# Patient Record
Sex: FEMALE | Race: BLACK OR AFRICAN AMERICAN | NOT HISPANIC OR LATINO | URBAN - METROPOLITAN AREA
[De-identification: names, ages, dates, MRNs, and addresses within clinical notes are randomized per-mention and may not be internally consistent; named-entity substitution may affect disease eponyms.]

---

## 2018-06-08 ENCOUNTER — EMERGENCY (EMERGENCY)
Facility: HOSPITAL | Age: 83
LOS: 0 days | Discharge: HOME | End: 2018-06-09
Attending: EMERGENCY MEDICINE
Payer: COMMERCIAL

## 2018-06-08 VITALS
WEIGHT: 139.99 LBS | HEIGHT: 65 IN | RESPIRATION RATE: 17 BRPM | HEART RATE: 61 BPM | SYSTOLIC BLOOD PRESSURE: 123 MMHG | OXYGEN SATURATION: 100 % | DIASTOLIC BLOOD PRESSURE: 72 MMHG | TEMPERATURE: 97 F

## 2018-06-08 DIAGNOSIS — I25.2 OLD MYOCARDIAL INFARCTION: ICD-10-CM

## 2018-06-08 DIAGNOSIS — I71.4 ABDOMINAL AORTIC ANEURYSM, WITHOUT RUPTURE: ICD-10-CM

## 2018-06-08 DIAGNOSIS — K59.00 CONSTIPATION, UNSPECIFIED: ICD-10-CM

## 2018-06-08 DIAGNOSIS — E78.5 HYPERLIPIDEMIA, UNSPECIFIED: ICD-10-CM

## 2018-06-08 DIAGNOSIS — I25.10 ATHEROSCLEROTIC HEART DISEASE OF NATIVE CORONARY ARTERY WITHOUT ANGINA PECTORIS: ICD-10-CM

## 2018-06-08 DIAGNOSIS — I10 ESSENTIAL (PRIMARY) HYPERTENSION: ICD-10-CM

## 2018-06-08 PROCEDURE — 74018 RADEX ABDOMEN 1 VIEW: CPT | Mod: 26

## 2018-06-08 PROCEDURE — 99283 EMERGENCY DEPT VISIT LOW MDM: CPT

## 2018-06-08 RX ORDER — POLYETHYLENE GLYCOL 3350 17 G/17G
17 POWDER, FOR SOLUTION ORAL ONCE
Qty: 0 | Refills: 0 | Status: COMPLETED | OUTPATIENT
Start: 2018-06-08 | End: 2018-06-08

## 2018-06-08 RX ORDER — SENNA PLUS 8.6 MG/1
2 TABLET ORAL ONCE
Qty: 0 | Refills: 0 | Status: COMPLETED | OUTPATIENT
Start: 2018-06-08 | End: 2018-06-08

## 2018-06-08 RX ORDER — DOCUSATE SODIUM 100 MG
100 CAPSULE ORAL ONCE
Qty: 0 | Refills: 0 | Status: COMPLETED | OUTPATIENT
Start: 2018-06-08 | End: 2018-06-08

## 2018-06-08 RX ADMIN — Medication 100 MILLIGRAM(S): at 22:27

## 2018-06-08 RX ADMIN — Medication 1 ENEMA: at 22:27

## 2018-06-08 RX ADMIN — POLYETHYLENE GLYCOL 3350 17 GRAM(S): 17 POWDER, FOR SOLUTION ORAL at 22:27

## 2018-06-08 RX ADMIN — SENNA PLUS 2 TABLET(S): 8.6 TABLET ORAL at 22:27

## 2018-06-08 NOTE — ED ADULT NURSE NOTE - PMH
AAA (abdominal aortic aneurysm)    Benign essential HTN    CAD (coronary artery disease)    HLD (hyperlipidemia)    Myocardial infarction

## 2018-06-08 NOTE — ED ADULT TRIAGE NOTE - NS ED TRIAGE AVPU SCALE
Alert-The patient is alert, awake and responds to voice. The patient is oriented to time, place, and person. The triage nurse is able to obtain subjective information. Hatchet Flap Text: The defect edges were debeveled with a #15 scalpel blade.  Given the location of the defect, shape of the defect and the proximity to free margins a hatchet flap was deemed most appropriate.  Using a sterile surgical marker, an appropriate hatchet flap was drawn incorporating the defect and placing the expected incisions within the relaxed skin tension lines where possible.    The area thus outlined was incised deep to adipose tissue with a #15 scalpel blade.  The skin margins were undermined to an appropriate distance in all directions utilizing iris scissors.

## 2018-06-08 NOTE — ED ADULT TRIAGE NOTE - PRO INTERPRETER NEED 2
09/12/17 1600   Psycho Education   Type of Intervention structured groups   Response unavailable   Hours 1   Treatment Detail dual group    Pt did not participate in dual group.    English

## 2018-06-09 VITALS
SYSTOLIC BLOOD PRESSURE: 152 MMHG | DIASTOLIC BLOOD PRESSURE: 86 MMHG | HEART RATE: 60 BPM | TEMPERATURE: 98 F | RESPIRATION RATE: 17 BRPM | OXYGEN SATURATION: 100 %

## 2018-06-09 NOTE — ED PROVIDER NOTE - MEDICAL DECISION MAKING DETAILS
Ddx: Constipation/ no vomiting, or abd pain to suggest SBO  Plan: KUB, senna, colace, fleet enema, possible manual disimpaction, reassess

## 2018-06-09 NOTE — ED PROVIDER NOTE - PROGRESS NOTE DETAILS
Pt had slight bowel movement with fleet enema. Manual disimpaction revealed dry and inspissated stool. Discomfort relieved, pt eager for discharge. KUB suggestive of high stool burden.

## 2018-06-09 NOTE — ED PROVIDER NOTE - OBJECTIVE STATEMENT
Pt is a 84 yo lady with a pmhx of HTN, HL, CAD, AAA sp repair who presents to the ED with 2 wks constipation. Has had prior constipation issues in the past, usually resolved with fleet enema, but this time did not resolve. No n/v/d, no chest pain, no sob. No abdominal pain. Normally goes once every day to 2 days. Not on bowel regimen. No hx of SBO.

## 2018-08-01 ENCOUNTER — INPATIENT (INPATIENT)
Facility: HOSPITAL | Age: 83
LOS: 7 days | Discharge: NURSING, REHAB OR LONG-TERM CARE | End: 2018-08-09
Attending: INTERNAL MEDICINE | Admitting: INTERNAL MEDICINE
Payer: COMMERCIAL

## 2018-08-01 VITALS
SYSTOLIC BLOOD PRESSURE: 117 MMHG | OXYGEN SATURATION: 99 % | HEIGHT: 69 IN | TEMPERATURE: 98 F | RESPIRATION RATE: 18 BRPM | WEIGHT: 139.99 LBS | HEART RATE: 86 BPM | DIASTOLIC BLOOD PRESSURE: 77 MMHG

## 2018-08-01 LAB
ALBUMIN SERPL ELPH-MCNC: 2.5 G/DL — LOW (ref 3.3–5)
ALP SERPL-CCNC: 75 U/L — SIGNIFICANT CHANGE UP (ref 40–120)
ALT FLD-CCNC: 8 U/L — LOW (ref 12–78)
ANION GAP SERPL CALC-SCNC: 17 MMOL/L — SIGNIFICANT CHANGE UP (ref 5–17)
APTT BLD: 27.6 SEC — SIGNIFICANT CHANGE UP (ref 27.5–37.4)
AST SERPL-CCNC: 22 U/L — SIGNIFICANT CHANGE UP (ref 15–37)
BASOPHILS # BLD AUTO: 0.03 K/UL — SIGNIFICANT CHANGE UP (ref 0–0.2)
BASOPHILS NFR BLD AUTO: 0.2 % — SIGNIFICANT CHANGE UP (ref 0–2)
BILIRUB SERPL-MCNC: 0.7 MG/DL — SIGNIFICANT CHANGE UP (ref 0.2–1.2)
BLD GP AB SCN SERPL QL: SIGNIFICANT CHANGE UP
BUN SERPL-MCNC: 55 MG/DL — HIGH (ref 7–23)
CALCIUM SERPL-MCNC: 9.1 MG/DL — SIGNIFICANT CHANGE UP (ref 8.5–10.1)
CHLORIDE SERPL-SCNC: 98 MMOL/L — SIGNIFICANT CHANGE UP (ref 96–108)
CK MB BLD-MCNC: <3.1 % — SIGNIFICANT CHANGE UP (ref 0–3.5)
CK MB CFR SERPL CALC: <1 NG/ML — SIGNIFICANT CHANGE UP (ref 0.5–3.6)
CK SERPL-CCNC: 32 U/L — SIGNIFICANT CHANGE UP (ref 26–192)
CO2 SERPL-SCNC: 24 MMOL/L — SIGNIFICANT CHANGE UP (ref 22–31)
CREAT SERPL-MCNC: 1.41 MG/DL — HIGH (ref 0.5–1.3)
EOSINOPHIL # BLD AUTO: 0.02 K/UL — SIGNIFICANT CHANGE UP (ref 0–0.5)
EOSINOPHIL NFR BLD AUTO: 0.1 % — SIGNIFICANT CHANGE UP (ref 0–6)
GLUCOSE SERPL-MCNC: 80 MG/DL — SIGNIFICANT CHANGE UP (ref 70–99)
HCT VFR BLD CALC: 35.9 % — SIGNIFICANT CHANGE UP (ref 34.5–45)
HGB BLD-MCNC: 11.5 G/DL — SIGNIFICANT CHANGE UP (ref 11.5–15.5)
IMM GRANULOCYTES NFR BLD AUTO: 0.8 % — SIGNIFICANT CHANGE UP (ref 0–1.5)
INR BLD: 1.12 RATIO — SIGNIFICANT CHANGE UP (ref 0.88–1.16)
LACTATE SERPL-SCNC: 1.4 MMOL/L — SIGNIFICANT CHANGE UP (ref 0.7–2)
LIDOCAIN IGE QN: 70 U/L — LOW (ref 73–393)
LYMPHOCYTES # BLD AUTO: 1.23 K/UL — SIGNIFICANT CHANGE UP (ref 1–3.3)
LYMPHOCYTES # BLD AUTO: 8.8 % — LOW (ref 13–44)
MCHC RBC-ENTMCNC: 28 PG — SIGNIFICANT CHANGE UP (ref 27–34)
MCHC RBC-ENTMCNC: 32 GM/DL — SIGNIFICANT CHANGE UP (ref 32–36)
MCV RBC AUTO: 87.6 FL — SIGNIFICANT CHANGE UP (ref 80–100)
MONOCYTES # BLD AUTO: 1.68 K/UL — HIGH (ref 0–0.9)
MONOCYTES NFR BLD AUTO: 12 % — SIGNIFICANT CHANGE UP (ref 2–14)
NEUTROPHILS # BLD AUTO: 10.96 K/UL — HIGH (ref 1.8–7.4)
NEUTROPHILS NFR BLD AUTO: 78.1 % — HIGH (ref 43–77)
NRBC # BLD: 0 /100 WBCS — SIGNIFICANT CHANGE UP (ref 0–0)
OB PNL STL: POSITIVE
OB PNL STL: POSITIVE
PLATELET # BLD AUTO: 310 K/UL — SIGNIFICANT CHANGE UP (ref 150–400)
POTASSIUM SERPL-MCNC: 3.5 MMOL/L — SIGNIFICANT CHANGE UP (ref 3.5–5.3)
POTASSIUM SERPL-SCNC: 3.5 MMOL/L — SIGNIFICANT CHANGE UP (ref 3.5–5.3)
PROT SERPL-MCNC: 7.9 GM/DL — SIGNIFICANT CHANGE UP (ref 6–8.3)
PROTHROM AB SERPL-ACNC: 12.2 SEC — SIGNIFICANT CHANGE UP (ref 9.8–12.7)
RBC # BLD: 4.1 M/UL — SIGNIFICANT CHANGE UP (ref 3.8–5.2)
RBC # FLD: 14 % — SIGNIFICANT CHANGE UP (ref 10.3–14.5)
SODIUM SERPL-SCNC: 139 MMOL/L — SIGNIFICANT CHANGE UP (ref 135–145)
TROPONIN I SERPL-MCNC: <.015 NG/ML — SIGNIFICANT CHANGE UP (ref 0.01–0.04)
WBC # BLD: 14.03 K/UL — HIGH (ref 3.8–10.5)
WBC # FLD AUTO: 14.03 K/UL — HIGH (ref 3.8–10.5)

## 2018-08-01 PROCEDURE — 71045 X-RAY EXAM CHEST 1 VIEW: CPT | Mod: 26

## 2018-08-01 PROCEDURE — 99285 EMERGENCY DEPT VISIT HI MDM: CPT

## 2018-08-01 PROCEDURE — 74174 CTA ABD&PLVS W/CONTRAST: CPT | Mod: 26

## 2018-08-01 RX ORDER — SODIUM CHLORIDE 9 MG/ML
3 INJECTION INTRAMUSCULAR; INTRAVENOUS; SUBCUTANEOUS ONCE
Qty: 0 | Refills: 0 | Status: COMPLETED | OUTPATIENT
Start: 2018-08-01 | End: 2018-08-01

## 2018-08-01 RX ORDER — SODIUM CHLORIDE 9 MG/ML
1000 INJECTION INTRAMUSCULAR; INTRAVENOUS; SUBCUTANEOUS ONCE
Qty: 0 | Refills: 0 | Status: COMPLETED | OUTPATIENT
Start: 2018-08-01 | End: 2018-08-01

## 2018-08-01 RX ADMIN — SODIUM CHLORIDE 1000 MILLILITER(S): 9 INJECTION INTRAMUSCULAR; INTRAVENOUS; SUBCUTANEOUS at 23:12

## 2018-08-01 RX ADMIN — SODIUM CHLORIDE 1000 MILLILITER(S): 9 INJECTION INTRAMUSCULAR; INTRAVENOUS; SUBCUTANEOUS at 21:30

## 2018-08-01 RX ADMIN — SODIUM CHLORIDE 3 MILLILITER(S): 9 INJECTION INTRAMUSCULAR; INTRAVENOUS; SUBCUTANEOUS at 21:30

## 2018-08-01 NOTE — ED ADULT NURSE NOTE - NSIMPLEMENTINTERV_GEN_ALL_ED
Implemented All Fall Risk Interventions:  [place] to call system. Call bell, personal items and telephone within reach. Instruct patient to call for assistance. Room bathroom lighting operational. Non-slip footwear when patient is off stretcher. Physically safe environment: no spills, clutter or unnecessary equipment. Stretcher in lowest position, wheels locked, appropriate side rails in place. Provide visual cue, wrist band, yellow gown, etc. Monitor gait and stability. Monitor for mental status changes and reorient to person, place, and time. Review medications for side effects contributing to fall risk. Reinforce activity limits and safety measures with patient and family.

## 2018-08-01 NOTE — ED PROVIDER NOTE - MEDICAL DECISION MAKING DETAILS
pt here with bloody stool and abdominal discomfort. will check labs, guaic, CT abd, likely from constipation, will give fleet enema

## 2018-08-01 NOTE — ED PROVIDER NOTE - PROGRESS NOTE DETAILS
endorsed by dr peña pending admission. CT with ++ diffuse constipation. Pt with drop in Hb but was given fluids. no acvtive BRB, but positive occult. disimpacted by dr peña but only soft small amount of stool with constant filling of vault. admit for serial disimpaction, hydration and monitoring of gi bleed. dr. wynn aware for admission.

## 2018-08-01 NOTE — ED PROVIDER NOTE - OBJECTIVE STATEMENT
84 y/o female with PMH HTN, HLD, CAD, AAA s/p repair here c/o bloody stool since last night. pt is poor historian. states she noticed there was bright red blood mixed in with her stool as well as abdominal discomfort, so her son called the ambulance. pt states she never had colonoscopy before. no fevers. no sick contacts. no recent travel. no vomiting.    ROS: No fever/chills. No eye pain/changes in vision, No ear pain/sore throat/dysphagia, No chest pain/palpitations. No SOB/cough/. ++ abdominal pain, no N/V/D, ++bloody bm. No dysuria/frequency/discharge, No headache. No Dizziness.    No rashes or breaks in skin. No numbness/tingling/weakness.

## 2018-08-01 NOTE — ED ADULT NURSE NOTE - ED STAT RN HANDOFF DETAILS
report given   no bleeding   vitals stable report given   no bleeding   vitals stable  patient had large loose brown bowel movement   made clean and dry

## 2018-08-01 NOTE — ED PROVIDER NOTE - ATTENDING CONTRIBUTION TO CARE
labs and imaging reviewed. Patient received ivfs and 2 fleet enemas. Patient also disimpacted with stool still in the vault. Patient pending admission. Patient care transitioned to incoming team.  All decisions regarding the progression of care will be made at their discretion.    Attending Physician: I have personally seen and examined the patient. I agree with the above history, physical and plan which I have reviewed and edited as appropriate.

## 2018-08-01 NOTE — ED ADULT TRIAGE NOTE - CHIEF COMPLAINT QUOTE
BIBA for 1 episode of bloody last night, stool mixed with stool, denies sob, goodwin, n/v/d, pain, cp.

## 2018-08-01 NOTE — ED PROVIDER NOTE - PHYSICAL EXAMINATION
Gen: Alert, NAD  Head: NC, AT, PERRL, EOMI, normal lids/conjunctiva  ENT: patent oropharynx without erythema/exudate, uvula midline  Neck: +supple, no tenderness/meningismus/JVD, +Trachea midline  Pulm: Bilateral BS, normal resp effort, no wheeze/stridor/retractions  CV: RRR, no M/R/G, +dist pulses  Abd: soft, ND, +BS, generalzied tenderness  rectal: no hemorrhoids, +impacted stool   Mskel: no edema/erythema/cyanosis  Skin: no rash  Neuro: AAOx3,

## 2018-08-02 DIAGNOSIS — I10 ESSENTIAL (PRIMARY) HYPERTENSION: ICD-10-CM

## 2018-08-02 DIAGNOSIS — K52.9 NONINFECTIVE GASTROENTERITIS AND COLITIS, UNSPECIFIED: ICD-10-CM

## 2018-08-02 DIAGNOSIS — I25.10 ATHEROSCLEROTIC HEART DISEASE OF NATIVE CORONARY ARTERY WITHOUT ANGINA PECTORIS: ICD-10-CM

## 2018-08-02 DIAGNOSIS — E78.5 HYPERLIPIDEMIA, UNSPECIFIED: ICD-10-CM

## 2018-08-02 DIAGNOSIS — K92.2 GASTROINTESTINAL HEMORRHAGE, UNSPECIFIED: ICD-10-CM

## 2018-08-02 LAB
BASOPHILS # BLD AUTO: 0.04 K/UL — SIGNIFICANT CHANGE UP (ref 0–0.2)
BASOPHILS NFR BLD AUTO: 0.3 % — SIGNIFICANT CHANGE UP (ref 0–2)
EOSINOPHIL # BLD AUTO: 0.03 K/UL — SIGNIFICANT CHANGE UP (ref 0–0.5)
EOSINOPHIL NFR BLD AUTO: 0.2 % — SIGNIFICANT CHANGE UP (ref 0–6)
HCT VFR BLD CALC: 32.2 % — LOW (ref 34.5–45)
HCT VFR BLD CALC: 32.9 % — LOW (ref 34.5–45)
HCT VFR BLD CALC: 33.1 % — LOW (ref 34.5–45)
HGB BLD-MCNC: 10.5 G/DL — LOW (ref 11.5–15.5)
HGB BLD-MCNC: 10.6 G/DL — LOW (ref 11.5–15.5)
HGB BLD-MCNC: 10.7 G/DL — LOW (ref 11.5–15.5)
IMM GRANULOCYTES NFR BLD AUTO: 1 % — SIGNIFICANT CHANGE UP (ref 0–1.5)
LYMPHOCYTES # BLD AUTO: 1.09 K/UL — SIGNIFICANT CHANGE UP (ref 1–3.3)
LYMPHOCYTES # BLD AUTO: 8.1 % — LOW (ref 13–44)
MCHC RBC-ENTMCNC: 28.5 PG — SIGNIFICANT CHANGE UP (ref 27–34)
MCHC RBC-ENTMCNC: 28.5 PG — SIGNIFICANT CHANGE UP (ref 27–34)
MCHC RBC-ENTMCNC: 28.7 PG — SIGNIFICANT CHANGE UP (ref 27–34)
MCHC RBC-ENTMCNC: 32 GM/DL — SIGNIFICANT CHANGE UP (ref 32–36)
MCHC RBC-ENTMCNC: 32.5 GM/DL — SIGNIFICANT CHANGE UP (ref 32–36)
MCHC RBC-ENTMCNC: 32.6 GM/DL — SIGNIFICANT CHANGE UP (ref 32–36)
MCV RBC AUTO: 87.5 FL — SIGNIFICANT CHANGE UP (ref 80–100)
MCV RBC AUTO: 88 FL — SIGNIFICANT CHANGE UP (ref 80–100)
MCV RBC AUTO: 89 FL — SIGNIFICANT CHANGE UP (ref 80–100)
MONOCYTES # BLD AUTO: 1.85 K/UL — HIGH (ref 0–0.9)
MONOCYTES NFR BLD AUTO: 13.8 % — SIGNIFICANT CHANGE UP (ref 2–14)
NEUTROPHILS # BLD AUTO: 10.28 K/UL — HIGH (ref 1.8–7.4)
NEUTROPHILS NFR BLD AUTO: 76.6 % — SIGNIFICANT CHANGE UP (ref 43–77)
NRBC # BLD: 0 /100 WBCS — SIGNIFICANT CHANGE UP (ref 0–0)
PLATELET # BLD AUTO: 275 K/UL — SIGNIFICANT CHANGE UP (ref 150–400)
PLATELET # BLD AUTO: 293 K/UL — SIGNIFICANT CHANGE UP (ref 150–400)
PLATELET # BLD AUTO: 306 K/UL — SIGNIFICANT CHANGE UP (ref 150–400)
RBC # BLD: 3.66 M/UL — LOW (ref 3.8–5.2)
RBC # BLD: 3.72 M/UL — LOW (ref 3.8–5.2)
RBC # BLD: 3.76 M/UL — LOW (ref 3.8–5.2)
RBC # FLD: 14.1 % — SIGNIFICANT CHANGE UP (ref 10.3–14.5)
RBC # FLD: 14.2 % — SIGNIFICANT CHANGE UP (ref 10.3–14.5)
RBC # FLD: 14.2 % — SIGNIFICANT CHANGE UP (ref 10.3–14.5)
WBC # BLD: 11.49 K/UL — HIGH (ref 3.8–10.5)
WBC # BLD: 13.42 K/UL — HIGH (ref 3.8–10.5)
WBC # BLD: 13.76 K/UL — HIGH (ref 3.8–10.5)
WBC # FLD AUTO: 11.49 K/UL — HIGH (ref 3.8–10.5)
WBC # FLD AUTO: 13.42 K/UL — HIGH (ref 3.8–10.5)
WBC # FLD AUTO: 13.76 K/UL — HIGH (ref 3.8–10.5)

## 2018-08-02 PROCEDURE — 93010 ELECTROCARDIOGRAM REPORT: CPT

## 2018-08-02 PROCEDURE — 99223 1ST HOSP IP/OBS HIGH 75: CPT

## 2018-08-02 RX ORDER — SODIUM CHLORIDE 9 MG/ML
1000 INJECTION INTRAMUSCULAR; INTRAVENOUS; SUBCUTANEOUS
Qty: 0 | Refills: 0 | Status: DISCONTINUED | OUTPATIENT
Start: 2018-08-02 | End: 2018-08-03

## 2018-08-02 RX ORDER — SIMVASTATIN 20 MG/1
20 TABLET, FILM COATED ORAL AT BEDTIME
Qty: 0 | Refills: 0 | Status: DISCONTINUED | OUTPATIENT
Start: 2018-08-02 | End: 2018-08-09

## 2018-08-02 RX ORDER — MULTIVIT WITH MIN/MFOLATE/K2 340-15/3 G
290 POWDER (GRAM) ORAL ONCE
Qty: 0 | Refills: 0 | Status: COMPLETED | OUTPATIENT
Start: 2018-08-02 | End: 2018-08-02

## 2018-08-02 RX ORDER — PANTOPRAZOLE SODIUM 20 MG/1
40 TABLET, DELAYED RELEASE ORAL EVERY 12 HOURS
Qty: 0 | Refills: 0 | Status: DISCONTINUED | OUTPATIENT
Start: 2018-08-02 | End: 2018-08-09

## 2018-08-02 RX ORDER — METRONIDAZOLE 500 MG
500 TABLET ORAL EVERY 8 HOURS
Qty: 0 | Refills: 0 | Status: DISCONTINUED | OUTPATIENT
Start: 2018-08-02 | End: 2018-08-09

## 2018-08-02 RX ORDER — CIPROFLOXACIN LACTATE 400MG/40ML
200 VIAL (ML) INTRAVENOUS EVERY 12 HOURS
Qty: 0 | Refills: 0 | Status: DISCONTINUED | OUTPATIENT
Start: 2018-08-02 | End: 2018-08-09

## 2018-08-02 RX ORDER — CIPROFLOXACIN LACTATE 400MG/40ML
VIAL (ML) INTRAVENOUS
Qty: 0 | Refills: 0 | Status: DISCONTINUED | OUTPATIENT
Start: 2018-08-02 | End: 2018-08-09

## 2018-08-02 RX ORDER — CIPROFLOXACIN LACTATE 400MG/40ML
400 VIAL (ML) INTRAVENOUS ONCE
Qty: 0 | Refills: 0 | Status: COMPLETED | OUTPATIENT
Start: 2018-08-02 | End: 2018-08-02

## 2018-08-02 RX ADMIN — Medication 200 MILLIGRAM(S): at 13:01

## 2018-08-02 RX ADMIN — Medication 1 ENEMA: at 00:10

## 2018-08-02 RX ADMIN — Medication 290 MILLILITER(S): at 17:19

## 2018-08-02 RX ADMIN — Medication 100 MILLIGRAM(S): at 23:38

## 2018-08-02 RX ADMIN — SODIUM CHLORIDE 1000 MILLILITER(S): 9 INJECTION INTRAMUSCULAR; INTRAVENOUS; SUBCUTANEOUS at 00:11

## 2018-08-02 RX ADMIN — SIMVASTATIN 20 MILLIGRAM(S): 20 TABLET, FILM COATED ORAL at 22:35

## 2018-08-02 RX ADMIN — SODIUM CHLORIDE 80 MILLILITER(S): 9 INJECTION INTRAMUSCULAR; INTRAVENOUS; SUBCUTANEOUS at 11:50

## 2018-08-02 RX ADMIN — Medication 100 MILLIGRAM(S): at 13:02

## 2018-08-02 RX ADMIN — Medication 100 MILLIGRAM(S): at 22:34

## 2018-08-02 RX ADMIN — SODIUM CHLORIDE 1000 MILLILITER(S): 9 INJECTION INTRAMUSCULAR; INTRAVENOUS; SUBCUTANEOUS at 02:12

## 2018-08-02 RX ADMIN — Medication 1 ENEMA: at 05:12

## 2018-08-02 RX ADMIN — PANTOPRAZOLE SODIUM 40 MILLIGRAM(S): 20 TABLET, DELAYED RELEASE ORAL at 17:20

## 2018-08-02 NOTE — ED ADULT NURSE REASSESSMENT NOTE - NS ED NURSE REASSESS COMMENT FT1
Spoke with son "Jose Alberto Chester" cell phone = [phone] home phone [phone]. reports patient had blood from rectum rather than bloody stool. Reports low dose aspiring but no blood thinners.
received patient resting no distress  vitals stable  no active bleeding noted

## 2018-08-02 NOTE — H&P ADULT - NSHPPHYSICALEXAM_GEN_ALL_CORE
GENERAL: NAD well-developed  HEAD:  Atraumatic, Normocephalic  EYES: EOMI, PERRLA, conjunctiva and sclera clear  ENMT: No tonsillar erythema, exudates, or enlargement; Moist mucous membranes, Good dentition, No lesions  NECK: Supple, No JVD, Normal thyroid  NERVOUS SYSTEM:  Alert & Oriented X3, Good concentration; Motor Strength 5/5 B/L upper and lower extremities; DTRs 2+ intact and symmetric  CHEST/LUNG: Clear to percussion bilaterally; No rales, rhonchi, wheezing, or rubs  HEART: Regular rate and rhythm; No murmurs, rubs, or gallops  ABDOMEN: Soft, Nontender, Nondistended; Bowel sounds present  EXTREMITIES:  2+ Peripheral Pulses, No clubbing, cyanosis, or edema  LYMPH: No lymphadenopathy   SKIN: No rashes or lesions GENERAL: NAD well-developed  HEAD:  Atraumatic, Normocephalic  EYES: EOMI, PERRLA, conjunctiva and sclera clear  ENMT: No tonsillar erythema, exudates, or enlargement; Moist mucous membranes, Good dentition, No lesions  NECK: Supple, No JVD, Normal thyroid  NERVOUS SYSTEM:  Alert & Oriented X3, Good concentration; Motor Strength 5/5 B/L upper and lower extremities; DTRs 2+ intact and symmetric  CHEST/LUNG: Clear to percussion bilaterally; No rales, rhonchi, wheezing, or rubs  HEART: Regular rate and rhythm; No murmurs, rubs, or gallops  ABDOMEN: Soft, lower abdominal tender, Nondistended; Bowel sounds present  EXTREMITIES:  2+ Peripheral Pulses, No clubbing, cyanosis, or edema  LYMPH: No lymphadenopathy   SKIN: No rashes or lesions

## 2018-08-02 NOTE — H&P ADULT - ASSESSMENT
86f with multiple medicql problems with bright red blood per rectum       IMPROVE VTE Individual Risk Assessment        RISK                                                          Points  [  ] Previous VTE                                                3  [  ] Thrombophilia                                             2  [  ] Lower limb paralysis                                   2        (unable to hold up >15 seconds)    [  ] Current Cancer                                            2         (within 6 months)  [  x] Immobilization > 24 hrs                              1  [  ] ICU/CCU stay > 24 hours                            1  [ x ] Age > 60                                                    1  IMPROVE VTE Score ___2_____

## 2018-08-02 NOTE — H&P ADULT - HISTORY OF PRESENT ILLNESS
86 y/o female with PMH HTN, HLD, CAD, AAA s/p repair here c/o bloody stool since last night. pt is poor historian. states she noticed there was bright red blood mixed in with her stool as well as abdominal discomfort, so her son called the ambulance. pt states she never had colonoscopy before. no fevers. no sick contacts. no recent travel. no vomiting. 84 y/o female with PMH HTN, HLD, CAD, AAA s/p rightepair here c/o bloody stool since last night. pt is poor historian. states she noticed there was bright red blood mixed in with her stool as well as abdominal discomfort, so her son called the ambulance. pt states she never had colonoscopy before. no fevers. no sick contacts. no recent travel. no vomiting.

## 2018-08-02 NOTE — H&P ADULT - NSHPREVIEWOFSYSTEMS_GEN_ALL_CORE
CONSTITUTIONAL: No fever, weight loss, or fatigue  EYES: No eye pain, visual disturbances, or discharge  ENMT:  No difficulty hearing, tinnitus, vertigo; No sinus or throat pain  NECK: No pain or stiffness  RESPIRATORY: No cough, wheezing, chills or hemoptysis; No shortness of breath  CARDIOVASCULAR: No chest pain, palpitations, dizziness, or leg swelling  GASTROINTESTINAL: No abdominal or epigastric pain. No nausea, vomiting, or hematemesis; No diarrhea or constipation. No melena or hematochezia.  GENITOURINARY: No dysuria, frequency, hematuria, or incontinence  NEUROLOGICAL: No headaches, memory loss, loss of strength, numbness, or tremors  SKIN: No itching, burning, rashes, or lesions   LYMPH NODES: No enlarged glands  ENDOCRINE: No heat or cold intolerance; No hair loss  MUSCULOSKELETAL: No joint pain or swelling; No muscle, back, or extremity pain  PSYCHIATRIC: No depression, anxiety, mood swings, or difficulty sleeping  HEME/LYMPH: No easy bruising, or bleeding gums  ALLERGY AND IMMUNOLOGIC: No hives or eczema CONSTITUTIONAL: fatigue  EYES: No eye pain, visual disturbances, or discharge  ENMT:  No difficulty hearing, tinnitus, vertigo; No sinus or throat pain  NECK: No pain or stiffness  RESPIRATORY: No cough, wheezing, chills or hemoptysis; No shortness of breath  CARDIOVASCULAR: No chest pain, palpitations, dizziness, or leg swelling  GASTROINTESTINAL :pos lower abdominal .pos  vomiting,  r hematochezia.  GENITOURINARY: No dysuria, frequency, hematuria, or incontinence  NEUROLOGICAL: No headaches, memory loss, loss of strength, numbness, or tremors  SKIN: No itching, burning, rashes, or lesions   LYMPH NODES: No enlarged glands  ENDOCRINE: No heat or cold intolerance; No hair loss  MUSCULOSKELETAL: No joint pain or swelling; No muscle, back, or extremity pain  PSYCHIATRIC: No depression, anxiety, mood swings, or difficulty sleeping  HEME/LYMPH: No easy bruising, or bleeding gums  ALLERGY AND IMMUNOLOGIC: No hives or eczema

## 2018-08-02 NOTE — CONSULT NOTE ADULT - SUBJECTIVE AND OBJECTIVE BOX
full consult dictated    Plan: Pt with probable colitis - cannot r/o neoplasm.  Will discuss possible colonoscopy with family. Continue antibx; will order laxatives; labs in AM

## 2018-08-02 NOTE — H&P ADULT - NSHPLABSRESULTS_GEN_ALL_CORE
10.6   13.42 )-----------( 275      ( 02 Aug 2018 02:05 )             33.1   08-01    139  |  98  |  55<H>  ----------------------------<  80  3.5   |  24  |  1.41<H>    Ca    9.1      01 Aug 2018 19:30    TPro  7.9  /  Alb  2.5<L>  /  TBili  0.7  /  DBili  x   /  AST  22  /  ALT  8<L>  /  AlkPhos  75  08-01  < from: Xray Chest 1 View- PORTABLE-Urgent (08.01.18 @ 23:55) >    Clear lungs. Tortuous aorta.    < from: CT Angio Abdomen and Pelvis w/ IV Cont (08.01.18 @ 22:21) >    IMPRESSION: Interval aortic endograft placement. No evidence of   extravasation. Small mural thrombus at the level ofrenal arteries.    Markedly distended rectosigmoid (10.1 x 9.3) with wall thickening at the   descending colon-sigmoid colon junction.

## 2018-08-03 LAB
ANION GAP SERPL CALC-SCNC: 15 MMOL/L — SIGNIFICANT CHANGE UP (ref 5–17)
BUN SERPL-MCNC: 32 MG/DL — HIGH (ref 7–23)
CALCIUM SERPL-MCNC: 8.1 MG/DL — LOW (ref 8.5–10.1)
CHLORIDE SERPL-SCNC: 105 MMOL/L — SIGNIFICANT CHANGE UP (ref 96–108)
CO2 SERPL-SCNC: 23 MMOL/L — SIGNIFICANT CHANGE UP (ref 22–31)
CREAT SERPL-MCNC: 1.14 MG/DL — SIGNIFICANT CHANGE UP (ref 0.5–1.3)
GLUCOSE SERPL-MCNC: 69 MG/DL — LOW (ref 70–99)
HCT VFR BLD CALC: 28.2 % — LOW (ref 34.5–45)
HCT VFR BLD CALC: 30.3 % — LOW (ref 34.5–45)
HGB BLD-MCNC: 9.3 G/DL — LOW (ref 11.5–15.5)
HGB BLD-MCNC: 9.7 G/DL — LOW (ref 11.5–15.5)
MCHC RBC-ENTMCNC: 28.4 PG — SIGNIFICANT CHANGE UP (ref 27–34)
MCHC RBC-ENTMCNC: 28.9 PG — SIGNIFICANT CHANGE UP (ref 27–34)
MCHC RBC-ENTMCNC: 32 GM/DL — SIGNIFICANT CHANGE UP (ref 32–36)
MCHC RBC-ENTMCNC: 33 GM/DL — SIGNIFICANT CHANGE UP (ref 32–36)
MCV RBC AUTO: 87.6 FL — SIGNIFICANT CHANGE UP (ref 80–100)
MCV RBC AUTO: 88.9 FL — SIGNIFICANT CHANGE UP (ref 80–100)
NRBC # BLD: 0 /100 WBCS — SIGNIFICANT CHANGE UP (ref 0–0)
NRBC # BLD: 0 /100 WBCS — SIGNIFICANT CHANGE UP (ref 0–0)
PLATELET # BLD AUTO: 260 K/UL — SIGNIFICANT CHANGE UP (ref 150–400)
PLATELET # BLD AUTO: 261 K/UL — SIGNIFICANT CHANGE UP (ref 150–400)
POTASSIUM SERPL-MCNC: 3.1 MMOL/L — LOW (ref 3.5–5.3)
POTASSIUM SERPL-SCNC: 3.1 MMOL/L — LOW (ref 3.5–5.3)
RBC # BLD: 3.22 M/UL — LOW (ref 3.8–5.2)
RBC # BLD: 3.41 M/UL — LOW (ref 3.8–5.2)
RBC # FLD: 14 % — SIGNIFICANT CHANGE UP (ref 10.3–14.5)
RBC # FLD: 14.3 % — SIGNIFICANT CHANGE UP (ref 10.3–14.5)
SODIUM SERPL-SCNC: 143 MMOL/L — SIGNIFICANT CHANGE UP (ref 135–145)
WBC # BLD: 11.38 K/UL — HIGH (ref 3.8–10.5)
WBC # BLD: 11.96 K/UL — HIGH (ref 3.8–10.5)
WBC # FLD AUTO: 11.38 K/UL — HIGH (ref 3.8–10.5)
WBC # FLD AUTO: 11.96 K/UL — HIGH (ref 3.8–10.5)

## 2018-08-03 PROCEDURE — 99233 SBSQ HOSP IP/OBS HIGH 50: CPT

## 2018-08-03 RX ORDER — DOCUSATE SODIUM 100 MG
100 CAPSULE ORAL THREE TIMES A DAY
Qty: 0 | Refills: 0 | Status: DISCONTINUED | OUTPATIENT
Start: 2018-08-03 | End: 2018-08-09

## 2018-08-03 RX ORDER — SODIUM CHLORIDE 9 MG/ML
1000 INJECTION, SOLUTION INTRAVENOUS
Qty: 0 | Refills: 0 | Status: DISCONTINUED | OUTPATIENT
Start: 2018-08-03 | End: 2018-08-09

## 2018-08-03 RX ORDER — DOCUSATE SODIUM 100 MG
100 CAPSULE ORAL THREE TIMES A DAY
Qty: 0 | Refills: 0 | Status: DISCONTINUED | OUTPATIENT
Start: 2018-08-03 | End: 2018-08-03

## 2018-08-03 RX ORDER — SENNA PLUS 8.6 MG/1
2 TABLET ORAL AT BEDTIME
Qty: 0 | Refills: 0 | Status: DISCONTINUED | OUTPATIENT
Start: 2018-08-03 | End: 2018-08-03

## 2018-08-03 RX ORDER — SENNA PLUS 8.6 MG/1
2 TABLET ORAL AT BEDTIME
Qty: 0 | Refills: 0 | Status: DISCONTINUED | OUTPATIENT
Start: 2018-08-03 | End: 2018-08-09

## 2018-08-03 RX ADMIN — Medication 100 MILLIGRAM(S): at 13:09

## 2018-08-03 RX ADMIN — Medication 100 MILLIGRAM(S): at 22:22

## 2018-08-03 RX ADMIN — SIMVASTATIN 20 MILLIGRAM(S): 20 TABLET, FILM COATED ORAL at 22:22

## 2018-08-03 RX ADMIN — Medication 100 MILLIGRAM(S): at 05:43

## 2018-08-03 RX ADMIN — SODIUM CHLORIDE 80 MILLILITER(S): 9 INJECTION, SOLUTION INTRAVENOUS at 13:11

## 2018-08-03 RX ADMIN — PANTOPRAZOLE SODIUM 40 MILLIGRAM(S): 20 TABLET, DELAYED RELEASE ORAL at 05:44

## 2018-08-03 RX ADMIN — Medication 100 MILLIGRAM(S): at 11:02

## 2018-08-03 RX ADMIN — PANTOPRAZOLE SODIUM 40 MILLIGRAM(S): 20 TABLET, DELAYED RELEASE ORAL at 17:20

## 2018-08-03 RX ADMIN — SENNA PLUS 2 MILLILITER(S): 8.6 TABLET ORAL at 22:21

## 2018-08-03 RX ADMIN — SODIUM CHLORIDE 80 MILLILITER(S): 9 INJECTION INTRAMUSCULAR; INTRAVENOUS; SUBCUTANEOUS at 05:44

## 2018-08-03 NOTE — SWALLOW BEDSIDE ASSESSMENT ADULT - SLP GENERAL OBSERVATIONS
pt seen bedside, alert and oriented x3.  She was able to answer simple autobiographical/want questions and follow one step directions.  Noted strained/strangled vocal quality.  Son present.

## 2018-08-03 NOTE — DIETITIAN INITIAL EVALUATION ADULT. - PERTINENT LABORATORY DATA
08-03 Na 143 mmol/L Glu 69 mg/dL<L> K+ 3.1 mmol/L<L> Cr  1.14 mg/dL BUN 32 mg/dL<H> Phos n/a   Alb n/a   PAB n/a   Hgb 9.7 g/dL<L> Hct 30.3 %<L>, WBC=11.96(8/3), Alb=2.5(8/1)

## 2018-08-03 NOTE — DIETITIAN INITIAL EVALUATION ADULT. - ORAL INTAKE PTA
Pt states poor po intake <50% nutritional needs x 2 months; "Pt states I've been eating nothing". Breakfast; coffee/poor

## 2018-08-03 NOTE — SWALLOW BEDSIDE ASSESSMENT ADULT - SWALLOW EVAL: DIAGNOSIS
pt presented with oropharyngeal dysphagia marked by decreased A-P transport of bolus causing oral residue with puree consistency, slow oral transit, suspect delay in swallow trigger, and reduced laryngeal elevation.  No overt signs of aspiration noted.

## 2018-08-03 NOTE — DIETITIAN INITIAL EVALUATION ADULT. - OTHER INFO
Pt seen for RN consult 8/3 for Low BMI.  Pt lives with son & grand son; pt receives MOW however reports poor po intake & abdominal discomfort x 2 months with sign wt loss.  Pt reports increased difficulty swallowing x 1 month, observed pt drinking water without difficulty & no coughing or choking. Pt c/o constipation; no further rectal bleeding, last BM x1(small) (8/3). Pt seen for RN consult 8/3 for Low BMI.  Pt lives with son & grand son; pt receives MOW however reports poor po intake & abdominal discomfort x 2 months with sign wt loss.  Pt reports increased difficulty swallowing x 1 month, observed pt drinking water without difficulty & no coughing or choking. Pt eating & drinking poorly noted low Glucose level; RN notified & recommend IVF D5 1/2. Pt c/o constipation; no further rectal bleeding, last BM x1(small) (8/3).

## 2018-08-03 NOTE — CHART NOTE - NSCHARTNOTEFT_GEN_A_CORE
Upon Nutritional Assessment by the Registered Dietitian your patient was determined to meet criteria / has evidence of the following diagnosis/diagnoses:          [ ]  Mild Protein Calorie Malnutrition        [ ]  Moderate Protein Calorie Malnutrition        [x ] Severe Protein Calorie Malnutrition        [ ] Unspecified Protein Calorie Malnutrition        [ ] Underweight / BMI <19        [ ] Morbid Obesity / BMI > 40      Findings as based on:  •  Comprehensive nutrition assessment and consultation  •  Calorie counts (nutrient intake analysis)  •  Food acceptance and intake status from observations by staff  •  Follow up  •  Patient education  •  Intervention secondary to interdisciplinary rounds  •   concerns      Treatment:    The following diet has been recommended:  Adv po diet when medically feasible pending swallow evaluation     PROVIDER Section:     By signing this assessment you are acknowledging and agree with the diagnosis/diagnoses assigned by the Registered Dietitian    Comments:

## 2018-08-03 NOTE — DIETITIAN INITIAL EVALUATION ADULT. - SIGNS/SYMPTOMS
sign wt loss 18% x 3months; po intake<50% nutr'l needs x 2 months; severe/moderate fat & muscle loss

## 2018-08-03 NOTE — SWALLOW BEDSIDE ASSESSMENT ADULT - SWALLOW EVAL: RECOMMENDED FEEDING/EATING TECHNIQUES
maintain upright posture during/after eating for 30 mins/check mouth frequently for oral residue/pocketing/Required reminder to clear oral residue

## 2018-08-03 NOTE — DIETITIAN INITIAL EVALUATION ADULT. - PHYSICAL APPEARANCE
underweight/debilitated/BMI=20.4; no edema; Nutrition focused physical exam conducted; Subcutaneous fat loss: [moderate ] Orbital fat pads region, [moderate ]Buccal fat region, [severe ]Triceps region,  [unable ]Ribs region.  Muscle wasting: [severe ]Temples region, [severe ]Clavicle region, [moderate ]Shoulder region, [unable ]Scapula region, [moderate ]Interosseous region,  [moderate ]thigh region, [severe ]Calf region

## 2018-08-03 NOTE — DIETITIAN INITIAL EVALUATION ADULT. - NS AS NUTRI INTERV MEALS SNACK
Recommend adv po diet pend swallow evaluation/Other (specify) Recommend adv po diet when medically feasible pend swallow evaluation/Other (specify)

## 2018-08-03 NOTE — SWALLOW BEDSIDE ASSESSMENT ADULT - SPECIFY REASON(S)
MD order stated history of dysphagia. Discussed with RN, okay to give po trials MD order stated history of dysphagia. Discussed with PA and RN, okay to give po trials

## 2018-08-03 NOTE — SWALLOW BEDSIDE ASSESSMENT ADULT - ORAL PHASE
Oral stasis/Decreased anterior-posterior movement of the bolus/Delayed oral transit time Delayed oral transit time

## 2018-08-03 NOTE — SWALLOW BEDSIDE ASSESSMENT ADULT - SWALLOW EVAL: PATIENT/FAMILY GOALS STATEMENT
Son stated that she has decreased appetite for 1 month. Son stated that she has decreased appetite for 1 month. Son report as per doctor pt not eating 2/2 reduced activity ie. walking. Son denied pt coughing while eating.

## 2018-08-03 NOTE — PROGRESS NOTE ADULT - SUBJECTIVE AND OBJECTIVE BOX
No more rectal bleeding - slight decrease in H/H      MEDICATIONS  (STANDING):  ciprofloxacin   IVPB 200 milliGRAM(s) IV Intermittent every 12 hours  ciprofloxacin   IVPB      metroNIDAZOLE  IVPB 500 milliGRAM(s) IV Intermittent every 8 hours  pantoprazole  Injectable 40 milliGRAM(s) IV Push every 12 hours  simvastatin 20 milliGRAM(s) Oral at bedtime  sodium chloride 0.9%. 1000 milliLiter(s) (80 mL/Hr) IV Continuous <Continuous>    MEDICATIONS  (PRN):      Allergies    No Known Allergies    Intolerances        Vital Signs Last 24 Hrs  T(C): 36.4 (03 Aug 2018 05:13), Max: 36.8 (02 Aug 2018 10:12)  T(F): 97.6 (03 Aug 2018 05:13), Max: 98.3 (02 Aug 2018 10:12)  HR: 70 (03 Aug 2018 05:13) (69 - 82)  BP: 124/67 (03 Aug 2018 05:13) (111/66 - 141/81)  BP(mean): --  RR: 16 (03 Aug 2018 05:13) (16 - 19)  SpO2: 98% (03 Aug 2018 05:13) (96% - 100%)    PHYSICAL EXAM:  General: NAD.  CVS: S1, S2  Chest: air entry bilaterally present  Abd: BS present, soft, non-tender      LABS:                        9.7    11.96 )-----------( 261      ( 03 Aug 2018 08:17 )             30.3     08-03    143  |  105  |  32<H>  ----------------------------<  69<L>  3.1<L>   |  23  |  1.14    Ca    8.1<L>      03 Aug 2018 08:17    TPro  7.9  /  Alb  2.5<L>  /  TBili  0.7  /  DBili  x   /  AST  22  /  ALT  8<L>  /  AlkPhos  75  08-01    PT/INR - ( 01 Aug 2018 19:30 )   PT: 12.2 sec;   INR: 1.12 ratio         PTT - ( 01 Aug 2018 19:30 )  PTT:27.6 sec      follow CBC  clear liquids  if bleeding persists consider colonoscopy

## 2018-08-03 NOTE — DIETITIAN INITIAL EVALUATION ADULT. - FACTORS AFF FOOD INTAKE
+abdominal discomfort x 2 months/persistent constipation/pain/difficulty swallowing +abdominal discomfort x 2 months; hypoactive bowels/difficulty swallowing/persistent constipation/pain

## 2018-08-03 NOTE — PROGRESS NOTE ADULT - SUBJECTIVE AND OBJECTIVE BOX
CHIEF COMPLAINT/INTERVAL HISTORY:    Patient is a 85y old  Female who presents with a chief complaint of gastroenterologist bleed (02 Aug 2018 10:54)      HPI:  86 y/o female with PMH HTN, HLD, CAD, AAA s/p rightepair here c/o bloody stool since last night. pt is poor historian. states she noticed there was bright red blood mixed in with her stool as well as abdominal discomfort, so her son called the ambulance. pt states she never had colonoscopy before. no fevers. no sick contacts. no recent travel. no vomiting. (02 Aug 2018 10:54)    Overnight issues  upper epigastric discomfort 	with vomiting x1             SUBJECTIVE & OBJECTIVE: Pt seen and examined at bedside.   ROS:  CONSTITUTIONAL: No fever, weight loss, or fatigue  NECK: No pain or stiffness  RESPIRATORY: No cough, wheezing, chills or hemoptysis; No shortness of breath  CARDIOVASCULAR: No chest pain, palpitations, dizziness, or leg swelling  GASTROINTESTINAL: as above  GENITOURINARY: No dysuria, frequency, hematuria, or incontinence  NEUROLOGICAL: No headaches, memory loss, loss of strength, numbness, or tremors  SKIN: No itching, burning, rashes, or lesions   ICU Vital Signs Last 24 Hrs  T(C): 37 (03 Aug 2018 11:11), Max: 37 (03 Aug 2018 11:11)  T(F): 98.6 (03 Aug 2018 11:11), Max: 98.6 (03 Aug 2018 11:11)  HR: 67 (03 Aug 2018 11:11) (67 - 74)  BP: 123/76 (03 Aug 2018 11:11) (111/66 - 128/69)  BP(mean): --  ABP: --  ABP(mean): --  RR: 16 (03 Aug 2018 11:11) (16 - 16)  SpO2: 99% (03 Aug 2018 11:11) (96% - 100%)        MEDICATIONS  (STANDING):  ciprofloxacin   IVPB 200 milliGRAM(s) IV Intermittent every 12 hours  ciprofloxacin   IVPB      dextrose 5% + sodium chloride 0.9%. 1000 milliLiter(s) (80 mL/Hr) IV Continuous <Continuous>  docusate sodium 100 milliGRAM(s) Oral three times a day  metroNIDAZOLE  IVPB 500 milliGRAM(s) IV Intermittent every 8 hours  pantoprazole  Injectable 40 milliGRAM(s) IV Push every 12 hours  senna 2 Tablet(s) Oral at bedtime  simvastatin 20 milliGRAM(s) Oral at bedtime    MEDICATIONS  (PRN):        PHYSICAL EXAM:    GENERAL: NAD, well-groomed, well-developed  HEAD:  Atraumatic, Normocephalic  EYES: EOMI, PERRLA, conjunctiva and sclera clear  ENMT: Moist mucous membranes  NECK: Supple, No JVD  NERVOUS SYSTEM:  Alert & Oriented X3, Motor Strength 5/5 B/L upper and lower extremities; DTRs 2+ intact and symmetric  CHEST/LUNG: Clear to auscultation bilaterally; No rales, rhonchi, wheezing, or rubs  HEART: Regular rate and rhythm; No murmurs, rubs, or gallops  ABDOMEN: Soft, Nontender, Nondistended; Bowel sounds present  EXTREMITIES:  2+ Peripheral Pulses, No clubbing, cyanosis, or edema    LABS:                        9.7    11.96 )-----------( 261      ( 03 Aug 2018 08:17 )             30.3     08-03    143  |  105  |  32<H>  ----------------------------<  69<L>  3.1<L>   |  23  |  1.14    Ca    8.1<L>      03 Aug 2018 08:17    TPro  7.9  /  Alb  2.5<L>  /  TBili  0.7  /  DBili  x   /  AST  22  /  ALT  8<L>  /  AlkPhos  75  08-01    PT/INR - ( 01 Aug 2018 19:30 )   PT: 12.2 sec;   INR: 1.12 ratio         PTT - ( 01 Aug 2018 19:30 )  PTT:27.6 sec      CAPILLARY BLOOD GLUCOSE          RECENT CULTURES:      RADIOLOGY & ADDITIONAL TESTS:  Imaging Personally Reviewed:  [ ] YES      Consultant(s) Notes Reviewed:  [ ] YES     Care Discussed with [ ] Consultants [X ] Patient [ ] Family  [x ]    [x ]  Other; RN  HEALTH ISSUES - PROBLEM Dx:  Colitis: Colitis  Hyperlipidemia, unspecified hyperlipidemia type: Hyperlipidemia, unspecified hyperlipidemia type  Benign essential HTN: Benign essential HTN  CAD (coronary artery disease): CAD (coronary artery disease)  Gastrointestinal hemorrhage, unspecified gastrointestinal hemorrhage type: Gastrointestinal hemorrhage, unspecified gastrointestinal hemorrhage type        DVT/GI ppx  Discussed with pt @ bedside

## 2018-08-03 NOTE — DIETITIAN INITIAL EVALUATION ADULT. - NS AS NUTRI INTERV COLLABORAT
Collaboration with other providers/Recommend palliative care consult & IVF D5 1/2 & recommend POCT due to hypoglycemia this am & swallow evaluation

## 2018-08-03 NOTE — SWALLOW BEDSIDE ASSESSMENT ADULT - H & P REVIEW
86 y/o female with PMH HTN, HLD, CAD, AAA s/p rightepair here c/o bloody stool since last night. pt is poor historian. states she noticed there was bright red blood mixed in with her stool as well as abdominal discomfort, so her son called the ambulance. pt states she never had colonoscopy before. no fevers. no sick contacts. no recent travel. no vomiting./yes

## 2018-08-04 DIAGNOSIS — E87.6 HYPOKALEMIA: ICD-10-CM

## 2018-08-04 LAB
ANION GAP SERPL CALC-SCNC: 8 MMOL/L — SIGNIFICANT CHANGE UP (ref 5–17)
BUN SERPL-MCNC: 19 MG/DL — SIGNIFICANT CHANGE UP (ref 7–23)
CALCIUM SERPL-MCNC: 7.6 MG/DL — LOW (ref 8.5–10.1)
CHLORIDE SERPL-SCNC: 108 MMOL/L — SIGNIFICANT CHANGE UP (ref 96–108)
CO2 SERPL-SCNC: 27 MMOL/L — SIGNIFICANT CHANGE UP (ref 22–31)
CREAT SERPL-MCNC: 1.09 MG/DL — SIGNIFICANT CHANGE UP (ref 0.5–1.3)
GLUCOSE SERPL-MCNC: 128 MG/DL — HIGH (ref 70–99)
HCT VFR BLD CALC: 29 % — LOW (ref 34.5–45)
HGB BLD-MCNC: 9.4 G/DL — LOW (ref 11.5–15.5)
MCHC RBC-ENTMCNC: 28.6 PG — SIGNIFICANT CHANGE UP (ref 27–34)
MCHC RBC-ENTMCNC: 32.4 GM/DL — SIGNIFICANT CHANGE UP (ref 32–36)
MCV RBC AUTO: 88.1 FL — SIGNIFICANT CHANGE UP (ref 80–100)
NRBC # BLD: 0 /100 WBCS — SIGNIFICANT CHANGE UP (ref 0–0)
PLATELET # BLD AUTO: 273 K/UL — SIGNIFICANT CHANGE UP (ref 150–400)
POTASSIUM SERPL-MCNC: 2.7 MMOL/L — CRITICAL LOW (ref 3.5–5.3)
POTASSIUM SERPL-SCNC: 2.7 MMOL/L — CRITICAL LOW (ref 3.5–5.3)
RBC # BLD: 3.29 M/UL — LOW (ref 3.8–5.2)
RBC # FLD: 14.2 % — SIGNIFICANT CHANGE UP (ref 10.3–14.5)
SODIUM SERPL-SCNC: 143 MMOL/L — SIGNIFICANT CHANGE UP (ref 135–145)
WBC # BLD: 10.98 K/UL — HIGH (ref 3.8–10.5)
WBC # FLD AUTO: 10.98 K/UL — HIGH (ref 3.8–10.5)

## 2018-08-04 PROCEDURE — 99233 SBSQ HOSP IP/OBS HIGH 50: CPT

## 2018-08-04 RX ORDER — POTASSIUM CHLORIDE 20 MEQ
10 PACKET (EA) ORAL
Qty: 0 | Refills: 0 | Status: COMPLETED | OUTPATIENT
Start: 2018-08-04 | End: 2018-08-04

## 2018-08-04 RX ORDER — POTASSIUM CHLORIDE 20 MEQ
40 PACKET (EA) ORAL ONCE
Qty: 0 | Refills: 0 | Status: COMPLETED | OUTPATIENT
Start: 2018-08-04 | End: 2018-08-04

## 2018-08-04 RX ORDER — PETROLATUM,WHITE
1 JELLY (GRAM) TOPICAL
Qty: 0 | Refills: 0 | Status: DISCONTINUED | OUTPATIENT
Start: 2018-08-04 | End: 2018-08-09

## 2018-08-04 RX ADMIN — Medication 100 MILLIGRAM(S): at 13:15

## 2018-08-04 RX ADMIN — Medication 100 MILLIEQUIVALENT(S): at 09:05

## 2018-08-04 RX ADMIN — PANTOPRAZOLE SODIUM 40 MILLIGRAM(S): 20 TABLET, DELAYED RELEASE ORAL at 05:21

## 2018-08-04 RX ADMIN — Medication 100 MILLIGRAM(S): at 05:19

## 2018-08-04 RX ADMIN — SENNA PLUS 2 MILLILITER(S): 8.6 TABLET ORAL at 22:09

## 2018-08-04 RX ADMIN — Medication 1 APPLICATION(S): at 22:08

## 2018-08-04 RX ADMIN — PANTOPRAZOLE SODIUM 40 MILLIGRAM(S): 20 TABLET, DELAYED RELEASE ORAL at 17:02

## 2018-08-04 RX ADMIN — Medication 100 MILLIEQUIVALENT(S): at 11:46

## 2018-08-04 RX ADMIN — Medication 100 MILLIGRAM(S): at 22:22

## 2018-08-04 RX ADMIN — Medication 100 MILLIGRAM(S): at 13:16

## 2018-08-04 RX ADMIN — Medication 100 MILLIEQUIVALENT(S): at 10:22

## 2018-08-04 RX ADMIN — Medication 40 MILLIEQUIVALENT(S): at 09:05

## 2018-08-04 RX ADMIN — Medication 100 MILLIGRAM(S): at 05:20

## 2018-08-04 RX ADMIN — Medication 100 MILLIGRAM(S): at 13:17

## 2018-08-04 RX ADMIN — Medication 100 MILLIGRAM(S): at 22:09

## 2018-08-04 RX ADMIN — SIMVASTATIN 20 MILLIGRAM(S): 20 TABLET, FILM COATED ORAL at 22:10

## 2018-08-04 RX ADMIN — Medication 100 MILLIGRAM(S): at 00:55

## 2018-08-04 RX ADMIN — SODIUM CHLORIDE 80 MILLILITER(S): 9 INJECTION, SOLUTION INTRAVENOUS at 05:19

## 2018-08-04 NOTE — PHYSICAL THERAPY INITIAL EVALUATION ADULT - GENERAL OBSERVATIONS, REHAB EVAL
Pt on room air. Pt received supine in bed. Pt left supine in bed with all needs in reach, NAD, in need of hygienic, RN and PCT made aware.

## 2018-08-04 NOTE — PHYSICAL THERAPY INITIAL EVALUATION ADULT - LIVES WITH, PROFILE
Pt lives in an apt with son. Ambulates using a rolling walker, but reports being too depressed to get out of bed recently.

## 2018-08-04 NOTE — PROGRESS NOTE ADULT - SUBJECTIVE AND OBJECTIVE BOX
Patient is a 85y old  Female who presents with a chief complaint of gastroenterologist bleed (02 Aug 2018 10:54)      INTERVAL HPI/OVERNIGHT EVENTS: Patient seen and examined at bed side in NAD.   K low-on supplement, poor po intake due to abdominal pain.     MEDICATIONS  (STANDING):  ciprofloxacin   IVPB 200 milliGRAM(s) IV Intermittent every 12 hours  ciprofloxacin   IVPB      dextrose 5% + sodium chloride 0.9%. 1000 milliLiter(s) (80 mL/Hr) IV Continuous <Continuous>  docusate sodium Liquid 100 milliGRAM(s) Oral three times a day  metroNIDAZOLE  IVPB 500 milliGRAM(s) IV Intermittent every 8 hours  pantoprazole  Injectable 40 milliGRAM(s) IV Push every 12 hours  potassium chloride  10 mEq/100 mL IVPB 10 milliEquivalent(s) IV Intermittent every 1 hour  senna Syrup 2 milliLiter(s) Oral at bedtime  simvastatin 20 milliGRAM(s) Oral at bedtime    MEDICATIONS  (PRN):      Allergies    No Known Allergies    Intolerances        Vital Signs Last 24 Hrs  T(C): 36.5 (04 Aug 2018 05:27), Max: 36.7 (04 Aug 2018 00:20)  T(F): 97.7 (04 Aug 2018 05:27), Max: 98.1 (04 Aug 2018 00:20)  HR: 67 (04 Aug 2018 05:27) (67 - 76)  BP: 124/68 (04 Aug 2018 05:27) (109/69 - 124/68)  BP(mean): --  RR: 16 (04 Aug 2018 05:27) (16 - 16)  SpO2: 94% (04 Aug 2018 05:27) (94% - 97%)    PHYSICAL EXAM:  GENERAL: elderly female in bed in [facility]  HEENT:  Atraumatic, Normocephalic, EOMI, PERRLA,  Moist mucous membranes  NECK: Supple, No JVD  NERVOUS SYSTEM:  Alert & Oriented X3, no focal deficit  CHEST/LUNG: Clear to percussion bilaterally; No rales, rhonchi  HEART: Regular rate and rhythm; No murmurs  ABDOMEN: Soft, mild diffuse tenderness on palpation, Nondistended; Bowel sounds present  EXTREMITIES:  no edema    LABS:                        9.4    10.98 )-----------( 273      ( 04 Aug 2018 07:28 )             29.0     08-04    143  |  108  |  19  ----------------------------<  128<H>  2.7<LL>   |  27  |  1.09    Ca    7.6<L>      04 Aug 2018 07:28

## 2018-08-05 DIAGNOSIS — E83.39 OTHER DISORDERS OF PHOSPHORUS METABOLISM: ICD-10-CM

## 2018-08-05 DIAGNOSIS — F32.9 MAJOR DEPRESSIVE DISORDER, SINGLE EPISODE, UNSPECIFIED: ICD-10-CM

## 2018-08-05 LAB
ALBUMIN SERPL ELPH-MCNC: 1.9 G/DL — LOW (ref 3.3–5)
ALP SERPL-CCNC: 55 U/L — SIGNIFICANT CHANGE UP (ref 40–120)
ALT FLD-CCNC: <6 U/L — LOW (ref 12–78)
ANION GAP SERPL CALC-SCNC: 11 MMOL/L — SIGNIFICANT CHANGE UP (ref 5–17)
AST SERPL-CCNC: 16 U/L — SIGNIFICANT CHANGE UP (ref 15–37)
BILIRUB SERPL-MCNC: 0.5 MG/DL — SIGNIFICANT CHANGE UP (ref 0.2–1.2)
BUN SERPL-MCNC: 12 MG/DL — SIGNIFICANT CHANGE UP (ref 7–23)
CALCIUM SERPL-MCNC: 7.7 MG/DL — LOW (ref 8.5–10.1)
CHLORIDE SERPL-SCNC: 109 MMOL/L — HIGH (ref 96–108)
CO2 SERPL-SCNC: 23 MMOL/L — SIGNIFICANT CHANGE UP (ref 22–31)
CREAT SERPL-MCNC: 0.91 MG/DL — SIGNIFICANT CHANGE UP (ref 0.5–1.3)
GLUCOSE SERPL-MCNC: 113 MG/DL — HIGH (ref 70–99)
HCT VFR BLD CALC: 28 % — LOW (ref 34.5–45)
HGB BLD-MCNC: 9.2 G/DL — LOW (ref 11.5–15.5)
MAGNESIUM SERPL-MCNC: 1.8 MG/DL — SIGNIFICANT CHANGE UP (ref 1.6–2.6)
MCHC RBC-ENTMCNC: 29 PG — SIGNIFICANT CHANGE UP (ref 27–34)
MCHC RBC-ENTMCNC: 32.9 GM/DL — SIGNIFICANT CHANGE UP (ref 32–36)
MCV RBC AUTO: 88.3 FL — SIGNIFICANT CHANGE UP (ref 80–100)
NRBC # BLD: 0 /100 WBCS — SIGNIFICANT CHANGE UP (ref 0–0)
PHOSPHATE SERPL-MCNC: 0.4 MG/DL — CRITICAL LOW (ref 2.5–4.5)
PLATELET # BLD AUTO: 254 K/UL — SIGNIFICANT CHANGE UP (ref 150–400)
POTASSIUM SERPL-MCNC: 3.1 MMOL/L — LOW (ref 3.5–5.3)
POTASSIUM SERPL-SCNC: 3.1 MMOL/L — LOW (ref 3.5–5.3)
PROT SERPL-MCNC: 5.9 GM/DL — LOW (ref 6–8.3)
RBC # BLD: 3.17 M/UL — LOW (ref 3.8–5.2)
RBC # FLD: 14.4 % — SIGNIFICANT CHANGE UP (ref 10.3–14.5)
SODIUM SERPL-SCNC: 143 MMOL/L — SIGNIFICANT CHANGE UP (ref 135–145)
WBC # BLD: 12.05 K/UL — HIGH (ref 3.8–10.5)
WBC # FLD AUTO: 12.05 K/UL — HIGH (ref 3.8–10.5)

## 2018-08-05 PROCEDURE — 99233 SBSQ HOSP IP/OBS HIGH 50: CPT

## 2018-08-05 RX ORDER — POTASSIUM PHOSPHATE, MONOBASIC POTASSIUM PHOSPHATE, DIBASIC 236; 224 MG/ML; MG/ML
30 INJECTION, SOLUTION INTRAVENOUS ONCE
Qty: 0 | Refills: 0 | Status: COMPLETED | OUTPATIENT
Start: 2018-08-05 | End: 2018-08-05

## 2018-08-05 RX ADMIN — Medication 100 MILLIGRAM(S): at 01:28

## 2018-08-05 RX ADMIN — SODIUM CHLORIDE 80 MILLILITER(S): 9 INJECTION, SOLUTION INTRAVENOUS at 12:15

## 2018-08-05 RX ADMIN — PANTOPRAZOLE SODIUM 40 MILLIGRAM(S): 20 TABLET, DELAYED RELEASE ORAL at 05:06

## 2018-08-05 RX ADMIN — POTASSIUM PHOSPHATE, MONOBASIC POTASSIUM PHOSPHATE, DIBASIC 85 MILLIMOLE(S): 236; 224 INJECTION, SOLUTION INTRAVENOUS at 09:18

## 2018-08-05 RX ADMIN — Medication 100 MILLIGRAM(S): at 13:15

## 2018-08-05 RX ADMIN — Medication 100 MILLIGRAM(S): at 05:02

## 2018-08-05 RX ADMIN — PANTOPRAZOLE SODIUM 40 MILLIGRAM(S): 20 TABLET, DELAYED RELEASE ORAL at 17:33

## 2018-08-05 RX ADMIN — Medication 1 APPLICATION(S): at 17:33

## 2018-08-05 RX ADMIN — Medication 100 MILLIGRAM(S): at 12:16

## 2018-08-05 RX ADMIN — Medication 1 APPLICATION(S): at 05:07

## 2018-08-05 RX ADMIN — Medication 100 MILLIGRAM(S): at 05:07

## 2018-08-05 RX ADMIN — Medication 100 MILLIGRAM(S): at 22:19

## 2018-08-05 NOTE — PROGRESS NOTE ADULT - SUBJECTIVE AND OBJECTIVE BOX
Pt stable - original CT showed dilated rectosigmoid  +anemia        MEDICATIONS  (STANDING):  ciprofloxacin   IVPB 200 milliGRAM(s) IV Intermittent every 12 hours  ciprofloxacin   IVPB      dextrose 5% + sodium chloride 0.9%. 1000 milliLiter(s) (80 mL/Hr) IV Continuous <Continuous>  docusate sodium Liquid 100 milliGRAM(s) Oral three times a day  metroNIDAZOLE  IVPB 500 milliGRAM(s) IV Intermittent every 8 hours  pantoprazole  Injectable 40 milliGRAM(s) IV Push every 12 hours  petrolatum white Ointment 1 Application(s) Topical two times a day  senna Syrup 2 milliLiter(s) Oral at bedtime  simvastatin 20 milliGRAM(s) Oral at bedtime    MEDICATIONS  (PRN):      Allergies    No Known Allergies    Intolerances        Vital Signs Last 24 Hrs  T(C): 37.6 (05 Aug 2018 17:36), Max: 37.6 (05 Aug 2018 00:56)  T(F): 99.6 (05 Aug 2018 17:36), Max: 99.7 (05 Aug 2018 00:56)  HR: 79 (05 Aug 2018 17:36) (73 - 79)  BP: 128/71 (05 Aug 2018 17:36) (113/66 - 129/72)  BP(mean): --  RR: 16 (05 Aug 2018 17:36) (16 - 17)  SpO2: 99% (05 Aug 2018 17:36) (99% - 100%)    PHYSICAL EXAM:  General: NAD.  CVS: S1, S2  Chest: air entry bilaterally present  Abd: BS present, soft, non-tender      LABS:                        9.2    12.05 )-----------( 254      ( 05 Aug 2018 06:25 )             28.0     08-05    143  |  109<H>  |  12  ----------------------------<  113<H>  3.1<L>   |  23  |  0.91    Ca    7.7<L>      05 Aug 2018 06:25  Phos  0.4     08-05  Mg     1.8     08-05    TPro  5.9<L>  /  Alb  1.9<L>  /  TBili  0.5  /  DBili  x   /  AST  16  /  ALT  <6<L>  /  AlkPhos  55  08-05      follow CBC  ?colonoscopy   diet as tolerated

## 2018-08-05 NOTE — PROGRESS NOTE ADULT - SUBJECTIVE AND OBJECTIVE BOX
CHIEF COMPLAINT/INTERVAL HISTORY:    Patient is a 85y old  Female who presents with a chief complaint of gastroenterologist bleed (02 Aug 2018 10:54)      HPI:  84 y/o female with PMH HTN, HLD, CAD, AAA s/p rightepair here c/o bloody stool since last night. pt is poor historian. states she noticed there was bright red blood mixed in with her stool as well as abdominal discomfort, so her son called the ambulance. pt states she never had colonoscopy before. no fevers. no sick contacts. no recent travel. no vomiting. (02 Aug 2018 10:54)    Overnight issues  NO FURTHER BLEEDING  FAMILY AND PATIENT SAYS SHE IS DEPRESSED  COLONOSCOPY PENDING   SUBJECTIVE & OBJECTIVE: Pt seen and examined at bedside.   ROS:  CONSTITUTIONAL: No fever, weight loss, or fatigue  NECK: No pain or stiffness  RESPIRATORY: No cough, wheezing, chills or hemoptysis; No shortness of breath  CARDIOVASCULAR: No chest pain, palpitations, dizziness, or leg swelling  GASTROINTESTINAL: No abdominal or epigastric pain. No nausea, vomiting, or hematemesis; No diarrhea or constipation. No melena or hematochezia.  GENITOURINARY: No dysuria, frequency, hematuria, or incontinence  NEUROLOGICAL: No headaches, memory loss, loss of strength, numbness, or tremors  SKIN: No itching, burning, rashes, or lesions   ICU Vital Signs Last 24 Hrs  T(C): 37.1 (05 Aug 2018 11:10), Max: 37.6 (05 Aug 2018 00:56)  T(F): 98.8 (05 Aug 2018 11:10), Max: 99.7 (05 Aug 2018 00:56)  HR: 76 (05 Aug 2018 11:10) (73 - 86)  BP: 129/72 (05 Aug 2018 11:10) (113/66 - 129/72)  BP(mean): --  ABP: --  ABP(mean): --  RR: 16 (05 Aug 2018 11:10) (16 - 17)  SpO2: 100% (05 Aug 2018 11:10) (97% - 100%)        MEDICATIONS  (STANDING):  ciprofloxacin   IVPB 200 milliGRAM(s) IV Intermittent every 12 hours  ciprofloxacin   IVPB      dextrose 5% + sodium chloride 0.9%. 1000 milliLiter(s) (80 mL/Hr) IV Continuous <Continuous>  docusate sodium Liquid 100 milliGRAM(s) Oral three times a day  metroNIDAZOLE  IVPB 500 milliGRAM(s) IV Intermittent every 8 hours  pantoprazole  Injectable 40 milliGRAM(s) IV Push every 12 hours  petrolatum white Ointment 1 Application(s) Topical two times a day  senna Syrup 2 milliLiter(s) Oral at bedtime  simvastatin 20 milliGRAM(s) Oral at bedtime    MEDICATIONS  (PRN):        PHYSICAL EXAM:    GENERAL: NAD, well-groomed, well-developed  HEAD:  Atraumatic, Normocephalic  EYES: EOMI, PERRLA, conjunctiva and sclera clear  ENMT: Moist mucous membranes  NECK: Supple, No JVD  NERVOUS SYSTEM:  Alert & Oriented X3, Motor Strength 5/5 B/L upper and lower extremities; DTRs 2+ intact and symmetric  CHEST/LUNG: Clear to auscultation bilaterally; No rales, rhonchi, wheezing, or rubs  HEART: Regular rate and rhythm; No murmurs, rubs, or gallops  ABDOMEN: Soft, Nontender, Nondistended; Bowel sounds present  EXTREMITIES:  2+ Peripheral Pulses, No clubbing, cyanosis, or edema    LABS:                        9.2    12.05 )-----------( 254      ( 05 Aug 2018 06:25 )             28.0     08-05    143  |  109<H>  |  12  ----------------------------<  113<H>  3.1<L>   |  23  |  0.91    Ca    7.7<L>      05 Aug 2018 06:25  Phos  0.4     08-05  Mg     1.8     08-05    TPro  5.9<L>  /  Alb  1.9<L>  /  TBili  0.5  /  DBili  x   /  AST  16  /  ALT  <6<L>  /  AlkPhos  55  08-05          CAPILLARY BLOOD GLUCOSE          RECENT CULTURES:      RADIOLOGY & ADDITIONAL TESTS:  Imaging Personally Reviewed:  [ ] YES      Consultant(s) Notes Reviewed:  [ ] YES     Care Discussed with [ ] Consultants [X ] Patient [ ] Family  [x ]    [x ]  Other; RN  HEALTH ISSUES - PROBLEM Dx:  Hypokalemia: Hypokalemia  Colitis: Colitis  Hyperlipidemia, unspecified hyperlipidemia type: Hyperlipidemia, unspecified hyperlipidemia type  Benign essential HTN: Benign essential HTN  CAD (coronary artery disease): CAD (coronary artery disease)  Gastrointestinal hemorrhage, unspecified gastrointestinal hemorrhage type: Gastrointestinal hemorrhage, unspecified gastrointestinal hemorrhage type        DVT/GI ppx  Discussed with pt @ bedside

## 2018-08-06 DIAGNOSIS — E83.42 HYPOMAGNESEMIA: ICD-10-CM

## 2018-08-06 LAB
ANION GAP SERPL CALC-SCNC: 7 MMOL/L — SIGNIFICANT CHANGE UP (ref 5–17)
BUN SERPL-MCNC: 8 MG/DL — SIGNIFICANT CHANGE UP (ref 7–23)
CALCIUM SERPL-MCNC: 6.8 MG/DL — LOW (ref 8.5–10.1)
CHLORIDE SERPL-SCNC: 112 MMOL/L — HIGH (ref 96–108)
CO2 SERPL-SCNC: 24 MMOL/L — SIGNIFICANT CHANGE UP (ref 22–31)
CREAT SERPL-MCNC: 0.86 MG/DL — SIGNIFICANT CHANGE UP (ref 0.5–1.3)
GLUCOSE SERPL-MCNC: 105 MG/DL — HIGH (ref 70–99)
HCT VFR BLD CALC: 26.8 % — LOW (ref 34.5–45)
HGB BLD-MCNC: 8.5 G/DL — LOW (ref 11.5–15.5)
MAGNESIUM SERPL-MCNC: 1.4 MG/DL — LOW (ref 1.6–2.6)
MCHC RBC-ENTMCNC: 28.1 PG — SIGNIFICANT CHANGE UP (ref 27–34)
MCHC RBC-ENTMCNC: 31.7 GM/DL — LOW (ref 32–36)
MCV RBC AUTO: 88.4 FL — SIGNIFICANT CHANGE UP (ref 80–100)
NRBC # BLD: 0 /100 WBCS — SIGNIFICANT CHANGE UP (ref 0–0)
PHOSPHATE SERPL-MCNC: 1.4 MG/DL — LOW (ref 2.5–4.5)
PLATELET # BLD AUTO: 253 K/UL — SIGNIFICANT CHANGE UP (ref 150–400)
POTASSIUM SERPL-MCNC: 3 MMOL/L — LOW (ref 3.5–5.3)
POTASSIUM SERPL-SCNC: 3 MMOL/L — LOW (ref 3.5–5.3)
RBC # BLD: 3.03 M/UL — LOW (ref 3.8–5.2)
RBC # FLD: 14.6 % — HIGH (ref 10.3–14.5)
SODIUM SERPL-SCNC: 143 MMOL/L — SIGNIFICANT CHANGE UP (ref 135–145)
WBC # BLD: 11.76 K/UL — HIGH (ref 3.8–10.5)
WBC # FLD AUTO: 11.76 K/UL — HIGH (ref 3.8–10.5)

## 2018-08-06 PROCEDURE — 74018 RADEX ABDOMEN 1 VIEW: CPT | Mod: 26

## 2018-08-06 PROCEDURE — 90792 PSYCH DIAG EVAL W/MED SRVCS: CPT

## 2018-08-06 PROCEDURE — 99497 ADVNCD CARE PLAN 30 MIN: CPT

## 2018-08-06 PROCEDURE — 99233 SBSQ HOSP IP/OBS HIGH 50: CPT

## 2018-08-06 RX ORDER — LACTULOSE 10 G/15ML
20 SOLUTION ORAL ONCE
Qty: 0 | Refills: 0 | Status: COMPLETED | OUTPATIENT
Start: 2018-08-06 | End: 2018-08-06

## 2018-08-06 RX ORDER — POTASSIUM PHOSPHATE, MONOBASIC POTASSIUM PHOSPHATE, DIBASIC 236; 224 MG/ML; MG/ML
15 INJECTION, SOLUTION INTRAVENOUS ONCE
Qty: 0 | Refills: 0 | Status: COMPLETED | OUTPATIENT
Start: 2018-08-06 | End: 2018-08-06

## 2018-08-06 RX ORDER — MAGNESIUM SULFATE 500 MG/ML
2 VIAL (ML) INJECTION ONCE
Qty: 0 | Refills: 0 | Status: COMPLETED | OUTPATIENT
Start: 2018-08-06 | End: 2018-08-06

## 2018-08-06 RX ORDER — POTASSIUM CHLORIDE 20 MEQ
10 PACKET (EA) ORAL
Qty: 0 | Refills: 0 | Status: COMPLETED | OUTPATIENT
Start: 2018-08-06 | End: 2018-08-06

## 2018-08-06 RX ADMIN — SIMVASTATIN 20 MILLIGRAM(S): 20 TABLET, FILM COATED ORAL at 22:42

## 2018-08-06 RX ADMIN — POTASSIUM PHOSPHATE, MONOBASIC POTASSIUM PHOSPHATE, DIBASIC 63.75 MILLIMOLE(S): 236; 224 INJECTION, SOLUTION INTRAVENOUS at 17:26

## 2018-08-06 RX ADMIN — Medication 100 MILLIEQUIVALENT(S): at 15:36

## 2018-08-06 RX ADMIN — Medication 100 MILLIGRAM(S): at 23:38

## 2018-08-06 RX ADMIN — Medication 100 MILLIEQUIVALENT(S): at 14:02

## 2018-08-06 RX ADMIN — Medication 100 MILLIGRAM(S): at 22:42

## 2018-08-06 RX ADMIN — Medication 50 GRAM(S): at 11:11

## 2018-08-06 RX ADMIN — Medication 100 MILLIGRAM(S): at 00:17

## 2018-08-06 RX ADMIN — Medication 100 MILLIGRAM(S): at 11:29

## 2018-08-06 RX ADMIN — Medication 1 APPLICATION(S): at 05:49

## 2018-08-06 RX ADMIN — PANTOPRAZOLE SODIUM 40 MILLIGRAM(S): 20 TABLET, DELAYED RELEASE ORAL at 17:26

## 2018-08-06 RX ADMIN — LACTULOSE 20 GRAM(S): 10 SOLUTION ORAL at 15:36

## 2018-08-06 RX ADMIN — Medication 100 MILLIEQUIVALENT(S): at 11:30

## 2018-08-06 RX ADMIN — Medication 100 MILLIGRAM(S): at 14:02

## 2018-08-06 RX ADMIN — PANTOPRAZOLE SODIUM 40 MILLIGRAM(S): 20 TABLET, DELAYED RELEASE ORAL at 05:50

## 2018-08-06 RX ADMIN — Medication 1 APPLICATION(S): at 17:26

## 2018-08-06 RX ADMIN — SENNA PLUS 2 MILLILITER(S): 8.6 TABLET ORAL at 22:42

## 2018-08-06 RX ADMIN — Medication 100 MILLIGRAM(S): at 22:43

## 2018-08-06 RX ADMIN — Medication 100 MILLIGRAM(S): at 05:50

## 2018-08-06 RX ADMIN — SODIUM CHLORIDE 80 MILLILITER(S): 9 INJECTION, SOLUTION INTRAVENOUS at 05:50

## 2018-08-06 NOTE — BEHAVIORAL HEALTH ASSESSMENT NOTE - NSBHCHARTREVIEWLAB_PSY_A_CORE FT
08-06    143  |  112<H>  |  8   ----------------------------<  105<H>  3.0<L>   |  24  |  0.86    Ca    6.8<L>      06 Aug 2018 06:26  Phos  1.4     08-06  Mg     1.4     08-06    TPro  5.9<L>  /  Alb  1.9<L>  /  TBili  0.5  /  DBili  x   /  AST  16  /  ALT  <6<L>  /  AlkPhos  55  08-05                        8.5    11.76 )-----------( 253      ( 06 Aug 2018 06:26 )             26.8

## 2018-08-06 NOTE — BEHAVIORAL HEALTH ASSESSMENT NOTE - NSBHCONSULTMEDAGITATION_PSY_A_CORE FT
if agitated consider haldol 0.25 q6h prn PO/IV/IM given patient's frailty if agitated consider haldol 0.5 q6h prn PO/IV/IM given patient's frailty

## 2018-08-06 NOTE — PROGRESS NOTE ADULT - PROBLEM SELECTOR PLAN 4
cipro /flagyl   gastroenterologist consult appreciated  wbc donwtrending and almost normalized cipro /flagyl   gastroenterologist consult appreciated  wbc down trending and almost normalized

## 2018-08-06 NOTE — CONSULT NOTE ADULT - ASSESSMENT
Family meeting on: DATE: 8/6/18 Called and left message to Chauncey Chester  to discuss goals of care and advance care planning. Awaiting call back.

## 2018-08-06 NOTE — BEHAVIORAL HEALTH ASSESSMENT NOTE - NSBHCHARTREVIEWVS_PSY_A_CORE FT
Vital Signs Last 24 Hrs  T(C): 37.1 (06 Aug 2018 11:21), Max: 37.6 (05 Aug 2018 17:36)  T(F): 98.7 (06 Aug 2018 11:21), Max: 99.6 (05 Aug 2018 17:36)  HR: 85 (06 Aug 2018 11:21) (77 - 85)  BP: 115/71 (06 Aug 2018 11:21) (115/71 - 132/77)  BP(mean): --  RR: 16 (06 Aug 2018 11:21) (16 - 18)  SpO2: 100% (06 Aug 2018 11:21) (94% - 100%)

## 2018-08-06 NOTE — CONSULT NOTE ADULT - SUBJECTIVE AND OBJECTIVE BOX
HPI:  85 year old female with PMH HTN, HLD, CAD, AAA s/p rightepair here c/o bloody stool since last night. pt is poor historian. states she noticed there was bright red blood mixed in with her stool as well as abdominal discomfort, so her son called the ambulance. pt states she never had colonoscopy before. no fevers. no sick contacts. no recent travel. no vomiting.     PERTINENT PMH REVIEWED:  [ x] YES [ ] NO           SOCIAL HISTORY:  Significant other/partner:  [ ] YES  [ ] NO            Children:  [x ] YES  [ ] NO                   Congregational/Spirituality:  Substance hx:  [ ] YES   [ ] NO           Tobacco hx:  [ ] YES  [ ] NO             Alcohol hx: [ ] YES  [ ] NO        Home Opioid hx:  [ ] YES  [ ] NO   Living Situation: [ ] Home  [ ] Long term care  [ ] Rehab    FAMILY HISTORY:    [x ] Family history non contributory     BASELINE ADLs (prior to admission):  Independent [ ] moderately [ ] fully   Dependent   [x ] moderately [ ] fully    Code Status:                      MOLST  [ ] YES [x ] NO    Living Will  [ ] YES [x ] NO    Health Care Proxy [ ] YES  [x ] NO      [ ] Surrogate  [ ] HCP  [ ] Guardian:  Chauncey Chester                                                                 Phone#:    Allergies  No Known Allergies    Intolerances    MEDICATIONS  (STANDING):  ciprofloxacin   IVPB 200 milliGRAM(s) IV Intermittent every 12 hours  ciprofloxacin   IVPB      dextrose 5% + sodium chloride 0.9%. 1000 milliLiter(s) (80 mL/Hr) IV Continuous <Continuous>  docusate sodium Liquid 100 milliGRAM(s) Oral three times a day  metroNIDAZOLE  IVPB 500 milliGRAM(s) IV Intermittent every 8 hours  pantoprazole  Injectable 40 milliGRAM(s) IV Push every 12 hours  petrolatum white Ointment 1 Application(s) Topical two times a day  potassium chloride  10 mEq/100 mL IVPB 10 milliEquivalent(s) IV Intermittent every 1 hour  potassium phosphate IVPB 15 milliMole(s) IV Intermittent once  senna Syrup 2 milliLiter(s) Oral at bedtime  simvastatin 20 milliGRAM(s) Oral at bedtime    MEDICATIONS  (PRN):    PRESENT SYMPTOMS:  Source: [ ] Patient   [ ] Family   [x ] Team     Pain: [ ] YES [ ] NO  Onset:                    Location:                          Duration:                 Character:            Aggravating factors:                        Relieving factors:    Radiation:              Timing:                             Severity:      Dyspnea: [x ] YES [ ] NO - Mild [x ]  Moderate [ ]  Severe [ ]    Anxiety: [x ] YES [ ] NO  Fatigue: [x ] YES [ ] NO   Nausea: [ ] YES [x ] NO  Loss of appetite: [x ] YES [ ] NO   Constipation: [x ] YES [ ] NO     Other Symptoms:  [x ] All other review of systems negative   [ ] Unable to obtain due to poor mentation     Does patient meet criteria for Severe Protein Calorie Malnutrition?  Yes [x ]  No [ ]  PPSV 30% or below [x ]  Anasarca [ ]  Albumin < 2 [ ] Catabolic State [ ] Poor nutritional intake [x ] Significant weight loss [ ]      Palliative Performance Status Version 2:  30 %  ECOG - 4     Vital Signs Last 24 Hrs  T(C): 37.1 (06 Aug 2018 11:21), Max: 37.6 (05 Aug 2018 17:36)  T(F): 98.7 (06 Aug 2018 11:21), Max: 99.6 (05 Aug 2018 17:36)  HR: 85 (06 Aug 2018 11:21) (77 - 85)  BP: 115/71 (06 Aug 2018 11:21) (115/71 - 132/77)  BP(mean): --  RR: 16 (06 Aug 2018 11:21) (16 - 18)  SpO2: 100% (06 Aug 2018 11:21) (94% - 100%)    Physical Exam:    General: [x ] Alert,  A&O x 1    [x ] lethargic   [ ] Agitated   [x ] Cachexia   HEENT: [ ] Normal   [x ] Dry mouth   [ ] ET Tube    [ ] Trach   Lungs: [ ] Clear [x ] Rhonchi  [ ] Crackles [ ] Wheezing [ ] Tachypnea  [ ] Audible excessive secretions    Cardiovascular:  [x ] Regular rate and rhythm  [ ] Irregular [ ] Tachycardia   [ ] Bradycardia   Abdomen: [x ] Soft  [ ] Distended  [x ] +BS  [ ] Non tender [x ] Tender  [ ]PEG   [ ] NGT   Last BM:     Genitourinary: [ ] Normal [x ] Incontinent   [ ] Oliguria/Anuria   [ ] Sin  Musculoskeletal:  [ ] Normal   [ ] Generalized weakness  [x ] Bedbound  [ ] Edema   Neurological: [ ] No focal deficits  [x ] Cognitive impairment     Skin: [x ] Normal   [ ] Pressure ulcers     LABS:                        8.5    11.76 )-----------( 253      ( 06 Aug 2018 06:26 )             26.8     08-06    143  |  112<H>  |  8   ----------------------------<  105<H>  3.0<L>   |  24  |  0.86    Ca    6.8<L>      06 Aug 2018 06:26  Phos  1.4     08-06  Mg     1.4     08-06    TPro  5.9<L>  /  Alb  1.9<L>  /  TBili  0.5  /  DBili  x   /  AST  16  /  ALT  <6<L>  /  AlkPhos  55  08-05    I&O's Summary    05 Aug 2018 07:01  -  06 Aug 2018 07:00  --------------------------------------------------------  IN: 2210 mL / OUT: 0 mL / NET: 2210 mL    RADIOLOGY & ADDITIONAL STUDIES:    Referrals:  Hospice [ ]   Chaplaincy [x ]    Child Life [ ]   Social Work [x ]   Case Management [ ]   Holistic Therapy [ ]

## 2018-08-06 NOTE — PROGRESS NOTE ADULT - SUBJECTIVE AND OBJECTIVE BOX
Pt stable - no c/o abd pain  Pt refuses colonoscopy      MEDICATIONS  (STANDING):  ciprofloxacin   IVPB 200 milliGRAM(s) IV Intermittent every 12 hours  ciprofloxacin   IVPB      dextrose 5% + sodium chloride 0.9%. 1000 milliLiter(s) (80 mL/Hr) IV Continuous <Continuous>  docusate sodium Liquid 100 milliGRAM(s) Oral three times a day  metroNIDAZOLE  IVPB 500 milliGRAM(s) IV Intermittent every 8 hours  pantoprazole  Injectable 40 milliGRAM(s) IV Push every 12 hours  petrolatum white Ointment 1 Application(s) Topical two times a day  potassium phosphate IVPB 15 milliMole(s) IV Intermittent once  senna Syrup 2 milliLiter(s) Oral at bedtime  simvastatin 20 milliGRAM(s) Oral at bedtime    MEDICATIONS  (PRN):      Allergies    No Known Allergies    Intolerances        Vital Signs Last 24 Hrs  T(C): 37.1 (06 Aug 2018 11:21), Max: 37.6 (05 Aug 2018 17:36)  T(F): 98.7 (06 Aug 2018 11:21), Max: 99.6 (05 Aug 2018 17:36)  HR: 85 (06 Aug 2018 11:21) (77 - 85)  BP: 115/71 (06 Aug 2018 11:21) (115/71 - 132/77)  BP(mean): --  RR: 16 (06 Aug 2018 11:21) (16 - 18)  SpO2: 100% (06 Aug 2018 11:21) (94% - 100%)    PHYSICAL EXAM:  General: NAD.  CVS: S1, S2  Chest: air entry bilaterally present  Abd: BS present, soft, non-tender      LABS:                        8.5    11.76 )-----------( 253      ( 06 Aug 2018 06:26 )             26.8     08-06    143  |  112<H>  |  8   ----------------------------<  105<H>  3.0<L>   |  24  |  0.86    Ca    6.8<L>      06 Aug 2018 06:26  Phos  1.4     08-06  Mg     1.4     08-06    TPro  5.9<L>  /  Alb  1.9<L>  /  TBili  0.5  /  DBili  x   /  AST  16  /  ALT  <6<L>  /  AlkPhos  55  08-05      Repeat KUB  on full fluids  pt refuses colonoscopy

## 2018-08-06 NOTE — BEHAVIORAL HEALTH ASSESSMENT NOTE - SUMMARY
84 y/o female, domiciled at home with son, grandson; with no PPH, PMH HTN, HLD, CAD, AAA s/p repair here c/o bloody stool since last night. Psych consulted to bj for depression.     Patient meets minor criteria for depression, however numerous features may overlap with medical illness. As appetite and sleep are of particular patient may benefit from mirtazapine low dose qHS to aide with both in addition to its antidepressant effects. Patient currently not interested in starting this med, but may be amenable with encouragement. AOx3, no waxing/waning seen though delirium should be high on differential due to age and debility.

## 2018-08-06 NOTE — CONSULT NOTE ADULT - PROVIDER SPECIALTY LIST ADULT
February 7, 2018      [facility] [place]  [address]  [place] [address]  Phone: [phone]  Fax: [phone]       Patient: Rahat Carbajal   YOB: 2012  Date of Visit: 02/07/2018    To Whom It May Concern:    Jay Jay Carbajal  was at [facility] on 02/07/2018. He may return to work/school on 2/8/2018, if no fever, with no restrictions. If you have any questions or concerns, or if I can be of further assistance, please do not hesitate to contact me.    Sincerely,    Reuben Lee PA-C      Palliative Care

## 2018-08-06 NOTE — BEHAVIORAL HEALTH ASSESSMENT NOTE - NSBHSUICPROTECTFACT_PSY_A_CORE
Future oriented/Supportive social network or family/Responsibility to family and others/Identifies reasons for living

## 2018-08-06 NOTE — BEHAVIORAL HEALTH ASSESSMENT NOTE - HPI (INCLUDE ILLNESS QUALITY, SEVERITY, DURATION, TIMING, CONTEXT, MODIFYING FACTORS, ASSOCIATED SIGNS AND SYMPTOMS)
86 y/o female, domiciled at home with son, grandson; with no PPH, PMH HTN, HLD, CAD, AAA s/p rightepair here c/o bloody stool since last night. Psych consulted to bj for depression.     Patient states that she has been depressed for several months due to weakness, poor energy, though has only mild anhedonia. Endorses worsening appetite and sleep, AOx3 currently. Denies AH/VH delusions or paranoia. Denies EtOH or drug abuse. 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 Denies SI/I/P. States her appetite would be better if she got split pea soup or "something that doesn't tastes like water".

## 2018-08-06 NOTE — PROGRESS NOTE ADULT - SUBJECTIVE AND OBJECTIVE BOX
INTERVAL HISTORY: Patient is a 85y old  Female who presents with a chief complaint of gastroenterologist bleed (02 Aug 2018 10:54)        MEDICATIONS  (STANDING):  ciprofloxacin   IVPB 200 milliGRAM(s) IV Intermittent every 12 hours  ciprofloxacin   IVPB      dextrose 5% + sodium chloride 0.9%. 1000 milliLiter(s) (80 mL/Hr) IV Continuous <Continuous>  docusate sodium Liquid 100 milliGRAM(s) Oral three times a day  magnesium sulfate  IVPB 2 Gram(s) IV Intermittent once  metroNIDAZOLE  IVPB 500 milliGRAM(s) IV Intermittent every 8 hours  pantoprazole  Injectable 40 milliGRAM(s) IV Push every 12 hours  petrolatum white Ointment 1 Application(s) Topical two times a day  potassium chloride  10 mEq/100 mL IVPB 10 milliEquivalent(s) IV Intermittent every 1 hour  potassium phosphate IVPB 15 milliMole(s) IV Intermittent once  senna Syrup 2 milliLiter(s) Oral at bedtime  simvastatin 20 milliGRAM(s) Oral at bedtime    MEDICATIONS  (PRN):    Vital Signs Last 24 Hrs  T(C): 36.8 (06 Aug 2018 05:49), Max: 37.6 (05 Aug 2018 17:36)  T(F): 98.3 (06 Aug 2018 05:49), Max: 99.6 (05 Aug 2018 17:36)  HR: 77 (06 Aug 2018 05:49) (76 - 79)  BP: 128/76 (06 Aug 2018 05:49) (128/71 - 132/77)  BP(mean): --  RR: 18 (06 Aug 2018 05:49) (16 - 18)  SpO2: 94% (06 Aug 2018 05:49) (94% - 100%)    PHYSICAL EXAM:    GENERAL: NAD, well-groomed, well-developed  HEAD:  Atraumatic, Normocephalic  EYES: EOMI, PERRLA, conjunctiva and sclera clear  ENMT: Moist mucous membranes  NECK: Supple, No JVD  NERVOUS SYSTEM:  Alert & Oriented X3, Motor Strength 5/5 B/L upper and lower extremities; DTRs 2+ intact and symmetric  CHEST/LUNG: Clear to auscultation bilaterally; No rales, rhonchi, wheezing, or rubs  HEART: Regular rate and rhythm; No murmurs, rubs, or gallops  ABDOMEN: Soft, Nontender, Nondistended; Bowel sounds present  EXTREMITIES:  2+ Peripheral Pulses, No clubbing, cyanosis, or edema    LABS:                                    8.5    11.76 )-----------( 253      ( 06 Aug 2018 06:26 )             26.8   08-06    143  |  112<H>  |  8   ----------------------------<  105<H>  3.0<L>   |  24  |  0.86    Ca    6.8<L>      06 Aug 2018 06:26  Phos  1.4     08-06  Mg     1.4     08-06    TPro  5.9<L>  /  Alb  1.9<L>  /  TBili  0.5  /  DBili  x   /  AST  16  /  ALT  <6<L>  /  AlkPhos  55  08-05          CAPILLARY BLOOD GLUCOSE          RECENT CULTURES:      RADIOLOGY & ADDITIONAL TESTS:  Imaging Personally Reviewed:  [ ] YES      Consultant(s) Notes Reviewed:  [ ] YES     Care Discussed with [ ] Consultants [X ] Patient [ ] Family  [x ]    [x ]  Other; RN  HEALTH ISSUES - PROBLEM Dx:  Hypokalemia: Hypokalemia  Colitis: Colitis  Hyperlipidemia, unspecified hyperlipidemia type: Hyperlipidemia, unspecified hyperlipidemia type  Benign essential HTN: Benign essential HTN  CAD (coronary artery disease): CAD (coronary artery disease)  Gastrointestinal hemorrhage, unspecified gastrointestinal hemorrhage type: Gastrointestinal hemorrhage, unspecified gastrointestinal hemorrhage type        DVT/GI ppx  Discussed with pt @ bedside INTERVAL HISTORY: Patient is a 85y old  Female who presents with a chief complaint of gastroenterologist bleed (02 Aug 2018 10:54)        MEDICATIONS  (STANDING):  ciprofloxacin   IVPB 200 milliGRAM(s) IV Intermittent every 12 hours  ciprofloxacin   IVPB      dextrose 5% + sodium chloride 0.9%. 1000 milliLiter(s) (80 mL/Hr) IV Continuous <Continuous>  docusate sodium Liquid 100 milliGRAM(s) Oral three times a day  magnesium sulfate  IVPB 2 Gram(s) IV Intermittent once  metroNIDAZOLE  IVPB 500 milliGRAM(s) IV Intermittent every 8 hours  pantoprazole  Injectable 40 milliGRAM(s) IV Push every 12 hours  petrolatum white Ointment 1 Application(s) Topical two times a day  potassium chloride  10 mEq/100 mL IVPB 10 milliEquivalent(s) IV Intermittent every 1 hour  potassium phosphate IVPB 15 milliMole(s) IV Intermittent once  senna Syrup 2 milliLiter(s) Oral at bedtime  simvastatin 20 milliGRAM(s) Oral at bedtime    MEDICATIONS  (PRN):    Vital Signs Last 24 Hrs  T(C): 36.8 (06 Aug 2018 05:49), Max: 37.6 (05 Aug 2018 17:36)  T(F): 98.3 (06 Aug 2018 05:49), Max: 99.6 (05 Aug 2018 17:36)  HR: 77 (06 Aug 2018 05:49) (76 - 79)  BP: 128/76 (06 Aug 2018 05:49) (128/71 - 132/77)  BP(mean): --  RR: 18 (06 Aug 2018 05:49) (16 - 18)  SpO2: 94% (06 Aug 2018 05:49) (94% - 100%)    PHYSICAL EXAM:    GENERAL: NAD, well-groomed, well-developed, elderly   HEAD:  Atraumatic, Normocephalic  EYES: EOMI, PERRLA, conjunctiva and sclera clear  ENMT: Moist mucous membranes  NECK: Supple, No JVD  NERVOUS SYSTEM:  Alert & Oriented X3, flat affect   CHEST/LUNG: Clear to auscultation bilaterally; No rales, rhonchi, wheezing, or rubs  HEART: Regular rate and rhythm; No murmurs, rubs, or gallops  ABDOMEN: Soft, Nontender, Nondistended; Bowel sounds present  EXTREMITIES:  2+ Peripheral Pulses, No clubbing, cyanosis, or edema    LABS:                                    8.5    11.76 )-----------( 253      ( 06 Aug 2018 06:26 )             26.8   08-06    143  |  112<H>  |  8   ----------------------------<  105<H>  3.0<L>   |  24  |  0.86    Ca    6.8<L>      06 Aug 2018 06:26  Phos  1.4     08-06  Mg     1.4     08-06    TPro  5.9<L>  /  Alb  1.9<L>  /  TBili  0.5  /  DBili  x   /  AST  16  /  ALT  <6<L>  /  AlkPhos  55  08-05          CAPILLARY BLOOD GLUCOSE          RECENT CULTURES:      RADIOLOGY & ADDITIONAL TESTS:  Imaging Personally Reviewed:  [ ] YES      Consultant(s) Notes Reviewed:  [ ] YES     Care Discussed with [ ] Consultants [X ] Patient [ ] Family  [x ]    [x ]  Other; RN  HEALTH ISSUES - PROBLEM Dx:  Hypokalemia: Hypokalemia  Colitis: Colitis  Hyperlipidemia, unspecified hyperlipidemia type: Hyperlipidemia, unspecified hyperlipidemia type  Benign essential HTN: Benign essential HTN  CAD (coronary artery disease): CAD (coronary artery disease)  Gastrointestinal hemorrhage, unspecified gastrointestinal hemorrhage type: Gastrointestinal hemorrhage, unspecified gastrointestinal hemorrhage type        DVT/GI ppx  Discussed with pt @ bedside

## 2018-08-06 NOTE — BEHAVIORAL HEALTH ASSESSMENT NOTE - NSBHCONSULTMEDS_PSY_A_CORE FT
- consider remeron 7.5mg qHS standing if patient amenable, patient currently not interested  - consider dietary/nutrition consult to assess patient's food intake  - f/u b12, folate levels - consider remeron 7.5mg qHS standing if patient amenable, patient currently not interested  - consider dietary/nutrition consult to assess patient's food intake  - consider chaplaincy f/u  - f/u b12, folate levels

## 2018-08-07 DIAGNOSIS — E43 UNSPECIFIED SEVERE PROTEIN-CALORIE MALNUTRITION: ICD-10-CM

## 2018-08-07 DIAGNOSIS — K59.00 CONSTIPATION, UNSPECIFIED: ICD-10-CM

## 2018-08-07 LAB
ANION GAP SERPL CALC-SCNC: 11 MMOL/L — SIGNIFICANT CHANGE UP (ref 5–17)
BUN SERPL-MCNC: 6 MG/DL — LOW (ref 7–23)
CALCIUM SERPL-MCNC: 7.3 MG/DL — LOW (ref 8.5–10.1)
CHLORIDE SERPL-SCNC: 110 MMOL/L — HIGH (ref 96–108)
CO2 SERPL-SCNC: 21 MMOL/L — LOW (ref 22–31)
CREAT SERPL-MCNC: 0.84 MG/DL — SIGNIFICANT CHANGE UP (ref 0.5–1.3)
FOLATE SERPL-MCNC: 11.6 NG/ML — SIGNIFICANT CHANGE UP
GLUCOSE SERPL-MCNC: 115 MG/DL — HIGH (ref 70–99)
HCT VFR BLD CALC: 29.3 % — LOW (ref 34.5–45)
HGB BLD-MCNC: 9.5 G/DL — LOW (ref 11.5–15.5)
MAGNESIUM SERPL-MCNC: 1.8 MG/DL — SIGNIFICANT CHANGE UP (ref 1.6–2.6)
MCHC RBC-ENTMCNC: 28.6 PG — SIGNIFICANT CHANGE UP (ref 27–34)
MCHC RBC-ENTMCNC: 32.4 GM/DL — SIGNIFICANT CHANGE UP (ref 32–36)
MCV RBC AUTO: 88.3 FL — SIGNIFICANT CHANGE UP (ref 80–100)
NRBC # BLD: 0 /100 WBCS — SIGNIFICANT CHANGE UP (ref 0–0)
PHOSPHATE SERPL-MCNC: 1.5 MG/DL — LOW (ref 2.5–4.5)
PLATELET # BLD AUTO: 249 K/UL — SIGNIFICANT CHANGE UP (ref 150–400)
POTASSIUM SERPL-MCNC: 3.5 MMOL/L — SIGNIFICANT CHANGE UP (ref 3.5–5.3)
POTASSIUM SERPL-SCNC: 3.5 MMOL/L — SIGNIFICANT CHANGE UP (ref 3.5–5.3)
RBC # BLD: 3.32 M/UL — LOW (ref 3.8–5.2)
RBC # FLD: 14.8 % — HIGH (ref 10.3–14.5)
SODIUM SERPL-SCNC: 142 MMOL/L — SIGNIFICANT CHANGE UP (ref 135–145)
VIT B12 SERPL-MCNC: 1882 PG/ML — HIGH (ref 232–1245)
WBC # BLD: 11.87 K/UL — HIGH (ref 3.8–10.5)
WBC # FLD AUTO: 11.87 K/UL — HIGH (ref 3.8–10.5)

## 2018-08-07 PROCEDURE — 99233 SBSQ HOSP IP/OBS HIGH 50: CPT

## 2018-08-07 RX ORDER — POTASSIUM PHOSPHATE, MONOBASIC POTASSIUM PHOSPHATE, DIBASIC 236; 224 MG/ML; MG/ML
15 INJECTION, SOLUTION INTRAVENOUS ONCE
Qty: 0 | Refills: 0 | Status: COMPLETED | OUTPATIENT
Start: 2018-08-07 | End: 2018-08-07

## 2018-08-07 RX ORDER — LACTULOSE 10 G/15ML
10 SOLUTION ORAL
Qty: 0 | Refills: 0 | Status: DISCONTINUED | OUTPATIENT
Start: 2018-08-07 | End: 2018-08-09

## 2018-08-07 RX ADMIN — Medication 100 MILLIGRAM(S): at 22:37

## 2018-08-07 RX ADMIN — Medication 100 MILLIGRAM(S): at 06:24

## 2018-08-07 RX ADMIN — Medication 100 MILLIGRAM(S): at 11:23

## 2018-08-07 RX ADMIN — Medication 100 MILLIGRAM(S): at 14:08

## 2018-08-07 RX ADMIN — Medication 1 APPLICATION(S): at 17:39

## 2018-08-07 RX ADMIN — LACTULOSE 10 GRAM(S): 10 SOLUTION ORAL at 17:39

## 2018-08-07 RX ADMIN — PANTOPRAZOLE SODIUM 40 MILLIGRAM(S): 20 TABLET, DELAYED RELEASE ORAL at 17:39

## 2018-08-07 RX ADMIN — SENNA PLUS 2 MILLILITER(S): 8.6 TABLET ORAL at 22:37

## 2018-08-07 RX ADMIN — Medication 1 APPLICATION(S): at 06:23

## 2018-08-07 RX ADMIN — SODIUM CHLORIDE 80 MILLILITER(S): 9 INJECTION, SOLUTION INTRAVENOUS at 17:38

## 2018-08-07 RX ADMIN — POTASSIUM PHOSPHATE, MONOBASIC POTASSIUM PHOSPHATE, DIBASIC 63.75 MILLIMOLE(S): 236; 224 INJECTION, SOLUTION INTRAVENOUS at 11:23

## 2018-08-07 RX ADMIN — SIMVASTATIN 20 MILLIGRAM(S): 20 TABLET, FILM COATED ORAL at 22:37

## 2018-08-07 RX ADMIN — PANTOPRAZOLE SODIUM 40 MILLIGRAM(S): 20 TABLET, DELAYED RELEASE ORAL at 06:23

## 2018-08-07 RX ADMIN — Medication 100 MILLIGRAM(S): at 06:23

## 2018-08-07 RX ADMIN — Medication 100 MILLIGRAM(S): at 14:09

## 2018-08-07 NOTE — PROGRESS NOTE ADULT - SUBJECTIVE AND OBJECTIVE BOX
Pt stable - no changes  H/H a little better      MEDICATIONS  (STANDING):  ciprofloxacin   IVPB 200 milliGRAM(s) IV Intermittent every 12 hours  ciprofloxacin   IVPB      dextrose 5% + sodium chloride 0.9%. 1000 milliLiter(s) (80 mL/Hr) IV Continuous <Continuous>  docusate sodium Liquid 100 milliGRAM(s) Oral three times a day  lactulose Syrup 10 Gram(s) Oral two times a day  metroNIDAZOLE  IVPB 500 milliGRAM(s) IV Intermittent every 8 hours  pantoprazole  Injectable 40 milliGRAM(s) IV Push every 12 hours  petrolatum white Ointment 1 Application(s) Topical two times a day  potassium phosphate IVPB 15 milliMole(s) IV Intermittent once  senna Syrup 2 milliLiter(s) Oral at bedtime  simvastatin 20 milliGRAM(s) Oral at bedtime    MEDICATIONS  (PRN):      Allergies    No Known Allergies    Intolerances        Vital Signs Last 24 Hrs  T(C): 37 (07 Aug 2018 05:27), Max: 37.1 (06 Aug 2018 11:21)  T(F): 98.6 (07 Aug 2018 05:27), Max: 98.8 (06 Aug 2018 23:56)  HR: 82 (07 Aug 2018 05:27) (80 - 85)  BP: 134/85 (07 Aug 2018 05:27) (115/71 - 146/85)  BP(mean): --  RR: 16 (07 Aug 2018 05:27) (16 - 16)  SpO2: 100% (07 Aug 2018 05:27) (96% - 100%)    PHYSICAL EXAM:  General: NAD.  CVS: S1, S2  Chest: air entry bilaterally present  Abd: BS present, soft, non-tender      LABS:                        9.5    11.87 )-----------( 249      ( 07 Aug 2018 07:16 )             29.3     08-07    142  |  110<H>  |  6<L>  ----------------------------<  115<H>  3.5   |  21<L>  |  0.84    Ca    7.3<L>      07 Aug 2018 07:16  Phos  1.5     08-07  Mg     1.8     08-07      follow H/H  diet as tolerated  on IV flagyl and Cipro

## 2018-08-07 NOTE — PROGRESS NOTE ADULT - SUBJECTIVE AND OBJECTIVE BOX
INTERVAL HISTORY: Patient is a 85y old  Female who presents with a chief complaint of gastroenterologist bleed (02 Aug 2018 10:54)    MEDICATIONS  (STANDING):  ciprofloxacin   IVPB 200 milliGRAM(s) IV Intermittent every 12 hours  ciprofloxacin   IVPB      dextrose 5% + sodium chloride 0.9%. 1000 milliLiter(s) (80 mL/Hr) IV Continuous <Continuous>  docusate sodium Liquid 100 milliGRAM(s) Oral three times a day  lactulose Syrup 10 Gram(s) Oral two times a day  metroNIDAZOLE  IVPB 500 milliGRAM(s) IV Intermittent every 8 hours  pantoprazole  Injectable 40 milliGRAM(s) IV Push every 12 hours  petrolatum white Ointment 1 Application(s) Topical two times a day  potassium phosphate IVPB 15 milliMole(s) IV Intermittent once  senna Syrup 2 milliLiter(s) Oral at bedtime  simvastatin 20 milliGRAM(s) Oral at bedtime    MEDICATIONS  (PRN):    Vital Signs Last 24 Hrs  T(C): 37 (07 Aug 2018 05:27), Max: 37.1 (06 Aug 2018 11:21)  T(F): 98.6 (07 Aug 2018 05:27), Max: 98.8 (06 Aug 2018 23:56)  HR: 82 (07 Aug 2018 05:27) (80 - 85)  BP: 134/85 (07 Aug 2018 05:27) (115/71 - 146/85)  BP(mean): --  RR: 16 (07 Aug 2018 05:27) (16 - 16)  SpO2: 100% (07 Aug 2018 05:27) (96% - 100%)    PHYSICAL EXAM:    GENERAL: NAD, well-groomed, well-developed, elderly   HEAD:  Atraumatic, Normocephalic  EYES: EOMI, PERRLA, conjunctiva and sclera clear  ENMT: Moist mucous membranes  NECK: Supple, No JVD  NERVOUS SYSTEM:  Alert & Oriented X3, flat affect   CHEST/LUNG: Clear to auscultation bilaterally; No rales, rhonchi, wheezing, or rubs  HEART: Regular rate and rhythm; No murmurs, rubs, or gallops  ABDOMEN: Soft, Nontender, Nondistended; Bowel sounds present  EXTREMITIES:  2+ Peripheral Pulses, No clubbing, cyanosis, or edema    LABS:                                        9.5    11.87 )-----------( 249      ( 07 Aug 2018 07:16 )             29.3   08-07    142  |  110<H>  |  6<L>  ----------------------------<  115<H>  3.5   |  21<L>  |  0.84    Ca    7.3<L>      07 Aug 2018 07:16  Phos  1.5     08-07  Mg     1.8     08-07          CAPILLARY BLOOD GLUCOSE          RECENT CULTURES:      RADIOLOGY & ADDITIONAL TESTS:  Imaging Personally Reviewed:  [ ] YES      Consultant(s) Notes Reviewed:  [ ] YES     Care Discussed with [ ] Consultants [X ] Patient [ ] Family  [x ]    [x ]  Other; RN  HEALTH ISSUES - PROBLEM Dx:  Hypokalemia: Hypokalemia  Colitis: Colitis  Hyperlipidemia, unspecified hyperlipidemia type: Hyperlipidemia, unspecified hyperlipidemia type  Benign essential HTN: Benign essential HTN  CAD (coronary artery disease): CAD (coronary artery disease)  Gastrointestinal hemorrhage, unspecified gastrointestinal hemorrhage type: Gastrointestinal hemorrhage, unspecified gastrointestinal hemorrhage type        DVT/GI ppx  Discussed with pt @ bedside INTERVAL HISTORY: Patient is a 85y old  Female who presents with a chief complaint of gastroenterologist bleed (02 Aug 2018 10:54)    MEDICATIONS  (STANDING):  ciprofloxacin   IVPB 200 milliGRAM(s) IV Intermittent every 12 hours  ciprofloxacin   IVPB      dextrose 5% + sodium chloride 0.9%. 1000 milliLiter(s) (80 mL/Hr) IV Continuous <Continuous>  docusate sodium Liquid 100 milliGRAM(s) Oral three times a day  lactulose Syrup 10 Gram(s) Oral two times a day  metroNIDAZOLE  IVPB 500 milliGRAM(s) IV Intermittent every 8 hours  pantoprazole  Injectable 40 milliGRAM(s) IV Push every 12 hours  petrolatum white Ointment 1 Application(s) Topical two times a day  potassium phosphate IVPB 15 milliMole(s) IV Intermittent once  senna Syrup 2 milliLiter(s) Oral at bedtime  simvastatin 20 milliGRAM(s) Oral at bedtime    MEDICATIONS  (PRN):    Vital Signs Last 24 Hrs  T(C): 37 (07 Aug 2018 05:27), Max: 37.1 (06 Aug 2018 11:21)  T(F): 98.6 (07 Aug 2018 05:27), Max: 98.8 (06 Aug 2018 23:56)  HR: 82 (07 Aug 2018 05:27) (80 - 85)  BP: 134/85 (07 Aug 2018 05:27) (115/71 - 146/85)  BP(mean): --  RR: 16 (07 Aug 2018 05:27) (16 - 16)  SpO2: 100% (07 Aug 2018 05:27) (96% - 100%)    PHYSICAL EXAM: s/p manual disimpaction    GENERAL: NAD, well-groomed, well-developed, elderly   HEAD:  Atraumatic, Normocephalic  EYES: EOMI, PERRLA, conjunctiva and sclera clear  ENMT: Moist mucous membranes  NECK: Supple, No JVD  NERVOUS SYSTEM:  Alert & Oriented X3, flat affect   CHEST/LUNG: Clear to auscultation bilaterally; No rales, rhonchi, wheezing, or rubs  HEART: Regular rate and rhythm; No murmurs, rubs, or gallops  ABDOMEN: Soft, Nontender, Nondistended; Bowel sounds present, s/p manual disimpacted   EXTREMITIES:  2+ Peripheral Pulses, No clubbing, cyanosis, or edema    LABS:                                        9.5    11.87 )-----------( 249      ( 07 Aug 2018 07:16 )             29.3   08-07    142  |  110<H>  |  6<L>  ----------------------------<  115<H>  3.5   |  21<L>  |  0.84    Ca    7.3<L>      07 Aug 2018 07:16  Phos  1.5     08-07  Mg     1.8     08-07          CAPILLARY BLOOD GLUCOSE          RECENT CULTURES:      RADIOLOGY & ADDITIONAL TESTS:  Imaging Personally Reviewed:  [ ] YES      Consultant(s) Notes Reviewed:  [ ] YES     Care Discussed with [ ] Consultants [X ] Patient [ ] Family  [x ]    [x ]  Other; RN  HEALTH ISSUES - PROBLEM Dx:  Hypokalemia: Hypokalemia  Colitis: Colitis  Hyperlipidemia, unspecified hyperlipidemia type: Hyperlipidemia, unspecified hyperlipidemia type  Benign essential HTN: Benign essential HTN  CAD (coronary artery disease): CAD (coronary artery disease)  Gastrointestinal hemorrhage, unspecified gastrointestinal hemorrhage type: Gastrointestinal hemorrhage, unspecified gastrointestinal hemorrhage type        DVT/GI ppx  Discussed with pt @ bedside

## 2018-08-07 NOTE — PROGRESS NOTE ADULT - PROBLEM SELECTOR PLAN 6
psychiatry consult reviewed offered Remeron and pt refused .    nutrition consult noted diet chanegd .

## 2018-08-07 NOTE — PROGRESS NOTE ADULT - PROBLEM SELECTOR PLAN 9
pt refusing enema continue with Lactulose change to bid . pt refusing enema continue with Lactulose change to bid .   s/p manual disimpaction

## 2018-08-07 NOTE — CHART NOTE - NSCHARTNOTEFT_GEN_A_CORE
Assessment: Pt with s/p gastrointestinal hemorrhage & colitis. Pt reports no further abdominal pain & refusing colonoscopy. Pt seen by palliative care; pending family decision for goals of care.     Factors impacting intake: [ ] none [ ] nausea  [ ] vomiting [ ] diarrhea [ ] constipation  [ ]chewing problems [ ] swallowing issues  [x ] other: Persistent lack of appetite  colitis    Diet Prescription: Diet, Full Liquid (08-06-18 @ 15:10)    Intake: 50% clear liquids & full liquids consumed. Pt states she likes Yogurt. Pt states she dislikes po supplements Ensure Enlive & Ensure pudding due to too sweet. Pt requesting puree food    Current Weight: Weight (kg): 55.8 (08-02 @ 12:04)  % Weight Change    Pertinent Medications: MEDICATIONS  (STANDING):  ciprofloxacin   IVPB 200 milliGRAM(s) IV Intermittent every 12 hours  ciprofloxacin   IVPB      dextrose 5% + sodium chloride 0.9%. 1000 milliLiter(s) (80 mL/Hr) IV Continuous <Continuous>  docusate sodium Liquid 100 milliGRAM(s) Oral three times a day  metroNIDAZOLE  IVPB 500 milliGRAM(s) IV Intermittent every 8 hours  pantoprazole  Injectable 40 milliGRAM(s) IV Push every 12 hours  petrolatum white Ointment 1 Application(s) Topical two times a day  senna Syrup 2 milliLiter(s) Oral at bedtime  simvastatin 20 milliGRAM(s) Oral at bedtime    MEDICATIONS  (PRN):    Pertinent Labs: 08-07 Na142 mmol/L Glu 115 mg/dL<H> K+ 3.5 mmol/L Cr  0.84 mg/dL BUN 6 mg/dL<L> 08-07 Phos 1.5 mg/dL<L> 08-05 Alb 1.9 g/dL<L>, WBC=11.87(8/7), Phos=1.5(8/7)     CAPILLARY BLOOD GLUCOSE        Skin:  intact    Estimated Needs:   [x ] no change since previous assessment  8/3/8  [ ] recalculated:     Previous Nutrition Diagnosis:   [ ] Inadequate Energy Intake [ ]Inadequate Oral Intake [ ] Excessive Energy Intake   [ ] Underweight [ ] Increased Nutrient Needs [ ] Overweight/Obesity   [ ] Altered GI Function [ ] Unintended Weight Loss [ ] Food & Nutrition Related Knowledge Deficit [x ] Acute severe Malnutrition [ ] moderate malnutrition    Nutrition Diagnosis is [x ] ongoing  [ ] resolved  [ ] improved  [ ] not applicable   Previous Goal:     New Nutrition Diagnosis: [x ] not applicable       Interventions:   Recommend  [x ] Continue: adv po diet when medically feasible Puree/thin liquids with Yogurt @ meals  [ ] Change Diet To:  [ ] Nutrition Supplement:  [ ] Nutrition Support:  [ ] Other:     Monitoring and Evaluation:   [x ] PO intake [ x ] Tolerance to diet prescription [ x ] weights [ x ] labs[ x ] follow up per protocol  [ ] other: Assessment: Pt with s/p gastrointestinal hemorrhage & colitis. Pt reports no further abdominal pain & refusing colonoscopy. Pt seen by palliative care; pending family decision for goals of care.     Factors impacting intake: [ ] none [ ] nausea  [ ] vomiting [ ] diarrhea [ ] constipation  [ ]chewing problems [ ] swallowing issues  [x ] other: Persistent lack of appetite  colitis    Diet Prescription: Diet, Full Liquid (08-06-18 @ 15:10)    Intake: 50% clear liquids & full liquids consumed. Pt states she likes Yogurt. Pt states she dislikes po supplements Ensure Enlive & Ensure pudding due to too sweet. Pt requesting puree food.    Current Weight: Weight (kg): Wt=63kg(8/7); no edema, Wt=55.8 (08-02 @ 12:04)  % Weight Change questionable sign wt gain 7.2kg(13%) x 5 days; recommend reweigh to verify wt gain & CNA notified.    Pertinent Medications: MEDICATIONS  (STANDING):  ciprofloxacin   IVPB 200 milliGRAM(s) IV Intermittent every 12 hours  ciprofloxacin   IVPB      dextrose 5% + sodium chloride 0.9%. 1000 milliLiter(s) (80 mL/Hr) IV Continuous <Continuous>  docusate sodium Liquid 100 milliGRAM(s) Oral three times a day  metroNIDAZOLE  IVPB 500 milliGRAM(s) IV Intermittent every 8 hours  pantoprazole  Injectable 40 milliGRAM(s) IV Push every 12 hours  petrolatum white Ointment 1 Application(s) Topical two times a day  senna Syrup 2 milliLiter(s) Oral at bedtime  simvastatin 20 milliGRAM(s) Oral at bedtime    MEDICATIONS  (PRN):    Pertinent Labs: 08-07 Na142 mmol/L Glu 115 mg/dL<H> K+ 3.5 mmol/L Cr  0.84 mg/dL BUN 6 mg/dL<L> 08-07 Phos 1.5 mg/dL<L> 08-05 Alb 1.9 g/dL<L>, WBC=11.87(8/7), Phos=1.5(8/7)     CAPILLARY BLOOD GLUCOSE        Skin:  intact    Estimated Needs:   [x ] no change since previous assessment  8/3/8  [ ] recalculated:     Previous Nutrition Diagnosis:   [ ] Inadequate Energy Intake [ ]Inadequate Oral Intake [ ] Excessive Energy Intake   [ ] Underweight [ ] Increased Nutrient Needs [ ] Overweight/Obesity   [ ] Altered GI Function [ ] Unintended Weight Loss [ ] Food & Nutrition Related Knowledge Deficit [x ] Acute severe Malnutrition [ ] moderate malnutrition    Nutrition Diagnosis is [x ] ongoing  [ ] resolved  [ ] improved  [ ] not applicable   Previous Goal:  Pt to maintain stable wt +/-2#; not met    Pt to tolerate po diet without GI distress; met    New Nutrition Diagnosis: [x ] not applicable       Interventions:   Recommend  [x ] Continue: adv po diet when medically feasible Puree/thin liquids with Yogurt @ meals  [ ] Change Diet To:  [ ] Nutrition Supplement:  [ ] Nutrition Support:  [x ] Other: Cater to food preferences    Monitoring and Evaluation:   [x ] PO intake [ x ] Tolerance to diet prescription [ x ] weights [ x ] labs[ x ] follow up per protocol  [ ] other:

## 2018-08-08 LAB
ANION GAP SERPL CALC-SCNC: 9 MMOL/L — SIGNIFICANT CHANGE UP (ref 5–17)
BUN SERPL-MCNC: 5 MG/DL — LOW (ref 7–23)
CALCIUM SERPL-MCNC: 7.2 MG/DL — LOW (ref 8.5–10.1)
CHLORIDE SERPL-SCNC: 110 MMOL/L — HIGH (ref 96–108)
CO2 SERPL-SCNC: 22 MMOL/L — SIGNIFICANT CHANGE UP (ref 22–31)
CREAT SERPL-MCNC: 0.93 MG/DL — SIGNIFICANT CHANGE UP (ref 0.5–1.3)
GLUCOSE SERPL-MCNC: 107 MG/DL — HIGH (ref 70–99)
HCT VFR BLD CALC: 29.3 % — LOW (ref 34.5–45)
HGB BLD-MCNC: 9.6 G/DL — LOW (ref 11.5–15.5)
MAGNESIUM SERPL-MCNC: 1.4 MG/DL — LOW (ref 1.6–2.6)
MCHC RBC-ENTMCNC: 28.5 PG — SIGNIFICANT CHANGE UP (ref 27–34)
MCHC RBC-ENTMCNC: 32.8 GM/DL — SIGNIFICANT CHANGE UP (ref 32–36)
MCV RBC AUTO: 86.9 FL — SIGNIFICANT CHANGE UP (ref 80–100)
NRBC # BLD: 0 /100 WBCS — SIGNIFICANT CHANGE UP (ref 0–0)
PHOSPHATE SERPL-MCNC: 1.6 MG/DL — LOW (ref 2.5–4.5)
PLATELET # BLD AUTO: 276 K/UL — SIGNIFICANT CHANGE UP (ref 150–400)
POTASSIUM SERPL-MCNC: 3.5 MMOL/L — SIGNIFICANT CHANGE UP (ref 3.5–5.3)
POTASSIUM SERPL-SCNC: 3.5 MMOL/L — SIGNIFICANT CHANGE UP (ref 3.5–5.3)
RBC # BLD: 3.37 M/UL — LOW (ref 3.8–5.2)
RBC # FLD: 15 % — HIGH (ref 10.3–14.5)
SODIUM SERPL-SCNC: 141 MMOL/L — SIGNIFICANT CHANGE UP (ref 135–145)
WBC # BLD: 11.65 K/UL — HIGH (ref 3.8–10.5)
WBC # FLD AUTO: 11.65 K/UL — HIGH (ref 3.8–10.5)

## 2018-08-08 PROCEDURE — 99232 SBSQ HOSP IP/OBS MODERATE 35: CPT

## 2018-08-08 PROCEDURE — 99233 SBSQ HOSP IP/OBS HIGH 50: CPT

## 2018-08-08 RX ORDER — MIRTAZAPINE 45 MG/1
7.5 TABLET, ORALLY DISINTEGRATING ORAL AT BEDTIME
Qty: 0 | Refills: 0 | Status: DISCONTINUED | OUTPATIENT
Start: 2018-08-08 | End: 2018-08-09

## 2018-08-08 RX ORDER — MAGNESIUM SULFATE 500 MG/ML
2 VIAL (ML) INJECTION ONCE
Qty: 0 | Refills: 0 | Status: COMPLETED | OUTPATIENT
Start: 2018-08-08 | End: 2018-08-08

## 2018-08-08 RX ORDER — POTASSIUM PHOSPHATE, MONOBASIC POTASSIUM PHOSPHATE, DIBASIC 236; 224 MG/ML; MG/ML
15 INJECTION, SOLUTION INTRAVENOUS ONCE
Qty: 0 | Refills: 0 | Status: COMPLETED | OUTPATIENT
Start: 2018-08-08 | End: 2018-08-08

## 2018-08-08 RX ADMIN — Medication 100 MILLIGRAM(S): at 22:49

## 2018-08-08 RX ADMIN — Medication 50 GRAM(S): at 15:09

## 2018-08-08 RX ADMIN — SENNA PLUS 2 MILLILITER(S): 8.6 TABLET ORAL at 23:40

## 2018-08-08 RX ADMIN — PANTOPRAZOLE SODIUM 40 MILLIGRAM(S): 20 TABLET, DELAYED RELEASE ORAL at 05:29

## 2018-08-08 RX ADMIN — Medication 100 MILLIGRAM(S): at 05:29

## 2018-08-08 RX ADMIN — LACTULOSE 10 GRAM(S): 10 SOLUTION ORAL at 05:29

## 2018-08-08 RX ADMIN — Medication 100 MILLIGRAM(S): at 23:40

## 2018-08-08 RX ADMIN — PANTOPRAZOLE SODIUM 40 MILLIGRAM(S): 20 TABLET, DELAYED RELEASE ORAL at 18:48

## 2018-08-08 RX ADMIN — SIMVASTATIN 20 MILLIGRAM(S): 20 TABLET, FILM COATED ORAL at 22:49

## 2018-08-08 RX ADMIN — LACTULOSE 10 GRAM(S): 10 SOLUTION ORAL at 18:48

## 2018-08-08 RX ADMIN — Medication 100 MILLIGRAM(S): at 13:57

## 2018-08-08 RX ADMIN — MIRTAZAPINE 7.5 MILLIGRAM(S): 45 TABLET, ORALLY DISINTEGRATING ORAL at 23:40

## 2018-08-08 RX ADMIN — Medication 1 APPLICATION(S): at 18:48

## 2018-08-08 RX ADMIN — POTASSIUM PHOSPHATE, MONOBASIC POTASSIUM PHOSPHATE, DIBASIC 63.75 MILLIMOLE(S): 236; 224 INJECTION, SOLUTION INTRAVENOUS at 15:38

## 2018-08-08 RX ADMIN — Medication 100 MILLIGRAM(S): at 13:53

## 2018-08-08 RX ADMIN — Medication 100 MILLIGRAM(S): at 00:32

## 2018-08-08 RX ADMIN — Medication 1 APPLICATION(S): at 05:30

## 2018-08-08 RX ADMIN — Medication 100 MILLIGRAM(S): at 11:41

## 2018-08-08 NOTE — PROGRESS NOTE BEHAVIORAL HEALTH - SUMMARY
86 y/o female, domiciled at home with son, grandson; with no PPH, PMH HTN, HLD, CAD, AAA s/p repair here c/o bloody stool since last night. Psych consulted to bj for depression.     Patient meets minor criteria for depression, however numerous features may overlap with medical illness. Patient states she is now interested in mirtazapine as previously discussed, may benefit from this for sleep and appetite in addition to its effects as an antidepressant. Appears cognitively same as last conversation.

## 2018-08-08 NOTE — PROGRESS NOTE ADULT - SUBJECTIVE AND OBJECTIVE BOX
INTERVAL HISTORY: Patient is a 85y old  Female who presents with a chief complaint of gastroenterologist bleed (02 Aug 2018 10:54)    MEDICATIONS  (STANDING):  ciprofloxacin   IVPB 200 milliGRAM(s) IV Intermittent every 12 hours  ciprofloxacin   IVPB      dextrose 5% + sodium chloride 0.9%. 1000 milliLiter(s) (80 mL/Hr) IV Continuous <Continuous>  docusate sodium Liquid 100 milliGRAM(s) Oral three times a day  lactulose Syrup 10 Gram(s) Oral two times a day  metroNIDAZOLE  IVPB 500 milliGRAM(s) IV Intermittent every 8 hours  pantoprazole  Injectable 40 milliGRAM(s) IV Push every 12 hours  petrolatum white Ointment 1 Application(s) Topical two times a day  senna Syrup 2 milliLiter(s) Oral at bedtime  simvastatin 20 milliGRAM(s) Oral at bedtime    MEDICATIONS  (PRN):    Vital Signs Last 24 Hrs  T(C): 36.7 (08 Aug 2018 05:19), Max: 37.3 (07 Aug 2018 12:54)  T(F): 98.1 (08 Aug 2018 05:19), Max: 99.2 (07 Aug 2018 12:54)  HR: 81 (08 Aug 2018 05:19) (79 - 88)  BP: 134/87 (08 Aug 2018 05:19) (130/83 - 143/91)  BP(mean): --  RR: 16 (08 Aug 2018 05:19) (16 - 16)  SpO2: 98% (08 Aug 2018 05:19) (96% - 100%)    PHYSICAL EXAM: s/p manual disimpaction    GENERAL: NAD, well-groomed, well-developed, elderly   HEAD:  Atraumatic, Normocephalic  EYES: EOMI, PERRLA, conjunctiva and sclera clear  ENMT: Moist mucous membranes  NECK: Supple, No JVD  NERVOUS SYSTEM:  Alert & Oriented X3, flat affect   CHEST/LUNG: Clear to auscultation bilaterally; No rales, rhonchi, wheezing, or rubs  HEART: Regular rate and rhythm; No murmurs, rubs, or gallops  ABDOMEN: Soft, Nontender, Nondistended; Bowel sounds present, s/p manual disimpacted   EXTREMITIES:  2+ Peripheral Pulses, No clubbing, cyanosis, or edema    LABS:                         9.6    11.65 )-----------( 276      ( 08 Aug 2018 06:35 )             29.3   08-08    141  |  110<H>  |  5<L>  ----------------------------<  107<H>  3.5   |  22  |  0.93    Ca    7.2<L>      08 Aug 2018 06:35  Phos  1.6     08-08  Mg     1.4     08-08                           CAPILLARY BLOOD GLUCOSE          RECENT CULTURES:      RADIOLOGY & ADDITIONAL TESTS:  Imaging Personally Reviewed:  [ ] YES      Consultant(s) Notes Reviewed:  [ ] YES     Care Discussed with [ ] Consultants [X ] Patient [ ] Family  [x ]    [x ]  Other; RN  HEALTH ISSUES - PROBLEM Dx:  Hypokalemia: Hypokalemia  Colitis: Colitis  Hyperlipidemia, unspecified hyperlipidemia type: Hyperlipidemia, unspecified hyperlipidemia type  Benign essential HTN: Benign essential HTN  CAD (coronary artery disease): CAD (coronary artery disease)  Gastrointestinal hemorrhage, unspecified gastrointestinal hemorrhage type: Gastrointestinal hemorrhage, unspecified gastrointestinal hemorrhage type        DVT/GI ppx  Discussed with pt @ bedside

## 2018-08-08 NOTE — PROGRESS NOTE BEHAVIORAL HEALTH - NSBHFUPINTERVALHXFT_PSY_A_CORE
Patient states that she still feels down and depressed, feels better when family visits, denies SI/I/P or HI/I/P, AOx2-3 (believes we are in [place] but gets hospital correct). Sleeping poorly and eating poorly, now feels she may benefit from qhs antidepressant, risks/benefits discussed.     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 Remembers content of conversation from yesterday.

## 2018-08-08 NOTE — PROGRESS NOTE BEHAVIORAL HEALTH - NSBHCHARTREVIEWVS_PSY_A_CORE FT
Vital Signs Last 24 Hrs  T(C): 37.2 (08 Aug 2018 11:33), Max: 37.3 (07 Aug 2018 12:54)  T(F): 98.9 (08 Aug 2018 11:33), Max: 99.2 (07 Aug 2018 12:54)  HR: 81 (08 Aug 2018 11:33) (79 - 88)  BP: 133/79 (08 Aug 2018 11:33) (130/83 - 143/91)  BP(mean): --  RR: 16 (08 Aug 2018 11:33) (16 - 16)  SpO2: 98% (08 Aug 2018 11:33) (96% - 100%)

## 2018-08-08 NOTE — PROGRESS NOTE ADULT - PROBLEM SELECTOR PLAN 6
psychiatry consult reviewed offered Remeron and pt refused .    nutrition consult noted diet chanegd . psychiatry consult reviewed    I d/w psych again on today  and pt now amenable to Remeron will start 7.5mg

## 2018-08-08 NOTE — PROGRESS NOTE BEHAVIORAL HEALTH - NSBHCONSULTMEDS_PSY_A_CORE FT
- consider remeron 7.5mg qHS standing, patient now wants it   - c/w dietary/nutrition consult to assess patient's food intake  - consider chaparden f/u  - consider TSH/T4 (last checked 2016); B12 folate OK

## 2018-08-08 NOTE — PROGRESS NOTE ADULT - SUBJECTIVE AND OBJECTIVE BOX
Pt weak - no bleeding; stable  Hgb 9.6      MEDICATIONS  (STANDING):  ciprofloxacin   IVPB 200 milliGRAM(s) IV Intermittent every 12 hours  ciprofloxacin   IVPB      dextrose 5% + sodium chloride 0.9%. 1000 milliLiter(s) (80 mL/Hr) IV Continuous <Continuous>  docusate sodium Liquid 100 milliGRAM(s) Oral three times a day  lactulose Syrup 10 Gram(s) Oral two times a day  magnesium sulfate  IVPB 2 Gram(s) IV Intermittent once  metroNIDAZOLE  IVPB 500 milliGRAM(s) IV Intermittent every 8 hours  pantoprazole  Injectable 40 milliGRAM(s) IV Push every 12 hours  petrolatum white Ointment 1 Application(s) Topical two times a day  potassium phosphate IVPB 15 milliMole(s) IV Intermittent once  senna Syrup 2 milliLiter(s) Oral at bedtime  simvastatin 20 milliGRAM(s) Oral at bedtime    MEDICATIONS  (PRN):      Allergies    No Known Allergies    Intolerances        Vital Signs Last 24 Hrs  T(C): 37.2 (08 Aug 2018 11:33), Max: 37.3 (07 Aug 2018 23:55)  T(F): 98.9 (08 Aug 2018 11:33), Max: 99.1 (07 Aug 2018 23:55)  HR: 81 (08 Aug 2018 11:33) (80 - 88)  BP: 133/79 (08 Aug 2018 11:33) (133/79 - 143/91)  BP(mean): --  RR: 16 (08 Aug 2018 11:33) (16 - 16)  SpO2: 98% (08 Aug 2018 11:33) (96% - 98%)    PHYSICAL EXAM:  General: NAD.  CVS: S1, S2  Chest: air entry bilaterally present  Abd: BS present, soft, non-tender      LABS:                        9.6    11.65 )-----------( 276      ( 08 Aug 2018 06:35 )             29.3     08-08    141  |  110<H>  |  5<L>  ----------------------------<  107<H>  3.5   |  22  |  0.93    Ca    7.2<L>      08 Aug 2018 06:35  Phos  1.6     08-08  Mg     1.4     08-08      Pt does not want colonoscopy - will discuss with family - pt is very frail  follow labs Pt weak - no bleeding; stable  Pt was constipated - disempacted yesterday by Dr Love  Hgb 9.6      MEDICATIONS  (STANDING):  ciprofloxacin   IVPB 200 milliGRAM(s) IV Intermittent every 12 hours  ciprofloxacin   IVPB      dextrose 5% + sodium chloride 0.9%. 1000 milliLiter(s) (80 mL/Hr) IV Continuous <Continuous>  docusate sodium Liquid 100 milliGRAM(s) Oral three times a day  lactulose Syrup 10 Gram(s) Oral two times a day  magnesium sulfate  IVPB 2 Gram(s) IV Intermittent once  metroNIDAZOLE  IVPB 500 milliGRAM(s) IV Intermittent every 8 hours  pantoprazole  Injectable 40 milliGRAM(s) IV Push every 12 hours  petrolatum white Ointment 1 Application(s) Topical two times a day  potassium phosphate IVPB 15 milliMole(s) IV Intermittent once  senna Syrup 2 milliLiter(s) Oral at bedtime  simvastatin 20 milliGRAM(s) Oral at bedtime    MEDICATIONS  (PRN):      Allergies    No Known Allergies    Intolerances        Vital Signs Last 24 Hrs  T(C): 37.2 (08 Aug 2018 11:33), Max: 37.3 (07 Aug 2018 23:55)  T(F): 98.9 (08 Aug 2018 11:33), Max: 99.1 (07 Aug 2018 23:55)  HR: 81 (08 Aug 2018 11:33) (80 - 88)  BP: 133/79 (08 Aug 2018 11:33) (133/79 - 143/91)  BP(mean): --  RR: 16 (08 Aug 2018 11:33) (16 - 16)  SpO2: 98% (08 Aug 2018 11:33) (96% - 98%)    PHYSICAL EXAM:  General: NAD.  CVS: S1, S2  Chest: air entry bilaterally present  Abd: BS present, soft, non-tender      LABS:                        9.6    11.65 )-----------( 276      ( 08 Aug 2018 06:35 )             29.3     08-08    141  |  110<H>  |  5<L>  ----------------------------<  107<H>  3.5   |  22  |  0.93    Ca    7.2<L>      08 Aug 2018 06:35  Phos  1.6     08-08  Mg     1.4     08-08      Pt does not want colonoscopy - will discuss with family - pt is very frail  follow labs  laxatives

## 2018-08-08 NOTE — PROGRESS NOTE ADULT - PROBLEM SELECTOR PLAN 9
pt refusing enema continue with Lactulose change to bid .   s/p manual disimpaction by me on8/7/18 and had large bm on last night 8/7/18

## 2018-08-08 NOTE — PROGRESS NOTE BEHAVIORAL HEALTH - NSBHCHARTREVIEWLAB_PSY_A_CORE FT
9.6    11.65 )-----------( 276      ( 08 Aug 2018 06:35 )             29.3   08-08    141  |  110<H>  |  5<L>  ----------------------------<  107<H>  3.5   |  22  |  0.93    Ca    7.2<L>      08 Aug 2018 06:35  Phos  1.6     08-08  Mg     1.4     08-08    Vitamin B12, Serum: 1882: Note: Reference Range Change on 12/18/2017. pg/mL (08.07.18 @ 10:10)  Folate, Serum: 11.6 ng/mL (08.07.18 @ 10:10)  Thyroid Stimulating Hormone, Serum: 4.51 uIU/mL (06.01.12 @ 05:30)

## 2018-08-08 NOTE — PROGRESS NOTE ADULT - ATTENDING COMMENTS
Turn and position the  patient  frequently per  protocol to prevent decubiti ulcer and skin breakdown Turn and position the  patient  frequently per  protocol to prevent decubiti ulcer and skin breakdown  Await PT eval to start d/c planning

## 2018-08-09 ENCOUNTER — TRANSCRIPTION ENCOUNTER (OUTPATIENT)
Age: 83
End: 2018-08-09

## 2018-08-09 VITALS — DIASTOLIC BLOOD PRESSURE: 98 MMHG | HEART RATE: 83 BPM | SYSTOLIC BLOOD PRESSURE: 143 MMHG | OXYGEN SATURATION: 96 %

## 2018-08-09 LAB
ANION GAP SERPL CALC-SCNC: 8 MMOL/L — SIGNIFICANT CHANGE UP (ref 5–17)
BUN SERPL-MCNC: 6 MG/DL — LOW (ref 7–23)
CALCIUM SERPL-MCNC: 7.5 MG/DL — LOW (ref 8.5–10.1)
CHLORIDE SERPL-SCNC: 112 MMOL/L — HIGH (ref 96–108)
CO2 SERPL-SCNC: 22 MMOL/L — SIGNIFICANT CHANGE UP (ref 22–31)
CREAT SERPL-MCNC: 1 MG/DL — SIGNIFICANT CHANGE UP (ref 0.5–1.3)
GLUCOSE SERPL-MCNC: 96 MG/DL — SIGNIFICANT CHANGE UP (ref 70–99)
HCT VFR BLD CALC: 32 % — LOW (ref 34.5–45)
HGB BLD-MCNC: 10.5 G/DL — LOW (ref 11.5–15.5)
MAGNESIUM SERPL-MCNC: 1.9 MG/DL — SIGNIFICANT CHANGE UP (ref 1.6–2.6)
MCHC RBC-ENTMCNC: 28.7 PG — SIGNIFICANT CHANGE UP (ref 27–34)
MCHC RBC-ENTMCNC: 32.8 GM/DL — SIGNIFICANT CHANGE UP (ref 32–36)
MCV RBC AUTO: 87.4 FL — SIGNIFICANT CHANGE UP (ref 80–100)
NRBC # BLD: 0 /100 WBCS — SIGNIFICANT CHANGE UP (ref 0–0)
PHOSPHATE SERPL-MCNC: 2.2 MG/DL — LOW (ref 2.5–4.5)
PLATELET # BLD AUTO: 286 K/UL — SIGNIFICANT CHANGE UP (ref 150–400)
POTASSIUM SERPL-MCNC: 4 MMOL/L — SIGNIFICANT CHANGE UP (ref 3.5–5.3)
POTASSIUM SERPL-SCNC: 4 MMOL/L — SIGNIFICANT CHANGE UP (ref 3.5–5.3)
RBC # BLD: 3.66 M/UL — LOW (ref 3.8–5.2)
RBC # FLD: 15.3 % — HIGH (ref 10.3–14.5)
SODIUM SERPL-SCNC: 142 MMOL/L — SIGNIFICANT CHANGE UP (ref 135–145)
WBC # BLD: 9.51 K/UL — SIGNIFICANT CHANGE UP (ref 3.8–10.5)
WBC # FLD AUTO: 9.51 K/UL — SIGNIFICANT CHANGE UP (ref 3.8–10.5)

## 2018-08-09 PROCEDURE — 99239 HOSP IP/OBS DSCHRG MGMT >30: CPT

## 2018-08-09 RX ORDER — PANTOPRAZOLE SODIUM 20 MG/1
1 TABLET, DELAYED RELEASE ORAL
Qty: 30 | Refills: 0
Start: 2018-08-09 | End: 2018-09-07

## 2018-08-09 RX ORDER — MOXIFLOXACIN HYDROCHLORIDE TABLETS, 400 MG 400 MG/1
1 TABLET, FILM COATED ORAL
Qty: 12 | Refills: 0
Start: 2018-08-09 | End: 2018-08-14

## 2018-08-09 RX ORDER — MIRTAZAPINE 45 MG/1
1 TABLET, ORALLY DISINTEGRATING ORAL
Qty: 0 | Refills: 0 | DISCHARGE
Start: 2018-08-09

## 2018-08-09 RX ORDER — METRONIDAZOLE 500 MG
1 TABLET ORAL
Qty: 18 | Refills: 0
Start: 2018-08-09 | End: 2018-08-14

## 2018-08-09 RX ORDER — LACTULOSE 10 G/15ML
15 SOLUTION ORAL
Qty: 0 | Refills: 0 | DISCHARGE
Start: 2018-08-09

## 2018-08-09 RX ORDER — LACTOBACILLUS ACIDOPHILUS 100MM CELL
1 CAPSULE ORAL
Qty: 20 | Refills: 0
Start: 2018-08-09 | End: 2018-08-18

## 2018-08-09 RX ORDER — SIMVASTATIN 20 MG/1
1 TABLET, FILM COATED ORAL
Qty: 0 | Refills: 0 | DISCHARGE
Start: 2018-08-09

## 2018-08-09 RX ORDER — PETROLATUM,WHITE
1 JELLY (GRAM) TOPICAL
Qty: 0 | Refills: 0 | DISCHARGE
Start: 2018-08-09

## 2018-08-09 RX ORDER — SENNA PLUS 8.6 MG/1
2 TABLET ORAL
Qty: 0 | Refills: 0 | DISCHARGE
Start: 2018-08-09

## 2018-08-09 RX ORDER — DOCUSATE SODIUM 100 MG
10 CAPSULE ORAL
Qty: 0 | Refills: 0 | DISCHARGE
Start: 2018-08-09

## 2018-08-09 RX ADMIN — LACTULOSE 10 GRAM(S): 10 SOLUTION ORAL at 06:10

## 2018-08-09 RX ADMIN — Medication 100 MILLIGRAM(S): at 13:16

## 2018-08-09 RX ADMIN — Medication 100 MILLIGRAM(S): at 06:11

## 2018-08-09 RX ADMIN — Medication 100 MILLIGRAM(S): at 11:43

## 2018-08-09 RX ADMIN — Medication 1 APPLICATION(S): at 06:12

## 2018-08-09 RX ADMIN — Medication 100 MILLIGRAM(S): at 06:09

## 2018-08-09 RX ADMIN — PANTOPRAZOLE SODIUM 40 MILLIGRAM(S): 20 TABLET, DELAYED RELEASE ORAL at 06:11

## 2018-08-09 RX ADMIN — SODIUM CHLORIDE 80 MILLILITER(S): 9 INJECTION, SOLUTION INTRAVENOUS at 06:07

## 2018-08-09 RX ADMIN — Medication 100 MILLIGRAM(S): at 13:15

## 2018-08-09 NOTE — PROGRESS NOTE ADULT - PROBLEM SELECTOR PROBLEM 2
Benign essential HTN
CAD (coronary artery disease)
Benign essential HTN

## 2018-08-09 NOTE — PROGRESS NOTE ADULT - ATTENDING COMMENTS
Turn and position the  patient  frequently per  protocol to prevent decubiti ulcer and skin breakdown  Await PT eval to start d/c planning

## 2018-08-09 NOTE — DISCHARGE NOTE ADULT - SECONDARY DIAGNOSIS.
Rectal bleeding Constipation, unspecified constipation type Hyperlipidemia, unspecified hyperlipidemia type

## 2018-08-09 NOTE — PROGRESS NOTE ADULT - PROBLEM SELECTOR PLAN 2
bp stable without meds  continue to monitor

## 2018-08-09 NOTE — DISCHARGE NOTE ADULT - PLAN OF CARE
complete antibx complete  antibx resolved and most likely due to colitis continue with stool continue with statin

## 2018-08-09 NOTE — PROGRESS NOTE ADULT - PROVIDER SPECIALTY LIST ADULT
Gastroenterology
Hospitalist

## 2018-08-09 NOTE — PROGRESS NOTE ADULT - PROBLEM SELECTOR PROBLEM 3
Hyperlipidemia, unspecified hyperlipidemia type
Benign essential HTN
Hyperlipidemia, unspecified hyperlipidemia type

## 2018-08-09 NOTE — PROGRESS NOTE ADULT - PROBLEM SELECTOR PLAN 9
pt refusing enema continue with Lactulose change to bid .   s/p manual disimpaction by me on8/7/18 and had large bm on last night 8/7/18 and again on this morning

## 2018-08-09 NOTE — DISCHARGE NOTE ADULT - PATIENT PORTAL LINK FT
You can access the [facility], offered by [facility], by registering with the following website: [email or web address]

## 2018-08-09 NOTE — PROGRESS NOTE ADULT - ASSESSMENT
85 yrs old female with PMH of HTN, HLD, CAD, AAA s/p repair here c/o bloody stool. Patient  is poor historian.  CT abdomen and pelvis showed Interval aortic endograft placement. No evidence of extravasation. Small mural thrombus at the level of renal arteries.Markedly distended rectosigmoid (10.1 x 9.3) with wall thickening at the descending colon-sigmoid colon junction. GI was consulted and recommended clear liquid diet and if bleeding persists will consider colonoscopy.   Hgb slow down trending but stable around 9-10.
86f with multiple medicql problems with bright red blood per rectum       IMPROVE VTE Individual Risk Assessment        RISK                                                          Points  [  ] Previous VTE                                                3  [  ] Thrombophilia                                             2  [  ] Lower limb paralysis                                   2        (unable to hold up >15 seconds)    [  ] Current Cancer                                            2         (within 6 months)  [  x] Immobilization > 24 hrs                              1  [  ] ICU/CCU stay > 24 hours                            1  [ x ] Age > 60                                                    1  IMPROVE VTE Score ___2_____
85 yrs old female with PMH of HTN, HLD, CAD, AAA s/p repair here c/o bloody stool. Patient  is poor historian.  CT abdomen and pelvis showed Interval aortic endograft placement. No evidence of extravasation. Small mural thrombus at the level of renal arteries.Markedly distended rectosigmoid (10.1 x 9.3) with wall thickening at the descending colon-sigmoid colon junction. GI was consulted and recommended clear liquid diet and if bleeding persists will consider colonoscopy.   Hgb slow down trending but stable around 9-10.

## 2018-08-09 NOTE — DISCHARGE NOTE ADULT - CARE PLAN
Principal Discharge DX:	Colitis  Goal:	complete antibx  Assessment and plan of treatment:	complete  antibx  Secondary Diagnosis:	Rectal bleeding  Goal:	resolved and most likely due to colitis  Secondary Diagnosis:	Constipation, unspecified constipation type  Goal:	continue with stool  Secondary Diagnosis:	Hyperlipidemia, unspecified hyperlipidemia type  Goal:	continue with statin

## 2018-08-09 NOTE — PROGRESS NOTE ADULT - SUBJECTIVE AND OBJECTIVE BOX
Stable - s/p GI bleed; + constipation  s/p disempaction      MEDICATIONS  (STANDING):  ciprofloxacin   IVPB 200 milliGRAM(s) IV Intermittent every 12 hours  ciprofloxacin   IVPB      dextrose 5% + sodium chloride 0.9%. 1000 milliLiter(s) (80 mL/Hr) IV Continuous <Continuous>  docusate sodium Liquid 100 milliGRAM(s) Oral three times a day  lactulose Syrup 10 Gram(s) Oral two times a day  metroNIDAZOLE  IVPB 500 milliGRAM(s) IV Intermittent every 8 hours  mirtazapine 7.5 milliGRAM(s) Oral at bedtime  pantoprazole  Injectable 40 milliGRAM(s) IV Push every 12 hours  petrolatum white Ointment 1 Application(s) Topical two times a day  senna Syrup 2 milliLiter(s) Oral at bedtime  simvastatin 20 milliGRAM(s) Oral at bedtime    MEDICATIONS  (PRN):      Allergies    No Known Allergies    Intolerances        Vital Signs Last 24 Hrs  T(C): 37.3 (09 Aug 2018 11:38), Max: 37.3 (09 Aug 2018 11:38)  T(F): 99.2 (09 Aug 2018 11:38), Max: 99.2 (09 Aug 2018 11:38)  HR: 83 (09 Aug 2018 12:00) (76 - 83)  BP: 143/98 (09 Aug 2018 12:00) (117/67 - 147/86)  BP(mean): --  RR: 16 (09 Aug 2018 11:38) (16 - 18)  SpO2: 96% (09 Aug 2018 12:00) (96% - 100%)    PHYSICAL EXAM:  General: NAD.  CVS: S1, S2  Chest: air entry bilaterally present  Abd: BS present, soft, non-tender      LABS:                        10.5   9.51  )-----------( 286      ( 09 Aug 2018 07:22 )             32.0     08-09    142  |  112<H>  |  6<L>  ----------------------------<  96  4.0   |  22  |  1.00    Ca    7.5<L>      09 Aug 2018 07:22  Phos  2.2     08-09  Mg     1.9     08-09      labs stable  diet as tolerated  pt refusing any further GI w/u

## 2018-08-09 NOTE — PROGRESS NOTE ADULT - PROBLEM SELECTOR PLAN 6
psychiatry consult reviewed    I d/w psych again on today  and pt now amenable to Remeron will start 7.5mg

## 2018-08-09 NOTE — DISCHARGE NOTE ADULT - HOSPITAL COURSE
· Assessment	  85 yrs old female with PMH of HTN, HLD, CAD, AAA s/p repair here c/o bloody stool. Patient  is poor historian.  CT abdomen and pelvis showed Interval aortic endograft placement. No evidence of extravasation. Small mural thrombus at the level of renal arteries.Markedly distended rectosigmoid (10.1 x 9.3) with wall thickening at the descending colon-sigmoid colon junction. GI was consulted and recommended clear liquid diet and if bleeding persists will consider colonoscopy.   Hgb slow down trending but stable around 9-10.        Problem/Plan - 1:  ·  Problem: Gastrointestinal hemorrhage, unspecified gastrointestinal hemorrhage type.  Plan: continue IV PPI change to po   gastroenterologist consult appreciated  diet advanced pt w/o appetite  last BM on 8/4/18 . s/p   KUB on 8/6/18 showing fecal impaction and rectal distention , patient having bowel  movements at 5am this morning   continue with stool softener  GI follow up recommended possible colonoscopy. can be possibly done as oupt      Problem/Plan - 2:  ·  Problem: Benign essential HTN.  Plan: bp stable without meds  continue to monitor.      Problem/Plan - 3:  ·  Problem: Hyperlipidemia, unspecified hyperlipidemia type.  Plan: statin.      Problem/Plan - 4:  ·  Problem: Colitis.  Plan: cipro /flagyl   gastroenterologist consult appreciated  wbc has  normalized.      Problem/Plan - 5:  ·  Problem: Hypokalemia.  Plan: resolved.      Problem/Plan - 6:  Problem: Depression, unspecified depression type. Plan: psychiatry consult reviewed    I d/w psych again on 8/7/18 and pt now amenable to Remeron start 7.5mg.     Problem/Plan - 7:  ·  Problem: Hypophosphatemia.  Plan: resolved.      Problem/Plan - 8:  ·  Problem: Hypomagnesemia.  Plan: resolved.      Problem/Plan - 9:  ·  Problem: Constipation, unspecified constipation type.  Plan: s/p manual disimpaction by me on8/7/18 and had large bm on last night 8/7/18 and again on this morning.      Problem/Plan - 10:  Problem: Severe protein-calorie malnutrition. Plan; nutrition consult noted diet changed.    TIME SPENT COMPLETING DISCHARGE AND COORDINATING CARE WITH NURSING,  AND CASE MANAGEMENT APPROX 40MINUTES

## 2018-08-09 NOTE — PROGRESS NOTE ADULT - PROBLEM SELECTOR PLAN 5
resolved
aggressive supplement  monitor bmp closely
cipro /flagyl   gastroenterologist consult appreciated
will be replaced
aggressive supplement  monitor bmp closely
resolved
will be replaced

## 2018-08-09 NOTE — PROGRESS NOTE ADULT - PROBLEM SELECTOR PLAN 10
nutrition consult noted diet changed

## 2018-08-09 NOTE — PROGRESS NOTE ADULT - PROBLEM SELECTOR PLAN 1
continue IV PPI  gastroenterologist consult appreciated  clear liquid diet advised, patient not tolerating well  per documentation 1 BM on 8/3, monitor BM, continue stool softener  GI follow up recommended possible colonoscopy
continue IV PPI  gastroenterologist consult appreciated  currently still on clear liquid diet advised,  last BM on 8/4/18 . will get KUB on today '  continue with stool softener  GI follow up recommended possible colonoscopy
continue IV PPI  gastroenterologist consult appreciated  clear liquid diet advised, patient not tolerating well  per documentation 1 BM on 8/3, monitor BM, continue stool softener  GI follow up recommended possible colonoscopy
continue IV PPI  gastroenterologist consult appreciated  diet advanced pt w/o appetite  last BM on 8/4/18 . s/p   KUB on 8/6/18 shwoing fecal imapaction and rectal distenetion  continue with stool softener  GI follow up recommended possible colonoscopy
continue IV PPI  gastroenterologist consult appreciated  diet advanced pt w/o appetite  last BM on 8/4/18 . s/p   KUB on 8/6/18 shwoing fecal imapaction and rectal distenetion  continue with stool softener  GI follow up recommended possible colonoscopy
protonix  gastroenterologist consult
continue IV PPI  gastroenterologist consult appreciated  diet advanced pt w/o appetite  last BM on 8/4/18 . s/p   KUB on 8/6/18 showing fecal impaction and rectal distention , patient having bowel  movements at 5am this morning   continue with stool softener  GI follow up recommended possible colonoscopy

## 2018-08-09 NOTE — DISCHARGE NOTE ADULT - MEDICATION SUMMARY - MEDICATIONS TO TAKE
I will START or STAY ON the medications listed below when I get home from the hospital:    Flagyl 500 mg oral tablet  -- 1 tab(s) by mouth 3 times a day  -- Indication: For Colitis    mirtazapine 7.5 mg oral tablet  -- 1 tab(s) by mouth once a day (at bedtime)  -- Indication: For Depression, unspecified depression type    simvastatin 20 mg oral tablet  -- 1 tab(s) by mouth once a day (at bedtime)  -- Indication: For Hld    petrolatum topical ointment  -- 1 application on skin 2 times a day  -- Indication: For General    docusate sodium 10 mg/mL oral liquid  -- 10 milliliter(s) by mouth 3 times a day  -- Indication: For Stool softener    lactulose 10 g/15 mL oral syrup  -- 15 milliliter(s) by mouth 2 times a day  -- Indication: For Constipation    senna 8.8 mg/5 mL oral syrup  -- 2 milliliter(s) by mouth once a day (at bedtime)  -- Indication: For Constipation    lactobacillus acidophilus oral capsule  -- 1 cap(s) by mouth 2 times a day   -- Indication: For General    pantoprazole 40 mg oral delayed release tablet  -- 1 tab(s) by mouth once a day  -- Indication: For General    Cipro 500 mg oral tablet  -- 1 tab(s) by mouth 2 times a day   -- Indication: For Colitis

## 2018-08-09 NOTE — PROGRESS NOTE ADULT - PROBLEM SELECTOR PROBLEM 1
Gastrointestinal hemorrhage, unspecified gastrointestinal hemorrhage type

## 2018-08-09 NOTE — PROGRESS NOTE ADULT - SUBJECTIVE AND OBJECTIVE BOX
INTERVAL HISTORY: Patient is a 85y old  Female who presents with a chief complaint of gastroenterologist bleed (02 Aug 2018 10:54)        MEDICATIONS  (STANDING):  ciprofloxacin   IVPB 200 milliGRAM(s) IV Intermittent every 12 hours  ciprofloxacin   IVPB      dextrose 5% + sodium chloride 0.9%. 1000 milliLiter(s) (80 mL/Hr) IV Continuous <Continuous>  docusate sodium Liquid 100 milliGRAM(s) Oral three times a day  lactulose Syrup 10 Gram(s) Oral two times a day  metroNIDAZOLE  IVPB 500 milliGRAM(s) IV Intermittent every 8 hours  mirtazapine 7.5 milliGRAM(s) Oral at bedtime  pantoprazole  Injectable 40 milliGRAM(s) IV Push every 12 hours  petrolatum white Ointment 1 Application(s) Topical two times a day  senna Syrup 2 milliLiter(s) Oral at bedtime  simvastatin 20 milliGRAM(s) Oral at bedtime    MEDICATIONS  (PRN):    Vital Signs Last 24 Hrs  T(C): 37.3 (09 Aug 2018 11:38), Max: 37.3 (09 Aug 2018 11:38)  T(F): 99.2 (09 Aug 2018 11:38), Max: 99.2 (09 Aug 2018 11:38)  HR: 83 (09 Aug 2018 11:38) (76 - 83)  BP: 147/86 (09 Aug 2018 11:38) (117/67 - 147/86)  BP(mean): 102 (08 Aug 2018 14:00) (102 - 102)  RR: 16 (09 Aug 2018 11:38) (16 - 18)  SpO2: 100% (09 Aug 2018 11:38) (96% - 100%)    PHYSICAL EXAM:   GENERAL: NAD, well-groomed, well-developed, elderly   HEAD:  Atraumatic, Normocephalic  EYES: EOMI, PERRLA, conjunctiva and sclera clear  ENMT: Moist mucous membranes  NECK: Supple, No JVD  NERVOUS SYSTEM:  Alert & Oriented X3, flat affect   CHEST/LUNG: Clear to auscultation bilaterally; No rales, rhonchi, wheezing, or rubs  HEART: Regular rate and rhythm; No murmurs, rubs, or gallops  ABDOMEN: Soft, Nontender, Nondistended; Bowel sounds present,   EXTREMITIES:  2+ Peripheral Pulses, No clubbing, cyanosis, or edema    LABS:                                           10.5   9.51  )-----------( 286      ( 09 Aug 2018 07:22 )               32.0     08-09    142  |  112<H>  |  6<L>  ----------------------------<  96  4.0   |  22  |  1.00    Ca    7.5<L>      09 Aug 2018 07:22  Phos  2.2     08-09  Mg     1.9     08-09                 CAPILLARY BLOOD GLUCOSE          RECENT CULTURES:      RADIOLOGY & ADDITIONAL TESTS:  Imaging Personally Reviewed:  [ ] YES      Consultant(s) Notes Reviewed:  [ ] YES     Care Discussed with [ ] Consultants [X ] Patient [ ] Family  [x ]    [x ]  Other; RN  HEALTH ISSUES - PROBLEM Dx:  Hypokalemia: Hypokalemia  Colitis: Colitis  Hyperlipidemia, unspecified hyperlipidemia type: Hyperlipidemia, unspecified hyperlipidemia type  Benign essential HTN: Benign essential HTN  CAD (coronary artery disease): CAD (coronary artery disease)  Gastrointestinal hemorrhage, unspecified gastrointestinal hemorrhage type: Gastrointestinal hemorrhage, unspecified gastrointestinal hemorrhage type        DVT/GI ppx  Discussed with pt @ bedside

## 2018-08-09 NOTE — PROGRESS NOTE ADULT - PROBLEM SELECTOR PROBLEM 6
Depression, unspecified depression type

## 2018-08-17 DIAGNOSIS — E43 UNSPECIFIED SEVERE PROTEIN-CALORIE MALNUTRITION: ICD-10-CM

## 2018-08-17 DIAGNOSIS — K59.00 CONSTIPATION, UNSPECIFIED: ICD-10-CM

## 2018-08-17 DIAGNOSIS — E78.5 HYPERLIPIDEMIA, UNSPECIFIED: ICD-10-CM

## 2018-08-17 DIAGNOSIS — E83.42 HYPOMAGNESEMIA: ICD-10-CM

## 2018-08-17 DIAGNOSIS — Z95.1 PRESENCE OF AORTOCORONARY BYPASS GRAFT: ICD-10-CM

## 2018-08-17 DIAGNOSIS — E83.39 OTHER DISORDERS OF PHOSPHORUS METABOLISM: ICD-10-CM

## 2018-08-17 DIAGNOSIS — Z53.20 PROCEDURE AND TREATMENT NOT CARRIED OUT BECAUSE OF PATIENT'S DECISION FOR UNSPECIFIED REASONS: ICD-10-CM

## 2018-08-17 DIAGNOSIS — I25.10 ATHEROSCLEROTIC HEART DISEASE OF NATIVE CORONARY ARTERY WITHOUT ANGINA PECTORIS: ICD-10-CM

## 2018-08-17 DIAGNOSIS — E87.6 HYPOKALEMIA: ICD-10-CM

## 2018-08-17 DIAGNOSIS — I25.2 OLD MYOCARDIAL INFARCTION: ICD-10-CM

## 2018-08-17 DIAGNOSIS — I10 ESSENTIAL (PRIMARY) HYPERTENSION: ICD-10-CM

## 2018-08-17 DIAGNOSIS — F32.9 MAJOR DEPRESSIVE DISORDER, SINGLE EPISODE, UNSPECIFIED: ICD-10-CM

## 2018-08-17 DIAGNOSIS — K92.2 GASTROINTESTINAL HEMORRHAGE, UNSPECIFIED: ICD-10-CM

## 2018-08-17 DIAGNOSIS — K52.9 NONINFECTIVE GASTROENTERITIS AND COLITIS, UNSPECIFIED: ICD-10-CM

## 2018-10-06 ENCOUNTER — INPATIENT (INPATIENT)
Facility: HOSPITAL | Age: 83
LOS: 2 days | Discharge: NURSING, REHAB OR LONG-TERM CARE | End: 2018-10-09
Attending: INTERNAL MEDICINE | Admitting: INTERNAL MEDICINE
Payer: MEDICARE

## 2018-10-06 VITALS
HEART RATE: 64 BPM | HEIGHT: 63 IN | SYSTOLIC BLOOD PRESSURE: 124 MMHG | RESPIRATION RATE: 20 BRPM | DIASTOLIC BLOOD PRESSURE: 78 MMHG | OXYGEN SATURATION: 96 % | WEIGHT: 119.93 LBS

## 2018-10-06 LAB
ALBUMIN SERPL ELPH-MCNC: 2.6 G/DL — LOW (ref 3.3–5)
ALP SERPL-CCNC: 63 U/L — SIGNIFICANT CHANGE UP (ref 40–120)
ALT FLD-CCNC: 15 U/L — SIGNIFICANT CHANGE UP (ref 12–78)
ANION GAP SERPL CALC-SCNC: 12 MMOL/L — SIGNIFICANT CHANGE UP (ref 5–17)
APPEARANCE UR: ABNORMAL
APTT BLD: 33.2 SEC — SIGNIFICANT CHANGE UP (ref 27.5–37.4)
AST SERPL-CCNC: 61 U/L — HIGH (ref 15–37)
BACTERIA # UR AUTO: ABNORMAL
BILIRUB SERPL-MCNC: 0.6 MG/DL — SIGNIFICANT CHANGE UP (ref 0.2–1.2)
BILIRUB UR-MCNC: NEGATIVE — SIGNIFICANT CHANGE UP
BUN SERPL-MCNC: 9 MG/DL — SIGNIFICANT CHANGE UP (ref 7–23)
CALCIUM SERPL-MCNC: 8.3 MG/DL — LOW (ref 8.5–10.1)
CHLORIDE SERPL-SCNC: 104 MMOL/L — SIGNIFICANT CHANGE UP (ref 96–108)
CK MB BLD-MCNC: <0.6 % — SIGNIFICANT CHANGE UP (ref 0–3.5)
CK MB CFR SERPL CALC: <1 NG/ML — SIGNIFICANT CHANGE UP (ref 0.5–3.6)
CK SERPL-CCNC: 154 U/L — SIGNIFICANT CHANGE UP (ref 26–192)
CO2 SERPL-SCNC: 22 MMOL/L — SIGNIFICANT CHANGE UP (ref 22–31)
COLOR SPEC: YELLOW — SIGNIFICANT CHANGE UP
CREAT SERPL-MCNC: 0.89 MG/DL — SIGNIFICANT CHANGE UP (ref 0.5–1.3)
DIFF PNL FLD: ABNORMAL
EPI CELLS # UR: SIGNIFICANT CHANGE UP
GLUCOSE BLDC GLUCOMTR-MCNC: 102 MG/DL — HIGH (ref 70–99)
GLUCOSE SERPL-MCNC: 97 MG/DL — SIGNIFICANT CHANGE UP (ref 70–99)
GLUCOSE UR QL: NEGATIVE MG/DL — SIGNIFICANT CHANGE UP
HCT VFR BLD CALC: 34.8 % — SIGNIFICANT CHANGE UP (ref 34.5–45)
HGB BLD-MCNC: 10.8 G/DL — LOW (ref 11.5–15.5)
INR BLD: 1.09 RATIO — SIGNIFICANT CHANGE UP (ref 0.88–1.16)
KETONES UR-MCNC: NEGATIVE — SIGNIFICANT CHANGE UP
LACTATE SERPL-SCNC: 3.2 MMOL/L — HIGH (ref 0.7–2)
LEUKOCYTE ESTERASE UR-ACNC: ABNORMAL
LIDOCAIN IGE QN: 169 U/L — SIGNIFICANT CHANGE UP (ref 73–393)
MCHC RBC-ENTMCNC: 29.1 PG — SIGNIFICANT CHANGE UP (ref 27–34)
MCHC RBC-ENTMCNC: 31 GM/DL — LOW (ref 32–36)
MCV RBC AUTO: 93.8 FL — SIGNIFICANT CHANGE UP (ref 80–100)
NITRITE UR-MCNC: POSITIVE
NRBC # BLD: 0 /100 WBCS — SIGNIFICANT CHANGE UP (ref 0–0)
PH UR: 7 — SIGNIFICANT CHANGE UP (ref 5–8)
PLATELET # BLD AUTO: 211 K/UL — SIGNIFICANT CHANGE UP (ref 150–400)
POTASSIUM SERPL-MCNC: 5.1 MMOL/L — SIGNIFICANT CHANGE UP (ref 3.5–5.3)
POTASSIUM SERPL-SCNC: 5.1 MMOL/L — SIGNIFICANT CHANGE UP (ref 3.5–5.3)
PROT SERPL-MCNC: 8 GM/DL — SIGNIFICANT CHANGE UP (ref 6–8.3)
PROT UR-MCNC: 15 MG/DL
PROTHROM AB SERPL-ACNC: 11.9 SEC — SIGNIFICANT CHANGE UP (ref 9.8–12.7)
RBC # BLD: 3.71 M/UL — LOW (ref 3.8–5.2)
RBC # FLD: 16.1 % — HIGH (ref 10.3–14.5)
RBC CASTS # UR COMP ASSIST: SIGNIFICANT CHANGE UP /HPF (ref 0–4)
SODIUM SERPL-SCNC: 138 MMOL/L — SIGNIFICANT CHANGE UP (ref 135–145)
SP GR SPEC: 1 — LOW (ref 1.01–1.02)
TROPONIN I SERPL-MCNC: <.015 NG/ML — SIGNIFICANT CHANGE UP (ref 0.01–0.04)
UROBILINOGEN FLD QL: NEGATIVE MG/DL — SIGNIFICANT CHANGE UP
WBC # BLD: 10.2 K/UL — SIGNIFICANT CHANGE UP (ref 3.8–10.5)
WBC # FLD AUTO: 10.2 K/UL — SIGNIFICANT CHANGE UP (ref 3.8–10.5)
WBC UR QL: ABNORMAL

## 2018-10-06 PROCEDURE — 74177 CT ABD & PELVIS W/CONTRAST: CPT | Mod: 26

## 2018-10-06 PROCEDURE — 99285 EMERGENCY DEPT VISIT HI MDM: CPT

## 2018-10-06 PROCEDURE — 93010 ELECTROCARDIOGRAM REPORT: CPT

## 2018-10-06 PROCEDURE — 71045 X-RAY EXAM CHEST 1 VIEW: CPT | Mod: 26

## 2018-10-06 RX ORDER — FAMOTIDINE 10 MG/ML
20 INJECTION INTRAVENOUS ONCE
Qty: 0 | Refills: 0 | Status: COMPLETED | OUTPATIENT
Start: 2018-10-06 | End: 2018-10-06

## 2018-10-06 RX ORDER — SODIUM CHLORIDE 9 MG/ML
1000 INJECTION INTRAMUSCULAR; INTRAVENOUS; SUBCUTANEOUS ONCE
Qty: 0 | Refills: 0 | Status: COMPLETED | OUTPATIENT
Start: 2018-10-06 | End: 2018-10-06

## 2018-10-06 RX ORDER — ENOXAPARIN SODIUM 100 MG/ML
40 INJECTION SUBCUTANEOUS DAILY
Qty: 0 | Refills: 0 | Status: DISCONTINUED | OUTPATIENT
Start: 2018-10-06 | End: 2018-10-09

## 2018-10-06 RX ORDER — CEFTRIAXONE 500 MG/1
1 INJECTION, POWDER, FOR SOLUTION INTRAMUSCULAR; INTRAVENOUS EVERY 24 HOURS
Qty: 0 | Refills: 0 | Status: DISCONTINUED | OUTPATIENT
Start: 2018-10-06 | End: 2018-10-07

## 2018-10-06 RX ORDER — ONDANSETRON 8 MG/1
4 TABLET, FILM COATED ORAL ONCE
Qty: 0 | Refills: 0 | Status: COMPLETED | OUTPATIENT
Start: 2018-10-06 | End: 2018-10-06

## 2018-10-06 RX ORDER — PANTOPRAZOLE SODIUM 20 MG/1
40 TABLET, DELAYED RELEASE ORAL
Qty: 0 | Refills: 0 | Status: DISCONTINUED | OUTPATIENT
Start: 2018-10-06 | End: 2018-10-09

## 2018-10-06 RX ORDER — CIPROFLOXACIN LACTATE 400MG/40ML
400 VIAL (ML) INTRAVENOUS EVERY 12 HOURS
Qty: 0 | Refills: 0 | Status: DISCONTINUED | OUTPATIENT
Start: 2018-10-07 | End: 2018-10-09

## 2018-10-06 RX ORDER — MIRTAZAPINE 45 MG/1
7.5 TABLET, ORALLY DISINTEGRATING ORAL AT BEDTIME
Qty: 0 | Refills: 0 | Status: DISCONTINUED | OUTPATIENT
Start: 2018-10-06 | End: 2018-10-09

## 2018-10-06 RX ORDER — METRONIDAZOLE 500 MG
TABLET ORAL
Qty: 0 | Refills: 0 | Status: DISCONTINUED | OUTPATIENT
Start: 2018-10-07 | End: 2018-10-09

## 2018-10-06 RX ORDER — CIPROFLOXACIN LACTATE 400MG/40ML
VIAL (ML) INTRAVENOUS
Qty: 0 | Refills: 0 | Status: DISCONTINUED | OUTPATIENT
Start: 2018-10-07 | End: 2018-10-09

## 2018-10-06 RX ORDER — SODIUM CHLORIDE 9 MG/ML
500 INJECTION INTRAMUSCULAR; INTRAVENOUS; SUBCUTANEOUS ONCE
Qty: 0 | Refills: 0 | Status: COMPLETED | OUTPATIENT
Start: 2018-10-06 | End: 2018-10-06

## 2018-10-06 RX ORDER — METRONIDAZOLE 500 MG
500 TABLET ORAL ONCE
Qty: 0 | Refills: 0 | Status: COMPLETED | OUTPATIENT
Start: 2018-10-06 | End: 2018-10-07

## 2018-10-06 RX ORDER — SENNA PLUS 8.6 MG/1
10 TABLET ORAL AT BEDTIME
Qty: 0 | Refills: 0 | Status: DISCONTINUED | OUTPATIENT
Start: 2018-10-06 | End: 2018-10-09

## 2018-10-06 RX ORDER — SODIUM CHLORIDE 9 MG/ML
1000 INJECTION, SOLUTION INTRAVENOUS
Qty: 0 | Refills: 0 | Status: DISCONTINUED | OUTPATIENT
Start: 2018-10-06 | End: 2018-10-09

## 2018-10-06 RX ORDER — METRONIDAZOLE 500 MG
500 TABLET ORAL EVERY 8 HOURS
Qty: 0 | Refills: 0 | Status: DISCONTINUED | OUTPATIENT
Start: 2018-10-07 | End: 2018-10-09

## 2018-10-06 RX ORDER — CIPROFLOXACIN LACTATE 400MG/40ML
400 VIAL (ML) INTRAVENOUS ONCE
Qty: 0 | Refills: 0 | Status: COMPLETED | OUTPATIENT
Start: 2018-10-06 | End: 2018-10-07

## 2018-10-06 RX ADMIN — CEFTRIAXONE 100 GRAM(S): 500 INJECTION, POWDER, FOR SOLUTION INTRAMUSCULAR; INTRAVENOUS at 21:00

## 2018-10-06 RX ADMIN — SODIUM CHLORIDE 1000 MILLILITER(S): 9 INJECTION INTRAMUSCULAR; INTRAVENOUS; SUBCUTANEOUS at 13:37

## 2018-10-06 RX ADMIN — SODIUM CHLORIDE 500 MILLILITER(S): 9 INJECTION INTRAMUSCULAR; INTRAVENOUS; SUBCUTANEOUS at 21:12

## 2018-10-06 RX ADMIN — SODIUM CHLORIDE 1000 MILLILITER(S): 9 INJECTION INTRAMUSCULAR; INTRAVENOUS; SUBCUTANEOUS at 12:16

## 2018-10-06 RX ADMIN — FAMOTIDINE 20 MILLIGRAM(S): 10 INJECTION INTRAVENOUS at 13:43

## 2018-10-06 RX ADMIN — ONDANSETRON 4 MILLIGRAM(S): 8 TABLET, FILM COATED ORAL at 13:43

## 2018-10-06 NOTE — ED ADULT NURSE NOTE - NS ED NOTE ABUSE RESPONSE YN
Pt called to confirm Eliquis hold for biopsy and appts for Friday.   FK level 9.4; no dose change.    Appreciated pt call.    Yes

## 2018-10-06 NOTE — ED ADULT NURSE NOTE - OBJECTIVE STATEMENT
biba, ao x3 but alk very low and slow , stated she went to  urgent care center because of being very weak , and had a near passing episode while with the doctor. the urgent care doctor called 911 . arrived with ems infusing 250ml n.s(0.9%) through a #20 sl lock in left ac position. labs drawn in ed and sent 1 l of 0.9N.S up as ordered . will continue to monitor.

## 2018-10-06 NOTE — H&P ADULT - NSHPLABSRESULTS_GEN_ALL_CORE
10.8   10.20 )-----------( 211      ( 06 Oct 2018 12:18 )             34.8     10-06    138  |  104  |  9   ----------------------------<  97  5.1   |  22  |  0.89    Ca    8.3<L>      06 Oct 2018 12:18    TPro  8.0  /  Alb  2.6<L>  /  TBili  0.6  /  DBili  x   /  AST  61<H>  /  ALT  15  /  AlkPhos  63  10    PT/INR - ( 06 Oct 2018 12:18 )   PT: 11.9 sec;   INR: 1.09 ratio         PTT - ( 06 Oct 2018 12:18 )  PTT:33.2 sec  Urinalysis Basic - ( 06 Oct 2018 16:29 )    Color: Yellow / Appearance: Slightly Turbid / S.005 / pH: x  Gluc: x / Ketone: Negative  / Bili: Negative / Urobili: Negative mg/dL   Blood: x / Protein: 15 mg/dL / Nitrite: Positive   Leuk Esterase: Trace / RBC: 0-2 /HPF / WBC 11-25   Sq Epi: x / Non Sq Epi: Few / Bacteria: Many        MICROBIOLOGY:  RECENT CULTURES:

## 2018-10-06 NOTE — H&P ADULT - NSHPPHYSICALEXAM_GEN_ALL_CORE
Constitutional: NAD, well-groomed, well-developed, dehydrated  HEENT: PERRLA, EOMI, Normal Hearing, MMM  Neck: No LAD, No JVD  Back: Normal spine flexure, No CVA tenderness  Respiratory: CTAB/L   Cardiovascular: S1 and S2, RRR, no M/G/R  Gastrointestinal: BS+, soft, NT/ND  Extremities: No peripheral edema  Vascular: 2+ peripheral pulses  Neurological: A/O x 3, no focal deficits  Skin: No rashes

## 2018-10-06 NOTE — PATIENT PROFILE ADULT - LONGEST PERIOD TOBACCO-FREE
Medical Necessity Clause: This procedure was medically necessary because the lesions that were treated were:
Anesthesia Volume In Cc: 0.5
Consent: The patient's consent was obtained including but not limited to risks of crusting, scabbing, blistering, scarring, darker or lighter pigmentary change, recurrence, incomplete removal and infection.
Add 52 Modifier (Optional): no
Medical Necessity Information: It is in your best interest to select a reason for this procedure from the list below. All of these items fulfill various CMS LCD requirements except the new and changing color options.
Post-Care Instructions: I reviewed with the patient in detail post-care instructions. Patient is to wear sunprotection, and avoid picking at any of the treated lesions. Pt may apply Vaseline to crusted or scabbing areas.
Treatment Number (Will Not Render If 0): 0
Detail Level: Detailed
25 years

## 2018-10-06 NOTE — H&P ADULT - HISTORY OF PRESENT ILLNESS
stated she went to  urgent care center because of being very weak , and had a near passing episode while with the doctor. the urgent care doctor called 911 .  position. pt states that she suddenly became lethargic and weak while sitting in her pmd office and vomited (-) chest pain (-) weakness (-) chills stated she went to  urgent care center because of being very weak , and had a near passing episode while with the doctor. the urgent care doctor called 911 .  position. pt states that she suddenly became lethargic and weak while sitting in her pmd office and vomited (-) chest pain (-) weakness (-) chills  Recently dc from hospital after fecal disimpaction, diarrhea, poor po intake

## 2018-10-06 NOTE — ED PROVIDER NOTE - OBJECTIVE STATEMENT
85 year old female presents today biba sent from her pmd office for evaluation, pt states that she suddenly became lethargic and weak while sitting in her pmd office and vomited (-) chest pain (-) weakness (-) chills

## 2018-10-06 NOTE — ED ADULT NURSE NOTE - INTERVENTIONS DEFINITIONS
[place] to call system/Call bell, personal items and telephone within reach/Instruct patient to call for assistance/Non-slip footwear when patient is off stretcher/Monitor for mental status changes and reorient to person, place, and time/Reinforce activity limits and safety measures with patient and family/Physically safe environment: no spills, clutter or unnecessary equipment/Stretcher in lowest position, wheels locked, appropriate side rails in place/Provide visual cue, wrist band, yellow gown, etc./Provide visual clues: red socks

## 2018-10-06 NOTE — ED ADULT TRIAGE NOTE - CHIEF COMPLAINT QUOTE
per ems " pt was at pmd office when she complained of feeling weak, and demonstrated signs of lethargy

## 2018-10-07 DIAGNOSIS — R53.1 WEAKNESS: ICD-10-CM

## 2018-10-07 DIAGNOSIS — E78.49 OTHER HYPERLIPIDEMIA: ICD-10-CM

## 2018-10-07 DIAGNOSIS — K52.9 NONINFECTIVE GASTROENTERITIS AND COLITIS, UNSPECIFIED: ICD-10-CM

## 2018-10-07 DIAGNOSIS — I10 ESSENTIAL (PRIMARY) HYPERTENSION: ICD-10-CM

## 2018-10-07 DIAGNOSIS — I25.810 ATHEROSCLEROSIS OF CORONARY ARTERY BYPASS GRAFT(S) WITHOUT ANGINA PECTORIS: ICD-10-CM

## 2018-10-07 RX ORDER — INFLUENZA VIRUS VACCINE 15; 15; 15; 15 UG/.5ML; UG/.5ML; UG/.5ML; UG/.5ML
0.5 SUSPENSION INTRAMUSCULAR ONCE
Qty: 0 | Refills: 0 | Status: COMPLETED | OUTPATIENT
Start: 2018-10-07 | End: 2018-10-09

## 2018-10-07 RX ADMIN — Medication 100 MILLIGRAM(S): at 19:30

## 2018-10-07 RX ADMIN — Medication 200 MILLIGRAM(S): at 13:11

## 2018-10-07 RX ADMIN — ENOXAPARIN SODIUM 40 MILLIGRAM(S): 100 INJECTION SUBCUTANEOUS at 13:12

## 2018-10-07 RX ADMIN — SODIUM CHLORIDE 80 MILLILITER(S): 9 INJECTION, SOLUTION INTRAVENOUS at 01:19

## 2018-10-07 RX ADMIN — Medication 100 MILLIGRAM(S): at 01:18

## 2018-10-07 RX ADMIN — SENNA PLUS 10 MILLILITER(S): 8.6 TABLET ORAL at 21:22

## 2018-10-07 RX ADMIN — Medication 100 MILLIGRAM(S): at 12:12

## 2018-10-07 RX ADMIN — Medication 200 MILLIGRAM(S): at 01:18

## 2018-10-07 RX ADMIN — MIRTAZAPINE 7.5 MILLIGRAM(S): 45 TABLET, ORALLY DISINTEGRATING ORAL at 21:22

## 2018-10-07 RX ADMIN — SODIUM CHLORIDE 80 MILLILITER(S): 9 INJECTION, SOLUTION INTRAVENOUS at 13:12

## 2018-10-07 NOTE — PHYSICAL THERAPY INITIAL EVALUATION ADULT - GAIT DEVIATIONS NOTED, PT EVAL
decreased velocity of limb motion/decreased step length/decreased stride length/decreased weight-shifting ability/increased time in double stance/decreased umu

## 2018-10-07 NOTE — PHYSICAL THERAPY INITIAL EVALUATION ADULT - CRITERIA FOR SKILLED THERAPEUTIC INTERVENTIONS
risk reduction/prevention/anticipated discharge recommendation/subacute rehab/rehab potential/impairments found/functional limitations in following categories/therapy frequency/predicted duration of therapy intervention

## 2018-10-07 NOTE — PHYSICAL THERAPY INITIAL EVALUATION ADULT - ADDITIONAL COMMENTS
As per son (Phillip), pt was able to ambulate independently c no AD in April 2018. Pt has recently become very weak and uses electric wheel chair and has a hospital bed. Pt has no steps to negotiate in home environment.

## 2018-10-07 NOTE — PROGRESS NOTE ADULT - SUBJECTIVE AND OBJECTIVE BOX
Patient is a 85y old  Female who presents with a chief complaint of vomiting/weakness (06 Oct 2018 21:25)      INTERVAL HPI/OVERNIGHT EVENTS:  BM x 2 today  tolerated breakfast, no Abd pain  feels improved  MEDICATIONS  (STANDING):  cefTRIAXone   IVPB 1 Gram(s) IV Intermittent every 24 hours  ciprofloxacin   IVPB      ciprofloxacin   IVPB 400 milliGRAM(s) IV Intermittent every 12 hours  dextrose 5% + sodium chloride 0.45%. 1000 milliLiter(s) (80 mL/Hr) IV Continuous <Continuous>  enoxaparin Injectable 40 milliGRAM(s) SubCutaneous daily  influenza   Vaccine 0.5 milliLiter(s) IntraMuscular once  metroNIDAZOLE  IVPB 500 milliGRAM(s) IV Intermittent every 8 hours  metroNIDAZOLE  IVPB      mirtazapine 7.5 milliGRAM(s) Oral at bedtime  pantoprazole    Tablet 40 milliGRAM(s) Oral before breakfast  senna Syrup 10 milliLiter(s) Oral at bedtime    MEDICATIONS  (PRN):        REVIEW OF SYSTEMS:  CONSTITUTIONAL: No fever, weight loss, or fatigue  EYES: No eye pain, visual disturbances, or discharge  ENMT:  No difficulty hearing, tinnitus, vertigo; No sinus or throat pain  NECK: No pain or stiffness  RESPIRATORY: No cough, wheezing, chills or hemoptysis; No shortness of breath  CARDIOVASCULAR: No chest pain, palpitations, dizziness, or leg swelling  GASTROINTESTINAL: No abdominal or epigastric pain. No nausea, vomiting, or hematemesis; No diarrhea or constipation. No melena or hematochezia.  GENITOURINARY: No dysuria, frequency, hematuria, or incontinence  NEUROLOGICAL: No headaches, memory loss, loss of strength, numbness, or tremors  SKIN: No itching, burning, rashes, or lesions      Vital Signs Last 24 Hrs  T(C): 36.8 (07 Oct 2018 11:08), Max: 36.8 (07 Oct 2018 11:08)  T(F): 98.2 (07 Oct 2018 11:08), Max: 98.2 (07 Oct 2018 11:08)  HR: 51 (07 Oct 2018 12:00) (51 - 70)  BP: 115/67 (07 Oct 2018 12:00) (115/67 - 164/97)  BP(mean): --  RR: 17 (07 Oct 2018 12:00) (15 - 17)  SpO2: 96% (07 Oct 2018 12:00) (96% - 100%)    PHYSICAL EXAM:  GENERAL: NAD, well-groomed, well-developed  HEAD:  Atraumatic, Normocephalic  EYES: EOMI, PERRLA, conjunctiva and sclera clear  ENMT: No tonsillar erythema, exudates, or enlargement; Moist mucous membranes   NECK: Supple, No JVD   NERVOUS SYSTEM:  Alert & Oriented X3, Good concentration; Motor Strength 5/5 B/L upper and lower extremities; DTRs 2+ intact and symmetric  CHEST/LUNG: Clear to percussion bilaterally; No rales, rhonchi, wheezing, or rubs  HEART: Regular rate and rhythm; No murmurs, rubs, or gallops  ABDOMEN: Soft, Nontender, Nondistended; Bowel sounds present  EXTREMITIES:  2+ Peripheral Pulses, No clubbing, cyanosis, or edema  LYMPH: No lymphadenopathy noted  SKIN: No rashes or lesions    LABS:                        10.8   10.20 )-----------( 211      ( 06 Oct 2018 12:18 )             34.8     10    138  |  104  |  9   ----------------------------<  97  5.1   |  22  |  0.89    Ca    8.3<L>      06 Oct 2018 12:18    TPro  8.0  /  Alb  2.6<L>  /  TBili  0.6  /  DBili  x   /  AST  61<H>  /  ALT  15  /  AlkPhos  63  10-06    PT/INR - ( 06 Oct 2018 12:18 )   PT: 11.9 sec;   INR: 1.09 ratio         PTT - ( 06 Oct 2018 12:18 )  PTT:33.2 sec  Urinalysis Basic - ( 06 Oct 2018 16:29 )    Color: Yellow / Appearance: Slightly Turbid / S.005 / pH: x  Gluc: x / Ketone: Negative  / Bili: Negative / Urobili: Negative mg/dL   Blood: x / Protein: 15 mg/dL / Nitrite: Positive   Leuk Esterase: Trace / RBC: 0-2 /HPF / WBC 11-25   Sq Epi: x / Non Sq Epi: Few / Bacteria: Many      CAPILLARY BLOOD GLUCOSE          RADIOLOGY & ADDITIONAL TESTS:    Imaging Personally Reviewed:  [ ] YES  [ ] NO    Consultant(s) Notes Reviewed:  [ ] YES  [ ] NO    Care Discussed with Consultants/Other Providers [ ] YES  [ ] NO

## 2018-10-08 DIAGNOSIS — N30.00 ACUTE CYSTITIS WITHOUT HEMATURIA: ICD-10-CM

## 2018-10-08 LAB
-  AMIKACIN: SIGNIFICANT CHANGE UP
-  AMOXICILLIN/CLAVULANIC ACID: SIGNIFICANT CHANGE UP
-  AMPICILLIN/SULBACTAM: SIGNIFICANT CHANGE UP
-  AMPICILLIN: SIGNIFICANT CHANGE UP
-  AZTREONAM: SIGNIFICANT CHANGE UP
-  CEFAZOLIN: SIGNIFICANT CHANGE UP
-  CEFEPIME: SIGNIFICANT CHANGE UP
-  CEFOXITIN: SIGNIFICANT CHANGE UP
-  CEFTRIAXONE: SIGNIFICANT CHANGE UP
-  CIPROFLOXACIN: SIGNIFICANT CHANGE UP
-  ERTAPENEM: SIGNIFICANT CHANGE UP
-  GENTAMICIN: SIGNIFICANT CHANGE UP
-  IMIPENEM: SIGNIFICANT CHANGE UP
-  LEVOFLOXACIN: SIGNIFICANT CHANGE UP
-  MEROPENEM: SIGNIFICANT CHANGE UP
-  NITROFURANTOIN: SIGNIFICANT CHANGE UP
-  PIPERACILLIN/TAZOBACTAM: SIGNIFICANT CHANGE UP
-  TIGECYCLINE: SIGNIFICANT CHANGE UP
-  TOBRAMYCIN: SIGNIFICANT CHANGE UP
-  TRIMETHOPRIM/SULFAMETHOXAZOLE: SIGNIFICANT CHANGE UP
ALBUMIN SERPL ELPH-MCNC: 2.1 G/DL — LOW (ref 3.3–5)
ALP SERPL-CCNC: 49 U/L — SIGNIFICANT CHANGE UP (ref 40–120)
ALT FLD-CCNC: 6 U/L — LOW (ref 12–78)
ANION GAP SERPL CALC-SCNC: 9 MMOL/L — SIGNIFICANT CHANGE UP (ref 5–17)
AST SERPL-CCNC: 21 U/L — SIGNIFICANT CHANGE UP (ref 15–37)
BILIRUB SERPL-MCNC: 0.4 MG/DL — SIGNIFICANT CHANGE UP (ref 0.2–1.2)
BUN SERPL-MCNC: 3 MG/DL — LOW (ref 7–23)
CALCIUM SERPL-MCNC: 8 MG/DL — LOW (ref 8.5–10.1)
CHLORIDE SERPL-SCNC: 109 MMOL/L — HIGH (ref 96–108)
CO2 SERPL-SCNC: 27 MMOL/L — SIGNIFICANT CHANGE UP (ref 22–31)
CREAT SERPL-MCNC: 0.69 MG/DL — SIGNIFICANT CHANGE UP (ref 0.5–1.3)
CULTURE RESULTS: SIGNIFICANT CHANGE UP
GLUCOSE SERPL-MCNC: 87 MG/DL — SIGNIFICANT CHANGE UP (ref 70–99)
HCT VFR BLD CALC: 29.4 % — LOW (ref 34.5–45)
HGB BLD-MCNC: 9.3 G/DL — LOW (ref 11.5–15.5)
LACTATE SERPL-SCNC: 1.9 MMOL/L — SIGNIFICANT CHANGE UP (ref 0.7–2)
MAGNESIUM SERPL-MCNC: 1.5 MG/DL — LOW (ref 1.6–2.6)
MCHC RBC-ENTMCNC: 29.2 PG — SIGNIFICANT CHANGE UP (ref 27–34)
MCHC RBC-ENTMCNC: 31.6 GM/DL — LOW (ref 32–36)
MCV RBC AUTO: 92.5 FL — SIGNIFICANT CHANGE UP (ref 80–100)
METHOD TYPE: SIGNIFICANT CHANGE UP
NRBC # BLD: 0 /100 WBCS — SIGNIFICANT CHANGE UP (ref 0–0)
ORGANISM # SPEC MICROSCOPIC CNT: SIGNIFICANT CHANGE UP
ORGANISM # SPEC MICROSCOPIC CNT: SIGNIFICANT CHANGE UP
PLATELET # BLD AUTO: 200 K/UL — SIGNIFICANT CHANGE UP (ref 150–400)
POTASSIUM SERPL-MCNC: 2.7 MMOL/L — CRITICAL LOW (ref 3.5–5.3)
POTASSIUM SERPL-SCNC: 2.7 MMOL/L — CRITICAL LOW (ref 3.5–5.3)
PROT SERPL-MCNC: 6.1 GM/DL — SIGNIFICANT CHANGE UP (ref 6–8.3)
RBC # BLD: 3.18 M/UL — LOW (ref 3.8–5.2)
RBC # FLD: 15.9 % — HIGH (ref 10.3–14.5)
SODIUM SERPL-SCNC: 145 MMOL/L — SIGNIFICANT CHANGE UP (ref 135–145)
SPECIMEN SOURCE: SIGNIFICANT CHANGE UP
WBC # BLD: 4.79 K/UL — SIGNIFICANT CHANGE UP (ref 3.8–10.5)
WBC # FLD AUTO: 4.79 K/UL — SIGNIFICANT CHANGE UP (ref 3.8–10.5)

## 2018-10-08 RX ORDER — POTASSIUM CHLORIDE 20 MEQ
40 PACKET (EA) ORAL ONCE
Qty: 0 | Refills: 0 | Status: COMPLETED | OUTPATIENT
Start: 2018-10-08 | End: 2018-10-08

## 2018-10-08 RX ORDER — POTASSIUM CHLORIDE 20 MEQ
10 PACKET (EA) ORAL
Qty: 0 | Refills: 0 | Status: COMPLETED | OUTPATIENT
Start: 2018-10-08 | End: 2018-10-08

## 2018-10-08 RX ADMIN — Medication 100 MILLIEQUIVALENT(S): at 11:49

## 2018-10-08 RX ADMIN — Medication 200 MILLIGRAM(S): at 13:33

## 2018-10-08 RX ADMIN — SODIUM CHLORIDE 80 MILLILITER(S): 9 INJECTION, SOLUTION INTRAVENOUS at 11:50

## 2018-10-08 RX ADMIN — ENOXAPARIN SODIUM 40 MILLIGRAM(S): 100 INJECTION SUBCUTANEOUS at 11:49

## 2018-10-08 RX ADMIN — Medication 40 MILLIEQUIVALENT(S): at 10:11

## 2018-10-08 RX ADMIN — Medication 200 MILLIGRAM(S): at 00:49

## 2018-10-08 RX ADMIN — PANTOPRAZOLE SODIUM 40 MILLIGRAM(S): 20 TABLET, DELAYED RELEASE ORAL at 08:03

## 2018-10-08 RX ADMIN — Medication 100 MILLIEQUIVALENT(S): at 14:53

## 2018-10-08 RX ADMIN — Medication 100 MILLIGRAM(S): at 03:57

## 2018-10-08 RX ADMIN — Medication 100 MILLIEQUIVALENT(S): at 13:33

## 2018-10-08 RX ADMIN — Medication 100 MILLIGRAM(S): at 11:49

## 2018-10-08 RX ADMIN — Medication 100 MILLIGRAM(S): at 20:07

## 2018-10-08 RX ADMIN — MIRTAZAPINE 7.5 MILLIGRAM(S): 45 TABLET, ORALLY DISINTEGRATING ORAL at 21:43

## 2018-10-08 NOTE — SWALLOW BEDSIDE ASSESSMENT ADULT - ASR SWALLOW DENTITION
pt reported "my dentures are at home but I do not use them"/upper dentures pt reported "I have upper dentures, but they are at home but I do not use them"/incomplete

## 2018-10-08 NOTE — SWALLOW BEDSIDE ASSESSMENT ADULT - SWALLOW EVAL: DIAGNOSIS
pt presented with oropharyngeal phases of swallow WNL, however reduced mastication ability as a result of incomplete dentition. no overt signs of aspiration. pt presented with oropharyngeal phases of swallow WNL, except reduced mastication which may be secondary to incomplete dentition. no overt signs of aspiration.

## 2018-10-08 NOTE — SWALLOW BEDSIDE ASSESSMENT ADULT - COMMENTS
CXR 10/6/2018 IMPRESSION: No consolidation, pleural effusion or pneumothorax. No pulmonary edema. The cardiomediastinal silhouette is normal in size and contour. Sternotomy wires and mediastinal clips are again noted. Abdominal aortic stent graft.

## 2018-10-08 NOTE — PROGRESS NOTE ADULT - SUBJECTIVE AND OBJECTIVE BOX
Patient is a 85y old  Female who presents with a chief complaint of vomiting/weakness (07 Oct 2018 15:43)      INTERVAL HPI/OVERNIGHT EVENTS:  continues to improve  labs noted- low K, E Coli on urine culture    MEDICATIONS  (STANDING):  ciprofloxacin   IVPB      ciprofloxacin   IVPB 400 milliGRAM(s) IV Intermittent every 12 hours  dextrose 5% + sodium chloride 0.45%. 1000 milliLiter(s) (80 mL/Hr) IV Continuous <Continuous>  enoxaparin Injectable 40 milliGRAM(s) SubCutaneous daily  influenza   Vaccine 0.5 milliLiter(s) IntraMuscular once  metroNIDAZOLE  IVPB      metroNIDAZOLE  IVPB 500 milliGRAM(s) IV Intermittent every 8 hours  mirtazapine 7.5 milliGRAM(s) Oral at bedtime  pantoprazole    Tablet 40 milliGRAM(s) Oral before breakfast  potassium chloride   Powder 40 milliEquivalent(s) Oral once  potassium chloride  10 mEq/100 mL IVPB 10 milliEquivalent(s) IV Intermittent every 1 hour  senna Syrup 10 milliLiter(s) Oral at bedtime    MEDICATIONS  (PRN):        REVIEW OF SYSTEMS:  CONSTITUTIONAL: No fever, weight loss, or fatigue  EYES: No eye pain, visual disturbances, or discharge  ENMT:  No difficulty hearing, tinnitus, vertigo; No sinus or throat pain  NECK: No pain or stiffness  RESPIRATORY: No cough, wheezing, chills or hemoptysis; No shortness of breath  CARDIOVASCULAR: No chest pain, palpitations, dizziness, or leg swelling  GASTROINTESTINAL: No abdominal or epigastric pain. No nausea, vomiting, or hematemesis; No diarrhea or constipation. No melena or hematochezia.  GENITOURINARY: No dysuria, frequency, hematuria, or incontinence  NEUROLOGICAL: No headaches, memory loss, loss of strength, numbness, or tremors  SKIN: No itching, burning, rashes, or lesions      Vital Signs Last 24 Hrs  T(C): 35.8 (08 Oct 2018 05:50), Max: 36.8 (07 Oct 2018 11:08)  T(F): 96.5 (08 Oct 2018 05:50), Max: 98.2 (07 Oct 2018 11:08)  HR: 57 (08 Oct 2018 05:50) (51 - 70)  BP: 126/74 (08 Oct 2018 05:50) (104/63 - 138/75)  BP(mean): --  RR: 17 (08 Oct 2018 05:50) (16 - 17)  SpO2: 97% (08 Oct 2018 05:50) (96% - 98%)    PHYSICAL EXAM:  GENERAL: NAD, well-groomed, well-developed  HEAD:  Atraumatic, Normocephalic  EYES: EOMI, PERRLA, conjunctiva and sclera clear  ENMT: No tonsillar erythema, exudates, or enlargement; Moist mucous membranes   NECK: Supple, No JVD   NERVOUS SYSTEM:  Alert & Oriented X3, Good concentration; Motor Strength 5/5 B/L upper and lower extremities; DTRs 2+ intact and symmetric  CHEST/LUNG: Clear to percussion bilaterally; No rales, rhonchi, wheezing, or rubs  HEART: Regular rate and rhythm; No murmurs, rubs, or gallops  ABDOMEN: Soft, Nontender, Nondistended; Bowel sounds present  EXTREMITIES:  2+ Peripheral Pulses, No clubbing, cyanosis, or edema  LYMPH: No lymphadenopathy noted  SKIN: No rashes or lesions    LABS:                        9.3    4.79  )-----------( 200      ( 08 Oct 2018 06:26 )             29.4     10-08    145  |  109<H>  |  3<L>  ----------------------------<  87  2.7<LL>   |  27  |  0.69    Ca    8.0<L>      08 Oct 2018 06:26    TPro  6.1  /  Alb  2.1<L>  /  TBili  0.4  /  DBili  x   /  AST  21  /  ALT  6<L>  /  AlkPhos  49  10-08    PT/INR - ( 06 Oct 2018 12:18 )   PT: 11.9 sec;   INR: 1.09 ratio         PTT - ( 06 Oct 2018 12:18 )  PTT:33.2 sec  Urinalysis Basic - ( 06 Oct 2018 16:29 )    Color: Yellow / Appearance: Slightly Turbid / S.005 / pH: x  Gluc: x / Ketone: Negative  / Bili: Negative / Urobili: Negative mg/dL   Blood: x / Protein: 15 mg/dL / Nitrite: Positive   Leuk Esterase: Trace / RBC: 0-2 /HPF / WBC 11-25   Sq Epi: x / Non Sq Epi: Few / Bacteria: Many      CAPILLARY BLOOD GLUCOSE          RADIOLOGY & ADDITIONAL TESTS:    Imaging Personally Reviewed:  [ ] YES  [ ] NO    Consultant(s) Notes Reviewed:  [ ] YES  [ ] NO    Care Discussed with Consultants/Other Providers [ ] YES  [ ] NO

## 2018-10-08 NOTE — SWALLOW BEDSIDE ASSESSMENT ADULT - H & P REVIEW
stated she went to  urgent care center because of being very weak , and had a near passing episode while with the doctor. the urgent care doctor called 911 .  position. pt states that she suddenly became lethargic and weak while sitting in her pmd office and vomited (-) chest pain (-) weakness (-) chills/yes

## 2018-10-08 NOTE — SWALLOW BEDSIDE ASSESSMENT ADULT - DIET PRIOR TO ADMI
pt reported "I typically eat cereal for breakfast and soup for lunch" and "I haven't eaten solid food in a while"

## 2018-10-08 NOTE — SWALLOW BEDSIDE ASSESSMENT ADULT - SLP GENERAL OBSERVATIONS
pt seen bedside alert and oriented x4. pt responded to autobiographical/want questions and verbalized needs. pt followed one step directions. noted good speech intelligibility.

## 2018-10-09 ENCOUNTER — TRANSCRIPTION ENCOUNTER (OUTPATIENT)
Age: 83
End: 2018-10-09

## 2018-10-09 VITALS
TEMPERATURE: 97 F | OXYGEN SATURATION: 98 % | SYSTOLIC BLOOD PRESSURE: 151 MMHG | RESPIRATION RATE: 16 BRPM | HEART RATE: 74 BPM | DIASTOLIC BLOOD PRESSURE: 75 MMHG

## 2018-10-09 LAB
ANION GAP SERPL CALC-SCNC: 10 MMOL/L — SIGNIFICANT CHANGE UP (ref 5–17)
ANION GAP SERPL CALC-SCNC: 8 MMOL/L — SIGNIFICANT CHANGE UP (ref 5–17)
BUN SERPL-MCNC: 2 MG/DL — LOW (ref 7–23)
BUN SERPL-MCNC: 2 MG/DL — LOW (ref 7–23)
CALCIUM SERPL-MCNC: 7.4 MG/DL — LOW (ref 8.5–10.1)
CALCIUM SERPL-MCNC: 7.7 MG/DL — LOW (ref 8.5–10.1)
CHLORIDE SERPL-SCNC: 109 MMOL/L — HIGH (ref 96–108)
CHLORIDE SERPL-SCNC: 109 MMOL/L — HIGH (ref 96–108)
CO2 SERPL-SCNC: 25 MMOL/L — SIGNIFICANT CHANGE UP (ref 22–31)
CO2 SERPL-SCNC: 26 MMOL/L — SIGNIFICANT CHANGE UP (ref 22–31)
CREAT SERPL-MCNC: 0.68 MG/DL — SIGNIFICANT CHANGE UP (ref 0.5–1.3)
CREAT SERPL-MCNC: 0.8 MG/DL — SIGNIFICANT CHANGE UP (ref 0.5–1.3)
GLUCOSE SERPL-MCNC: 117 MG/DL — HIGH (ref 70–99)
GLUCOSE SERPL-MCNC: 73 MG/DL — SIGNIFICANT CHANGE UP (ref 70–99)
POTASSIUM SERPL-MCNC: 2.9 MMOL/L — CRITICAL LOW (ref 3.5–5.3)
POTASSIUM SERPL-MCNC: 3.5 MMOL/L — SIGNIFICANT CHANGE UP (ref 3.5–5.3)
POTASSIUM SERPL-SCNC: 2.9 MMOL/L — CRITICAL LOW (ref 3.5–5.3)
POTASSIUM SERPL-SCNC: 3.5 MMOL/L — SIGNIFICANT CHANGE UP (ref 3.5–5.3)
SODIUM SERPL-SCNC: 143 MMOL/L — SIGNIFICANT CHANGE UP (ref 135–145)
SODIUM SERPL-SCNC: 144 MMOL/L — SIGNIFICANT CHANGE UP (ref 135–145)

## 2018-10-09 RX ORDER — POTASSIUM CHLORIDE 20 MEQ
10 PACKET (EA) ORAL
Qty: 0 | Refills: 0 | Status: COMPLETED | OUTPATIENT
Start: 2018-10-09 | End: 2018-10-09

## 2018-10-09 RX ORDER — POTASSIUM CHLORIDE 20 MEQ
40 PACKET (EA) ORAL EVERY 4 HOURS
Qty: 0 | Refills: 0 | Status: COMPLETED | OUTPATIENT
Start: 2018-10-09 | End: 2018-10-09

## 2018-10-09 RX ORDER — POTASSIUM CHLORIDE 20 MEQ
40 PACKET (EA) ORAL EVERY 4 HOURS
Qty: 0 | Refills: 0 | Status: DISCONTINUED | OUTPATIENT
Start: 2018-10-09 | End: 2018-10-09

## 2018-10-09 RX ADMIN — Medication 40 MILLIEQUIVALENT(S): at 13:25

## 2018-10-09 RX ADMIN — Medication 200 MILLIGRAM(S): at 13:25

## 2018-10-09 RX ADMIN — Medication 100 MILLIEQUIVALENT(S): at 12:32

## 2018-10-09 RX ADMIN — PANTOPRAZOLE SODIUM 40 MILLIGRAM(S): 20 TABLET, DELAYED RELEASE ORAL at 08:32

## 2018-10-09 RX ADMIN — Medication 100 MILLIEQUIVALENT(S): at 14:55

## 2018-10-09 RX ADMIN — ENOXAPARIN SODIUM 40 MILLIGRAM(S): 100 INJECTION SUBCUTANEOUS at 12:33

## 2018-10-09 RX ADMIN — Medication 200 MILLIGRAM(S): at 01:26

## 2018-10-09 RX ADMIN — Medication 100 MILLIEQUIVALENT(S): at 11:33

## 2018-10-09 RX ADMIN — Medication 100 MILLIGRAM(S): at 05:34

## 2018-10-09 RX ADMIN — Medication 40 MILLIEQUIVALENT(S): at 17:22

## 2018-10-09 RX ADMIN — SODIUM CHLORIDE 80 MILLILITER(S): 9 INJECTION, SOLUTION INTRAVENOUS at 05:34

## 2018-10-09 RX ADMIN — INFLUENZA VIRUS VACCINE 0.5 MILLILITER(S): 15; 15; 15; 15 SUSPENSION INTRAMUSCULAR at 13:24

## 2018-10-09 RX ADMIN — Medication 100 MILLIGRAM(S): at 12:32

## 2018-10-09 NOTE — DISCHARGE NOTE ADULT - MEDICATION SUMMARY - MEDICATIONS TO TAKE
I will START or STAY ON the medications listed below when I get home from the hospital:    Flagyl 500 mg oral tablet  -- 1 tab(s) by mouth 3 times a day  -- Indication: For Proctocolitis    mirtazapine 7.5 mg oral tablet  -- 1 tab(s) by mouth once a day (at bedtime)  -- Indication: For depression    simvastatin 20 mg oral tablet  -- 1 tab(s) by mouth once a day (at bedtime)  -- Indication: For hld    senna 8.8 mg/5 mL oral syrup  -- 2 milliliter(s) by mouth once a day (at bedtime)  -- Indication: For Constipation    pantoprazole 40 mg oral delayed release tablet  -- 1 tab(s) by mouth once a day  -- Indication: For gerd    Cipro 500 mg oral tablet  -- 1 tab(s) by mouth 2 times a day   -- Indication: For Proctocolitis

## 2018-10-09 NOTE — DISCHARGE NOTE ADULT - HOSPITAL COURSE
stated she went to  urgent care center because of being very weak , and had a near passing episode while with the doctor. the urgent care doctor called 911 .  position. pt states that she suddenly became lethargic and weak while sitting in her pmd office and vomited (-) chest pain (-) weakness (-) chills  Recently dc from hospital after fecal disimpaction, diarrhea, poor po intake  In hospital pt received IV cipro, flagyl for proctocolitis. for d/c to rehab with po cipro, flagyl.

## 2018-10-09 NOTE — DIETITIAN INITIAL EVALUATION ADULT. - OTHER INFO
Pt seen for consult (decreased PO intake); Pt admitted for procolitis, vomiting & weakness; PMHx AAA, benign essential HTN, CAD, HDL, MI, S/P CABG x4; As per previous RD assessment (08/03) and Pt interview, PO intake <50% needs noted x4 months; SLP evaluation (10/08) recommended dysphagia 2 with thins; Pt reported vomiting, now alleviated, and diarrhea, no constipation or nausea; Pt lives with her son who assists with breakfast, receives meals on wheels for lunch/dinner Pt seen for RN consult 10/7 for (decreased PO intake); Pt admitted for procolitis, vomiting & weakness; PMHx AAA, benign essential HTN, CAD, HDL, MI, S/P CABG x4; As per previous RD assessment (08/03) and Pt interview, PO intake <50% needs noted x4 months; Wt=55.6kg(8/3) with 2.1kg wt loss (4%) x 2 months. SLP evaluation (10/08) recommended dysphagia 2 with thin liquids; Pt reported vomiting, now alleviated, and diarrhea, no constipation or nausea; Pt lives with her son who assists with breakfast, receives meals on wheels for lunch/dinner Pt seen for RN consult 10/7 for (decreased PO intake); Pt admitted for procolitis, vomiting & weakness; PMHx AAA, benign essential HTN, CAD, HDL, MI, S/P CABG x4; As per previous RD assessment (08/03) and Pt interview, PO intake <50% needs noted x4 months; Wt=55.6kg(8/3) with 2.1kg wt loss (4%) x 2 months. SLP evaluation (10/08) recommended dysphagia 2 with thin liquids; Pt with missing teeth prefers mechanical soft consistency & prefers puree soup with meals. Pt reported vomiting on admission, now resolved, and diarrhea, no constipation or nausea; Pt lives with her son who assists with breakfast, receives meals on wheels for lunch/dinner

## 2018-10-09 NOTE — DIETITIAN INITIAL EVALUATION ADULT. - ADHERENCE
Pt consumes 2-3 ensure/day and soups due to chewing difficulty/fair fair/Pt states consuming 2-3 ensure/day and soups due to chewing difficulty

## 2018-10-09 NOTE — DIETITIAN INITIAL EVALUATION ADULT. - PERTINENT LABORATORY DATA
10-08 Na 145 mmol/L Glu 87 mg/dL K+ 2.7 mmol/L<LL> Cr 0.69 mg/dL BUN 3 mg/dL<L> Phos n/a   Alb 2.1 g/dL<L> PAB n/a   Hgb 9.3 g/dL<L> Hct 29.4 %<L> HgA1C n/a     24Hr FS: 10-08 Na 145 mmol/L Glu 87 mg/dL K+ 2.7 mmol/L<LL> Cr 0.69 mg/dL BUN 3 mg/dL<L> Phos n/a   Alb 2.1 g/dL<L> PAB n/a   Hgb 9.3 g/dL<L> Hct 29.4 %<L> HgA1C n/a     24Hr FS:, Phos=2.2(10/9)

## 2018-10-09 NOTE — DISCHARGE NOTE ADULT - MEDICATION SUMMARY - MEDICATIONS TO STOP TAKING
I will STOP taking the medications listed below when I get home from the hospital:    docusate sodium 10 mg/mL oral liquid  -- 10 milliliter(s) by mouth 3 times a day    lactulose 10 g/15 mL oral syrup  -- 15 milliliter(s) by mouth 2 times a day

## 2018-10-09 NOTE — CHART NOTE - NSCHARTNOTEFT_GEN_A_CORE
Upon Nutritional Assessment by the Registered Dietitian your patient was determined to meet criteria / has evidence of the following diagnosis/diagnoses:          [ ]  Mild Protein Calorie Malnutrition        [ ]  Moderate Protein Calorie Malnutrition        [x ] Severe Protein Calorie Malnutrition        [ ] Unspecified Protein Calorie Malnutrition        [ ] Underweight / BMI <19        [ ] Morbid Obesity / BMI > 40      Findings as based on:  •  Comprehensive nutrition assessment and consultation  •  Calorie counts (nutrient intake analysis)  •  Food acceptance and intake status from observations by staff  •  Follow up  •  Patient education  •  Intervention secondary to interdisciplinary rounds  •   concerns      Treatment:    The following diet has been recommended:  Mechanical soft/thin liquids/Ensure Enlive 2 x day(350kcal & 20gm protein)    PROVIDER Section:     By signing this assessment you are acknowledging and agree with the diagnosis/diagnoses assigned by the Registered Dietitian    Comments:

## 2018-10-09 NOTE — DISCHARGE NOTE ADULT - PATIENT PORTAL LINK FT
You can access the [facility] Portal, offered by [facility], by registering with the following website: [email or web address]

## 2018-10-09 NOTE — DISCHARGE NOTE ADULT - CARE PROVIDER_API CALL
Reno Haddad), Internal Medicine  [address] [place] [address]  Phone: [phone]  Fax: [phone]

## 2018-10-09 NOTE — DIETITIAN INITIAL EVALUATION ADULT. - SIGNS/SYMPTOMS
Severe fat depletion & muscle loss; PO intake <50% x4 months; weight loss 21% x5 months Severe fat depletion & muscle loss; PO intake <50% x4 months; weight loss of 21% x5 months

## 2018-10-09 NOTE — DIETITIAN INITIAL EVALUATION ADULT. - ETIOLOGY
Inadequate protein/energy intake related to lethargy, dysphagia & decreased appetite Inadequate protein/energy intake related to decreased appetite, lethargy, & dysphagia Inadequate protein/energy intake related to cardiac hx with s/p CABG x 4;  decreased appetite, lethargy, & dysphagia

## 2018-10-09 NOTE — DIETITIAN INITIAL EVALUATION ADULT. - ENERGY NEEDS
Height (cm): 165.1 (10-09)  Weight (kg): 50.5 (10-06)  BMI (kg/m2): 18.5 (10-09)  IBW (kg): 57 +/-10%   % IBW: 93.8  UBW (kg): 68   % UBW: 79 Height (cm): 165.1 (10-09)  Weight (kg): 53.5 (10-09)  BMI (kg/m2): 19.67 (10-09)  IBW (kg): 57 +/-10%   % IBW: 93.8  UBW (kg): 68   % UBW: 79

## 2018-10-09 NOTE — DISCHARGE NOTE ADULT - CARE PLAN
Principal Discharge DX:	Proctocolitis  Goal:	resolution of infection  Assessment and plan of treatment:	cont po cipro/ flagyl. f/u with pmd  Secondary Diagnosis:	Hyperlipidemia, unspecified hyperlipidemia type  Assessment and plan of treatment:	cont simvastatin  Secondary Diagnosis:	Benign essential HTN  Assessment and plan of treatment:	f/u with pmd

## 2018-10-09 NOTE — DIETITIAN INITIAL EVALUATION ADULT. - PERTINENT MEDS FT
ciprofloxacin, dextrose 5% + sodium chloride 0.45%., enoxaparin, metronidazole, mirtazapine, pantoprazole, senna

## 2018-10-12 DIAGNOSIS — Z95.1 PRESENCE OF AORTOCORONARY BYPASS GRAFT: ICD-10-CM

## 2018-10-12 DIAGNOSIS — I25.2 OLD MYOCARDIAL INFARCTION: ICD-10-CM

## 2018-10-12 DIAGNOSIS — N39.0 URINARY TRACT INFECTION, SITE NOT SPECIFIED: ICD-10-CM

## 2018-10-12 DIAGNOSIS — B96.20 UNSPECIFIED ESCHERICHIA COLI [E. COLI] AS THE CAUSE OF DISEASES CLASSIFIED ELSEWHERE: ICD-10-CM

## 2018-10-12 DIAGNOSIS — F32.9 MAJOR DEPRESSIVE DISORDER, SINGLE EPISODE, UNSPECIFIED: ICD-10-CM

## 2018-10-12 DIAGNOSIS — I10 ESSENTIAL (PRIMARY) HYPERTENSION: ICD-10-CM

## 2018-10-12 DIAGNOSIS — E43 UNSPECIFIED SEVERE PROTEIN-CALORIE MALNUTRITION: ICD-10-CM

## 2018-10-12 DIAGNOSIS — R53.1 WEAKNESS: ICD-10-CM

## 2018-10-12 DIAGNOSIS — R11.10 VOMITING, UNSPECIFIED: ICD-10-CM

## 2018-10-12 DIAGNOSIS — I25.10 ATHEROSCLEROTIC HEART DISEASE OF NATIVE CORONARY ARTERY WITHOUT ANGINA PECTORIS: ICD-10-CM

## 2018-10-12 DIAGNOSIS — K52.9 NONINFECTIVE GASTROENTERITIS AND COLITIS, UNSPECIFIED: ICD-10-CM

## 2018-10-12 DIAGNOSIS — R13.10 DYSPHAGIA, UNSPECIFIED: ICD-10-CM

## 2018-10-12 DIAGNOSIS — K59.00 CONSTIPATION, UNSPECIFIED: ICD-10-CM

## 2018-10-12 DIAGNOSIS — K21.9 GASTRO-ESOPHAGEAL REFLUX DISEASE WITHOUT ESOPHAGITIS: ICD-10-CM

## 2018-10-12 DIAGNOSIS — E78.49 OTHER HYPERLIPIDEMIA: ICD-10-CM

## 2020-01-01 ENCOUNTER — TRANSCRIPTION ENCOUNTER (OUTPATIENT)
Age: 85
End: 2020-01-01

## 2020-01-01 ENCOUNTER — APPOINTMENT (OUTPATIENT)
Dept: FAMILY MEDICINE | Facility: CLINIC | Age: 85
End: 2020-01-01

## 2020-01-01 ENCOUNTER — APPOINTMENT (OUTPATIENT)
Dept: UROLOGY | Facility: CLINIC | Age: 85
End: 2020-01-01
Payer: MEDICARE

## 2020-01-01 ENCOUNTER — NON-APPOINTMENT (OUTPATIENT)
Age: 85
End: 2020-01-01

## 2020-01-01 ENCOUNTER — LABORATORY RESULT (OUTPATIENT)
Age: 85
End: 2020-01-01

## 2020-01-01 ENCOUNTER — RESULT REVIEW (OUTPATIENT)
Age: 85
End: 2020-01-01

## 2020-01-01 ENCOUNTER — APPOINTMENT (OUTPATIENT)
Dept: NEUROLOGY | Facility: CLINIC | Age: 85
End: 2020-01-01
Payer: MEDICARE

## 2020-01-01 ENCOUNTER — APPOINTMENT (OUTPATIENT)
Dept: FAMILY MEDICINE | Facility: CLINIC | Age: 85
End: 2020-01-01
Payer: MEDICARE

## 2020-01-01 ENCOUNTER — INPATIENT (INPATIENT)
Facility: HOSPITAL | Age: 85
LOS: 4 days | Discharge: HOME HEALTH | End: 2020-08-30
Attending: INTERNAL MEDICINE | Admitting: INTERNAL MEDICINE
Payer: MEDICARE

## 2020-01-01 ENCOUNTER — APPOINTMENT (OUTPATIENT)
Dept: UROLOGY | Facility: HOSPITAL | Age: 85
End: 2020-01-01

## 2020-01-01 ENCOUNTER — APPOINTMENT (OUTPATIENT)
Dept: NEUROLOGY | Facility: CLINIC | Age: 85
End: 2020-01-01

## 2020-01-01 ENCOUNTER — INPATIENT (INPATIENT)
Facility: HOSPITAL | Age: 85
LOS: 7 days | Discharge: HOME HEALTH | End: 2020-10-07
Attending: HOSPITALIST | Admitting: HOSPITALIST
Payer: MEDICARE

## 2020-01-01 ENCOUNTER — APPOINTMENT (OUTPATIENT)
Dept: CARDIOLOGY | Facility: CLINIC | Age: 85
End: 2020-01-01

## 2020-01-01 ENCOUNTER — APPOINTMENT (OUTPATIENT)
Dept: UROLOGY | Facility: CLINIC | Age: 85
End: 2020-01-01

## 2020-01-01 VITALS
HEART RATE: 92 BPM | BODY MASS INDEX: 23.54 KG/M2 | DIASTOLIC BLOOD PRESSURE: 70 MMHG | SYSTOLIC BLOOD PRESSURE: 100 MMHG | WEIGHT: 150 LBS | TEMPERATURE: 97.7 F | OXYGEN SATURATION: 95 % | HEIGHT: 67 IN

## 2020-01-01 VITALS
WEIGHT: 169.98 LBS | HEIGHT: 65 IN | OXYGEN SATURATION: 98 % | SYSTOLIC BLOOD PRESSURE: 62 MMHG | DIASTOLIC BLOOD PRESSURE: 47 MMHG | TEMPERATURE: 98 F | HEART RATE: 66 BPM | RESPIRATION RATE: 15 BRPM

## 2020-01-01 VITALS
SYSTOLIC BLOOD PRESSURE: 119 MMHG | HEART RATE: 87 BPM | DIASTOLIC BLOOD PRESSURE: 75 MMHG | RESPIRATION RATE: 17 BRPM | TEMPERATURE: 98 F

## 2020-01-01 VITALS
HEART RATE: 66 BPM | RESPIRATION RATE: 19 BRPM | OXYGEN SATURATION: 92 % | DIASTOLIC BLOOD PRESSURE: 73 MMHG | SYSTOLIC BLOOD PRESSURE: 133 MMHG | TEMPERATURE: 98 F

## 2020-01-01 VITALS — TEMPERATURE: 98.2 F | SYSTOLIC BLOOD PRESSURE: 101 MMHG | DIASTOLIC BLOOD PRESSURE: 62 MMHG | HEART RATE: 86 BPM

## 2020-01-01 VITALS
RESPIRATION RATE: 18 BRPM | HEART RATE: 73 BPM | DIASTOLIC BLOOD PRESSURE: 66 MMHG | TEMPERATURE: 98 F | SYSTOLIC BLOOD PRESSURE: 132 MMHG | OXYGEN SATURATION: 95 %

## 2020-01-01 VITALS
SYSTOLIC BLOOD PRESSURE: 115 MMHG | DIASTOLIC BLOOD PRESSURE: 71 MMHG | RESPIRATION RATE: 17 BRPM | HEART RATE: 78 BPM | TEMPERATURE: 98.1 F

## 2020-01-01 VITALS
SYSTOLIC BLOOD PRESSURE: 144 MMHG | HEIGHT: 67 IN | WEIGHT: 154.98 LBS | TEMPERATURE: 98 F | OXYGEN SATURATION: 94 % | HEART RATE: 80 BPM | DIASTOLIC BLOOD PRESSURE: 94 MMHG

## 2020-01-01 VITALS — HEIGHT: 67 IN | BODY MASS INDEX: 21.97 KG/M2 | WEIGHT: 140 LBS

## 2020-01-01 DIAGNOSIS — N39.0 URINARY TRACT INFECTION, SITE NOT SPECIFIED: ICD-10-CM

## 2020-01-01 DIAGNOSIS — N20.0 CALCULUS OF KIDNEY: ICD-10-CM

## 2020-01-01 DIAGNOSIS — N13.6 PYONEPHROSIS: ICD-10-CM

## 2020-01-01 DIAGNOSIS — Z82.3 FAMILY HISTORY OF STROKE: ICD-10-CM

## 2020-01-01 DIAGNOSIS — R53.81 OTHER MALAISE: ICD-10-CM

## 2020-01-01 DIAGNOSIS — Z29.9 ENCOUNTER FOR PROPHYLACTIC MEASURES, UNSPECIFIED: ICD-10-CM

## 2020-01-01 DIAGNOSIS — E78.5 HYPERLIPIDEMIA, UNSPECIFIED: ICD-10-CM

## 2020-01-01 DIAGNOSIS — I25.2 OLD MYOCARDIAL INFARCTION: ICD-10-CM

## 2020-01-01 DIAGNOSIS — Z95.0 PRESENCE OF CARDIAC PACEMAKER: ICD-10-CM

## 2020-01-01 DIAGNOSIS — N20.1 CALCULUS OF URETER: ICD-10-CM

## 2020-01-01 DIAGNOSIS — M21.372 FOOT DROP, LEFT FOOT: ICD-10-CM

## 2020-01-01 DIAGNOSIS — B96.1 KLEBSIELLA PNEUMONIAE [K. PNEUMONIAE] AS THE CAUSE OF DISEASES CLASSIFIED ELSEWHERE: ICD-10-CM

## 2020-01-01 DIAGNOSIS — R53.2 FUNCTIONAL QUADRIPLEGIA: ICD-10-CM

## 2020-01-01 DIAGNOSIS — N12 TUBULO-INTERSTITIAL NEPHRITIS, NOT SPECIFIED AS ACUTE OR CHRONIC: ICD-10-CM

## 2020-01-01 DIAGNOSIS — K29.60 OTHER GASTRITIS W/OUT BLEEDING: ICD-10-CM

## 2020-01-01 DIAGNOSIS — I10 ESSENTIAL (PRIMARY) HYPERTENSION: ICD-10-CM

## 2020-01-01 DIAGNOSIS — Z95.1 PRESENCE OF AORTOCORONARY BYPASS GRAFT: ICD-10-CM

## 2020-01-01 DIAGNOSIS — N17.9 ACUTE KIDNEY FAILURE, UNSPECIFIED: ICD-10-CM

## 2020-01-01 DIAGNOSIS — R78.81 BACTEREMIA: ICD-10-CM

## 2020-01-01 DIAGNOSIS — I25.10 ATHEROSCLEROTIC HEART DISEASE OF NATIVE CORONARY ARTERY W/OUT ANGINA PECTORIS: ICD-10-CM

## 2020-01-01 DIAGNOSIS — K56.41 FECAL IMPACTION: ICD-10-CM

## 2020-01-01 DIAGNOSIS — I25.10 ATHEROSCLEROTIC HEART DISEASE OF NATIVE CORONARY ARTERY WITHOUT ANGINA PECTORIS: ICD-10-CM

## 2020-01-01 DIAGNOSIS — D64.9 ANEMIA, UNSPECIFIED: ICD-10-CM

## 2020-01-01 DIAGNOSIS — R32 UNSPECIFIED URINARY INCONTINENCE: ICD-10-CM

## 2020-01-01 DIAGNOSIS — E87.6 HYPOKALEMIA: ICD-10-CM

## 2020-01-01 DIAGNOSIS — Z80.1 FAMILY HISTORY OF MALIGNANT NEOPLASM OF TRACHEA, BRONCHUS AND LUNG: ICD-10-CM

## 2020-01-01 DIAGNOSIS — M21.371 FOOT DROP, RIGHT FOOT: ICD-10-CM

## 2020-01-01 DIAGNOSIS — K57.90 DIVERTICULOSIS OF INTESTINE, PART UNSPECIFIED, WITHOUT PERFORATION OR ABSCESS WITHOUT BLEEDING: ICD-10-CM

## 2020-01-01 DIAGNOSIS — K52.9 NONINFECTIVE GASTROENTERITIS AND COLITIS, UNSPECIFIED: ICD-10-CM

## 2020-01-01 DIAGNOSIS — R11.2 NAUSEA WITH VOMITING, UNSPECIFIED: ICD-10-CM

## 2020-01-01 DIAGNOSIS — E78.00 PURE HYPERCHOLESTEROLEMIA, UNSPECIFIED: ICD-10-CM

## 2020-01-01 DIAGNOSIS — R94.01 ABNORMAL ELECTROENCEPHALOGRAM [EEG]: ICD-10-CM

## 2020-01-01 DIAGNOSIS — K59.00 CONSTIPATION, UNSPECIFIED: ICD-10-CM

## 2020-01-01 DIAGNOSIS — E44.0 MODERATE PROTEIN-CALORIE MALNUTRITION: ICD-10-CM

## 2020-01-01 DIAGNOSIS — Z95.5 PRESENCE OF CORONARY ANGIOPLASTY IMPLANT AND GRAFT: ICD-10-CM

## 2020-01-01 DIAGNOSIS — Z00.00 ENCOUNTER FOR GENERAL ADULT MEDICAL EXAMINATION W/OUT ABNORMAL FINDINGS: ICD-10-CM

## 2020-01-01 DIAGNOSIS — B96.20 UNSPECIFIED ESCHERICHIA COLI [E. COLI] AS THE CAUSE OF DISEASES CLASSIFIED ELSEWHERE: ICD-10-CM

## 2020-01-01 LAB
-  AMIKACIN: SIGNIFICANT CHANGE UP
-  AMOXICILLIN/CLAVULANIC ACID: SIGNIFICANT CHANGE UP
-  AMPICILLIN/SULBACTAM: SIGNIFICANT CHANGE UP
-  AMPICILLIN: SIGNIFICANT CHANGE UP
-  AZTREONAM: SIGNIFICANT CHANGE UP
-  CEFAZOLIN: SIGNIFICANT CHANGE UP
-  CEFEPIME: SIGNIFICANT CHANGE UP
-  CEFOXITIN: SIGNIFICANT CHANGE UP
-  CEFTRIAXONE: SIGNIFICANT CHANGE UP
-  CIPROFLOXACIN: SIGNIFICANT CHANGE UP
-  COAGULASE NEGATIVE STAPHYLOCOCCUS: SIGNIFICANT CHANGE UP
-  ERTAPENEM: SIGNIFICANT CHANGE UP
-  GENTAMICIN: SIGNIFICANT CHANGE UP
-  IMIPENEM: SIGNIFICANT CHANGE UP
-  K. PNEUMONIAE GROUP: SIGNIFICANT CHANGE UP
-  LEVOFLOXACIN: SIGNIFICANT CHANGE UP
-  MEROPENEM: SIGNIFICANT CHANGE UP
-  NITROFURANTOIN: SIGNIFICANT CHANGE UP
-  PIPERACILLIN/TAZOBACTAM: SIGNIFICANT CHANGE UP
-  TIGECYCLINE: SIGNIFICANT CHANGE UP
-  TOBRAMYCIN: SIGNIFICANT CHANGE UP
-  TRIMETHOPRIM/SULFAMETHOXAZOLE: SIGNIFICANT CHANGE UP
24R-OH-CALCIDIOL SERPL-MCNC: 39.1 NG/ML — SIGNIFICANT CHANGE UP (ref 30–80)
25(OH)D3 SERPL-MCNC: 41.4 NG/ML
ALBUMIN SERPL ELPH-MCNC: 2.5 G/DL — LOW (ref 3.3–5)
ALBUMIN SERPL ELPH-MCNC: 2.6 G/DL — LOW (ref 3.3–5)
ALBUMIN SERPL ELPH-MCNC: 3.1 G/DL — LOW (ref 3.3–5)
ALBUMIN SERPL ELPH-MCNC: 3.2 G/DL — LOW (ref 3.3–5)
ALBUMIN SERPL ELPH-MCNC: 3.3 G/DL — SIGNIFICANT CHANGE UP (ref 3.3–5)
ALBUMIN SERPL ELPH-MCNC: 4.4 G/DL
ALP BLD-CCNC: 89 U/L
ALP SERPL-CCNC: 102 U/L — SIGNIFICANT CHANGE UP (ref 40–120)
ALP SERPL-CCNC: 54 U/L — SIGNIFICANT CHANGE UP (ref 40–120)
ALP SERPL-CCNC: 62 U/L — SIGNIFICANT CHANGE UP (ref 40–120)
ALP SERPL-CCNC: 69 U/L — SIGNIFICANT CHANGE UP (ref 40–120)
ALP SERPL-CCNC: 94 U/L — SIGNIFICANT CHANGE UP (ref 40–120)
ALT FLD-CCNC: 11 U/L — LOW (ref 12–78)
ALT FLD-CCNC: 15 U/L — SIGNIFICANT CHANGE UP (ref 12–78)
ALT FLD-CCNC: 17 U/L — SIGNIFICANT CHANGE UP (ref 12–78)
ALT FLD-CCNC: 19 U/L — SIGNIFICANT CHANGE UP (ref 12–78)
ALT FLD-CCNC: 9 U/L — LOW (ref 12–78)
ALT SERPL-CCNC: 6 U/L
ANION GAP SERPL CALC-SCNC: 10 MMOL/L — SIGNIFICANT CHANGE UP (ref 5–17)
ANION GAP SERPL CALC-SCNC: 12 MMOL/L — SIGNIFICANT CHANGE UP (ref 5–17)
ANION GAP SERPL CALC-SCNC: 14 MMOL/L — SIGNIFICANT CHANGE UP (ref 5–17)
ANION GAP SERPL CALC-SCNC: 17 MMOL/L
ANION GAP SERPL CALC-SCNC: 5 MMOL/L — SIGNIFICANT CHANGE UP (ref 5–17)
ANION GAP SERPL CALC-SCNC: 6 MMOL/L — SIGNIFICANT CHANGE UP (ref 5–17)
ANION GAP SERPL CALC-SCNC: 6 MMOL/L — SIGNIFICANT CHANGE UP (ref 5–17)
ANION GAP SERPL CALC-SCNC: 7 MMOL/L — SIGNIFICANT CHANGE UP (ref 5–17)
ANION GAP SERPL CALC-SCNC: 8 MMOL/L — SIGNIFICANT CHANGE UP (ref 5–17)
ANION GAP SERPL CALC-SCNC: 8 MMOL/L — SIGNIFICANT CHANGE UP (ref 5–17)
ANION GAP SERPL CALC-SCNC: 9 MMOL/L — SIGNIFICANT CHANGE UP (ref 5–17)
APPEARANCE UR: ABNORMAL
APPEARANCE UR: CLEAR — SIGNIFICANT CHANGE UP
APTT BLD: 28.3 SEC — SIGNIFICANT CHANGE UP (ref 27.5–35.5)
APTT BLD: 39.7 SEC — HIGH (ref 27.5–35.5)
AST SERPL-CCNC: 23 U/L — SIGNIFICANT CHANGE UP (ref 15–37)
AST SERPL-CCNC: 25 U/L
AST SERPL-CCNC: 26 U/L — SIGNIFICANT CHANGE UP (ref 15–37)
AST SERPL-CCNC: 27 U/L — SIGNIFICANT CHANGE UP (ref 15–37)
AST SERPL-CCNC: 31 U/L — SIGNIFICANT CHANGE UP (ref 15–37)
AST SERPL-CCNC: 38 U/L — HIGH (ref 15–37)
BACTERIA # UR AUTO: ABNORMAL
BACTERIA # UR AUTO: ABNORMAL
BACTERIA UR CULT: ABNORMAL
BACTERIA UR CULT: NORMAL
BASOPHILS # BLD AUTO: 0 K/UL — SIGNIFICANT CHANGE UP (ref 0–0.2)
BASOPHILS # BLD AUTO: 0.04 K/UL — SIGNIFICANT CHANGE UP (ref 0–0.2)
BASOPHILS # BLD AUTO: 0.05 K/UL
BASOPHILS NFR BLD AUTO: 0 % — SIGNIFICANT CHANGE UP (ref 0–2)
BASOPHILS NFR BLD AUTO: 0.4 % — SIGNIFICANT CHANGE UP (ref 0–2)
BASOPHILS NFR BLD AUTO: 0.6 %
BILIRUB SERPL-MCNC: 0.4 MG/DL
BILIRUB SERPL-MCNC: 0.5 MG/DL — SIGNIFICANT CHANGE UP (ref 0.2–1.2)
BILIRUB SERPL-MCNC: 0.6 MG/DL — SIGNIFICANT CHANGE UP (ref 0.2–1.2)
BILIRUB SERPL-MCNC: 0.7 MG/DL — SIGNIFICANT CHANGE UP (ref 0.2–1.2)
BILIRUB SERPL-MCNC: 0.8 MG/DL — SIGNIFICANT CHANGE UP (ref 0.2–1.2)
BILIRUB SERPL-MCNC: 1.8 MG/DL — HIGH (ref 0.2–1.2)
BILIRUB UR-MCNC: NEGATIVE — SIGNIFICANT CHANGE UP
BILIRUB UR-MCNC: NEGATIVE — SIGNIFICANT CHANGE UP
BLD GP AB SCN SERPL QL: SIGNIFICANT CHANGE UP
BLD GP AB SCN SERPL QL: SIGNIFICANT CHANGE UP
BUN SERPL-MCNC: 10 MG/DL
BUN SERPL-MCNC: 10 MG/DL — SIGNIFICANT CHANGE UP (ref 7–23)
BUN SERPL-MCNC: 12 MG/DL — SIGNIFICANT CHANGE UP (ref 7–23)
BUN SERPL-MCNC: 16 MG/DL — SIGNIFICANT CHANGE UP (ref 7–23)
BUN SERPL-MCNC: 22 MG/DL — SIGNIFICANT CHANGE UP (ref 7–23)
BUN SERPL-MCNC: 23 MG/DL — SIGNIFICANT CHANGE UP (ref 7–23)
BUN SERPL-MCNC: 26 MG/DL — HIGH (ref 7–23)
BUN SERPL-MCNC: 32 MG/DL — HIGH (ref 7–23)
BUN SERPL-MCNC: 34 MG/DL — HIGH (ref 7–23)
BUN SERPL-MCNC: 8 MG/DL — SIGNIFICANT CHANGE UP (ref 7–23)
BUN SERPL-MCNC: 9 MG/DL — SIGNIFICANT CHANGE UP (ref 7–23)
CALCIUM SERPL-MCNC: 8.1 MG/DL — LOW (ref 8.5–10.1)
CALCIUM SERPL-MCNC: 8.2 MG/DL — LOW (ref 8.5–10.1)
CALCIUM SERPL-MCNC: 8.2 MG/DL — LOW (ref 8.5–10.1)
CALCIUM SERPL-MCNC: 8.3 MG/DL — LOW (ref 8.5–10.1)
CALCIUM SERPL-MCNC: 8.6 MG/DL — SIGNIFICANT CHANGE UP (ref 8.5–10.1)
CALCIUM SERPL-MCNC: 9.1 MG/DL — SIGNIFICANT CHANGE UP (ref 8.5–10.1)
CALCIUM SERPL-MCNC: 9.5 MG/DL — SIGNIFICANT CHANGE UP (ref 8.5–10.1)
CALCIUM SERPL-MCNC: 9.7 MG/DL
CHLORIDE SERPL-SCNC: 101 MMOL/L — SIGNIFICANT CHANGE UP (ref 96–108)
CHLORIDE SERPL-SCNC: 103 MMOL/L — SIGNIFICANT CHANGE UP (ref 96–108)
CHLORIDE SERPL-SCNC: 105 MMOL/L — SIGNIFICANT CHANGE UP (ref 96–108)
CHLORIDE SERPL-SCNC: 107 MMOL/L — SIGNIFICANT CHANGE UP (ref 96–108)
CHLORIDE SERPL-SCNC: 107 MMOL/L — SIGNIFICANT CHANGE UP (ref 96–108)
CHLORIDE SERPL-SCNC: 109 MMOL/L — HIGH (ref 96–108)
CHLORIDE SERPL-SCNC: 109 MMOL/L — HIGH (ref 96–108)
CHLORIDE SERPL-SCNC: 110 MMOL/L — HIGH (ref 96–108)
CHLORIDE SERPL-SCNC: 99 MMOL/L
CHOLEST SERPL-MCNC: 149 MG/DL
CHOLEST/HDLC SERPL: 3.2 RATIO
CK SERPL-CCNC: 90 U/L — SIGNIFICANT CHANGE UP (ref 26–192)
CO2 SERPL-SCNC: 21 MMOL/L — LOW (ref 22–31)
CO2 SERPL-SCNC: 22 MMOL/L — SIGNIFICANT CHANGE UP (ref 22–31)
CO2 SERPL-SCNC: 24 MMOL/L — SIGNIFICANT CHANGE UP (ref 22–31)
CO2 SERPL-SCNC: 25 MMOL/L
CO2 SERPL-SCNC: 25 MMOL/L — SIGNIFICANT CHANGE UP (ref 22–31)
CO2 SERPL-SCNC: 26 MMOL/L — SIGNIFICANT CHANGE UP (ref 22–31)
CO2 SERPL-SCNC: 26 MMOL/L — SIGNIFICANT CHANGE UP (ref 22–31)
CO2 SERPL-SCNC: 28 MMOL/L — SIGNIFICANT CHANGE UP (ref 22–31)
CO2 SERPL-SCNC: 29 MMOL/L — SIGNIFICANT CHANGE UP (ref 22–31)
COLOR SPEC: YELLOW — SIGNIFICANT CHANGE UP
COLOR SPEC: YELLOW — SIGNIFICANT CHANGE UP
COMMENT - URINE: SIGNIFICANT CHANGE UP
CREAT SERPL-MCNC: 0.88 MG/DL — SIGNIFICANT CHANGE UP (ref 0.5–1.3)
CREAT SERPL-MCNC: 0.91 MG/DL — SIGNIFICANT CHANGE UP (ref 0.5–1.3)
CREAT SERPL-MCNC: 0.92 MG/DL — SIGNIFICANT CHANGE UP (ref 0.5–1.3)
CREAT SERPL-MCNC: 1.05 MG/DL — SIGNIFICANT CHANGE UP (ref 0.5–1.3)
CREAT SERPL-MCNC: 1.08 MG/DL — SIGNIFICANT CHANGE UP (ref 0.5–1.3)
CREAT SERPL-MCNC: 1.1 MG/DL
CREAT SERPL-MCNC: 1.15 MG/DL — SIGNIFICANT CHANGE UP (ref 0.5–1.3)
CREAT SERPL-MCNC: 1.22 MG/DL — SIGNIFICANT CHANGE UP (ref 0.5–1.3)
CREAT SERPL-MCNC: 1.3 MG/DL — SIGNIFICANT CHANGE UP (ref 0.5–1.3)
CREAT SERPL-MCNC: 1.47 MG/DL — HIGH (ref 0.5–1.3)
CREAT SERPL-MCNC: 1.54 MG/DL — HIGH (ref 0.5–1.3)
CULTURE RESULTS: SIGNIFICANT CHANGE UP
DIFF PNL FLD: ABNORMAL
DIFF PNL FLD: ABNORMAL
EOSINOPHIL # BLD AUTO: 0 K/UL — SIGNIFICANT CHANGE UP (ref 0–0.5)
EOSINOPHIL # BLD AUTO: 0.1 K/UL — SIGNIFICANT CHANGE UP (ref 0–0.5)
EOSINOPHIL # BLD AUTO: 0.16 K/UL
EOSINOPHIL NFR BLD AUTO: 0 % — SIGNIFICANT CHANGE UP (ref 0–6)
EOSINOPHIL NFR BLD AUTO: 1 % — SIGNIFICANT CHANGE UP (ref 0–6)
EOSINOPHIL NFR BLD AUTO: 2.1 %
EPI CELLS # UR: SIGNIFICANT CHANGE UP
EPI CELLS # UR: SIGNIFICANT CHANGE UP
ESTIMATED AVERAGE GLUCOSE: 105 MG/DL
FERRITIN SERPL-MCNC: 302 NG/ML
FOLATE SERPL-MCNC: 13.3 NG/ML
FOLATE SERPL-MCNC: 14.5 NG/ML — SIGNIFICANT CHANGE UP
GLUCOSE BLDC GLUCOMTR-MCNC: 156 MG/DL — HIGH (ref 70–99)
GLUCOSE SERPL-MCNC: 102 MG/DL — HIGH (ref 70–99)
GLUCOSE SERPL-MCNC: 109 MG/DL — HIGH (ref 70–99)
GLUCOSE SERPL-MCNC: 118 MG/DL — HIGH (ref 70–99)
GLUCOSE SERPL-MCNC: 135 MG/DL
GLUCOSE SERPL-MCNC: 162 MG/DL — HIGH (ref 70–99)
GLUCOSE SERPL-MCNC: 74 MG/DL — SIGNIFICANT CHANGE UP (ref 70–99)
GLUCOSE SERPL-MCNC: 75 MG/DL — SIGNIFICANT CHANGE UP (ref 70–99)
GLUCOSE SERPL-MCNC: 85 MG/DL — SIGNIFICANT CHANGE UP (ref 70–99)
GLUCOSE SERPL-MCNC: 93 MG/DL — SIGNIFICANT CHANGE UP (ref 70–99)
GLUCOSE SERPL-MCNC: 94 MG/DL — SIGNIFICANT CHANGE UP (ref 70–99)
GLUCOSE SERPL-MCNC: 98 MG/DL — SIGNIFICANT CHANGE UP (ref 70–99)
GLUCOSE UR QL: NEGATIVE MG/DL — SIGNIFICANT CHANGE UP
GLUCOSE UR QL: NEGATIVE MG/DL — SIGNIFICANT CHANGE UP
GRAM STN FLD: SIGNIFICANT CHANGE UP
HBA1C MFR BLD HPLC: 5.3 %
HCT VFR BLD CALC: 32 % — LOW (ref 34.5–45)
HCT VFR BLD CALC: 33.6 % — LOW (ref 34.5–45)
HCT VFR BLD CALC: 33.7 % — LOW (ref 34.5–45)
HCT VFR BLD CALC: 34 % — LOW (ref 34.5–45)
HCT VFR BLD CALC: 34.5 % — SIGNIFICANT CHANGE UP (ref 34.5–45)
HCT VFR BLD CALC: 36 % — SIGNIFICANT CHANGE UP (ref 34.5–45)
HCT VFR BLD CALC: 37.2 % — SIGNIFICANT CHANGE UP (ref 34.5–45)
HCT VFR BLD CALC: 37.3 % — SIGNIFICANT CHANGE UP (ref 34.5–45)
HCT VFR BLD CALC: 39.1 %
HCT VFR BLD CALC: 39.6 % — SIGNIFICANT CHANGE UP (ref 34.5–45)
HDLC SERPL-MCNC: 46 MG/DL
HGB BLD-MCNC: 10.3 G/DL — LOW (ref 11.5–15.5)
HGB BLD-MCNC: 10.4 G/DL — LOW (ref 11.5–15.5)
HGB BLD-MCNC: 11 G/DL — LOW (ref 11.5–15.5)
HGB BLD-MCNC: 11.1 G/DL — LOW (ref 11.5–15.5)
HGB BLD-MCNC: 11.2 G/DL — LOW (ref 11.5–15.5)
HGB BLD-MCNC: 11.5 G/DL — SIGNIFICANT CHANGE UP (ref 11.5–15.5)
HGB BLD-MCNC: 11.6 G/DL
HGB BLD-MCNC: 11.7 G/DL — SIGNIFICANT CHANGE UP (ref 11.5–15.5)
HGB BLD-MCNC: 12 G/DL — SIGNIFICANT CHANGE UP (ref 11.5–15.5)
HGB BLD-MCNC: 13.3 G/DL — SIGNIFICANT CHANGE UP (ref 11.5–15.5)
HYPOCHROMIA BLD QL: SLIGHT — SIGNIFICANT CHANGE UP
IMM GRANULOCYTES NFR BLD AUTO: 0.4 %
IMM GRANULOCYTES NFR BLD AUTO: 0.4 % — SIGNIFICANT CHANGE UP (ref 0–1.5)
INR BLD: 1.09 RATIO — SIGNIFICANT CHANGE UP (ref 0.88–1.16)
INR BLD: 1.21 RATIO — HIGH (ref 0.88–1.16)
IRON SATN MFR SERPL: 22 %
IRON SERPL-MCNC: 50 UG/DL
KETONES UR-MCNC: ABNORMAL
KETONES UR-MCNC: NEGATIVE — SIGNIFICANT CHANGE UP
LACTATE SERPL-SCNC: 1.7 MMOL/L — SIGNIFICANT CHANGE UP (ref 0.7–2)
LACTATE SERPL-SCNC: 2 MMOL/L — SIGNIFICANT CHANGE UP (ref 0.7–2)
LACTATE SERPL-SCNC: 2.7 MMOL/L — HIGH (ref 0.7–2)
LDLC SERPL CALC-MCNC: 85 MG/DL
LEUKOCYTE ESTERASE UR-ACNC: ABNORMAL
LEUKOCYTE ESTERASE UR-ACNC: ABNORMAL
LIDOCAIN IGE QN: 24 U/L — LOW (ref 73–393)
LIDOCAIN IGE QN: 50 U/L — LOW (ref 73–393)
LYMPHOCYTES # BLD AUTO: 2.14 K/UL
LYMPHOCYTES # BLD AUTO: 2.18 K/UL — SIGNIFICANT CHANGE UP (ref 1–3.3)
LYMPHOCYTES # BLD AUTO: 2.5 K/UL — SIGNIFICANT CHANGE UP (ref 1–3.3)
LYMPHOCYTES # BLD AUTO: 21.3 % — SIGNIFICANT CHANGE UP (ref 13–44)
LYMPHOCYTES # BLD AUTO: 26 % — SIGNIFICANT CHANGE UP (ref 13–44)
LYMPHOCYTES NFR BLD AUTO: 27.7 %
MAGNESIUM SERPL-MCNC: 1.6 MG/DL — SIGNIFICANT CHANGE UP (ref 1.6–2.6)
MAGNESIUM SERPL-MCNC: 1.9 MG/DL — SIGNIFICANT CHANGE UP (ref 1.6–2.6)
MAGNESIUM SERPL-MCNC: 2 MG/DL — SIGNIFICANT CHANGE UP (ref 1.6–2.6)
MAGNESIUM SERPL-MCNC: 2 MG/DL — SIGNIFICANT CHANGE UP (ref 1.6–2.6)
MAGNESIUM SERPL-MCNC: 2.1 MG/DL — SIGNIFICANT CHANGE UP (ref 1.6–2.6)
MAGNESIUM SERPL-MCNC: 2.1 MG/DL — SIGNIFICANT CHANGE UP (ref 1.6–2.6)
MAGNESIUM SERPL-MCNC: 2.2 MG/DL — SIGNIFICANT CHANGE UP (ref 1.6–2.6)
MAGNESIUM SERPL-MCNC: 2.2 MG/DL — SIGNIFICANT CHANGE UP (ref 1.6–2.6)
MAN DIFF?: NORMAL
MANUAL SMEAR VERIFICATION: SIGNIFICANT CHANGE UP
MCHC RBC-ENTMCNC: 27.4 PG — SIGNIFICANT CHANGE UP (ref 27–34)
MCHC RBC-ENTMCNC: 27.5 PG — SIGNIFICANT CHANGE UP (ref 27–34)
MCHC RBC-ENTMCNC: 27.6 PG
MCHC RBC-ENTMCNC: 27.7 PG — SIGNIFICANT CHANGE UP (ref 27–34)
MCHC RBC-ENTMCNC: 27.9 PG — SIGNIFICANT CHANGE UP (ref 27–34)
MCHC RBC-ENTMCNC: 27.9 PG — SIGNIFICANT CHANGE UP (ref 27–34)
MCHC RBC-ENTMCNC: 28 PG — SIGNIFICANT CHANGE UP (ref 27–34)
MCHC RBC-ENTMCNC: 28.2 PG — SIGNIFICANT CHANGE UP (ref 27–34)
MCHC RBC-ENTMCNC: 28.5 PG — SIGNIFICANT CHANGE UP (ref 27–34)
MCHC RBC-ENTMCNC: 28.7 PG — SIGNIFICANT CHANGE UP (ref 27–34)
MCHC RBC-ENTMCNC: 29.7 GM/DL
MCHC RBC-ENTMCNC: 31 GM/DL — LOW (ref 32–36)
MCHC RBC-ENTMCNC: 31.5 GM/DL — LOW (ref 32–36)
MCHC RBC-ENTMCNC: 31.9 GM/DL — LOW (ref 32–36)
MCHC RBC-ENTMCNC: 31.9 GM/DL — LOW (ref 32–36)
MCHC RBC-ENTMCNC: 32.2 GM/DL — SIGNIFICANT CHANGE UP (ref 32–36)
MCHC RBC-ENTMCNC: 32.2 GM/DL — SIGNIFICANT CHANGE UP (ref 32–36)
MCHC RBC-ENTMCNC: 32.6 GM/DL — SIGNIFICANT CHANGE UP (ref 32–36)
MCHC RBC-ENTMCNC: 33.2 GM/DL — SIGNIFICANT CHANGE UP (ref 32–36)
MCHC RBC-ENTMCNC: 33.6 GM/DL — SIGNIFICANT CHANGE UP (ref 32–36)
MCV RBC AUTO: 84.8 FL — SIGNIFICANT CHANGE UP (ref 80–100)
MCV RBC AUTO: 86 FL — SIGNIFICANT CHANGE UP (ref 80–100)
MCV RBC AUTO: 86.3 FL — SIGNIFICANT CHANGE UP (ref 80–100)
MCV RBC AUTO: 86.4 FL — SIGNIFICANT CHANGE UP (ref 80–100)
MCV RBC AUTO: 86.7 FL — SIGNIFICANT CHANGE UP (ref 80–100)
MCV RBC AUTO: 87.5 FL — SIGNIFICANT CHANGE UP (ref 80–100)
MCV RBC AUTO: 87.6 FL — SIGNIFICANT CHANGE UP (ref 80–100)
MCV RBC AUTO: 87.8 FL — SIGNIFICANT CHANGE UP (ref 80–100)
MCV RBC AUTO: 88.7 FL — SIGNIFICANT CHANGE UP (ref 80–100)
MCV RBC AUTO: 92.9 FL
METHOD TYPE: SIGNIFICANT CHANGE UP
MONOCYTES # BLD AUTO: 0.48 K/UL — SIGNIFICANT CHANGE UP (ref 0–0.9)
MONOCYTES # BLD AUTO: 0.62 K/UL — SIGNIFICANT CHANGE UP (ref 0–0.9)
MONOCYTES # BLD AUTO: 0.65 K/UL
MONOCYTES NFR BLD AUTO: 5 % — SIGNIFICANT CHANGE UP (ref 2–14)
MONOCYTES NFR BLD AUTO: 6.1 % — SIGNIFICANT CHANGE UP (ref 2–14)
MONOCYTES NFR BLD AUTO: 8.4 %
NEUTROPHILS # BLD AUTO: 4.69 K/UL
NEUTROPHILS # BLD AUTO: 6.62 K/UL — SIGNIFICANT CHANGE UP (ref 1.8–7.4)
NEUTROPHILS # BLD AUTO: 7.24 K/UL — SIGNIFICANT CHANGE UP (ref 1.8–7.4)
NEUTROPHILS NFR BLD AUTO: 60.8 %
NEUTROPHILS NFR BLD AUTO: 69 % — SIGNIFICANT CHANGE UP (ref 43–77)
NEUTROPHILS NFR BLD AUTO: 70.8 % — SIGNIFICANT CHANGE UP (ref 43–77)
NITRITE UR-MCNC: NEGATIVE — SIGNIFICANT CHANGE UP
NITRITE UR-MCNC: POSITIVE
NRBC # BLD: 0 /100 WBCS — SIGNIFICANT CHANGE UP (ref 0–0)
NRBC # BLD: 0 /100 — SIGNIFICANT CHANGE UP (ref 0–0)
NRBC # BLD: SIGNIFICANT CHANGE UP /100 WBCS (ref 0–0)
ORGANISM # SPEC MICROSCOPIC CNT: SIGNIFICANT CHANGE UP
PH UR: 5 — SIGNIFICANT CHANGE UP (ref 5–8)
PH UR: 8 — SIGNIFICANT CHANGE UP (ref 5–8)
PHOSPHATE SERPL-MCNC: 2.2 MG/DL — LOW (ref 2.5–4.5)
PHOSPHATE SERPL-MCNC: 2.7 MG/DL — SIGNIFICANT CHANGE UP (ref 2.5–4.5)
PHOSPHATE SERPL-MCNC: 2.8 MG/DL — SIGNIFICANT CHANGE UP (ref 2.5–4.5)
PHOSPHATE SERPL-MCNC: 2.9 MG/DL — SIGNIFICANT CHANGE UP (ref 2.5–4.5)
PHOSPHATE SERPL-MCNC: 3.4 MG/DL — SIGNIFICANT CHANGE UP (ref 2.5–4.5)
PLAT MORPH BLD: NORMAL — SIGNIFICANT CHANGE UP
PLATELET # BLD AUTO: 138 K/UL — LOW (ref 150–400)
PLATELET # BLD AUTO: 175 K/UL — SIGNIFICANT CHANGE UP (ref 150–400)
PLATELET # BLD AUTO: 189 K/UL — SIGNIFICANT CHANGE UP (ref 150–400)
PLATELET # BLD AUTO: 196 K/UL — SIGNIFICANT CHANGE UP (ref 150–400)
PLATELET # BLD AUTO: 198 K/UL — SIGNIFICANT CHANGE UP (ref 150–400)
PLATELET # BLD AUTO: 202 K/UL — SIGNIFICANT CHANGE UP (ref 150–400)
PLATELET # BLD AUTO: 223 K/UL — SIGNIFICANT CHANGE UP (ref 150–400)
PLATELET # BLD AUTO: 235 K/UL — SIGNIFICANT CHANGE UP (ref 150–400)
PLATELET # BLD AUTO: 243 K/UL — SIGNIFICANT CHANGE UP (ref 150–400)
PLATELET # BLD AUTO: 264 K/UL
POTASSIUM SERPL-MCNC: 3.2 MMOL/L — LOW (ref 3.5–5.3)
POTASSIUM SERPL-MCNC: 3.3 MMOL/L — LOW (ref 3.5–5.3)
POTASSIUM SERPL-MCNC: 3.3 MMOL/L — LOW (ref 3.5–5.3)
POTASSIUM SERPL-MCNC: 3.4 MMOL/L — LOW (ref 3.5–5.3)
POTASSIUM SERPL-MCNC: 3.4 MMOL/L — LOW (ref 3.5–5.3)
POTASSIUM SERPL-MCNC: 3.5 MMOL/L — SIGNIFICANT CHANGE UP (ref 3.5–5.3)
POTASSIUM SERPL-MCNC: 4 MMOL/L — SIGNIFICANT CHANGE UP (ref 3.5–5.3)
POTASSIUM SERPL-MCNC: 4.8 MMOL/L — SIGNIFICANT CHANGE UP (ref 3.5–5.3)
POTASSIUM SERPL-SCNC: 3.2 MMOL/L — LOW (ref 3.5–5.3)
POTASSIUM SERPL-SCNC: 3.3 MMOL/L
POTASSIUM SERPL-SCNC: 3.3 MMOL/L — LOW (ref 3.5–5.3)
POTASSIUM SERPL-SCNC: 3.3 MMOL/L — LOW (ref 3.5–5.3)
POTASSIUM SERPL-SCNC: 3.4 MMOL/L — LOW (ref 3.5–5.3)
POTASSIUM SERPL-SCNC: 3.4 MMOL/L — LOW (ref 3.5–5.3)
POTASSIUM SERPL-SCNC: 3.5 MMOL/L — SIGNIFICANT CHANGE UP (ref 3.5–5.3)
POTASSIUM SERPL-SCNC: 4 MMOL/L — SIGNIFICANT CHANGE UP (ref 3.5–5.3)
POTASSIUM SERPL-SCNC: 4.8 MMOL/L — SIGNIFICANT CHANGE UP (ref 3.5–5.3)
PROT SERPL-MCNC: 6.7 GM/DL — SIGNIFICANT CHANGE UP (ref 6–8.3)
PROT SERPL-MCNC: 7.2 GM/DL — SIGNIFICANT CHANGE UP (ref 6–8.3)
PROT SERPL-MCNC: 8.1 GM/DL — SIGNIFICANT CHANGE UP (ref 6–8.3)
PROT SERPL-MCNC: 8.2 GM/DL — SIGNIFICANT CHANGE UP (ref 6–8.3)
PROT SERPL-MCNC: 8.4 G/DL
PROT SERPL-MCNC: 8.9 GM/DL — HIGH (ref 6–8.3)
PROT UR-MCNC: 15 MG/DL
PROT UR-MCNC: 500 MG/DL
PROTHROM AB SERPL-ACNC: 12.6 SEC — SIGNIFICANT CHANGE UP (ref 10.6–13.6)
PROTHROM AB SERPL-ACNC: 13.9 SEC — HIGH (ref 10.6–13.6)
RBC # BLD: 3.72 M/UL — LOW (ref 3.8–5.2)
RBC # BLD: 3.79 M/UL — LOW (ref 3.8–5.2)
RBC # BLD: 3.9 M/UL — SIGNIFICANT CHANGE UP (ref 3.8–5.2)
RBC # BLD: 3.94 M/UL — SIGNIFICANT CHANGE UP (ref 3.8–5.2)
RBC # BLD: 3.94 M/UL — SIGNIFICANT CHANGE UP (ref 3.8–5.2)
RBC # BLD: 4.1 M/UL — SIGNIFICANT CHANGE UP (ref 3.8–5.2)
RBC # BLD: 4.21 M/UL
RBC # BLD: 4.25 M/UL — SIGNIFICANT CHANGE UP (ref 3.8–5.2)
RBC # BLD: 4.3 M/UL — SIGNIFICANT CHANGE UP (ref 3.8–5.2)
RBC # BLD: 4.67 M/UL — SIGNIFICANT CHANGE UP (ref 3.8–5.2)
RBC # FLD: 14.2 % — SIGNIFICANT CHANGE UP (ref 10.3–14.5)
RBC # FLD: 14.3 % — SIGNIFICANT CHANGE UP (ref 10.3–14.5)
RBC # FLD: 14.8 % — HIGH (ref 10.3–14.5)
RBC # FLD: 14.9 % — HIGH (ref 10.3–14.5)
RBC # FLD: 14.9 % — HIGH (ref 10.3–14.5)
RBC # FLD: 15 % — HIGH (ref 10.3–14.5)
RBC # FLD: 15 % — HIGH (ref 10.3–14.5)
RBC # FLD: 15.4 % — HIGH (ref 10.3–14.5)
RBC # FLD: 15.4 % — HIGH (ref 10.3–14.5)
RBC # FLD: 15.5 %
RBC BLD AUTO: SIGNIFICANT CHANGE UP
RBC CASTS # UR COMP ASSIST: ABNORMAL /HPF (ref 0–4)
RBC CASTS # UR COMP ASSIST: ABNORMAL /HPF (ref 0–4)
SARS-COV-2 IGG SERPL QL IA: NEGATIVE — SIGNIFICANT CHANGE UP
SARS-COV-2 IGG SERPL QL IA: NEGATIVE — SIGNIFICANT CHANGE UP
SARS-COV-2 IGM SERPL IA-ACNC: 0.08 INDEX — SIGNIFICANT CHANGE UP
SARS-COV-2 IGM SERPL IA-ACNC: 0.55 RATIO — SIGNIFICANT CHANGE UP
SARS-COV-2 RNA SPEC QL NAA+PROBE: SIGNIFICANT CHANGE UP
SARS-COV-2 RNA SPEC QL NAA+PROBE: SIGNIFICANT CHANGE UP
SODIUM SERPL-SCNC: 136 MMOL/L — SIGNIFICANT CHANGE UP (ref 135–145)
SODIUM SERPL-SCNC: 137 MMOL/L — SIGNIFICANT CHANGE UP (ref 135–145)
SODIUM SERPL-SCNC: 138 MMOL/L — SIGNIFICANT CHANGE UP (ref 135–145)
SODIUM SERPL-SCNC: 139 MMOL/L — SIGNIFICANT CHANGE UP (ref 135–145)
SODIUM SERPL-SCNC: 140 MMOL/L — SIGNIFICANT CHANGE UP (ref 135–145)
SODIUM SERPL-SCNC: 141 MMOL/L
SODIUM SERPL-SCNC: 141 MMOL/L — SIGNIFICANT CHANGE UP (ref 135–145)
SODIUM SERPL-SCNC: 141 MMOL/L — SIGNIFICANT CHANGE UP (ref 135–145)
SODIUM SERPL-SCNC: 143 MMOL/L — SIGNIFICANT CHANGE UP (ref 135–145)
SP GR SPEC: 1.01 — SIGNIFICANT CHANGE UP (ref 1.01–1.02)
SP GR SPEC: 1.02 — SIGNIFICANT CHANGE UP (ref 1.01–1.02)
SPECIMEN SOURCE: SIGNIFICANT CHANGE UP
T PALLIDUM AB TITR SER: NEGATIVE — SIGNIFICANT CHANGE UP
T3 SERPL-MCNC: 88 NG/DL — SIGNIFICANT CHANGE UP (ref 80–200)
T4 FREE SERPL-MCNC: 1.5 NG/DL — SIGNIFICANT CHANGE UP (ref 0.9–1.8)
TIBC SERPL-MCNC: 229 UG/DL
TRIGL SERPL-MCNC: 88 MG/DL
TROPONIN I SERPL-MCNC: 0.05 NG/ML — HIGH (ref 0.01–0.04)
TROPONIN I SERPL-MCNC: <.015 NG/ML — SIGNIFICANT CHANGE UP (ref 0.01–0.04)
TSH SERPL-ACNC: 6.51 UIU/ML
TSH SERPL-MCNC: 3.96 UU/ML — HIGH (ref 0.36–3.74)
TSH SERPL-MCNC: 3.99 UU/ML — HIGH (ref 0.36–3.74)
UIBC SERPL-MCNC: 179 UG/DL
UROBILINOGEN FLD QL: 1 MG/DL
UROBILINOGEN FLD QL: NEGATIVE MG/DL — SIGNIFICANT CHANGE UP
VIT B12 SERPL-MCNC: 1375 PG/ML — HIGH (ref 232–1245)
VIT B12 SERPL-MCNC: >2000 PG/ML
WBC # BLD: 10.22 K/UL — SIGNIFICANT CHANGE UP (ref 3.8–10.5)
WBC # BLD: 10.63 K/UL — HIGH (ref 3.8–10.5)
WBC # BLD: 10.92 K/UL — HIGH (ref 3.8–10.5)
WBC # BLD: 15.01 K/UL — HIGH (ref 3.8–10.5)
WBC # BLD: 6.06 K/UL — SIGNIFICANT CHANGE UP (ref 3.8–10.5)
WBC # BLD: 6.09 K/UL — SIGNIFICANT CHANGE UP (ref 3.8–10.5)
WBC # BLD: 6.89 K/UL — SIGNIFICANT CHANGE UP (ref 3.8–10.5)
WBC # BLD: 9.6 K/UL — SIGNIFICANT CHANGE UP (ref 3.8–10.5)
WBC # BLD: 9.75 K/UL — SIGNIFICANT CHANGE UP (ref 3.8–10.5)
WBC # FLD AUTO: 10.22 K/UL — SIGNIFICANT CHANGE UP (ref 3.8–10.5)
WBC # FLD AUTO: 10.63 K/UL — HIGH (ref 3.8–10.5)
WBC # FLD AUTO: 10.92 K/UL — HIGH (ref 3.8–10.5)
WBC # FLD AUTO: 15.01 K/UL — HIGH (ref 3.8–10.5)
WBC # FLD AUTO: 6.06 K/UL — SIGNIFICANT CHANGE UP (ref 3.8–10.5)
WBC # FLD AUTO: 6.09 K/UL — SIGNIFICANT CHANGE UP (ref 3.8–10.5)
WBC # FLD AUTO: 6.89 K/UL — SIGNIFICANT CHANGE UP (ref 3.8–10.5)
WBC # FLD AUTO: 7.72 K/UL
WBC # FLD AUTO: 9.6 K/UL — SIGNIFICANT CHANGE UP (ref 3.8–10.5)
WBC # FLD AUTO: 9.75 K/UL — SIGNIFICANT CHANGE UP (ref 3.8–10.5)
WBC UR QL: >50
WBC UR QL: ABNORMAL

## 2020-01-01 PROCEDURE — 36415 COLL VENOUS BLD VENIPUNCTURE: CPT

## 2020-01-01 PROCEDURE — 99233 SBSQ HOSP IP/OBS HIGH 50: CPT

## 2020-01-01 PROCEDURE — 99232 SBSQ HOSP IP/OBS MODERATE 35: CPT

## 2020-01-01 PROCEDURE — 52310 CYSTOSCOPY AND TREATMENT: CPT

## 2020-01-01 PROCEDURE — 99221 1ST HOSP IP/OBS SF/LOW 40: CPT

## 2020-01-01 PROCEDURE — 99214 OFFICE O/P EST MOD 30 MIN: CPT | Mod: 95

## 2020-01-01 PROCEDURE — 74176 CT ABD & PELVIS W/O CONTRAST: CPT | Mod: 26

## 2020-01-01 PROCEDURE — 70450 CT HEAD/BRAIN W/O DYE: CPT | Mod: 26

## 2020-01-01 PROCEDURE — 74420 UROGRAPHY RTRGR +-KUB: CPT | Mod: 26

## 2020-01-01 PROCEDURE — 72131 CT LUMBAR SPINE W/O DYE: CPT | Mod: 26

## 2020-01-01 PROCEDURE — 99285 EMERGENCY DEPT VISIT HI MDM: CPT

## 2020-01-01 PROCEDURE — 71045 X-RAY EXAM CHEST 1 VIEW: CPT | Mod: 26

## 2020-01-01 PROCEDURE — 93010 ELECTROCARDIOGRAM REPORT: CPT

## 2020-01-01 PROCEDURE — 52332 CYSTOSCOPY AND TREATMENT: CPT | Mod: 59,LT

## 2020-01-01 PROCEDURE — 74018 RADEX ABDOMEN 1 VIEW: CPT | Mod: 26

## 2020-01-01 PROCEDURE — 73600 X-RAY EXAM OF ANKLE: CPT | Mod: 26,LT

## 2020-01-01 PROCEDURE — 99239 HOSP IP/OBS DSCHRG MGMT >30: CPT

## 2020-01-01 PROCEDURE — 88300 SURGICAL PATH GROSS: CPT | Mod: 26,59

## 2020-01-01 PROCEDURE — 76775 US EXAM ABDO BACK WALL LIM: CPT | Mod: 26

## 2020-01-01 PROCEDURE — G0439: CPT

## 2020-01-01 PROCEDURE — 99231 SBSQ HOSP IP/OBS SF/LOW 25: CPT

## 2020-01-01 PROCEDURE — 52356 CYSTO/URETERO W/LITHOTRIPSY: CPT | Mod: LT

## 2020-01-01 PROCEDURE — 99223 1ST HOSP IP/OBS HIGH 75: CPT

## 2020-01-01 PROCEDURE — 52332 CYSTOSCOPY AND TREATMENT: CPT | Mod: LT

## 2020-01-01 PROCEDURE — 52005 CYSTO W/URTRL CATHJ: CPT | Mod: 59

## 2020-01-01 PROCEDURE — 99212 OFFICE O/P EST SF 10 MIN: CPT | Mod: 25

## 2020-01-01 PROCEDURE — 51701 INSERT BLADDER CATHETER: CPT

## 2020-01-01 PROCEDURE — 99222 1ST HOSP IP/OBS MODERATE 55: CPT

## 2020-01-01 PROCEDURE — 99497 ADVNCD CARE PLAN 30 MIN: CPT

## 2020-01-01 PROCEDURE — 99203 OFFICE O/P NEW LOW 30 MIN: CPT | Mod: 95

## 2020-01-01 PROCEDURE — 99231 SBSQ HOSP IP/OBS SF/LOW 25: CPT | Mod: 57,25

## 2020-01-01 PROCEDURE — 99214 OFFICE O/P EST MOD 30 MIN: CPT | Mod: 25

## 2020-01-01 RX ORDER — VANCOMYCIN HCL 1 G
1000 VIAL (EA) INTRAVENOUS ONCE
Refills: 0 | Status: COMPLETED | OUTPATIENT
Start: 2020-01-01 | End: 2020-01-01

## 2020-01-01 RX ORDER — ASPIRIN 325 MG/1
TABLET, FILM COATED ORAL
Refills: 0 | Status: DISCONTINUED | COMMUNITY
End: 2020-01-01

## 2020-01-01 RX ORDER — IOHEXOL 300 MG/ML
30 INJECTION, SOLUTION INTRAVENOUS ONCE
Refills: 0 | Status: COMPLETED | OUTPATIENT
Start: 2020-01-01 | End: 2020-01-01

## 2020-01-01 RX ORDER — POTASSIUM CHLORIDE 20 MEQ
20 PACKET (EA) ORAL ONCE
Refills: 0 | Status: COMPLETED | OUTPATIENT
Start: 2020-01-01 | End: 2020-01-01

## 2020-01-01 RX ORDER — CEFPODOXIME PROXETIL 200 MG/1
200 TABLET, FILM COATED ORAL
Refills: 0 | Status: DISCONTINUED | COMMUNITY
End: 2020-01-01

## 2020-01-01 RX ORDER — SENNA PLUS 8.6 MG/1
2 TABLET ORAL
Qty: 0 | Refills: 0 | DISCHARGE
Start: 2020-01-01

## 2020-01-01 RX ORDER — SODIUM CHLORIDE 9 MG/ML
1000 INJECTION INTRAMUSCULAR; INTRAVENOUS; SUBCUTANEOUS
Refills: 0 | Status: DISCONTINUED | OUTPATIENT
Start: 2020-01-01 | End: 2020-01-01

## 2020-01-01 RX ORDER — CEFTRIAXONE 500 MG/1
INJECTION, POWDER, FOR SOLUTION INTRAMUSCULAR; INTRAVENOUS
Refills: 0 | Status: DISCONTINUED | OUTPATIENT
Start: 2020-01-01 | End: 2020-01-01

## 2020-01-01 RX ORDER — CEFTRIAXONE 500 MG/1
1000 INJECTION, POWDER, FOR SOLUTION INTRAMUSCULAR; INTRAVENOUS EVERY 24 HOURS
Refills: 0 | Status: CANCELLED | OUTPATIENT
Start: 2020-01-01 | End: 2020-01-01

## 2020-01-01 RX ORDER — ENOXAPARIN SODIUM 100 MG/ML
30 INJECTION SUBCUTANEOUS DAILY
Refills: 0 | Status: DISCONTINUED | OUTPATIENT
Start: 2020-01-01 | End: 2020-01-01

## 2020-01-01 RX ORDER — SIMVASTATIN 20 MG/1
20 TABLET, FILM COATED ORAL AT BEDTIME
Refills: 0 | Status: DISCONTINUED | OUTPATIENT
Start: 2020-01-01 | End: 2020-01-01

## 2020-01-01 RX ORDER — ONDANSETRON 8 MG/1
4 TABLET, FILM COATED ORAL THREE TIMES A DAY
Refills: 0 | Status: DISCONTINUED | OUTPATIENT
Start: 2020-01-01 | End: 2020-01-01

## 2020-01-01 RX ORDER — AMLODIPINE BESYLATE 2.5 MG/1
10 TABLET ORAL DAILY
Refills: 0 | Status: DISCONTINUED | OUTPATIENT
Start: 2020-01-01 | End: 2020-01-01

## 2020-01-01 RX ORDER — CEFTRIAXONE 500 MG/1
1000 INJECTION, POWDER, FOR SOLUTION INTRAMUSCULAR; INTRAVENOUS ONCE
Refills: 0 | Status: COMPLETED | OUTPATIENT
Start: 2020-01-01 | End: 2020-01-01

## 2020-01-01 RX ORDER — SODIUM CHLORIDE 9 MG/ML
1000 INJECTION INTRAMUSCULAR; INTRAVENOUS; SUBCUTANEOUS ONCE
Refills: 0 | Status: COMPLETED | OUTPATIENT
Start: 2020-01-01 | End: 2020-01-01

## 2020-01-01 RX ORDER — PIPERACILLIN AND TAZOBACTAM 4; .5 G/20ML; G/20ML
3.38 INJECTION, POWDER, LYOPHILIZED, FOR SOLUTION INTRAVENOUS EVERY 8 HOURS
Refills: 0 | Status: DISCONTINUED | OUTPATIENT
Start: 2020-01-01 | End: 2020-01-01

## 2020-01-01 RX ORDER — LACTULOSE 10 G/15ML
15 SOLUTION ORAL ONCE
Refills: 0 | Status: COMPLETED | OUTPATIENT
Start: 2020-01-01 | End: 2020-01-01

## 2020-01-01 RX ORDER — ONDANSETRON 8 MG/1
4 TABLET, FILM COATED ORAL ONCE
Refills: 0 | Status: DISCONTINUED | OUTPATIENT
Start: 2020-01-01 | End: 2020-01-01

## 2020-01-01 RX ORDER — SIMETHICONE 80 MG/1
80 TABLET, CHEWABLE ORAL EVERY 6 HOURS
Refills: 0 | Status: DISCONTINUED | OUTPATIENT
Start: 2020-01-01 | End: 2020-01-01

## 2020-01-01 RX ORDER — ASPIRIN/CALCIUM CARB/MAGNESIUM 324 MG
81 TABLET ORAL DAILY
Refills: 0 | Status: DISCONTINUED | OUTPATIENT
Start: 2020-01-01 | End: 2020-01-01

## 2020-01-01 RX ORDER — PANTOPRAZOLE SODIUM 20 MG/1
40 TABLET, DELAYED RELEASE ORAL
Refills: 0 | Status: DISCONTINUED | OUTPATIENT
Start: 2020-01-01 | End: 2020-01-01

## 2020-01-01 RX ORDER — ACETAMINOPHEN 500 MG
1000 TABLET ORAL ONCE
Refills: 0 | Status: COMPLETED | OUTPATIENT
Start: 2020-01-01 | End: 2020-01-01

## 2020-01-01 RX ORDER — MULTIVIT WITH MIN/MFOLATE/K2 340-15/3 G
1 POWDER (GRAM) ORAL ONCE
Refills: 0 | Status: DISCONTINUED | OUTPATIENT
Start: 2020-01-01 | End: 2020-01-01

## 2020-01-01 RX ORDER — SODIUM CHLORIDE 9 MG/ML
1000 INJECTION, SOLUTION INTRAVENOUS
Refills: 0 | Status: DISCONTINUED | OUTPATIENT
Start: 2020-01-01 | End: 2020-01-01

## 2020-01-01 RX ORDER — MIRTAZAPINE 45 MG/1
7.5 TABLET, ORALLY DISINTEGRATING ORAL AT BEDTIME
Refills: 0 | Status: DISCONTINUED | OUTPATIENT
Start: 2020-01-01 | End: 2020-01-01

## 2020-01-01 RX ORDER — MORPHINE SULFATE 50 MG/1
4 CAPSULE, EXTENDED RELEASE ORAL
Refills: 0 | Status: DISCONTINUED | OUTPATIENT
Start: 2020-01-01 | End: 2020-01-01

## 2020-01-01 RX ORDER — AMLODIPINE BESYLATE 5 MG/1
TABLET ORAL
Refills: 0 | Status: DISCONTINUED | COMMUNITY
End: 2020-01-01

## 2020-01-01 RX ORDER — POTASSIUM CHLORIDE 20 MEQ
40 PACKET (EA) ORAL EVERY 4 HOURS
Refills: 0 | Status: COMPLETED | OUTPATIENT
Start: 2020-01-01 | End: 2020-01-01

## 2020-01-01 RX ORDER — POTASSIUM CHLORIDE 20 MEQ
40 PACKET (EA) ORAL ONCE
Refills: 0 | Status: COMPLETED | OUTPATIENT
Start: 2020-01-01 | End: 2020-01-01

## 2020-01-01 RX ORDER — CEFPODOXIME PROXETIL 100 MG
1 TABLET ORAL
Qty: 18 | Refills: 0
Start: 2020-01-01 | End: 2020-01-01

## 2020-01-01 RX ORDER — HEPARIN SODIUM 5000 [USP'U]/ML
5000 INJECTION INTRAVENOUS; SUBCUTANEOUS EVERY 8 HOURS
Refills: 0 | Status: DISCONTINUED | OUTPATIENT
Start: 2020-01-01 | End: 2020-01-01

## 2020-01-01 RX ORDER — AMOXICILLIN AND CLAVULANATE POTASSIUM 875; 125 MG/1; MG/1
875-125 TABLET, COATED ORAL
Qty: 28 | Refills: 2 | Status: DISCONTINUED | COMMUNITY
Start: 2020-01-01 | End: 2020-01-01

## 2020-01-01 RX ORDER — PANTOPRAZOLE 40 MG/1
40 TABLET, DELAYED RELEASE ORAL
Refills: 0 | Status: ACTIVE | COMMUNITY

## 2020-01-01 RX ORDER — ONDANSETRON 8 MG/1
4 TABLET, FILM COATED ORAL ONCE
Refills: 0 | Status: COMPLETED | OUTPATIENT
Start: 2020-01-01 | End: 2020-01-01

## 2020-01-01 RX ORDER — LACTULOSE 10 G/15ML
15 SOLUTION ORAL
Refills: 0 | Status: DISCONTINUED | OUTPATIENT
Start: 2020-01-01 | End: 2020-01-01

## 2020-01-01 RX ORDER — SENNA PLUS 8.6 MG/1
2 TABLET ORAL AT BEDTIME
Refills: 0 | Status: DISCONTINUED | OUTPATIENT
Start: 2020-01-01 | End: 2020-01-01

## 2020-01-01 RX ORDER — CEFPODOXIME PROXETIL 200 MG/1
200 TABLET, FILM COATED ORAL
Qty: 20 | Refills: 0 | Status: DISCONTINUED | COMMUNITY
Start: 2020-01-01 | End: 2020-01-01

## 2020-01-01 RX ORDER — POLYETHYLENE GLYCOL 3350 17 G/17G
17 POWDER, FOR SOLUTION ORAL DAILY
Refills: 0 | Status: DISCONTINUED | OUTPATIENT
Start: 2020-01-01 | End: 2020-01-01

## 2020-01-01 RX ORDER — PANTOPRAZOLE SODIUM 20 MG/1
1 TABLET, DELAYED RELEASE ORAL
Qty: 30 | Refills: 0
Start: 2020-01-01 | End: 2020-01-01

## 2020-01-01 RX ORDER — PIPERACILLIN AND TAZOBACTAM 4; .5 G/20ML; G/20ML
3.38 INJECTION, POWDER, LYOPHILIZED, FOR SOLUTION INTRAVENOUS ONCE
Refills: 0 | Status: COMPLETED | OUTPATIENT
Start: 2020-01-01 | End: 2020-01-01

## 2020-01-01 RX ORDER — LEVOTHYROXINE SODIUM 137 UG/1
TABLET ORAL
Refills: 0 | Status: DISCONTINUED | COMMUNITY
End: 2020-01-01

## 2020-01-01 RX ORDER — MAGNESIUM HYDROXIDE 400 MG/1
30 TABLET, CHEWABLE ORAL ONCE
Refills: 0 | Status: DISCONTINUED | OUTPATIENT
Start: 2020-01-01 | End: 2020-01-01

## 2020-01-01 RX ORDER — LACTULOSE 10 G/15ML
10 SOLUTION ORAL DAILY
Refills: 0 | Status: DISCONTINUED | OUTPATIENT
Start: 2020-01-01 | End: 2020-01-01

## 2020-01-01 RX ORDER — HYDROMORPHONE HYDROCHLORIDE 2 MG/ML
0.5 INJECTION INTRAMUSCULAR; INTRAVENOUS; SUBCUTANEOUS
Refills: 0 | Status: DISCONTINUED | OUTPATIENT
Start: 2020-01-01 | End: 2020-01-01

## 2020-01-01 RX ORDER — SIMVASTATIN 20 MG/1
1 TABLET, FILM COATED ORAL
Qty: 0 | Refills: 0 | DISCHARGE
Start: 2020-01-01

## 2020-01-01 RX ORDER — POLYETHYLENE GLYCOL 3350 17 G/17G
17 POWDER, FOR SOLUTION ORAL
Qty: 510 | Refills: 0
Start: 2020-01-01 | End: 2020-01-01

## 2020-01-01 RX ORDER — CIPROFLOXACIN LACTATE 400MG/40ML
250 VIAL (ML) INTRAVENOUS EVERY 12 HOURS
Refills: 0 | Status: COMPLETED | OUTPATIENT
Start: 2020-01-01 | End: 2020-01-01

## 2020-01-01 RX ORDER — POTASSIUM PHOSPHATE, MONOBASIC POTASSIUM PHOSPHATE, DIBASIC 236; 224 MG/ML; MG/ML
15 INJECTION, SOLUTION INTRAVENOUS ONCE
Refills: 0 | Status: COMPLETED | OUTPATIENT
Start: 2020-01-01 | End: 2020-01-01

## 2020-01-01 RX ORDER — AMLODIPINE BESYLATE 2.5 MG/1
1 TABLET ORAL
Qty: 30 | Refills: 0
Start: 2020-01-01 | End: 2020-01-01

## 2020-01-01 RX ORDER — MAGNESIUM OXIDE 400 MG ORAL TABLET 241.3 MG
800 TABLET ORAL ONCE
Refills: 0 | Status: COMPLETED | OUTPATIENT
Start: 2020-01-01 | End: 2020-01-01

## 2020-01-01 RX ORDER — GINGER ROOT/GINGER ROOT EXT 262.5 MG
600-800 CAPSULE ORAL
Refills: 0 | Status: DISCONTINUED | COMMUNITY
Start: 2020-01-01 | End: 2020-01-01

## 2020-01-01 RX ORDER — CEFTRIAXONE 500 MG/1
1000 INJECTION, POWDER, FOR SOLUTION INTRAMUSCULAR; INTRAVENOUS EVERY 24 HOURS
Refills: 0 | Status: DISCONTINUED | OUTPATIENT
Start: 2020-01-01 | End: 2020-01-01

## 2020-01-01 RX ORDER — POTASSIUM CHLORIDE 20 MEQ
40 PACKET (EA) ORAL EVERY 4 HOURS
Refills: 0 | Status: DISCONTINUED | OUTPATIENT
Start: 2020-01-01 | End: 2020-01-01

## 2020-01-01 RX ORDER — MORPHINE SULFATE 50 MG/1
2 CAPSULE, EXTENDED RELEASE ORAL
Refills: 0 | Status: DISCONTINUED | OUTPATIENT
Start: 2020-01-01 | End: 2020-01-01

## 2020-01-01 RX ORDER — POTASSIUM CHLORIDE 20 MEQ
10 PACKET (EA) ORAL
Refills: 0 | Status: DISCONTINUED | OUTPATIENT
Start: 2020-01-01 | End: 2020-01-01

## 2020-01-01 RX ORDER — SULFAMETHOXAZOLE AND TRIMETHOPRIM 800; 160 MG/1; MG/1
800-160 TABLET ORAL TWICE DAILY
Qty: 2 | Refills: 0 | Status: COMPLETED | COMMUNITY
Start: 2020-01-01 | End: 2020-01-01

## 2020-01-01 RX ADMIN — ENOXAPARIN SODIUM 30 MILLIGRAM(S): 100 INJECTION SUBCUTANEOUS at 11:59

## 2020-01-01 RX ADMIN — Medication 40 MILLIEQUIVALENT(S): at 13:26

## 2020-01-01 RX ADMIN — HEPARIN SODIUM 5000 UNIT(S): 5000 INJECTION INTRAVENOUS; SUBCUTANEOUS at 06:36

## 2020-01-01 RX ADMIN — PIPERACILLIN AND TAZOBACTAM 25 GRAM(S): 4; .5 INJECTION, POWDER, LYOPHILIZED, FOR SOLUTION INTRAVENOUS at 13:15

## 2020-01-01 RX ADMIN — AMLODIPINE BESYLATE 10 MILLIGRAM(S): 2.5 TABLET ORAL at 06:36

## 2020-01-01 RX ADMIN — SENNA PLUS 2 TABLET(S): 8.6 TABLET ORAL at 22:35

## 2020-01-01 RX ADMIN — Medication 81 MILLIGRAM(S): at 11:16

## 2020-01-01 RX ADMIN — HEPARIN SODIUM 5000 UNIT(S): 5000 INJECTION INTRAVENOUS; SUBCUTANEOUS at 05:45

## 2020-01-01 RX ADMIN — PIPERACILLIN AND TAZOBACTAM 25 GRAM(S): 4; .5 INJECTION, POWDER, LYOPHILIZED, FOR SOLUTION INTRAVENOUS at 12:08

## 2020-01-01 RX ADMIN — Medication 81 MILLIGRAM(S): at 12:45

## 2020-01-01 RX ADMIN — Medication 20 MILLIEQUIVALENT(S): at 18:35

## 2020-01-01 RX ADMIN — HEPARIN SODIUM 5000 UNIT(S): 5000 INJECTION INTRAVENOUS; SUBCUTANEOUS at 13:37

## 2020-01-01 RX ADMIN — SODIUM CHLORIDE 1000 MILLILITER(S): 9 INJECTION INTRAMUSCULAR; INTRAVENOUS; SUBCUTANEOUS at 19:45

## 2020-01-01 RX ADMIN — HEPARIN SODIUM 5000 UNIT(S): 5000 INJECTION INTRAVENOUS; SUBCUTANEOUS at 14:30

## 2020-01-01 RX ADMIN — HEPARIN SODIUM 5000 UNIT(S): 5000 INJECTION INTRAVENOUS; SUBCUTANEOUS at 22:35

## 2020-01-01 RX ADMIN — PANTOPRAZOLE SODIUM 40 MILLIGRAM(S): 20 TABLET, DELAYED RELEASE ORAL at 05:58

## 2020-01-01 RX ADMIN — SODIUM CHLORIDE 1000 MILLILITER(S): 9 INJECTION INTRAMUSCULAR; INTRAVENOUS; SUBCUTANEOUS at 17:01

## 2020-01-01 RX ADMIN — PIPERACILLIN AND TAZOBACTAM 25 GRAM(S): 4; .5 INJECTION, POWDER, LYOPHILIZED, FOR SOLUTION INTRAVENOUS at 21:19

## 2020-01-01 RX ADMIN — ENOXAPARIN SODIUM 30 MILLIGRAM(S): 100 INJECTION SUBCUTANEOUS at 11:24

## 2020-01-01 RX ADMIN — SODIUM CHLORIDE 60 MILLILITER(S): 9 INJECTION INTRAMUSCULAR; INTRAVENOUS; SUBCUTANEOUS at 21:19

## 2020-01-01 RX ADMIN — CEFTRIAXONE 100 MILLIGRAM(S): 500 INJECTION, POWDER, FOR SOLUTION INTRAMUSCULAR; INTRAVENOUS at 18:15

## 2020-01-01 RX ADMIN — Medication 250 MILLIGRAM(S): at 17:04

## 2020-01-01 RX ADMIN — SODIUM CHLORIDE 60 MILLILITER(S): 9 INJECTION INTRAMUSCULAR; INTRAVENOUS; SUBCUTANEOUS at 15:22

## 2020-01-01 RX ADMIN — SIMVASTATIN 20 MILLIGRAM(S): 20 TABLET, FILM COATED ORAL at 22:59

## 2020-01-01 RX ADMIN — SODIUM CHLORIDE 85 MILLILITER(S): 9 INJECTION INTRAMUSCULAR; INTRAVENOUS; SUBCUTANEOUS at 21:11

## 2020-01-01 RX ADMIN — SODIUM CHLORIDE 1000 MILLILITER(S): 9 INJECTION INTRAMUSCULAR; INTRAVENOUS; SUBCUTANEOUS at 13:22

## 2020-01-01 RX ADMIN — AMLODIPINE BESYLATE 10 MILLIGRAM(S): 2.5 TABLET ORAL at 05:47

## 2020-01-01 RX ADMIN — SIMVASTATIN 20 MILLIGRAM(S): 20 TABLET, FILM COATED ORAL at 21:20

## 2020-01-01 RX ADMIN — SODIUM CHLORIDE 1000 MILLILITER(S): 9 INJECTION INTRAMUSCULAR; INTRAVENOUS; SUBCUTANEOUS at 13:33

## 2020-01-01 RX ADMIN — Medication 1000 MILLIGRAM(S): at 21:20

## 2020-01-01 RX ADMIN — Medication 81 MILLIGRAM(S): at 11:24

## 2020-01-01 RX ADMIN — HEPARIN SODIUM 5000 UNIT(S): 5000 INJECTION INTRAVENOUS; SUBCUTANEOUS at 14:54

## 2020-01-01 RX ADMIN — SIMVASTATIN 20 MILLIGRAM(S): 20 TABLET, FILM COATED ORAL at 21:39

## 2020-01-01 RX ADMIN — PIPERACILLIN AND TAZOBACTAM 25 GRAM(S): 4; .5 INJECTION, POWDER, LYOPHILIZED, FOR SOLUTION INTRAVENOUS at 05:44

## 2020-01-01 RX ADMIN — SODIUM CHLORIDE 75 MILLILITER(S): 9 INJECTION, SOLUTION INTRAVENOUS at 17:53

## 2020-01-01 RX ADMIN — HEPARIN SODIUM 5000 UNIT(S): 5000 INJECTION INTRAVENOUS; SUBCUTANEOUS at 21:39

## 2020-01-01 RX ADMIN — SIMVASTATIN 20 MILLIGRAM(S): 20 TABLET, FILM COATED ORAL at 21:27

## 2020-01-01 RX ADMIN — SIMVASTATIN 20 MILLIGRAM(S): 20 TABLET, FILM COATED ORAL at 22:35

## 2020-01-01 RX ADMIN — PANTOPRAZOLE SODIUM 40 MILLIGRAM(S): 20 TABLET, DELAYED RELEASE ORAL at 06:07

## 2020-01-01 RX ADMIN — SENNA PLUS 2 TABLET(S): 8.6 TABLET ORAL at 21:18

## 2020-01-01 RX ADMIN — Medication 250 MILLIGRAM(S): at 10:49

## 2020-01-01 RX ADMIN — PIPERACILLIN AND TAZOBACTAM 25 GRAM(S): 4; .5 INJECTION, POWDER, LYOPHILIZED, FOR SOLUTION INTRAVENOUS at 18:09

## 2020-01-01 RX ADMIN — POTASSIUM PHOSPHATE, MONOBASIC POTASSIUM PHOSPHATE, DIBASIC 62.5 MILLIMOLE(S): 236; 224 INJECTION, SOLUTION INTRAVENOUS at 17:48

## 2020-01-01 RX ADMIN — SIMVASTATIN 20 MILLIGRAM(S): 20 TABLET, FILM COATED ORAL at 23:20

## 2020-01-01 RX ADMIN — Medication 5 MILLIGRAM(S): at 05:33

## 2020-01-01 RX ADMIN — ENOXAPARIN SODIUM 30 MILLIGRAM(S): 100 INJECTION SUBCUTANEOUS at 18:09

## 2020-01-01 RX ADMIN — MIRTAZAPINE 7.5 MILLIGRAM(S): 45 TABLET, ORALLY DISINTEGRATING ORAL at 21:49

## 2020-01-01 RX ADMIN — HEPARIN SODIUM 5000 UNIT(S): 5000 INJECTION INTRAVENOUS; SUBCUTANEOUS at 21:54

## 2020-01-01 RX ADMIN — Medication 250 MILLIGRAM(S): at 17:11

## 2020-01-01 RX ADMIN — LACTULOSE 10 GRAM(S): 10 SOLUTION ORAL at 11:16

## 2020-01-01 RX ADMIN — HEPARIN SODIUM 5000 UNIT(S): 5000 INJECTION INTRAVENOUS; SUBCUTANEOUS at 14:01

## 2020-01-01 RX ADMIN — Medication 81 MILLIGRAM(S): at 11:35

## 2020-01-01 RX ADMIN — AMLODIPINE BESYLATE 10 MILLIGRAM(S): 2.5 TABLET ORAL at 05:18

## 2020-01-01 RX ADMIN — PIPERACILLIN AND TAZOBACTAM 25 GRAM(S): 4; .5 INJECTION, POWDER, LYOPHILIZED, FOR SOLUTION INTRAVENOUS at 22:35

## 2020-01-01 RX ADMIN — SIMVASTATIN 20 MILLIGRAM(S): 20 TABLET, FILM COATED ORAL at 22:13

## 2020-01-01 RX ADMIN — HEPARIN SODIUM 5000 UNIT(S): 5000 INJECTION INTRAVENOUS; SUBCUTANEOUS at 06:04

## 2020-01-01 RX ADMIN — CEFTRIAXONE 100 MILLIGRAM(S): 500 INJECTION, POWDER, FOR SOLUTION INTRAMUSCULAR; INTRAVENOUS at 13:22

## 2020-01-01 RX ADMIN — MIRTAZAPINE 7.5 MILLIGRAM(S): 45 TABLET, ORALLY DISINTEGRATING ORAL at 22:13

## 2020-01-01 RX ADMIN — Medication 81 MILLIGRAM(S): at 19:46

## 2020-01-01 RX ADMIN — SENNA PLUS 2 TABLET(S): 8.6 TABLET ORAL at 21:27

## 2020-01-01 RX ADMIN — HEPARIN SODIUM 5000 UNIT(S): 5000 INJECTION INTRAVENOUS; SUBCUTANEOUS at 23:12

## 2020-01-01 RX ADMIN — HEPARIN SODIUM 5000 UNIT(S): 5000 INJECTION INTRAVENOUS; SUBCUTANEOUS at 06:12

## 2020-01-01 RX ADMIN — AMLODIPINE BESYLATE 10 MILLIGRAM(S): 2.5 TABLET ORAL at 12:44

## 2020-01-01 RX ADMIN — SIMVASTATIN 20 MILLIGRAM(S): 20 TABLET, FILM COATED ORAL at 21:49

## 2020-01-01 RX ADMIN — Medication 40 MILLIEQUIVALENT(S): at 06:07

## 2020-01-01 RX ADMIN — CEFTRIAXONE 100 MILLIGRAM(S): 500 INJECTION, POWDER, FOR SOLUTION INTRAMUSCULAR; INTRAVENOUS at 18:19

## 2020-01-01 RX ADMIN — Medication 81 MILLIGRAM(S): at 11:26

## 2020-01-01 RX ADMIN — AMLODIPINE BESYLATE 10 MILLIGRAM(S): 2.5 TABLET ORAL at 06:12

## 2020-01-01 RX ADMIN — PIPERACILLIN AND TAZOBACTAM 200 GRAM(S): 4; .5 INJECTION, POWDER, LYOPHILIZED, FOR SOLUTION INTRAVENOUS at 19:47

## 2020-01-01 RX ADMIN — Medication 100 MILLIEQUIVALENT(S): at 08:10

## 2020-01-01 RX ADMIN — SENNA PLUS 2 TABLET(S): 8.6 TABLET ORAL at 21:54

## 2020-01-01 RX ADMIN — Medication 40 MILLIEQUIVALENT(S): at 19:46

## 2020-01-01 RX ADMIN — HEPARIN SODIUM 5000 UNIT(S): 5000 INJECTION INTRAVENOUS; SUBCUTANEOUS at 05:18

## 2020-01-01 RX ADMIN — SULFAMETHOXAZOLE AND TRIMETHOPRIM 0 MG: 800; 160 TABLET ORAL at 00:00

## 2020-01-01 RX ADMIN — SENNA PLUS 2 TABLET(S): 8.6 TABLET ORAL at 21:11

## 2020-01-01 RX ADMIN — Medication 400 MILLIGRAM(S): at 20:55

## 2020-01-01 RX ADMIN — HEPARIN SODIUM 5000 UNIT(S): 5000 INJECTION INTRAVENOUS; SUBCUTANEOUS at 21:27

## 2020-01-01 RX ADMIN — AMLODIPINE BESYLATE 10 MILLIGRAM(S): 2.5 TABLET ORAL at 05:45

## 2020-01-01 RX ADMIN — MAGNESIUM OXIDE 400 MG ORAL TABLET 800 MILLIGRAM(S): 241.3 TABLET ORAL at 05:59

## 2020-01-01 RX ADMIN — HEPARIN SODIUM 5000 UNIT(S): 5000 INJECTION INTRAVENOUS; SUBCUTANEOUS at 05:33

## 2020-01-01 RX ADMIN — HEPARIN SODIUM 5000 UNIT(S): 5000 INJECTION INTRAVENOUS; SUBCUTANEOUS at 21:11

## 2020-01-01 RX ADMIN — PIPERACILLIN AND TAZOBACTAM 25 GRAM(S): 4; .5 INJECTION, POWDER, LYOPHILIZED, FOR SOLUTION INTRAVENOUS at 06:02

## 2020-01-01 RX ADMIN — Medication 40 MILLIEQUIVALENT(S): at 23:03

## 2020-01-01 RX ADMIN — PANTOPRAZOLE SODIUM 40 MILLIGRAM(S): 20 TABLET, DELAYED RELEASE ORAL at 05:44

## 2020-01-01 RX ADMIN — ENOXAPARIN SODIUM 30 MILLIGRAM(S): 100 INJECTION SUBCUTANEOUS at 11:30

## 2020-01-01 RX ADMIN — SODIUM CHLORIDE 1000 MILLILITER(S): 9 INJECTION INTRAMUSCULAR; INTRAVENOUS; SUBCUTANEOUS at 15:35

## 2020-01-01 RX ADMIN — PIPERACILLIN AND TAZOBACTAM 200 GRAM(S): 4; .5 INJECTION, POWDER, LYOPHILIZED, FOR SOLUTION INTRAVENOUS at 10:40

## 2020-01-01 RX ADMIN — Medication 81 MILLIGRAM(S): at 11:11

## 2020-01-01 RX ADMIN — CEFTRIAXONE 100 MILLIGRAM(S): 500 INJECTION, POWDER, FOR SOLUTION INTRAMUSCULAR; INTRAVENOUS at 09:56

## 2020-01-01 RX ADMIN — SENNA PLUS 2 TABLET(S): 8.6 TABLET ORAL at 21:39

## 2020-01-01 RX ADMIN — SIMVASTATIN 20 MILLIGRAM(S): 20 TABLET, FILM COATED ORAL at 21:54

## 2020-01-01 RX ADMIN — IOHEXOL 30 MILLILITER(S): 300 INJECTION, SOLUTION INTRAVENOUS at 12:39

## 2020-01-01 RX ADMIN — Medication 81 MILLIGRAM(S): at 11:59

## 2020-01-01 RX ADMIN — Medication 20 MILLIEQUIVALENT(S): at 12:45

## 2020-01-01 RX ADMIN — Medication 40 MILLIEQUIVALENT(S): at 10:49

## 2020-01-01 RX ADMIN — MIRTAZAPINE 7.5 MILLIGRAM(S): 45 TABLET, ORALLY DISINTEGRATING ORAL at 22:36

## 2020-01-01 RX ADMIN — AMLODIPINE BESYLATE 10 MILLIGRAM(S): 2.5 TABLET ORAL at 05:49

## 2020-01-01 RX ADMIN — MIRTAZAPINE 7.5 MILLIGRAM(S): 45 TABLET, ORALLY DISINTEGRATING ORAL at 21:17

## 2020-01-01 RX ADMIN — HEPARIN SODIUM 5000 UNIT(S): 5000 INJECTION INTRAVENOUS; SUBCUTANEOUS at 22:20

## 2020-01-01 RX ADMIN — PIPERACILLIN AND TAZOBACTAM 25 GRAM(S): 4; .5 INJECTION, POWDER, LYOPHILIZED, FOR SOLUTION INTRAVENOUS at 05:18

## 2020-01-01 RX ADMIN — Medication 81 MILLIGRAM(S): at 11:30

## 2020-01-01 RX ADMIN — Medication 40 MILLIEQUIVALENT(S): at 09:49

## 2020-01-01 RX ADMIN — AMLODIPINE BESYLATE 10 MILLIGRAM(S): 2.5 TABLET ORAL at 05:34

## 2020-01-01 RX ADMIN — PANTOPRAZOLE SODIUM 40 MILLIGRAM(S): 20 TABLET, DELAYED RELEASE ORAL at 08:00

## 2020-01-01 RX ADMIN — Medication 81 MILLIGRAM(S): at 18:09

## 2020-01-01 RX ADMIN — SENNA PLUS 2 TABLET(S): 8.6 TABLET ORAL at 22:20

## 2020-01-01 RX ADMIN — HEPARIN SODIUM 5000 UNIT(S): 5000 INJECTION INTRAVENOUS; SUBCUTANEOUS at 13:05

## 2020-01-01 RX ADMIN — Medication 100 MILLIEQUIVALENT(S): at 09:25

## 2020-01-01 RX ADMIN — Medication 250 MILLIGRAM(S): at 06:04

## 2020-01-01 RX ADMIN — HEPARIN SODIUM 5000 UNIT(S): 5000 INJECTION INTRAVENOUS; SUBCUTANEOUS at 05:47

## 2020-01-01 RX ADMIN — Medication 81 MILLIGRAM(S): at 12:02

## 2020-01-01 RX ADMIN — ENOXAPARIN SODIUM 30 MILLIGRAM(S): 100 INJECTION SUBCUTANEOUS at 12:45

## 2020-01-01 RX ADMIN — SIMVASTATIN 20 MILLIGRAM(S): 20 TABLET, FILM COATED ORAL at 21:11

## 2020-01-01 RX ADMIN — AMLODIPINE BESYLATE 10 MILLIGRAM(S): 2.5 TABLET ORAL at 05:38

## 2020-01-01 RX ADMIN — Medication 20 MILLIEQUIVALENT(S): at 11:30

## 2020-01-01 RX ADMIN — Medication 81 MILLIGRAM(S): at 11:34

## 2020-01-01 RX ADMIN — LACTULOSE 15 GRAM(S): 10 SOLUTION ORAL at 18:21

## 2020-01-01 RX ADMIN — SODIUM CHLORIDE 60 MILLILITER(S): 9 INJECTION INTRAMUSCULAR; INTRAVENOUS; SUBCUTANEOUS at 11:29

## 2020-01-01 RX ADMIN — SIMVASTATIN 20 MILLIGRAM(S): 20 TABLET, FILM COATED ORAL at 22:20

## 2020-01-01 RX ADMIN — SIMVASTATIN 20 MILLIGRAM(S): 20 TABLET, FILM COATED ORAL at 21:17

## 2020-01-01 RX ADMIN — SODIUM CHLORIDE 110 MILLILITER(S): 9 INJECTION, SOLUTION INTRAVENOUS at 20:45

## 2020-01-01 RX ADMIN — POLYETHYLENE GLYCOL 3350 17 GRAM(S): 17 POWDER, FOR SOLUTION ORAL at 11:16

## 2020-01-01 RX ADMIN — Medication 250 MILLIGRAM(S): at 06:36

## 2020-01-01 RX ADMIN — PANTOPRAZOLE SODIUM 40 MILLIGRAM(S): 20 TABLET, DELAYED RELEASE ORAL at 06:16

## 2020-01-01 RX ADMIN — ONDANSETRON 4 MILLIGRAM(S): 8 TABLET, FILM COATED ORAL at 13:34

## 2020-01-01 RX ADMIN — HEPARIN SODIUM 5000 UNIT(S): 5000 INJECTION INTRAVENOUS; SUBCUTANEOUS at 21:18

## 2020-01-01 RX ADMIN — AMLODIPINE BESYLATE 10 MILLIGRAM(S): 2.5 TABLET ORAL at 06:04

## 2020-01-01 RX ADMIN — LACTULOSE 10 GRAM(S): 10 SOLUTION ORAL at 11:25

## 2020-01-01 RX ADMIN — HEPARIN SODIUM 5000 UNIT(S): 5000 INJECTION INTRAVENOUS; SUBCUTANEOUS at 14:16

## 2020-01-01 RX ADMIN — Medication 20 MILLIEQUIVALENT(S): at 18:16

## 2020-01-01 RX ADMIN — POLYETHYLENE GLYCOL 3350 17 GRAM(S): 17 POWDER, FOR SOLUTION ORAL at 11:26

## 2020-01-01 RX ADMIN — Medication 250 MILLIGRAM(S): at 17:05

## 2020-01-01 RX ADMIN — SODIUM CHLORIDE 110 MILLILITER(S): 9 INJECTION, SOLUTION INTRAVENOUS at 06:12

## 2020-01-01 RX ADMIN — Medication 250 MILLIGRAM(S): at 06:12

## 2020-02-27 NOTE — PATIENT PROFILE ADULT. - NS PRO PASSIVE SMOKE EXP
Patient seen and evaluated with Dr. Andrew  Discussed with patient the need for admission for IV antibiotics, patient has failed outpatient oral antibiotics  Discussed need to obtain vascular studies and MRI  Order for right foot MRI and arterial duplex  Wound Culture obtained and submitted to lab  Dressed in DSD  Podiatry will continue to follow No

## 2020-08-25 NOTE — H&P ADULT - HISTORY OF PRESENT ILLNESS
87F PMHx CAD, MI w/ stents, AAA. 1 day of abdominal pain. 4 episdoes of nbnb vomiting this AM. weakness for 1 day.  no diarrhea or constipation. patient is chronically incontinent 87F PMHx CAD, MI w/ stents, AAA. 1 day of abdominal pain. 4 episdoes of nbnb vomiting this AM. weakness for 1 day.  no diarrhea or constipation. patient is chronically incontinent- she also POSITIVE upper epigastric pain

## 2020-08-25 NOTE — H&P ADULT - NSHPREVIEWOFSYSTEMS_GEN_ALL_CORE
CONSTITUTIONAL: No fever, weight loss, POSITIVE fatigue  EYES: No eye pain, visual disturbances, or discharge  ENMT:  No difficulty hearing, tinnitus, vertigo; No sinus or throat pain  NECK: No pain or stiffness  RESPIRATORY: No cough, wheezing, chills or hemoptysis; No shortness of breath  CARDIOVASCULAR: No chest pain, palpitations, dizziness, or leg swelling  GASTROINTESTINAL:see history of present illness   GENITOURINARY: No dysuria, frequency, hematuria, or incontinence  NEUROLOGICAL: No headaches, memory loss, loss of strength, numbness, or tremors  SKIN: No itching, burning, rashes, or lesions   LYMPH NODES: No enlarged glands  ENDOCRINE: No heat or cold intolerance; No hair loss  MUSCULOSKELETAL: No joint pain or swelling; No muscle, back, or extremity pain  PSYCHIATRIC: No depression, anxiety, mood swings, or difficulty sleeping  HEME/LYMPH: No easy bruising, or bleeding gums  ALLERGY AND IMMUNOLOGIC: No hives or eczema

## 2020-08-25 NOTE — ED ADULT TRIAGE NOTE - IDEAL BODY WEIGHT(KG)
Patient Education     Middle Ear Infection (Adult)  You have an infection of the middle ear (the space behind the eardrum). This is also called acute otitis media (AOM). Sometimes it is caused by the common cold. This is because congestion can block the internal passage (eustachian tube) that drains fluid from the middle ear. When the middle ear fills with fluid, bacteria can grow there and cause an infection. Oral antibiotics are used to treat this illness, not ear drops. Symptoms usually start to improve within 1 to 2 days of treatment.    Home care  The following are general care guidelines:  · Finish all of the antibiotic medicine given, even though you may feel better after the first few days.  · You may use acetaminophen or ibuprofen to control pain, unless something else was prescribed. [NOTE: If you have chronic liver or kidney disease or have ever had a stomach ulcer or GI bleeding, talk with your doctor before using these medicines.] Do not give aspirin to anyone under 18 years of age who has a fever. It may cause severe liver damage.  Follow-up care  Follow up with your doctor in 2 weeks if all symptoms have not gotten better, or if hearing doesn't go back to normal within 1 month.  When to seek medical care  Get prompt medical attention if any of the following occur:  · Ear pain gets worse or does not improve after 3 days of treatment  · Unusual drowsiness or confusion  · Neck pain, stiff neck, or headache  · Fluid or blood draining from the ear canal  · Fever of 100.4°F (38°C) or higher after 3 days of antibiotics, or as directed by your health care provider  · Convulsion (seizure)  © 3843-2296 [employer]. [address] [place] [address], [place] [address]. All rights reserved. This information is not intended as a substitute for professional medical care. Always follow your healthcare professional's instructions.         push fluids and rest.  flonase nasal spray, 1 spray to each nostril once  daily  Until all congestion and ear symptoms are gone.  Pseudoephedrine 30mg, 1 tab every 6 hours for nasal congestion and ear plugged. Ask the pharmacist for this.   augmentin antibiotic 1 tab twice daily for 7 days, take with food.  Follow up in 5 days if no improvement, sooner if symptoms worsen.    62

## 2020-08-25 NOTE — ED PROVIDER NOTE - PROGRESS NOTE DETAILS
w/u significant for nephrolithiasis and UTI with pyelonephritis and mild hydronephrosis. will admit for management of the above. urology paged for aid with definitive maangment. fecal impaction on CT read, patient states her BMs are normal

## 2020-08-25 NOTE — BRIEF OPERATIVE NOTE - NSICDXBRIEFPROCEDURE_GEN_ALL_CORE_FT
PROCEDURES:  Left retrograde pyelography 25-Aug-2020 21:41:49  Perez Leary  Cystoureteroscopy with insertion of stent 25-Aug-2020 21:41:28  Perez Leary

## 2020-08-25 NOTE — H&P ADULT - NSICDXPASTMEDICALHX_GEN_ALL_CORE_FT
PAST MEDICAL HISTORY:  AAA (abdominal aortic aneurysm)     Benign essential HTN     CAD (coronary artery disease)     HLD (hyperlipidemia)     Myocardial infarction

## 2020-08-25 NOTE — ED ADULT NURSE NOTE - CHPI ED NUR SYMPTOMS NEG
no abdominal distension/no blood in stool/no burning urination/no chills/no dysuria/no fever/no hematuria/no nausea

## 2020-08-25 NOTE — ED PROVIDER NOTE - CARE PLAN
Principal Discharge DX:	Pyelonephritis  Secondary Diagnosis:	UTI (urinary tract infection)  Secondary Diagnosis:	LISA (acute kidney injury)

## 2020-08-25 NOTE — CONSULT NOTE ADULT - ATTENDING COMMENTS
chart and Ct reviewed  has had fever,shaking chills vomiting, and elev wbc  obstructing left ureteral stone  sl inc troponin likely demand ischemia  d/w pt-to have left JJ stent  rationale risk alternative reviewed   all questions answered

## 2020-08-25 NOTE — CONSULT NOTE ADULT - SUBJECTIVE AND OBJECTIVE BOX
Chief Complaint: Patient is a 87y old Female who presents with a chief complaint of increased weakkness (25 Aug 2020 15:35)      HPI:  87F PMHx CAD, MI w/ stents, AAA. 1 day of abdominal pain. 4 episdoes of nbnb vomiting this AM. weakness for 1 day.  no diarrhea or constipation. patient is chronically incontinent- she also POSITIVE upper epigastric pain (25 Aug 2020 15:35)    Pt seen and examined in ED. Son present at bedside.   Reports nausea and vomiting since last evening.  Denies flank/abd pain, fevers, chills, chest pain, SOB.   Pt is incontinent of urine.  Denies any urological issues in the past, has never seen a urologist in the past.   Febrile in ED to 100.6.   Pt has history of MI s/p coronary bypass x 4.  Has not seen her cariologist    PMH/PSH:PAST MEDICAL & SURGICAL HISTORY:  CAD (coronary artery disease)  AAA (abdominal aortic aneurysm)  Myocardial infarction  HLD (hyperlipidemia)  Benign essential HTN  S/p CABG x 4    Allergies:  No Known Allergies      Medications:  mirtazapine 7.5 milliGRAM(s) Oral at bedtime  pantoprazole    Tablet 40 milliGRAM(s) Oral before breakfast  simvastatin 20 milliGRAM(s) Oral at bedtime      REVIEW OF SYSTEMS:  All other review of systems is negative unless indicated above.    Relevant Family History:   FAMILY HISTORY:      Relevant Social History:  Denies ETOH or tobacco history    Physical Exam:    Vital Signs:  Vital Signs Last 24 Hrs  T(C): 37.2 (25 Aug 2020 16:40), Max: 38.1 (25 Aug 2020 12:42)  T(F): 99 (25 Aug 2020 16:40), Max: 100.6 (25 Aug 2020 12:42)  HR: 82 (25 Aug 2020 16:40) (80 - 82)  BP: 153/87 (25 Aug 2020 16:40) (141/80 - 153/87)  BP(mean): --  RR: 82 (25 Aug 2020 16:40) (82 - 94)  SpO2: 94% (25 Aug 2020 10:56) (94% - 94%)  Daily Height in cm: 170.18 (25 Aug 2020 10:56)    Daily     Constitutional: WDWN resting comfortably in bed; NAD  HEENT: NC/AT, PERRL, EOMI, anicteric sclera, no nasal discharge; uvula midline, no oropharyngeal erythema or exudates  Neck: supple; no JVD or thyromegaly  Respiratory: CTA B/L; no W/R/R, no retractions  Cardiac: +S1/S2; RRR; no M/R/G; PMI non-displaced  Gastrointestinal: soft, NT/ND; no rebound or guarding; +BS   Extremities: , no clubbing or cyanosis; no peripheral edema  Musculoskeletal:  no joint swelling, tenderness or erythema  Vascular: 2+ radial, femoral, DP/PT pulses B/L  Skin: warm, dry and intact; no rashes, wounds, or scars  Neurologic: AAOx3; CNS grossly intact; no focal deficits no asterixis, no tremor, no encephalopathy    Laboratory:                          13.3   15.01 )-----------( 175      ( 25 Aug 2020 12:05 )             39.6     08-    136  |  101  |  23  ----------------------------<  109<H>  3.2<L>   |  21<L>  |  1.22    Ca    9.1      25 Aug 2020 12:05    TPro  8.2  /  Alb  3.1<L>  /  TBili  1.8<H>  /  DBili  x   /  AST  27  /  ALT  11<L>  /  AlkPhos  69  08-25    LIVER FUNCTIONS - ( 25 Aug 2020 12:05 )  Alb: 3.1 g/dL / Pro: 8.2 gm/dL / ALK PHOS: 69 U/L / ALT: 11 U/L / AST: 27 U/L / GGT: x           PT/INR - ( 25 Aug 2020 12:05 )   PT: 13.9 sec;   INR: 1.21 ratio         PTT - ( 25 Aug 2020 12:05 )  PTT:28.3 sec  Urinalysis Basic - ( 25 Aug 2020 12:40 )    Color: Yellow / Appearance: very cloudy / S.025 / pH: x  Gluc: x / Ketone: Moderate  / Bili: Negative / Urobili: 1 mg/dL   Blood: x / Protein: 500 mg/dL / Nitrite: Positive   Leuk Esterase: Moderate / RBC: 6-10 /HPF / WBC >50   Sq Epi: x / Non Sq Epi: Few / Bacteria: Many    Lipase serum24 U/L<L>         Intake and Output      Imaging: Chief Complaint: Patient is a 87y old Female who presents with a chief complaint of increased weakkness (25 Aug 2020 15:35)      HPI:  87F PMHx CAD, MI w/ stents, AAA. 1 day of abdominal pain. 4 episdoes of nbnb vomiting this AM. weakness for 1 day.  no diarrhea or constipation. patient is chronically incontinent- she also POSITIVE upper epigastric pain (25 Aug 2020 15:35)    Pt seen and examined in ED. Son present at bedside.   Reports nausea and vomiting since last evening.  Denies flank/abd pain, fevers, chills, chest pain, SOB.   Pt is incontinent of urine.  Denies any urological issues in the past, has never seen a urologist in the past.   Febrile in ED to 100.6.   Pt has history of MI s/p coronary bypass x 4.  Has not seen her cardiologist in over a year.     PMH/PSH:PAST MEDICAL & SURGICAL HISTORY:  CAD (coronary artery disease)  AAA (abdominal aortic aneurysm)  Myocardial infarction  HLD (hyperlipidemia)  Benign essential HTN  S/p CABG x 4    Allergies:  No Known Allergies      Medications:  mirtazapine 7.5 milliGRAM(s) Oral at bedtime  pantoprazole    Tablet 40 milliGRAM(s) Oral before breakfast  simvastatin 20 milliGRAM(s) Oral at bedtime      REVIEW OF SYSTEMS:  All other review of systems is negative unless indicated above.    Relevant Family History:   FAMILY HISTORY:      Relevant Social History:  Denies ETOH or tobacco history    Physical Exam:    Vital Signs:  Vital Signs Last 24 Hrs  T(C): 37.2 (25 Aug 2020 16:40), Max: 38.1 (25 Aug 2020 12:42)  T(F): 99 (25 Aug 2020 16:40), Max: 100.6 (25 Aug 2020 12:42)  HR: 82 (25 Aug 2020 16:40) (80 - 82)  BP: 153/87 (25 Aug 2020 16:40) (141/80 - 153/87)  RR: 82 (25 Aug 2020 16:40) (82 - 94)  SpO2: 94% (25 Aug 2020 10:56) (94% - 94%)  Daily Height in cm: 170.18 (25 Aug 2020 10:56)    Daily     Constitutional: Lying supine in bed, +chills   HEENT: NC/AT, PERRL, EOMI  Neck: supple; no JVD or thyromegaly  Respiratory: respirations nonlabored   Cardiac: RRR   Gastrointestinal: soft, NT/ND; no rebound or guarding, no suprapubic tenderness  Extremities: no clubbing or cyanosis; no peripheral edema  Musculoskeletal:  no joint swelling, tenderness or erythema  Vascular: 2+ radial, femoral, DP/PT pulses B/L  Skin: warm, dry and intact; no rashes, wounds, or scars  Neurologic: AAOx3    Laboratory:                          13.3   15.01 )-----------( 175      ( 25 Aug 2020 12:05 )             39.6         136  |  101  |  23  ----------------------------<  109<H>  3.2<L>   |  21<L>  |  1.22    Ca    9.1      25 Aug 2020 12:05    TPro  8.2  /  Alb  3.1<L>  /  TBili  1.8<H>  /  DBili  x   /  AST  27  /  ALT  11<L>  /  AlkPhos  69      LIVER FUNCTIONS - ( 25 Aug 2020 12:05 )  Alb: 3.1 g/dL / Pro: 8.2 gm/dL / ALK PHOS: 69 U/L / ALT: 11 U/L / AST: 27 U/L / GGT: x           PT/INR - ( 25 Aug 2020 12:05 )   PT: 13.9 sec;   INR: 1.21 ratio         PTT - ( 25 Aug 2020 12:05 )  PTT:28.3 sec  Urinalysis Basic - ( 25 Aug 2020 12:40 )    Color: Yellow / Appearance: very cloudy / S.025 / pH: x  Gluc: x / Ketone: Moderate  / Bili: Negative / Urobili: 1 mg/dL   Blood: x / Protein: 500 mg/dL / Nitrite: Positive   Leuk Esterase: Moderate / RBC: 6-10 /HPF / WBC >50   Sq Epi: x / Non Sq Epi: Few / Bacteria: Many    Lipase serum24 U/L<L>         Imaging:  < from: CT Abdomen and Pelvis No Cont (20 @ 13:49) >  EXAM:  CT ABDOMEN AND PELVIS                            PROCEDURE DATE:  2020          INTERPRETATION:  CLINICAL INDICATION: 87 years  Female with abdominal pain.    COMPARISON: Contrast-enhanced CT abdomen and pelvis 10/6/2018    PROCEDURE:  CT of the Abdomen and Pelvis was performed without intravenous contrast.  Intravenous contrast: None.  Oral contrast: None.  Sagittal and coronal reformats were performed.    LIMITATIONS: Evaluation of the solid organs, vascular structures and GI tract is limited without oral and IV contrast.. Motion artifact.    FINDINGS:  LOWER CHEST: Mild bibasilar dependent changes. Trace bilateral pleural effusions. Mild cardiomegaly. Pacemaker leads in the heart. Coronary atherosclerosis.    LIVER: Multiplehepatic cysts. Some of the largest cysts are smaller than on the prior study.  BILE DUCTS: Normal caliber.  GALLBLADDER: Within normal limits.  SPLEEN: Within normal limits.  PANCREAS: Within normal limits.  ADRENALS: Within normal limits.  KIDNEYS/URETERS: New mild left hydroureteronephrosis down to the level of a 8.8 mm mid ureteral calculus at the level of the L2-3 disc. Mild left perinephric edema. No right hydroureteronephrosis or calculus.    BLADDER: Decompressed  REPRODUCTIVE ORGANS: Atrophic uterus deviated to the left by the distended rectum.    BOWEL: No bowel obstruction. Appendix is not visualized. No evidence of inflammation in the pericecal region.. Rectal fecal impaction with the rectum measuring up to 7.6 cm in diameter. Redundant sigmoid colon. The proximal sigmoid colon is distended measuring up to 6.7 cm.  PERITONEUM: No ascites.  VESSELS: Embolization coils reidentified in the right internal iliac artery. Aortobifemoral stent graft reidentified. Native aortic aneurysmmeasures up to 4.5 cm in diameter.  RETROPERITONEUM/LYMPH NODES: No lymphadenopathy.  ABDOMINAL WALL: Bilateral inguinal surgical clips.  BONES: Moderate degenerative changes.    IMPRESSION:  Mild left hydroureteronephrosis down to level of an 8.8 mmmid ureteral calculus. Left perinephric stranding related to obstruction. Correlate clinically for pyelonephritis.    Rectal fecal impaction. Redundant sigmoid colon is mildly distended    Aortobifemoral stent graft reidentified. Right internal iliacartery embolization coils reidentified.    Cardiomegaly. Pacemaker. Trace bilateral pleural effusions.      MICHELLE LIU M.D., ATTENDING RADIOLOGIST  This document has been electronically signed. Aug 25 2020  2:38PM

## 2020-08-25 NOTE — ED ADULT NURSE NOTE - OBJECTIVE STATEMENT
pt complains of N/V since this morning. Vomiting 5-10 episodes. brown emesis. pt states her BMs are usually diarrhea per usual. Pt denies any cough, fever, abdominal pain or urinary symptoms. Pt incontinent. Wearing depends. Son a bedside, reports pt being weak. Pt is bedbound. unable to ambulate due to weakness.

## 2020-08-25 NOTE — ED PROVIDER NOTE - CLINICAL SUMMARY MEDICAL DECISION MAKING FREE TEXT BOX
lower extremity weaknesss is chronic. will eval for possible intrabdominal infection vs. uti.   per chart review patient BUN normal range is 2-5; so patient is eperiencing pre-renal LISA in ED. leukocysotis and fever in ED. will test for covid. rehydrate, obtain imaging to aid in diagnoses. troponin minimally elevated; EKG with paced rhythm, no priors fro comparison. patient without chest pain or sob, unlikely ACS

## 2020-08-25 NOTE — CONSULT NOTE ADULT - ASSESSMENT
Impression: 88 y/o female PMH MI s/p CABG x 4, HTN, HLD presenting with sepsis 2/2 L 8.8 midureteral calculus.      Plan  -Per Dr. Leary, will book for emergent OR for L ureteroscopy, possible stent insertion, with image.   -Consent in chart.  -F/u preop labs.   -Discussed with Dr. Leary.

## 2020-08-25 NOTE — ED PROVIDER NOTE - OBJECTIVE STATEMENT
87F PMHx CAD, MI w/ stents, AAA. 1 day of abdominal pain. 4 episdoes of nbnb vomiting this AM. weakness for 1 day.  no diarrhea or constipation. patient is chronically incontinent

## 2020-08-25 NOTE — H&P ADULT - ASSESSMENT
87 years old female with 87 years old female with upper epigastric pain with nausea vomiting or diarrhea probably from urinary tract infection     IMPROVE VTE Individual Risk Assessment          RISK                                                          Points  [  ] Previous VTE                                                3  [  ] Thrombophilia                                             2  [  ] Lower limb paralysis                                   2        (unable to hold up >15 seconds)    [  ] Current Cancer                                             2         (within 6 months)  [ x ] Immobilization > 24 hrs                              1  [  ] ICU/CCU stay > 24 hours                             1  [  x] Age > 60                                                         1    IMPROVE VTE Score: 2

## 2020-08-25 NOTE — ED PROVIDER NOTE - SKIN, MLM
fever and cough x 1 week, took 1g of tylenol denies recent travels. Skin normal color for race, warm, dry and intact. No evidence of rash.

## 2020-08-25 NOTE — H&P ADULT - NSHPPHYSICALEXAM_GEN_ALL_CORE
ICU Vital Signs Last 24 Hrs  T(C): 38.1 (25 Aug 2020 12:42), Max: 38.1 (25 Aug 2020 12:42)  T(F): 100.6 (25 Aug 2020 12:42), Max: 100.6 (25 Aug 2020 12:42)  HR: 80 (25 Aug 2020 12:42) (80 - 80)  BP: 141/80 (25 Aug 2020 12:42) (141/80 - 144/94)  BP(mean): --  ABP: --  ABP(mean): --  RR: 94 (25 Aug 2020 12:42) (94 - 94)  SpO2: 94% (25 Aug 2020 10:56) (94% - 94%)  GENERAL: NAD well-developed  HEAD:  Atraumatic, Normocephalic  EYES: EOMI, PERRLA, conjunctiva and sclera clear  ENMT: No tonsillar erythema, exudates, or enlargement; Moist mucous membranes, Good dentition, No lesions  NECK: Supple, No JVD, Normal thyroid  NERVOUS SYSTEM:  Alert & Oriented X3, Good concentration; Motor Strength 5/5 B/L upper and lower extremities; DTRs 2+ intact and symmetric  CHEST/LUNG: Clear to percussion bilaterally; No rales, rhonchi, wheezing, or rubs  HEART: Regular rate and rhythm; No murmurs, rubs, or gallops  ABDOMEN: Soft, Nontender, Nondistended; Bowel sounds present  EXTREMITIES:  2+ Peripheral Pulses, No clubbing, cyanosis, or edema  LYMPH: No lymphadenopathy   SKIN: No rashes or lesions

## 2020-08-26 NOTE — PROGRESS NOTE ADULT - SUBJECTIVE AND OBJECTIVE BOX
Patient seen and examined bedside resting comfortably.  No complaints offered.   Has indwelling Sin  Denies nausea and vomiting. Tolerating diet.  Denies chest pain, dyspnea, cough.    T(F): 98.2 (08-26-20 @ 11:29), Max: 100.6 (08-25-20 @ 12:42)  HR: 67 (08-26-20 @ 11:29) (67 - 93)  BP: 125/77 (08-26-20 @ 11:29) (125/70 - 164/98)  RR: 18 (08-26-20 @ 11:29) (16 - 94)  SpO2: 97% (08-26-20 @ 11:29) (92% - 99%)  Wt(kg): --  CAPILLARY BLOOD GLUCOSE          PHYSICAL EXAM:  General: NAD, alert and awake  HEENT: NCAT, EOMI, conjunctiva clear  Chest: nonlabored respirations, good inspiratory effort  Abdomen: soft, NTND.   Extremities: no pedal edema or calf tenderness noted   : No CVA or SP tenderness.     LABS:                        11.1   10.63 )-----------( 138      ( 26 Aug 2020 01:19 )             34.0   08-26    139  |  105  |  22  ----------------------------<  102<H>  3.3<L>   |  22  |  1.08    Ca    8.2<L>      26 Aug 2020 01:19  Mg     1.9     08-26    TPro  8.2  /  Alb  3.1<L>  /  TBili  1.8<H>  /  DBili  x   /  AST  27  /  ALT  11<L>  /  AlkPhos  69  08-25  PT/INR - ( 25 Aug 2020 12:05 )   PT: 13.9 sec;   INR: 1.21 ratio         PTT - ( 25 Aug 2020 12:05 )  PTT:28.3 sec  I&O's Detail    25 Aug 2020 07:01  -  26 Aug 2020 07:00  --------------------------------------------------------  IN:    lactated ringers.: 110 mL    Solution: 100 mL  Total IN: 210 mL    OUT:    Voided: 180 mL  Total OUT: 180 mL    Total NET: 30 mL      26 Aug 2020 07:01  -  26 Aug 2020 11:38  --------------------------------------------------------  IN:    Oral Fluid: 360 mL  Total IN: 360 mL    OUT:  Total OUT: 0 mL    Total NET: 360 mL        Impression: 86 y/o female PMH MI s/p CABG x 4, HTN, HLD presenting with sepsis 2/2 L 8.8 midureteral calculus.    s/p Cystoureteroscopy with insertion of stent POD 1    Continue Antibiotics  D/C yuliya in am  Will need follow up as outpatient for possible ESWL vs laser lithotripsy  - discussed with Dr Leary

## 2020-08-26 NOTE — PROGRESS NOTE ADULT - PROBLEM SELECTOR PLAN 1
CT abdomen/pelvis: Mild left hydroureteronephrosis down to level of an 8.8 mm mid ureteral calculus.   - s/p Cystourethroscopy w/ stent placement   - Urology following   - dwyer to be removed in am, f/u urology recommendations.

## 2020-08-26 NOTE — PROGRESS NOTE ADULT - SUBJECTIVE AND OBJECTIVE BOX
Patient is a 87y old  Female who presents with a chief complaint of increased weakkness (26 Aug 2020 11:37)      INTERVAL HPI/ OVERNIGHT EVENTS: Pt was seen and examined at bedside today, POD1 cystourethroscopy w/ stent placement ; pt denies any complaints at this time     MEDICATIONS  (STANDING):  magnesium oxide 800 milliGRAM(s) Oral once  mirtazapine 7.5 milliGRAM(s) Oral at bedtime  pantoprazole    Tablet 40 milliGRAM(s) Oral before breakfast  piperacillin/tazobactam IVPB.. 3.375 Gram(s) IV Intermittent every 8 hours  simvastatin 20 milliGRAM(s) Oral at bedtime    MEDICATIONS  (PRN):      Allergies    No Known Allergies    Intolerances        REVIEW OF SYSTEMS:    Unable to examine due to [ ] Encephalopathy [ ] Advanced Dementia [ ] Expressive Aphasia [ ] Non-verbal patient    CONSTITUTIONAL: No fever, NO generalized weakness/Fatigue, No weight loss  EYES: No eye pain, visual disturbances, or discharge  ENMT:  No difficulty hearing, tinnitus, vertigo; No sinus or throat pain  NECK: No pain or stiffness  RESPIRATORY: No shortness of breath,  cough, wheezing, sputum or hemoptysis   CARDIOVASCULAR: No chest pain, palpitations, or leg swelling  GASTROINTESTINAL: No abdominal pain. No nausea, vomiting, diarrhea or constipation. No melena or hematochezia.  GENITOURINARY: No dysuria, frequency, hematuria, or incontinence  NEUROLOGICAL: No headaches, Dizziness, memory loss, loss of strength, numbness, or tremors  SKIN: No itching, burning, rashes, or lesions   MUSCULOSKELETAL: No joint pain or swelling; No muscle, back, or extremity pain  PSYCHIATRIC: No depression, anxiety, mood swings, or difficulty sleeping  HEME/LYMPH: No easy bruising, or bleeding gums      Vital Signs Last 24 Hrs  T(C): 36.8 (26 Aug 2020 11:29), Max: 37.4 (25 Aug 2020 18:44)  T(F): 98.2 (26 Aug 2020 11:29), Max: 99.4 (25 Aug 2020 18:44)  HR: 67 (26 Aug 2020 11:29) (67 - 93)  BP: 125/77 (26 Aug 2020 11:29) (125/70 - 164/98)  BP(mean): --  RR: 18 (26 Aug 2020 11:29) (16 - 82)  SpO2: 97% (26 Aug 2020 11:29) (92% - 99%)    PHYSICAL EXAM:  GENERAL: NAD, well-developed, well-groomed  HEAD:  Atraumatic, Normocephalic  EYES: conjunctiva and sclera clear  ENMT: Moist mucous membranes  NECK: Supple, No JVD, Normal thyroid  CHEST/LUNG: Clear to Auscultation bilaterally; No rales, rhonchi, wheezing, or rubs  HEART: Regular rate and rhythm; No murmurs, rubs, or gallops  ABDOMEN: Soft, Nontender, Nondistended; Bowel sounds present  EXTREMITIES:  2+ Peripheral Pulses, No clubbing, cyanosis, or edema  SKIN: No rashes or lesions  NERVOUS SYSTEM:  Alert & Oriented X2, Good concentration; Motor Strength 5/5 B/L upper and lower extremities    LABS:                        11.1   10.63 )-----------( 138      ( 26 Aug 2020 01:19 )             34.0         139  |  105  |  22  ----------------------------<  102<H>  3.3<L>   |  22  |  1.08    Ca    8.2<L>      26 Aug 2020 01:19  Mg     1.9         TPro  8.2  /  Alb  3.1<L>  /  TBili  1.8<H>  /  DBili  x   /  AST  27  /  ALT  11<L>  /  AlkPhos  69      PT/INR - ( 25 Aug 2020 12:05 )   PT: 13.9 sec;   INR: 1.21 ratio         PTT - ( 25 Aug 2020 12:05 )  PTT:28.3 sec  Urinalysis Basic - ( 25 Aug 2020 12:40 )    Color: Yellow / Appearance: very cloudy / S.025 / pH: x  Gluc: x / Ketone: Moderate  / Bili: Negative / Urobili: 1 mg/dL   Blood: x / Protein: 500 mg/dL / Nitrite: Positive   Leuk Esterase: Moderate / RBC: 6-10 /HPF / WBC >50   Sq Epi: x / Non Sq Epi: Few / Bacteria: Many      CAPILLARY BLOOD GLUCOSE            Culture - Blood (collected 20)  Source: .Blood Blood-Peripheral  Gram Stain (prelim) (20):    Growth in aerobic bottle: Gram Negative Rods  Preliminary Report (20):    Growth in aerobic bottle: Gram Negative Rods    Culture - Blood (collected 20)  Source: .Blood Blood-Peripheral  Gram Stain (prelim) (20):    Growth in aerobic bottle: Gram Negative Rods  Preliminary Report (20):    Growth in aerobic bottle: Gram Negative Rods    "Due to technical problems, Proteus sp. will Not be reported as part of    the BCID panel until further notice"    ***Blood Panel PCR results on this specimen are available    approximately 3 hours after the Gram stain result.***    Gram stain, PCR, and/or culture results may not always    correspond due to difference in methodologies.    ************************************************************    This PCR assay was performed using [employer].    The following targets are tested for: Enterococcus,    vancomycin resistant enterococci, Listeria monocytogenes,    coagulase negative staphylococci, S. aureus,    methicillin resistant S. aureus, Streptococcus agalactiae    (Group B), S. pneumoniae, S. pyogenes (Group A),    Acinetobacter baumannii, Enterobacter cloacae, E. coli,    Klebsiella oxytoca, K. pneumoniae, Proteus sp.,    Serratia marcescens, Haemophilus influenzae,    Neisseria meningitidis, Pseudomonas aeruginosa, Candida    albicans, C. glabrata, C krusei, C parapsilosis,    C. tropicalis and the KPC resistance gene.  Organism: Blood Culture PCR (20)  Organism: Blood Culture PCR (20)    Sensitivities:      -  Klebsiella pneumoniae: Detec      Method Type: PCR        RADIOLOGY & ADDITIONAL TESTS:          Imaging Personally Reviewed:  [ ] YES  [ ] NO    Consultant(s) Notes Reviewed:  [ x] YES  [ ] NO    Care Discussed with Consultants/Other Providers [x ] YES  [ ] NO

## 2020-08-27 NOTE — PROGRESS NOTE ADULT - SUBJECTIVE AND OBJECTIVE BOX
Patient is a 87y old  Female who presents with a chief complaint of increased weakkness (27 Aug 2020 10:51)      INTERVAL HPI/ OVERNIGHT EVENTS: Pt was seen and examined at bedside today, No significant overnight events, pt denies any pain but admits to feeling weak and drowsy today      MEDICATIONS  (STANDING):  aspirin enteric coated 81 milliGRAM(s) Oral daily  enoxaparin Injectable 30 milliGRAM(s) SubCutaneous daily  mirtazapine 7.5 milliGRAM(s) Oral at bedtime  pantoprazole    Tablet 40 milliGRAM(s) Oral before breakfast  piperacillin/tazobactam IVPB.. 3.375 Gram(s) IV Intermittent every 8 hours  simvastatin 20 milliGRAM(s) Oral at bedtime  sodium chloride 0.9%. 1000 milliLiter(s) (60 mL/Hr) IV Continuous <Continuous>    MEDICATIONS  (PRN):      Allergies    No Known Allergies    Intolerances        REVIEW OF SYSTEMS:    Unable to examine due to [ ] Encephalopathy [ ] Advanced Dementia [ ] Expressive Aphasia [ ] Non-verbal patient    CONSTITUTIONAL: No fever, positive generalized weakness/Fatigue, No weight loss  EYES: No eye pain, visual disturbances, or discharge  ENMT:  No difficulty hearing, tinnitus, vertigo; No sinus or throat pain  NECK: No pain or stiffness  RESPIRATORY: No shortness of breath,  cough, wheezing, sputum or hemoptysis   CARDIOVASCULAR: No chest pain, palpitations, or leg swelling  GASTROINTESTINAL: No abdominal pain. No nausea, vomiting, diarrhea or constipation. No melena or hematochezia.  GENITOURINARY: No dysuria, frequency, hematuria, or incontinence  NEUROLOGICAL: No headaches, Dizziness, memory loss, loss of strength, numbness, or tremors  SKIN: No itching, burning, rashes, or lesions   MUSCULOSKELETAL: No joint pain or swelling; No muscle, back, or extremity pain  PSYCHIATRIC: No depression, anxiety, mood swings, or difficulty sleeping  HEME/LYMPH: No easy bruising, or bleeding gums      Vital Signs Last 24 Hrs  T(C): 36.7 (27 Aug 2020 11:45), Max: 36.7 (27 Aug 2020 11:45)  T(F): 98 (27 Aug 2020 11:45), Max: 98 (27 Aug 2020 11:45)  HR: 60 (27 Aug 2020 11:45) (60 - 85)  BP: 163/90 (27 Aug 2020 11:45) (141/73 - 170/95)  BP(mean): --  RR: 17 (27 Aug 2020 11:45) (17 - 19)  SpO2: 95% (27 Aug 2020 11:45) (95% - 95%)    PHYSICAL EXAM:  GENERAL: NAD, drowsy  HEAD:  Atraumatic, Normocephalic  EYES: conjunctiva and sclera clear  ENMT: Moist mucous membranes  NECK: Supple, No JVD, Normal thyroid  CHEST/LUNG: Clear to Auscultation bilaterally; No rales, rhonchi, wheezing, or rubs  HEART: Regular rate and rhythm; No murmurs, rubs, or gallops  ABDOMEN: Soft, Nontender, Nondistended; Bowel sounds present  EXTREMITIES:  2+ Peripheral Pulses, No clubbing, cyanosis, or edema  SKIN: No rashes or lesions  NERVOUS SYSTEM:  Alert & Oriented X2, Good concentration; Motor Strength 5/5 B/L upper and lower extremities    LABS:                        10.9   12.94 )-----------( 163      ( 27 Aug 2020 07:51 )             32.2     08-27    141  |  109<H>  |  32<H>  ----------------------------<  118<H>  4.8   |  24  |  1.47<H>    Ca    8.6      27 Aug 2020 07:51  Mg     2.1     08-27    TPro  6.7  /  Alb  2.5<L>  /  TBili  0.8  /  DBili  x   /  AST  38<H>  /  ALT  19  /  AlkPhos  62  08-27        CAPILLARY BLOOD GLUCOSE            Culture - Blood (collected 08-26-20)  Source: .Blood Blood-Peripheral  Preliminary Report (08-27-20):    No growth to date.    Culture - Urine (collected 08-26-20)  Source: .Urine urine from left kidney c/s  Preliminary Report (08-27-20):    Numerous Gram Negative Rods    Culture - Urine (collected 08-26-20)  Source: .Urine bladder urine free  Preliminary Report (08-27-20):    Numerous Klebsiella pneumoniae    Numerous Escherichia coli    Culture - Urine (collected 08-25-20)  Source: .Urine Catheterized  Preliminary Report (08-27-20):    >100,000 CFU/ml Escherichia coli    Culture - Blood (collected 08-25-20)  Source: .Blood Blood-Peripheral  Gram Stain (prelim) (08-26-20):    Growth in aerobic bottle: Gram Negative Rods  Preliminary Report (08-27-20):    Growth in aerobic bottle: Klebsiella pneumoniae    See previous culture [id number]    Culture - Blood (collected 08-25-20)  Source: .Blood Blood-Peripheral  Gram Stain (prelim) (08-26-20):    Growth in aerobic bottle: Gram Negative Rods  Preliminary Report (08-27-20):    Growth in aerobic bottle: Klebsiella pneumoniae    "Due to technical problems, Proteus sp. will Not be reported as part of    the BCID panel until further notice"    ***Blood Panel PCR results on this specimen are available    approximately 3 hours after the Gram stain result.***    Gram stain, PCR, and/or culture results may not always    correspond due to difference in methodologies.    ************************************************************    This PCR assay was performed using [employer].    The following targets are tested for: Enterococcus,    vancomycin resistant enterococci, Listeria monocytogenes,    coagulase negative staphylococci, S. aureus,    methicillin resistant S. aureus, Streptococcus agalactiae    (Group B), S. pneumoniae, S. pyogenes (Group A),    Acinetobacter baumannii, Enterobacter cloacae, E. coli,    Klebsiella oxytoca, K. pneumoniae, Proteus sp.,    Serratia marcescens, Haemophilus influenzae,    Neisseria meningitidis, Pseudomonas aeruginosa, Candida    albicans, C. glabrata, C krusei, C parapsilosis,    C. tropicalis and the KPC resistance gene.  Organism: Blood Culture PCR (08-26-20)  Organism: Blood Culture PCR (08-26-20)    Sensitivities:      -  Klebsiella pneumoniae: Detec      Method Type: PCR        RADIOLOGY & ADDITIONAL TESTS:          Imaging Personally Reviewed:  [ ] YES  [ ] NO    Consultant(s) Notes Reviewed:  [x ] YES  [ ] NO    Care Discussed with Consultants/Other Providers [x ] YES  [ ] NO

## 2020-08-27 NOTE — PHYSICAL THERAPY INITIAL EVALUATION ADULT - ADDITIONAL COMMENTS
Pt reports she lives in an apartment that is handicap accessible with an elevator. She lives with her son and has an aide 10hrs/7days. Pt needs assist for all ADLS and is non ambulatory. She reports she occasionally transfers OOB to w/c but "its been a long time, I just stay in bed."

## 2020-08-27 NOTE — PROGRESS NOTE ADULT - SUBJECTIVE AND OBJECTIVE BOX
Patient seen and examined bedside resting comfortably.  No complaints offered.   Voiding spontaneously ( incontinent )   Denies hematuria and dysuria.  Denies nausea and vomiting. Tolerating diet.  Denies chest pain, dyspnea, cough.    T(F): 98 (08-27-20 @ 11:45), Max: 98 (08-27-20 @ 11:45)  HR: 60 (08-27-20 @ 11:45) (60 - 85)  BP: 163/90 (08-27-20 @ 11:45) (141/73 - 170/95)  RR: 17 (08-27-20 @ 11:45) (17 - 19)  SpO2: 95% (08-27-20 @ 11:45) (95% - 95%)  Wt(kg): --  CAPILLARY BLOOD GLUCOSE          PHYSICAL EXAM:  General: NAD, alert and awake  HEENT: NCAT, EOMI, conjunctiva clear  Chest: nonlabored respirations, good inspiratory effort  Abdomen: soft, NTND.   Extremities: no pedal edema or calf tenderness noted   : No CVA or SP tenderness.     LABS:                        10.9   12.94 )-----------( 163      ( 27 Aug 2020 07:51 )             32.2   08-27    141  |  109<H>  |  32<H>  ----------------------------<  118<H>  4.8   |  24  |  1.47<H>    Ca    8.6      27 Aug 2020 07:51  Mg     2.1     08-27    TPro  6.7  /  Alb  2.5<L>  /  TBili  0.8  /  DBili  x   /  AST  38<H>  /  ALT  19  /  AlkPhos  62  08-27    I&O's Detail    26 Aug 2020 07:01  -  27 Aug 2020 07:00  --------------------------------------------------------  IN:    Oral Fluid: 360 mL  Total IN: 360 mL    OUT:    Voided: 950 mL  Total OUT: 950 mL    Total NET: -590 mL      Impression: 88 y/o female PMH MI s/p CABG x 4, HTN, HLD presenting with sepsis 2/2 L 8.8 midureteral calculus.    s/p Cystoureteroscopy with insertion of stent POD 2    Bladder scan ordered  Continue Antibiotics  Will need follow up as outpatient for possible ESWL vs laser lithotripsy  - discussed with Dr Leary Patient seen and examined bedside resting comfortably.  No complaints offered.   Voiding spontaneously ( incontinent )   Denies hematuria and dysuria.  Denies nausea and vomiting. Tolerating diet.  Denies chest pain, dyspnea, cough.    T(F): 98 (08-27-20 @ 11:45), Max: 98 (08-27-20 @ 11:45)  HR: 60 (08-27-20 @ 11:45) (60 - 85)  BP: 163/90 (08-27-20 @ 11:45) (141/73 - 170/95)  RR: 17 (08-27-20 @ 11:45) (17 - 19)  SpO2: 95% (08-27-20 @ 11:45) (95% - 95%)  Wt(kg): --  CAPILLARY BLOOD GLUCOSE          PHYSICAL EXAM:  General: NAD, alert and awake  HEENT: NCAT, EOMI, conjunctiva clear  Chest: nonlabored respirations, good inspiratory effort  Abdomen: soft, NTND.   Extremities: no pedal edema or calf tenderness noted   : No CVA or SP tenderness.     LABS:                        10.9   12.94 )-----------( 163      ( 27 Aug 2020 07:51 )             32.2   08-27    141  |  109<H>  |  32<H>  ----------------------------<  118<H>  4.8   |  24  |  1.47<H>    Ca    8.6      27 Aug 2020 07:51  Mg     2.1     08-27    TPro  6.7  /  Alb  2.5<L>  /  TBili  0.8  /  DBili  x   /  AST  38<H>  /  ALT  19  /  AlkPhos  62  08-27    I&O's Detail    26 Aug 2020 07:01  -  27 Aug 2020 07:00  --------------------------------------------------------  IN:    Oral Fluid: 360 mL  Total IN: 360 mL    OUT:    Voided: 950 mL  Total OUT: 950 mL    Total NET: -590 mL      Impression: 86 y/o female PMH MI s/p CABG x 4, HTN, HLD presenting with sepsis 2/2 L 8.8 midureteral calculus.    s/p Cystoureteroscopy with insertion of stent POD 2,     Mild increase in creatinine and WBC  Bladder scan ordered  Continue Antibiotics  Will need follow up as outpatient for possible ESWL vs laser lithotripsy  - discussed with Dr Leary

## 2020-08-27 NOTE — CONSULT NOTE ADULT - ASSESSMENT
87F PMHx CAD, MI w/ stents, AAA admitted with 1 day of abdominal pain. Pt found to have UTI with bacteremia and left hydro with obstructing stone. Pt seen by urology s/p stent placement. Nephrology consulted for fluctuating renal function.     LISA  Baseline Scr 1.08  Pt admitted with UTI, bacteremia and obstructing kidney stone with left hydronephrosis  Pt s/p ureteral stent placement by urology on 8/25  Scr elevated 1.4 today   ?? passing stone. Would re-image pt if renal function continues to worsen tmrw to assess if stone is passing or obstructing   If obstructing, pt may need inpatient lithotripsy. Urology actively following   Agree with IVF x 24 hours, NS @ 60 cc/hr   Pt is on Zosyn for bacteremia. Check CBC with diff in AM r/o AIN as cause of rising SCr   Recheck UA once UTI treatment completed   Avoid nephrotoxins     UTI/Bacteremia  Pt on antibiotics     Anemia  Hb stable     Elevated BP w/o diagnosis of HTN  BP elevated today  BP was normal before today  ? in the setting of pain/ infection. Monitor       Thank you for this consult, we will continue to follow along with you. 87F PMHx CAD, MI w/ stents, AAA admitted with 1 day of abdominal pain. Pt found to have UTI with bacteremia and left hydro with obstructing stone. Pt seen by urology s/p stent placement. Nephrology consulted for fluctuating renal function.     LISA  Baseline Scr 1.08  Pt admitted with UTI, bacteremia and obstructing kidney stone with left hydronephrosis  Pt s/p ureteral stent placement by urology on 8/25  Scr elevated 1.4 today   ?? passing stone. Would re-image pt if renal function continues to worsen tmrw to assess if stone is passing or obstructing   If obstructing, pt may need inpatient lithotripsy. Urology actively following   Agree with IVF x 24 hours, NS @ 60 cc/hr   Pt is on Zosyn for bacteremia. Check CBC with diff in AM r/o AIN as cause of rising SCr   Recheck UA once UTI treatment completed   Avoid nephrotoxins     UTI/Bacteremia  Pt on antibiotics per team    Anemia  Hb stable     Elevated BP w/o diagnosis of HTN  BP elevated today  BP was normal before today  ? in the setting of pain/ infection. Monitor       Thank you for this consult, we will continue to follow along with you.

## 2020-08-27 NOTE — PHYSICAL THERAPY INITIAL EVALUATION ADULT - RANGE OF MOTION EXAMINATION, REHAB EVAL
B LE in extension synergy with B ankle in planterflexion-unable to attain neutral ankle positioning./no ROM deficits were identified

## 2020-08-27 NOTE — PROGRESS NOTE ADULT - PROBLEM SELECTOR PLAN 1
CT abdomen/pelvis: Mild left hydroureteronephrosis down to level of an 8.8 mm mid ureteral calculus.   - s/p Cystourethroscopy w/ stent placement   - Urology following   - dwyer to be removed this am  - discussed pt with Dr. Leary agreed with renal function monitoring, will get repeat imaging if pt declines or renal function worsen CT abdomen/pelvis: Mild left hydroureteronephrosis down to level of an 8.8 mm mid ureteral calculus.   - s/p Cystourethroscopy w/ stent placement   - Urology following   - dwyer was removed this am  - discussed pt with Dr. Leary agreed with renal function monitoring, will get repeat imaging if pt status declines or renal function worsen

## 2020-08-27 NOTE — CONSULT NOTE ADULT - SUBJECTIVE AND OBJECTIVE BOX
[facility]   MD Bharat Bundy MD Fatima Sheikh, D.O. Ruoru Wong, PA    From 7 AM - 5 PM:  OFFICE: [phone]  Dr. Headley cell: [phone]  Dr. Toscano Cell: [phone]  Dr. Bravo cell: [phone]  SELIN Nelson cell: [phone]    From 5 PM - 7 AM: Answering Service: [phone]      -- INITIAL RENAL CONSULT NOTE  --------------------------------------------------------------------------------  HPI:  87F PMHx CAD, MI w/ stents, AAA admitted with 1 day of abdominal pain. Pt found to have UTI with bacteremia and left hydro with obstructing stone. Pt seen by urology s/p stent placement. Nephrology consulted for fluctuating renal function. Pt denies hx of DM or HTN. States she has not had kidney stones in the past. Denies NSAID use     PAST HISTORY  --------------------------------------------------------------------------------  PAST MEDICAL & SURGICAL HISTORY:  CAD (coronary artery disease)  AAA (abdominal aortic aneurysm)  Myocardial infarction  HLD (hyperlipidemia)  Benign essential HTN  S/P CABG x 4    FAMILY HISTORY:    PAST SOCIAL HISTORY:    ALLERGIES & MEDICATIONS  --------------------------------------------------------------------------------  Allergies    No Known Allergies    Intolerances      Standing Inpatient Medications  aspirin enteric coated 81 milliGRAM(s) Oral daily  enoxaparin Injectable 30 milliGRAM(s) SubCutaneous daily  mirtazapine 7.5 milliGRAM(s) Oral at bedtime  pantoprazole    Tablet 40 milliGRAM(s) Oral before breakfast  piperacillin/tazobactam IVPB.. 3.375 Gram(s) IV Intermittent every 8 hours  simvastatin 20 milliGRAM(s) Oral at bedtime    PRN Inpatient Medications      REVIEW OF SYSTEMS  --------------------------------------------------------------------------------  Gen: No fevers/chills  Skin: No rashes  Head/Eyes/Ears: Normal hearing,  Normal vision   Respiratory: No dyspnea, cough  CV: No chest pain  GI: No abdominal pain, diarrhea, constipation, nausea, vomiting  : No dysuria, hematuria  MSK: No  edema  Heme: No easy bruising or bleeding  Psych: No significant depression    All other systems were reviewed and are negative, except as noted.    VITALS/PHYSICAL EXAM  --------------------------------------------------------------------------------  T(C): 36.4 (08-27-20 @ 05:53), Max: 36.8 (08-26-20 @ 11:29)  HR: 60 (08-27-20 @ 05:53) (60 - 85)  BP: 170/95 (08-27-20 @ 05:53) (125/77 - 170/95)  RR: 18 (08-27-20 @ 05:53) (18 - 19)  SpO2: 95% (08-27-20 @ 05:53) (95% - 97%)  Wt(kg): --  Height (cm): 165.1 (08-25-20 @ 22:19)  Weight (kg): 73.3 (08-25-20 @ 22:19)  BMI (kg/m2): 26.9 (08-25-20 @ 22:19)  BSA (m2): 1.81 (08-25-20 @ 22:19)      08-26-20 @ 07:01  -  08-27-20 @ 07:00  --------------------------------------------------------  IN: 360 mL / OUT: 950 mL / NET: -590 mL      Physical Exam:  	Gen: NAD  	HEENT: MMM  	Pulm: CTA B/L  	CV: S1S2  	Abd: Soft, +BS   	Ext: trace LE edema B/L  	Neuro: Awake  	Skin: Warm and dry      LABS/STUDIES  --------------------------------------------------------------------------------              10.9   12.94 >-----------<  163      [08-27-20 @ 07:51]              32.2     141  |  109  |  32  ----------------------------<  118      [08-27-20 @ 07:51]  4.8   |  24  |  1.47        Ca     8.6     [08-27-20 @ 07:51]      Mg     2.1     [08-27-20 @ 07:51]    TPro  6.7  /  Alb  2.5  /  TBili  0.8  /  DBili  x   /  AST  38  /  ALT  19  /  AlkPhos  62  [08-27-20 @ 07:51]    PT/INR: PT 13.9 , INR 1.21       [08-25-20 @ 12:05]  PTT: 28.3       [08-25-20 @ 12:05]    Troponin .051      [08-25-20 @ 12:05]    Creatinine Trend:  SCr 1.47 [08-27 @ 07:51]  SCr 1.08 [08-26 @ 01:19]  SCr 1.22 [08-25 @ 12:05]    Urinalysis - [08-25-20 @ 12:40]      Color Yellow / Appearance very cloudy / SG 1.025 / pH 5.0      Gluc Negative / Ketone Moderate  / Bili Negative / Urobili 1       Blood Large / Protein 500 / Leuk Est Moderate / Nitrite Positive      RBC 6-10 / WBC >50 / Hyaline  / Gran  / Sq Epi  / Non Sq Epi Few / Bacteria Many

## 2020-08-27 NOTE — CONSULT NOTE ADULT - ASSESSMENT
Septicemia with Klebisella.  Due to octsructive uropathy/UTI  Relief of obstruction the key element  Slight uptick in creatinine  Mld WBC elevation  Overall seems to be improving however    Suggestions--  Await sensi on cx data  Hope to narrow antibiotic spectrum tomorrow  Trend CBC  Trend creatinine  Postop care per urology    Thank you for the courtesy of this referral.  Joselo Serrano MD  Attending Physician  [facility]  Division of Infectious Diseases  [phone]

## 2020-08-27 NOTE — PROGRESS NOTE ADULT - ATTENDING COMMENTS
as per above  c/s being addressed  once on appropriate atb will d/c home  follow creat    I am out of town from tonight thru Mon  If any need for urgent urologic intervention, ACP's aware that they will confer with my colleagues at [facility]    f/u my office to arrange definitive uscope at later date

## 2020-08-28 NOTE — PROGRESS NOTE ADULT - SUBJECTIVE AND OBJECTIVE BOX
[facility]  Division of Infectious Diseases  [phone]    Name: MITCHEL MULLINS  Age: 87y  Gender: Female  MRN: [id number]    Interval History--  Notes reviewed. Seen earlier today. No new complaints. "Why is the urine on the wall?" (Explained primafit device)    Past Medical History--  CAD (coronary artery disease)  AAA (abdominal aortic aneurysm)  Myocardial infarction  S/P CABG x 4  HLD (hyperlipidemia)  Benign essential HTN  S/P CABG x 4      For details regarding the patient's social history, family history, and other miscellaneous elements, please refer the initial infectious diseases consultation and/or the admitting history and physical examination for this admission.    Allergies    No Known Allergies    Intolerances        Medications--  Antibiotics: stopped?    Immunologic:    Other:  aspirin enteric coated  enoxaparin Injectable  mirtazapine  pantoprazole    Tablet  potassium phosphate IVPB  simvastatin      Review of Systems--  A 10-point review of systems was obtained.   Review of systems otherwise negative except as previously noted.    Physical Examination--  Vital Signs: T(F): 97.8 (08-28-20 @ 11:02), Max: 97.8 (08-28-20 @ 00:02)  HR: 66 (08-28-20 @ 15:15)  BP: 138/85 (08-28-20 @ 15:15)  RR: 17 (08-28-20 @ 15:15)  SpO2: 95% (08-28-20 @ 15:15)  Wt(kg): --  General: Nontoxic-appearing Female in no acute distress.  HEENT: AT/NC.  Anicteric. Conjunctiva pink and moist. Oropharynx clear.  Neck: Not rigid. No sense of mass.  Nodes: None palpable.  Lungs: Clear bilaterally without rales, wheezing or rhonchi  Heart: Regular rate and rhythm. No Murmur. No rub. No gallop. No palpable thrill.  Abdomen: Bowel sounds present and normoactive. Soft. Nondistended. Nontender. No sense of mass. No organomegaly.  Extremities: No cyanosis or clubbing. No edema.   Skin: Warm. Dry. Good turgor. No rash. No vasculitic stigmata.  Psychiatric: Appropriate affect and mood for situation.         Laboratory Studies--  CBC                        11.2   10.92 )-----------( 196      ( 28 Aug 2020 07:19 )             33.7       Chemistries  08-28    141  |  109<H>  |  34<H>  ----------------------------<  85  3.5   |  25  |  1.54<H>    Ca    8.3<L>      28 Aug 2020 07:19  Phos  2.2     08-28  Mg     2.2     08-28    TPro  7.2  /  Alb  2.6<L>  /  TBili  0.7  /  DBili  x   /  AST  31  /  ALT  17  /  AlkPhos  54  08-28      Culture Data  Culture - Blood (collected 26 Aug 2020 11:14)  Source: .Blood Blood-Peripheral  Preliminary Report (27 Aug 2020 12:01):    No growth to date.    Culture - Urine (collected 26 Aug 2020 09:25)  Source: .Urine urine from left kidney c/s  Preliminary Report (28 Aug 2020 17:18):    Numerous Klebsiella pneumoniae    Numerous Escherichia coli    Culture - Urine (collected 26 Aug 2020 09:25)  Source: .Urine bladder urine free  Final Report (28 Aug 2020 13:54):    Numerous Klebsiella pneumoniae    Numerous Escherichia coli    Numerous Bifidobacterium breve "Susceptibilities not performed"  Organism: Klebsiella pneumoniae  Escherichia coli (28 Aug 2020 08:10)  Organism: Escherichia coli (28 Aug 2020 08:10)  Organism: Klebsiella pneumoniae (28 Aug 2020 08:10)    Culture - Urine (collected 25 Aug 2020 17:44)  Source: .Urine Catheterized  Preliminary Report (27 Aug 2020 11:14):    >100,000 CFU/ml Escherichia coli  Organism: Escherichia coli (28 Aug 2020 07:57)  Organism: Escherichia coli (28 Aug 2020 07:57)    Culture - Blood (collected 25 Aug 2020 16:31)  Source: .Blood Blood-Peripheral  Gram Stain (prelim) (28 Aug 2020 08:28):    Growth in aerobic bottle: Gram Negative Rods    Growth in anaerobic bottle: Gram Negative Rods  Preliminary Report (28 Aug 2020 08:29):    Growth in anaerobic bottle: Gram Negative Rods    Growth in aerobic bottle: Klebsiella pneumoniae    See previous culture [id number]    Culture - Blood (collected 25 Aug 2020 16:31)  Source: .Blood Blood-Peripheral  Gram Stain (prelim) (28 Aug 2020 00:52):    Growth in aerobic bottle: Gram Negative Rods    Growth in anaerobic bottle: Gram Negative Rods  Preliminary Report (28 Aug 2020 00:52):    Growth in aerobic bottle: Klebsiella pneumoniae    "Due to technical problems, Proteus sp. will Not be reported as part of    the BCID panel until further notice"    ***Blood Panel PCR results on this specimen are available    approximately 3 hours after the Gram stain result.***    Gram stain, PCR, and/or culture results may not always    correspond due to difference in methodologies.    ************************************************************    This PCR assay was performed using [employer].    The following targets are tested for: Enterococcus,    vancomycin resistant enterococci, Listeria monocytogenes,    coagulase negative staphylococci, S. aureus,    methicillin resistant S. aureus, Streptococcus agalactiae    (Group B), S. pneumoniae, S. pyogenes (Group A),    Acinetobacter baumannii, Enterobacter cloacae, E. coli,    Klebsiella oxytoca, K. pneumoniae, Proteus sp.,    Serratia marcescens, Haemophilus influenzae,    Neisseria meningitidis, Pseudomonas aeruginosa, Candida    albicans, C. glabrata, C krusei, C parapsilosis,    C. tropicalis and the KPC resistance gene.    Growth in anaerobic bottle: Gram Negative Rods  Organism: Blood Culture PCR  Klebsiella pneumoniae (28 Aug 2020 09:51)  Organism: Klebsiella pneumoniae (28 Aug 2020 09:51)  Organism: Blood Culture PCR (26 Aug 2020 06:39)

## 2020-08-28 NOTE — PROVIDER CONTACT NOTE (CRITICAL VALUE NOTIFICATION) - TEST AND RESULT REPORTED:
blood culture on 8/25 preliminary result, growth in anaerobic bottle gram negative rods and growth in aerobic bottle klebsiella pneumoniae

## 2020-08-28 NOTE — PROGRESS NOTE ADULT - SUBJECTIVE AND OBJECTIVE BOX
Patient is a 87y old  Female who presents with a chief complaint of increased weakkness (28 Aug 2020 14:29)      INTERVAL HPI/ OVERNIGHT EVENTS: Pt was seen and examined at bedside today, No significant overnight events, pt admits to feeling cold and weak, denies any other complaints.      MEDICATIONS  (STANDING):  aspirin enteric coated 81 milliGRAM(s) Oral daily  cefTRIAXone   IVPB      enoxaparin Injectable 30 milliGRAM(s) SubCutaneous daily  mirtazapine 7.5 milliGRAM(s) Oral at bedtime  pantoprazole    Tablet 40 milliGRAM(s) Oral before breakfast  simvastatin 20 milliGRAM(s) Oral at bedtime    MEDICATIONS  (PRN):      Allergies    No Known Allergies    Intolerances        REVIEW OF SYSTEMS:    Unable to examine due to [ ] Encephalopathy [ ] Advanced Dementia [ ] Expressive Aphasia [ ] Non-verbal patient    CONSTITUTIONAL: No fever, positive generalized weakness/Fatigue, No weight loss  EYES: No eye pain, visual disturbances, or discharge  ENMT:  No difficulty hearing, tinnitus, vertigo; No sinus or throat pain  NECK: No pain or stiffness  RESPIRATORY: No shortness of breath,  cough, wheezing, sputum or hemoptysis   CARDIOVASCULAR: No chest pain, palpitations, or leg swelling  GASTROINTESTINAL: No abdominal pain. No nausea, vomiting, diarrhea or constipation. No melena or hematochezia.  GENITOURINARY: No dysuria, frequency, hematuria, or incontinence  NEUROLOGICAL: No headaches, Dizziness, memory loss, loss of strength, numbness, or tremors  SKIN: No itching, burning, rashes, or lesions   MUSCULOSKELETAL: No joint pain or swelling; No muscle, back, or extremity pain  PSYCHIATRIC: No depression, anxiety, mood swings, or difficulty sleeping  HEME/LYMPH: No easy bruising, or bleeding gums      Vital Signs Last 24 Hrs  T(C): 36.7 (28 Aug 2020 16:56), Max: 36.7 (28 Aug 2020 16:56)  T(F): 98 (28 Aug 2020 16:56), Max: 98 (28 Aug 2020 16:56)  HR: 67 (28 Aug 2020 16:56) (60 - 67)  BP: 142/83 (28 Aug 2020 16:56) (138/85 - 160/95)  BP(mean): --  RR: 17 (28 Aug 2020 16:56) (17 - 19)  SpO2: 94% (28 Aug 2020 16:56) (93% - 95%)    PHYSICAL EXAM:  GENERAL: NAD, drowsy  HEAD:  Atraumatic, Normocephalic  EYES: conjunctiva and sclera clear  ENMT: Moist mucous membranes  NECK: Supple, No JVD, Normal thyroid  CHEST/LUNG: Clear to Auscultation bilaterally; No rales, rhonchi, wheezing, or rubs  HEART: Regular rate and rhythm; No murmurs, rubs, or gallops  ABDOMEN: Soft, Nontender, Nondistended; Bowel sounds present  EXTREMITIES:  2+ Peripheral Pulses, No clubbing, cyanosis, or edema  SKIN: No rashes or lesions  NERVOUS SYSTEM:  Alert & Oriented X2, Good concentration; Motor Strength 5/5 B/L upper and lower extremities    LABS:                        11.2   10.92 )-----------( 196      ( 28 Aug 2020 07:19 )             33.7     08-28    141  |  109<H>  |  34<H>  ----------------------------<  85  3.5   |  25  |  1.54<H>    Ca    8.3<L>      28 Aug 2020 07:19  Phos  2.2     08-28  Mg     2.2     08-28    TPro  7.2  /  Alb  2.6<L>  /  TBili  0.7  /  DBili  x   /  AST  31  /  ALT  17  /  AlkPhos  54  08-28        CAPILLARY BLOOD GLUCOSE            Culture - Blood (collected 08-26-20)  Source: .Blood Blood-Peripheral  Preliminary Report (08-27-20):    No growth to date.    Culture - Urine (collected 08-26-20)  Source: .Urine urine from left kidney c/s  Preliminary Report (08-28-20):    Numerous Klebsiella pneumoniae    Numerous Escherichia coli    Culture - Urine (collected 08-26-20)  Source: .Urine bladder urine free  Final Report (08-28-20):    Numerous Klebsiella pneumoniae    Numerous Escherichia coli    Numerous Bifidobacterium breve "Susceptibilities not performed"  Organism: Klebsiella pneumoniae  Escherichia coli (08-28-20)  Organism: Escherichia coli (08-28-20)    Sensitivities:      -  Amikacin: S <=16      -  Amoxicillin/Clavulanic Acid: S <=8/4      -  Ampicillin: S <=8 These ampicillin results predict results for amoxicillin      -  Ampicillin/Sulbactam: S <=4/2 Enterobacter, Citrobacter, and Serratia may develop resistance during prolonged therapy (3-4 days)      -  Aztreonam: S <=4      -  Cefazolin: S <=2 (MIC_CL_COM_ENTERIC_CEFAZU) For uncomplicated UTI with K. pneumoniae, E. coli, or P. mirablis: HECTOR <=16 is sensitive and HECTOR >=32 is resistant. This also predicts results for oral agents cefaclor, cefdinir, cefpodoxime, cefprozil, cefuroxime axetil, cephalexin and locarbef for uncomplicated UTI. Note that some isolates may be susceptible to these agents while testing resistant to cefazolin.      -  Cefepime: S <=2      -  Cefoxitin: S <=8      -  Ceftriaxone: S <=1 Enterobacter, Citrobacter, and Serratia may develop resistance during prolonged therapy      -  Ciprofloxacin: S <=0.25      -  Ertapenem: S <=0.5      -  Gentamicin: S <=2      -  Imipenem: S <=1      -  Levofloxacin: S <=0.5      -  Meropenem: S <=1      -  Nitrofurantoin: S <=32 Should not be used to treat pyelonephritis      -  Piperacillin/Tazobactam: S <=8      -  Tigecycline: S <=2      -  Tobramycin: S <=2      -  Trimethoprim/Sulfamethoxazole: S <=0.5/9.5      Method Type: HECTOR  Organism: Klebsiella pneumoniae (08-28-20)    Sensitivities:      -  Amikacin: S <=16      -  Amoxicillin/Clavulanic Acid: S <=8/4      -  Ampicillin: R 16 These ampicillin results predict results for amoxicillin      -  Ampicillin/Sulbactam: S <=4/2 Enterobacter, Citrobacter, and Serratia may develop resistance during prolonged therapy (3-4 days)      -  Aztreonam: S <=4      -  Cefazolin: S <=2 (MIC_CL_COM_ENTERIC_CEFAZU) For uncomplicated UTI with K. pneumoniae, E. coli, or P. mirablis: HECTOR <=16 is sensitive and HECTOR >=32 is resistant. This also predicts results for oral agents cefaclor, cefdinir, cefpodoxime, cefprozil, cefuroxime axetil, cephalexin and locarbef for uncomplicated UTI. Note that some isolates may be susceptible to these agents while testing resistant to cefazolin.      -  Cefepime: S <=2      -  Cefoxitin: S <=8      -  Ceftriaxone: S <=1 Enterobacter, Citrobacter, and Serratia may develop resistance during prolonged therapy      -  Ciprofloxacin: S <=0.25      -  Ertapenem: S <=0.5      -  Gentamicin: S <=2      -  Imipenem: S <=1      -  Levofloxacin: S <=0.5      -  Meropenem: S <=1      -  Nitrofurantoin: I 64 Should not be used to treat pyelonephritis      -  Piperacillin/Tazobactam: S <=8      -  Tigecycline: S <=2      -  Tobramycin: S <=2      -  Trimethoprim/Sulfamethoxazole: S <=0.5/9.5      Method Type: HECTOR    Culture - Urine (collected 08-25-20)  Source: .Urine Catheterized  Preliminary Report (08-27-20):    >100,000 CFU/ml Escherichia coli  Organism: Escherichia coli (08-28-20)  Organism: Escherichia coli (08-28-20)    Sensitivities:      -  Amikacin: S <=16      -  Amoxicillin/Clavulanic Acid: S <=8/4      -  Ampicillin: S <=8 These ampicillin results predict results for amoxicillin      -  Ampicillin/Sulbactam: S <=4/2 Enterobacter, Citrobacter, and Serratia may develop resistance during prolonged therapy (3-4 days)      -  Aztreonam: S <=4      -  Cefazolin: S <=2 (MIC_CL_COM_ENTERIC_CEFAZU) For uncomplicated UTI with K. pneumoniae, E. coli, or P. mirablis: HECTOR <=16 is sensitive and HECTOR >=32 is resistant. This also predicts results for oral agents cefaclor, cefdinir, cefpodoxime, cefprozil, cefuroxime axetil, cephalexin and locarbef for uncomplicated UTI. Note that some isolates may be susceptible to these agents while testing resistant to cefazolin.      -  Cefepime: S <=2      -  Cefoxitin: S <=8      -  Ceftriaxone: S <=1 Enterobacter, Citrobacter, and Serratia may develop resistance during prolonged therapy      -  Ciprofloxacin: S <=0.25      -  Ertapenem: S <=0.5      -  Gentamicin: S <=2      -  Imipenem: S <=1      -  Levofloxacin: S <=0.5      -  Meropenem: S <=1      -  Nitrofurantoin: S <=32 Should not be used to treat pyelonephritis      -  Piperacillin/Tazobactam: S <=8      -  Tigecycline: S <=2      -  Tobramycin: S <=2      -  Trimethoprim/Sulfamethoxazole: S <=0.5/9.5      Method Type: HECTOR    Culture - Blood (collected 08-25-20)  Source: .Blood Blood-Peripheral  Gram Stain (prelim) (08-28-20):    Growth in aerobic bottle: Gram Negative Rods    Growth in anaerobic bottle: Gram Negative Rods  Preliminary Report (08-28-20):    Growth in anaerobic bottle: Gram Negative Rods    Growth in aerobic bottle: Klebsiella pneumoniae    See previous culture [id number]    Culture - Blood (collected 08-25-20)  Source: .Blood Blood-Peripheral  Gram Stain (prelim) (08-28-20):    Growth in aerobic bottle: Gram Negative Rods    Growth in anaerobic bottle: Gram Negative Rods  Preliminary Report (08-28-20):    Growth in aerobic bottle: Klebsiella pneumoniae    "Due to technical problems, Proteus sp. will Not be reported as part of    the BCID panel until further notice"    ***Blood Panel PCR results on this specimen are available    approximately 3 hours after the Gram stain result.***    Gram stain, PCR, and/or culture results may not always    correspond due to difference in methodologies.    ************************************************************    This PCR assay was performed using [employer].    The following targets are tested for: Enterococcus,    vancomycin resistant enterococci, Listeria monocytogenes,    coagulase negative staphylococci, S. aureus,    methicillin resistant S. aureus, Streptococcus agalactiae    (Group B), S. pneumoniae, S. pyogenes (Group A),    Acinetobacter baumannii, Enterobacter cloacae, E. coli,    Klebsiella oxytoca, K. pneumoniae, Proteus sp.,    Serratia marcescens, Haemophilus influenzae,    Neisseria meningitidis, Pseudomonas aeruginosa, Candida    albicans, C. glabrata, C krusei, C parapsilosis,    C. tropicalis and the KPC resistance gene.    Growth in anaerobic bottle: Gram Negative Rods  Organism: Blood Culture PCR  Klebsiella pneumoniae (08-28-20)  Organism: Klebsiella pneumoniae (08-28-20)    Sensitivities:      -  Amikacin: S <=16      -  Ampicillin: R 16 These ampicillin results predict results for amoxicillin      -  Ampicillin/Sulbactam: S <=4/2 Enterobacter, Citrobacter, and Serratia may develop resistance during prolonged therapy (3-4 days)      -  Aztreonam: S <=4      -  Cefazolin: S <=2 Enterobacter, Citrobacter, and Serratia may develop resistance during prolonged therapy (3-4 days)      -  Cefepime: S <=2      -  Cefoxitin: S <=8      -  Ceftriaxone: S <=1 Enterobacter, Citrobacter, and Serratia may develop resistance during prolonged therapy      -  Ciprofloxacin: S <=0.25      -  Ertapenem: S <=0.5      -  Gentamicin: S <=2      -  Imipenem: S <=1      -  Levofloxacin: S <=0.5      -  Meropenem: S <=1      -  Piperacillin/Tazobactam: S <=8      -  Tobramycin: S <=2      -  Trimethoprim/Sulfamethoxazole: S <=0.5/9.5      Method Type: HECTOR  Organism: Blood Culture PCR (08-26-20)    Sensitivities:      -  Klebsiella pneumoniae: Detec      Method Type: PCR        RADIOLOGY & ADDITIONAL TESTS:          Imaging Personally Reviewed:  [ ] YES  [ ] NO    Consultant(s) Notes Reviewed:  [ ] YES  [ ] NO    Care Discussed with Consultants/Other Providers [x ] YES  [ ] NO

## 2020-08-28 NOTE — PROGRESS NOTE ADULT - PROBLEM SELECTOR PLAN 1
CT abdomen/pelvis: Mild left hydroureteronephrosis down to level of an 8.8 mm mid ureteral calculus.   - s/p Cystourethroscopy w/ stent placement   - Urology following   - bladder scan; benign, urinating well

## 2020-08-28 NOTE — PROGRESS NOTE ADULT - SUBJECTIVE AND OBJECTIVE BOX
[facility]   MD Bharat Bundy MD, D.O. Ruoru Wong, PA    From 7 AM - 5 PM:  OFFICE: [phone]  Dr. Headley cell: [phone]  Dr. Toscano cell: [phone]  Dr. Bravo cell: [phone]  SELIN Nelson cell: [phone]    From 5 PM - 7 AM: Answering Service: [phone]      RENAL FOLLOW UP NOTE  --------------------------------------------------------------------------------  HPI:  Pt seen and examined at bedside.       PAST HISTORY  --------------------------------------------------------------------------------  No significant changes to PMH, PSH, FHx, SHx, unless otherwise noted    ALLERGIES & MEDICATIONS  --------------------------------------------------------------------------------  Allergies    No Known Allergies    Intolerances      Standing Inpatient Medications  aspirin enteric coated 81 milliGRAM(s) Oral daily  enoxaparin Injectable 30 milliGRAM(s) SubCutaneous daily  mirtazapine 7.5 milliGRAM(s) Oral at bedtime  pantoprazole    Tablet 40 milliGRAM(s) Oral before breakfast  potassium phosphate IVPB 15 milliMole(s) IV Intermittent once  simvastatin 20 milliGRAM(s) Oral at bedtime    PRN Inpatient Medications      REVIEW OF SYSTEMS  --------------------------------------------------------------------------------  General: no fever  CVS: no chest pain  RESP: no sob, no cough  ABD: no abdominal pain  : no dysuria  MSK: no edema     VITALS/PHYSICAL EXAM  --------------------------------------------------------------------------------  T(C): 36.6 (08-28-20 @ 11:02), Max: 36.6 (08-27-20 @ 16:41)  HR: 61 (08-28-20 @ 11:02) (60 - 63)  BP: 160/95 (08-28-20 @ 11:02) (147/83 - 160/95)  RR: 18 (08-28-20 @ 11:02) (18 - 19)  SpO2: 93% (08-28-20 @ 11:02) (93% - 95%)  Wt(kg): --        08-27-20 @ 07:01  -  08-28-20 @ 07:00  --------------------------------------------------------  IN: 1320 mL / OUT: 22 mL / NET: 1298 mL    08-28-20 @ 07:01  -  08-28-20 @ 14:29  --------------------------------------------------------  IN: 472 mL / OUT: 0 mL / NET: 472 mL      Physical Exam:  	Gen: NAD  	HEENT: MMM  	Pulm: CTA B/L  	CV: S1S2  	Abd: Soft, +BS  	Ext: No LE edema B/L                      Neuro: Awake   	Skin: Warm and Dry   	    LABS/STUDIES  --------------------------------------------------------------------------------              11.2   10.92 >-----------<  196      [08-28-20 @ 07:19]              33.7     141  |  109  |  34  ----------------------------<  85      [08-28-20 @ 07:19]  3.5   |  25  |  1.54        Ca     8.3     [08-28-20 @ 07:19]      Mg     2.2     [08-28-20 @ 07:19]      Phos  2.2     [08-28-20 @ 07:19]    TPro  7.2  /  Alb  2.6  /  TBili  0.7  /  DBili  x   /  AST  31  /  ALT  17  /  AlkPhos  54  [08-28-20 @ 07:19]      Creatinine Trend:  SCr 1.54 [08-28 @ 07:19]  SCr 1.47 [08-27 @ 07:51]  SCr 1.08 [08-26 @ 01:19]  SCr 1.22 [08-25 @ 12:05]    Urinalysis - [08-25-20 @ 12:40]      Color Yellow / Appearance very cloudy / SG 1.025 / pH 5.0      Gluc Negative / Ketone Moderate  / Bili Negative / Urobili 1       Blood Large / Protein 500 / Leuk Est Moderate / Nitrite Positive      RBC 6-10 / WBC >50 / Hyaline  / Gran  / Sq Epi  / Non Sq Epi Few / Bacteria Many

## 2020-08-28 NOTE — PROGRESS NOTE ADULT - SUBJECTIVE AND OBJECTIVE BOX
Patient seen and examined bedside resting comfortably.  No complaints offered.   Voiding spontaneously ( incontinent ) PRIMAFIT in place  Denies hematuria and dysuria.  Denies nausea and vomiting. Tolerating diet.  Denies chest pain, dyspnea, cough.    T(F): 97.6 (08-28-20 @ 05:48), Max: 98 (08-27-20 @ 11:45)  HR: 60 (08-28-20 @ 05:48) (60 - 63)  BP: 157/87 (08-28-20 @ 05:48) (147/83 - 163/90)  RR: 18 (08-28-20 @ 05:48) (17 - 19)  SpO2: 95% (08-28-20 @ 05:48) (93% - 95%)  Wt(kg): --  CAPILLARY BLOOD GLUCOSE      PHYSICAL EXAM:  General: NAD, alert and awake  HEENT: NCAT, EOMI, conjunctiva clear  Chest: nonlabored respirations, good inspiratory effort  Abdomen: soft, NTND.   Extremities: no pedal edema or calf tenderness noted   : No CVA or SP tenderness. PRIMAFIT in place with clear urine draining    LABS:                        11.2   10.92 )-----------( 196      ( 28 Aug 2020 07:19 )             33.7   08-28    141  |  109<H>  |  34<H>  ----------------------------<  85  3.5   |  25  |  1.54<H>    Ca    8.3<L>      28 Aug 2020 07:19  Phos  2.2     08-28  Mg     2.2     08-28    TPro  7.2  /  Alb  2.6<L>  /  TBili  0.7  /  DBili  x   /  AST  31  /  ALT  17  /  AlkPhos  54  08-28    I&O's Detail    27 Aug 2020 07:01  -  28 Aug 2020 07:00  --------------------------------------------------------  IN:    Oral Fluid: 400 mL    sodium chloride 0.9%.: 720 mL    Solution: 200 mL  Total IN: 1320 mL    OUT:    Post-Void Residual per Intermittent Catheterization: 22 mL  Total OUT: 22 mL    Total NET: 1298 mL    Culture - Urine (08.26.20 @ 09:25)    -  Cefazolin: S <=2 (MIC_CL_COM_ENTERIC_CEFAZU) For uncomplicated UTI with K. pneumoniae, E. coli, or P. mirablis: HECTOR <=16 is sensitive and HECTOR >=32 is resistant. This also predicts results for oral agents cefaclor, cefdinir, cefpodoxime, cefprozil, cefuroxime axetil, cephalexin and locarbef for uncomplicated UTI. Note that some isolates may be susceptible to these agents while testing resistant to cefazolin.    -  Cefazolin: S <=2 (MIC_CL_COM_ENTERIC_CEFAZU) For uncomplicated UTI with K. pneumoniae, E. coli, or P. mirablis: HECTOR <=16 is sensitive and HECTOR >=32 is resistant. This also predicts results for oral agents cefaclor, cefdinir, cefpodoxime, cefprozil, cefuroxime axetil, cephalexin and locarbef for uncomplicated UTI. Note that some isolates may be susceptible to these agents while testing resistant to cefazolin.    -  Aztreonam: S <=4    -  Aztreonam: S <=4    -  Ampicillin: R 16 These ampicillin results predict results for amoxicillin    -  Ampicillin: S <=8 These ampicillin results predict results for amoxicillin    -  Ampicillin/Sulbactam: S <=4/2 Enterobacter, Citrobacter, and Serratia may develop resistance during prolonged therapy (3-4 days)    -  Ampicillin/Sulbactam: S <=4/2 Enterobacter, Citrobacter, and Serratia may develop resistance during prolonged therapy (3-4 days)    -  Amoxicillin/Clavulanic Acid: S <=8/4    -  Amoxicillin/Clavulanic Acid: S <=8/4    -  Amikacin: S <=16    -  Amikacin: S <=16    -  Nitrofurantoin: I 64 Should not be used to treat pyelonephritis    -  Nitrofurantoin: S <=32 Should not be used to treat pyelonephritis    -  Piperacillin/Tazobactam: S <=8    -  Piperacillin/Tazobactam: S <=8    -  Tigecycline: S <=2    -  Tigecycline: S <=2    -  Meropenem: S <=1    -  Meropenem: S <=1    -  Imipenem: S <=1    -  Imipenem: S <=1    -  Levofloxacin: S <=0.5    -  Levofloxacin: S <=0.5    -  Gentamicin: S <=2    -  Gentamicin: S <=2    -  Ertapenem: S <=0.5    -  Ertapenem: S <=0.5    -  Ciprofloxacin: S <=0.25    -  Ciprofloxacin: S <=0.25    -  Ceftriaxone: S <=1 Enterobacter, Citrobacter, and Serratia may develop resistance during prolonged therapy    -  Ceftriaxone: S <=1 Enterobacter, Citrobacter, and Serratia may develop resistance during prolonged therapy    -  Cefoxitin: S <=8    -  Cefoxitin: S <=8    -  Cefepime: S <=2    -  Cefepime: S <=2    -  Trimethoprim/Sulfamethoxazole: S <=0.5/9.5    -  Trimethoprim/Sulfamethoxazole: S <=0.5/9.5    -  Tobramycin: S <=2    -  Tobramycin: S <=2    Specimen Source: .Urine bladder urine free    Culture Results:   Numerous Klebsiella pneumoniae  Numerous Escherichia coli    Organism Identification: Klebsiella pneumoniae  Escherichia coli    Organism: Klebsiella pneumoniae    Organism: Escherichia coli    Method Type: HECTOR    Method Type: HECTOR          Impression: 88 y/o female PMH MI s/p CABG x 4, HTN, HLD presenting with sepsis 2/2 L 8.8 midureteral calculus.    s/p Cystoureteroscopy with insertion of stent POD 2, urine culture :Klebsiella pneumoniae, Escherichia coli  BLADDER scan PVR 22ml    KUB noted double J in place  Follow Creatinine   Continue Antibiotics per ID  Will need follow up as outpatient for possible ESWL vs laser lithotripsy  - discussed with Dr Leary

## 2020-08-29 NOTE — PROGRESS NOTE ADULT - PROBLEM SELECTOR PLAN 2
Klebsiella, E. coli   ID consult appreciated.   cont w/ IV abx
Klebsiella, E. coli   cont w/ IV abx f/u ID consult
f/u w/ Urine cx   cont w/ IV abx f/u ID consult
Klebsiella, E. coli   ID consult appreciated.   cont w/ IV abx

## 2020-08-29 NOTE — PROGRESS NOTE ADULT - PROBLEM SELECTOR PLAN 3
Gram negative; Klebsiella   ID consult appreciated.   cont w/ IV abx
Gram negative; Klebsiella   cont w/ IV abx   f/u ID consult
Gram negative; Klebsiella   cont w/ IV abx   f/u ID consult
Gram negative; Klebsiella   ID consult appreciated.   cont w/ IV abx

## 2020-08-29 NOTE — PROGRESS NOTE ADULT - SUBJECTIVE AND OBJECTIVE BOX
Patient is a 87y old  Female who presents with a chief complaint of increased weakkness (29 Aug 2020 11:28)      INTERVAL HPI/ OVERNIGHT EVENTS: Pt was seen and examined at bedside today, No significant overnight events, pt cont to feel weak but denies any other complaints.      MEDICATIONS  (STANDING):  amLODIPine   Tablet 10 milliGRAM(s) Oral daily  aspirin enteric coated 81 milliGRAM(s) Oral daily  cefTRIAXone   IVPB      cefTRIAXone   IVPB 1000 milliGRAM(s) IV Intermittent every 24 hours  enoxaparin Injectable 30 milliGRAM(s) SubCutaneous daily  mirtazapine 7.5 milliGRAM(s) Oral at bedtime  pantoprazole    Tablet 40 milliGRAM(s) Oral before breakfast  potassium chloride    Tablet ER 20 milliEquivalent(s) Oral once  simvastatin 20 milliGRAM(s) Oral at bedtime    MEDICATIONS  (PRN):      Allergies    No Known Allergies    Intolerances        REVIEW OF SYSTEMS:    Unable to examine due to [ ] Encephalopathy [ ] Advanced Dementia [ ] Expressive Aphasia [ ] Non-verbal patient    CONSTITUTIONAL: No fever, Positive generalized weakness/Fatigue, No weight loss  EYES: No eye pain, visual disturbances, or discharge  ENMT:  No difficulty hearing, tinnitus, vertigo; No sinus or throat pain  NECK: No pain or stiffness  RESPIRATORY: No shortness of breath,  cough, wheezing, sputum or hemoptysis   CARDIOVASCULAR: No chest pain, palpitations, or leg swelling  GASTROINTESTINAL: No abdominal pain. No nausea, vomiting, diarrhea or constipation. No melena or hematochezia.  GENITOURINARY: No dysuria, frequency, hematuria, or incontinence  NEUROLOGICAL: No headaches, Dizziness, memory loss, loss of strength, numbness, or tremors  SKIN: No itching, burning, rashes, or lesions   MUSCULOSKELETAL: No joint pain or swelling; No muscle, back, or extremity pain  PSYCHIATRIC: No depression, anxiety, mood swings, or difficulty sleeping  HEME/LYMPH: No easy bruising, or bleeding gums      Vital Signs Last 24 Hrs  T(C): 36.9 (29 Aug 2020 11:10), Max: 37.2 (29 Aug 2020 00:05)  T(F): 98.4 (29 Aug 2020 11:10), Max: 98.9 (29 Aug 2020 00:05)  HR: 65 (29 Aug 2020 11:10) (65 - 76)  BP: 139/100 (29 Aug 2020 11:10) (138/85 - 156/91)  BP(mean): --  RR: 18 (29 Aug 2020 11:10) (17 - 18)  SpO2: 91% (29 Aug 2020 11:10) (91% - 97%)    PHYSICAL EXAM:  GENERAL: NAD, well-developed, well-groomed  HEAD:  Atraumatic, Normocephalic  EYES: conjunctiva and sclera clear  ENMT: Moist mucous membranes  NECK: Supple, No JVD, Normal thyroid  CHEST/LUNG: Clear to Auscultation bilaterally; No rales, rhonchi, wheezing, or rubs  HEART: Regular rate and rhythm; No murmurs, rubs, or gallops  ABDOMEN: Soft, Nontender, Nondistended; Bowel sounds present  EXTREMITIES:  2+ Peripheral Pulses, No clubbing, cyanosis, or edema  SKIN: No rashes or lesions  NERVOUS SYSTEM:  Alert & Oriented X3, Good concentration; Motor Strength 5/5 B/L upper and lower extremities    LABS:                        11.5   9.60  )-----------( 202      ( 29 Aug 2020 07:28 )             36.0     08-29    140  |  110<H>  |  26<H>  ----------------------------<  75  3.4<L>   |  24  |  1.15    Ca    8.2<L>      29 Aug 2020 07:28  Phos  2.8     08-29  Mg     2.2     08-29    TPro  7.2  /  Alb  2.6<L>  /  TBili  0.7  /  DBili  x   /  AST  31  /  ALT  17  /  AlkPhos  54  08-28        CAPILLARY BLOOD GLUCOSE            Culture - Blood (collected 08-26-20)  Source: .Blood Blood-Peripheral  Preliminary Report (08-27-20):    No growth to date.    Culture - Urine (collected 08-26-20)  Source: .Urine urine from left kidney c/s  Preliminary Report (08-29-20):    Numerous Klebsiella pneumoniae    Numerous Escherichia coli    Culture in progress  Organism: Klebsiella pneumoniae  Escherichia coli (08-29-20)  Organism: Escherichia coli (08-29-20)    Sensitivities:      -  Amikacin: S <=16      -  Amoxicillin/Clavulanic Acid: S <=8/4      -  Ampicillin: S <=8 These ampicillin results predict results for amoxicillin      -  Ampicillin/Sulbactam: S <=4/2 Enterobacter, Citrobacter, and Serratia may develop resistance during prolonged therapy (3-4 days)      -  Aztreonam: S <=4      -  Cefazolin: S <=2 (MIC_CL_COM_ENTERIC_CEFAZU) For uncomplicated UTI with K. pneumoniae, E. coli, or P. mirablis: HECTOR <=16 is sensitive and HECTOR >=32 is resistant. This also predicts results for oral agents cefaclor, cefdinir, cefpodoxime, cefprozil, cefuroxime axetil, cephalexin and locarbef for uncomplicated UTI. Note that some isolates may be susceptible to these agents while testing resistant to cefazolin.      -  Cefepime: S <=2      -  Cefoxitin: S <=8      -  Ceftriaxone: S <=1 Enterobacter, Citrobacter, and Serratia may develop resistance during prolonged therapy      -  Ciprofloxacin: S <=0.25      -  Ertapenem: S <=0.5      -  Gentamicin: S <=2      -  Imipenem: S <=1      -  Levofloxacin: S <=0.5      -  Meropenem: S <=1      -  Nitrofurantoin: S <=32 Should not be used to treat pyelonephritis      -  Piperacillin/Tazobactam: S <=8      -  Tigecycline: S <=2      -  Tobramycin: S <=2      -  Trimethoprim/Sulfamethoxazole: S <=0.5/9.5      Method Type: HECTOR  Organism: Klebsiella pneumoniae (08-29-20)    Sensitivities:      -  Amikacin: S <=16      -  Amoxicillin/Clavulanic Acid: S <=8/4      -  Ampicillin: R >16 These ampicillin results predict results for amoxicillin      -  Ampicillin/Sulbactam: S <=4/2 Enterobacter, Citrobacter, and Serratia may develop resistance during prolonged therapy (3-4 days)      -  Aztreonam: S <=4      -  Cefazolin: S <=2 (MIC_CL_COM_ENTERIC_CEFAZU) For uncomplicated UTI with K. pneumoniae, E. coli, or P. mirablis: HECTOR <=16 is sensitive and HECTOR >=32 is resistant. This also predicts results for oral agents cefaclor, cefdinir, cefpodoxime, cefprozil, cefuroxime axetil, cephalexin and locarbef for uncomplicated UTI. Note that some isolates may be susceptible to these agents while testing resistant to cefazolin.      -  Cefepime: S <=2      -  Cefoxitin: S <=8      -  Ceftriaxone: S <=1 Enterobacter, Citrobacter, and Serratia may develop resistance during prolonged therapy      -  Ciprofloxacin: S <=0.25      -  Ertapenem: S <=0.5      -  Gentamicin: S <=2      -  Imipenem: S <=1      -  Levofloxacin: S <=0.5      -  Meropenem: S <=1      -  Nitrofurantoin: I 64 Should not be used to treat pyelonephritis      -  Piperacillin/Tazobactam: S <=8      -  Tigecycline: S <=2      -  Tobramycin: S <=2      -  Trimethoprim/Sulfamethoxazole: S <=0.5/9.5      Method Type: HECTOR    Culture - Urine (collected 08-26-20)  Source: .Urine bladder urine free  Final Report (08-28-20):    Numerous Klebsiella pneumoniae    Numerous Escherichia coli    Numerous Bifidobacterium breve "Susceptibilities not performed"  Organism: Klebsiella pneumoniae  Escherichia coli (08-28-20)  Organism: Escherichia coli (08-28-20)    Sensitivities:      -  Amikacin: S <=16      -  Amoxicillin/Clavulanic Acid: S <=8/4      -  Ampicillin: S <=8 These ampicillin results predict results for amoxicillin      -  Ampicillin/Sulbactam: S <=4/2 Enterobacter, Citrobacter, and Serratia may develop resistance during prolonged therapy (3-4 days)      -  Aztreonam: S <=4      -  Cefazolin: S <=2 (MIC_CL_COM_ENTERIC_CEFAZU) For uncomplicated UTI with K. pneumoniae, E. coli, or P. mirablis: HECTOR <=16 is sensitive and HECTOR >=32 is resistant. This also predicts results for oral agents cefaclor, cefdinir, cefpodoxime, cefprozil, cefuroxime axetil, cephalexin and locarbef for uncomplicated UTI. Note that some isolates may be susceptible to these agents while testing resistant to cefazolin.      -  Cefepime: S <=2      -  Cefoxitin: S <=8      -  Ceftriaxone: S <=1 Enterobacter, Citrobacter, and Serratia may develop resistance during prolonged therapy      -  Ciprofloxacin: S <=0.25      -  Ertapenem: S <=0.5      -  Gentamicin: S <=2      -  Imipenem: S <=1      -  Levofloxacin: S <=0.5      -  Meropenem: S <=1      -  Nitrofurantoin: S <=32 Should not be used to treat pyelonephritis      -  Piperacillin/Tazobactam: S <=8      -  Tigecycline: S <=2      -  Tobramycin: S <=2      -  Trimethoprim/Sulfamethoxazole: S <=0.5/9.5      Method Type: HECTOR  Organism: Klebsiella pneumoniae (08-28-20)    Sensitivities:      -  Amikacin: S <=16      -  Amoxicillin/Clavulanic Acid: S <=8/4      -  Ampicillin: R 16 These ampicillin results predict results for amoxicillin      -  Ampicillin/Sulbactam: S <=4/2 Enterobacter, Citrobacter, and Serratia may develop resistance during prolonged therapy (3-4 days)      -  Aztreonam: S <=4      -  Cefazolin: S <=2 (MIC_CL_COM_ENTERIC_CEFAZU) For uncomplicated UTI with K. pneumoniae, E. coli, or P. mirablis: HECTOR <=16 is sensitive and HECTOR >=32 is resistant. This also predicts results for oral agents cefaclor, cefdinir, cefpodoxime, cefprozil, cefuroxime axetil, cephalexin and locarbef for uncomplicated UTI. Note that some isolates may be susceptible to these agents while testing resistant to cefazolin.      -  Cefepime: S <=2      -  Cefoxitin: S <=8      -  Ceftriaxone: S <=1 Enterobacter, Citrobacter, and Serratia may develop resistance during prolonged therapy      -  Ciprofloxacin: S <=0.25      -  Ertapenem: S <=0.5      -  Gentamicin: S <=2      -  Imipenem: S <=1      -  Levofloxacin: S <=0.5      -  Meropenem: S <=1      -  Nitrofurantoin: I 64 Should not be used to treat pyelonephritis      -  Piperacillin/Tazobactam: S <=8      -  Tigecycline: S <=2      -  Tobramycin: S <=2      -  Trimethoprim/Sulfamethoxazole: S <=0.5/9.5      Method Type: HECTOR    Culture - Urine (collected 08-25-20)  Source: .Urine Catheterized  Preliminary Report (08-27-20):    >100,000 CFU/ml Escherichia coli  Organism: Escherichia coli (08-28-20)  Organism: Escherichia coli (08-28-20)    Sensitivities:      -  Amikacin: S <=16      -  Amoxicillin/Clavulanic Acid: S <=8/4      -  Ampicillin: S <=8 These ampicillin results predict results for amoxicillin      -  Ampicillin/Sulbactam: S <=4/2 Enterobacter, Citrobacter, and Serratia may develop resistance during prolonged therapy (3-4 days)      -  Aztreonam: S <=4      -  Cefazolin: S <=2 (MIC_CL_COM_ENTERIC_CEFAZU) For uncomplicated UTI with K. pneumoniae, E. coli, or P. mirablis: HECTOR <=16 is sensitive and HECTOR >=32 is resistant. This also predicts results for oral agents cefaclor, cefdinir, cefpodoxime, cefprozil, cefuroxime axetil, cephalexin and locarbef for uncomplicated UTI. Note that some isolates may be susceptible to these agents while testing resistant to cefazolin.      -  Cefepime: S <=2      -  Cefoxitin: S <=8      -  Ceftriaxone: S <=1 Enterobacter, Citrobacter, and Serratia may develop resistance during prolonged therapy      -  Ciprofloxacin: S <=0.25      -  Ertapenem: S <=0.5      -  Gentamicin: S <=2      -  Imipenem: S <=1      -  Levofloxacin: S <=0.5      -  Meropenem: S <=1      -  Nitrofurantoin: S <=32 Should not be used to treat pyelonephritis      -  Piperacillin/Tazobactam: S <=8      -  Tigecycline: S <=2      -  Tobramycin: S <=2      -  Trimethoprim/Sulfamethoxazole: S <=0.5/9.5      Method Type: HECTOR    Culture - Blood (collected 08-25-20)  Source: .Blood Blood-Peripheral  Gram Stain (08-29-20):    Growth in aerobic bottle: Gram Negative Rods    Growth in anaerobic bottle: Gram Negative Rods  Final Report (08-29-20):    Growth in aerobic and anaerobic bottles: Klebsiella pneumoniae    See previous culture [id number]    Culture - Blood (collected 08-25-20)  Source: .Blood Blood-Peripheral  Gram Stain (08-28-20):    Growth in aerobic bottle: Gram Negative Rods    Growth in anaerobic bottle: Gram Negative Rods  Final Report (08-28-20):    Growth in aerobic and anaerobic bottles: Klebsiella pneumoniae    "Due to technical problems, Proteus sp. will Not be reported as part of    the BCID panel until further notice"    ***Blood Panel PCR results on this specimen are available    approximately 3 hours after the Gram stain result.***    Gram stain, PCR, and/or culture results may not always    correspond due to difference in methodologies.    ************************************************************    This PCR assay was performed using [employer].    The following targets are tested for: Enterococcus,    vancomycin resistant enterococci, Listeria monocytogenes,    coagulase negative staphylococci, S. aureus,    methicillin resistant S. aureus, Streptococcus agalactiae    (Group B), S. pneumoniae, S. pyogenes (Group A),    Acinetobacter baumannii, Enterobacter cloacae, E. coli,    Klebsiella oxytoca, K. pneumoniae, Proteus sp.,    Serratia marcescens, Haemophilus influenzae,    Neisseria meningitidis, Pseudomonas aeruginosa, Candida    albicans, C. glabrata, C krusei, C parapsilosis,    C. tropicalis and the KPC resistance gene.  Organism: Blood Culture PCR  Klebsiella pneumoniae (08-28-20)  Organism: Klebsiella pneumoniae (08-28-20)    Sensitivities:      -  Amikacin: S <=16      -  Ampicillin: R 16 These ampicillin results predict results for amoxicillin      -  Ampicillin/Sulbactam: S <=4/2 Enterobacter, Citrobacter, and Serratia may develop resistance during prolonged therapy (3-4 days)      -  Aztreonam: S <=4      -  Cefazolin: S <=2 Enterobacter, Citrobacter, and Serratia may develop resistance during prolonged therapy (3-4 days)      -  Cefepime: S <=2      -  Cefoxitin: S <=8      -  Ceftriaxone: S <=1 Enterobacter, Citrobacter, and Serratia may develop resistance during prolonged therapy      -  Ciprofloxacin: S <=0.25      -  Ertapenem: S <=0.5      -  Gentamicin: S <=2      -  Imipenem: S <=1      -  Levofloxacin: S <=0.5      -  Meropenem: S <=1      -  Piperacillin/Tazobactam: S <=8      -  Tobramycin: S <=2      -  Trimethoprim/Sulfamethoxazole: S <=0.5/9.5      Method Type: HECTOR  Organism: Blood Culture PCR (08-28-20)    Sensitivities:      -  Klebsiella pneumoniae: Detec      Method Type: PCR        RADIOLOGY & ADDITIONAL TESTS:          Imaging Personally Reviewed:  [ ] YES  [ ] NO    Consultant(s) Notes Reviewed:  [x ] YES  [ ] NO    Care Discussed with Consultants/Other Providers [x ] YES  [ ] NO

## 2020-08-29 NOTE — PROGRESS NOTE ADULT - PROBLEM SELECTOR PLAN 4
presumed secondary to obstructive ureter stone   improving now   Nephrology consult appreciated  cont to monitor; avoid nephrotoxic medications.
presumed secondary to obstructive ureter stone   worsened today  Nephrology consult appreciated, start trial of IVF   cont to monitor; avoid nephrotoxic medications.
presumed secondary to obstructive ureter stone   improving  cont to monitor; avoid nephrotoxic medications.
presumed secondary to obstructive ureter stone   worsened today  Nephrology consult appreciated  Abx changed to Rocephin   cont to monitor; avoid nephrotoxic medications.

## 2020-08-29 NOTE — PROGRESS NOTE ADULT - PROBLEM SELECTOR PLAN 1
CT abdomen/pelvis: Mild left hydroureteronephrosis down to level of an 8.8 mm mid ureteral calculus.   - s/p Cystourethroscopy w/ stent placement   - Urology following   - bladder scan; benign, urinating well, renal function improving.

## 2020-08-29 NOTE — PROGRESS NOTE ADULT - PROBLEM SELECTOR PROBLEM 6
Coronary artery disease involving native heart without angina pectoris, unspecified vessel or lesion type

## 2020-08-29 NOTE — PROGRESS NOTE ADULT - PROBLEM SELECTOR PROBLEM 4
LISA (acute kidney injury)

## 2020-08-29 NOTE — PROGRESS NOTE ADULT - PROBLEM SELECTOR PLAN 8
DVTp: Lovenox   Disposition: f/u PT
DVTp: Lovenox   Disposition: home PT recommended.

## 2020-08-30 NOTE — PROGRESS NOTE ADULT - REASON FOR ADMISSION
increased weakness
increased weakkness
increased weakkness
increased weakness
increased weakkness
increased weakness
increased weakkness

## 2020-08-30 NOTE — PROGRESS NOTE ADULT - PROVIDER SPECIALTY LIST ADULT
Hospitalist
Infectious Disease
Nephrology
Urology
Hospitalist

## 2020-08-30 NOTE — DISCHARGE NOTE PROVIDER - CARE PROVIDER_API CALL
Perez Leary  UROLOGY  [address] [facility] [place] [address]  Phone: [phone]  Fax: [phone]  Follow Up Time: 1 week

## 2020-08-30 NOTE — PROGRESS NOTE ADULT - ASSESSMENT
87 years old female with upper epigastric pain with nausea vomiting or diarrhea probably from urinary tract infection     IMPROVE VTE Individual Risk Assessment          RISK                                                          Points  [  ] Previous VTE                                                3  [  ] Thrombophilia                                             2  [  ] Lower limb paralysis                                   2        (unable to hold up >15 seconds)    [  ] Current Cancer                                             2         (within 6 months)  [ x ] Immobilization > 24 hrs                              1  [  ] ICU/CCU stay > 24 hours                             1  [  x] Age > 60                                                         1    IMPROVE VTE Score: 2
87F PMHx CAD, MI w/ stents, AAA admitted with 1 day of abdominal pain. Pt found to have UTI with bacteremia and left hydro with obstructing stone. Pt seen by urology s/p stent placement. Nephrology consulted for fluctuating renal function.     LISA  Baseline Scr 1.08  Pt admitted with UTI, bacteremia and obstructing kidney stone with left hydronephrosis  Pt s/p ureteral stent placement by urology on 8/25  Renal function improving today  Likely etiology pre-renal/Obstruction  ? hemodynamically mediated in the setting of infection  Monitor renal function  Recheck UA once UTI treatment completed   Avoid nephrotoxins     Hypokalemia  Supplement KCL 40meq PO    UTI/Bacteremia  Pt on antibiotics per team    Anemia  Hb stable     Elevated BP w/o diagnosis of HTN  Started on Norvasc  Better controlled  Monitor
87F PMHx CAD, MI w/ stents, AAA admitted with 1 day of abdominal pain. Pt found to have UTI with bacteremia and left hydro with obstructing stone. Pt seen by urology s/p stent placement. Nephrology consulted for fluctuating renal function.     LISA  Baseline Scr 1.08  Pt admitted with UTI, bacteremia and obstructing kidney stone with left hydronephrosis  Pt s/p ureteral stent placement by urology on 8/25  Renal function improving today  Likely etiology pre-renal/Obstruction  ? hemodynamically mediated in the setting of infection  Monitor renal function  Recheck UA once UTI treatment completed   Avoid nephrotoxins     UTI/Bacteremia  Pt on antibiotics per team    Anemia  Hb stable     Elevated BP w/o diagnosis of HTN  BP remains elevated today  ? in the setting of pain/ infection.  Can add Norvasc 5mg, if remain elevated
87F PMHx CAD, MI w/ stents, AAA admitted with 1 day of abdominal pain. Pt found to have UTI with bacteremia and left hydro with obstructing stone. Pt seen by urology s/p stent placement. Nephrology consulted for fluctuating renal function.     LISA  Baseline Scr 1.08  Pt admitted with UTI, bacteremia and obstructing kidney stone with left hydronephrosis  Pt s/p ureteral stent placement by urology on 8/25  Scr elevated today   ?? passing stone. Would re-image pt if renal function continues to worsen over the weekend to assess if stone is passing or obstructing   If obstructing, pt may need inpatient lithotripsy. Urology actively following   No improvement in SCr with IVF yesterday. Unlikely pre-renal. Cannot r/o ATN however no noted episodes of hypotension  ? hemodynamically mediated in the setting of infection  Pt is on Zosyn for bacteremia. Check CBC with diff in AM r/o AIN as cause of rising SCr . Further abx per ID   Check urine electrolytes  Recheck UA once UTI treatment completed   Avoid nephrotoxins     UTI/Bacteremia  Pt on antibiotics per team    Anemia  Hb stable     Elevated BP w/o diagnosis of HTN  BP remains elevated today  BP was normal before today  ? in the setting of pain/ infection. Monitor
Septicemia with Klebisella.  Due to octsructive uropathy/UTI  Relief of obstruction the key element  Slight uptick in creatinine  Mld WBC elevation  Overall seems to be improving however      8/28: Slight increase in creatining. Unclear why antibiotics were stopped. F/U Cx NTD. GNR isolates have good sensitivity profiles. Bifidobacteria are gut commensals that are used commercially in fermentation. They very rarely play a pathogenic role. I am skeptical that it represents a pathogen presently. Would be early to see AIN related to Zosyn. However there are other options.    Suggestions--  Ceftriaxone 1g/day  Trend CBC  Trend creatinine  Postop care per urology    Please call if ID input needed over the weekend ([phone]). I will be off beginning 8/31, Dr. LEYLA Anthony will be assuming care of my patients in my absence.    Joselo Serrano MD  Attending Physician  [facility]  Division of Infectious Diseases  [phone]
87 years old female with upper epigastric pain with nausea vomiting or diarrhea probably from urinary tract infection     IMPROVE VTE Individual Risk Assessment          RISK                                                          Points  [  ] Previous VTE                                                3  [  ] Thrombophilia                                             2  [  ] Lower limb paralysis                                   2        (unable to hold up >15 seconds)    [  ] Current Cancer                                             2         (within 6 months)  [ x ] Immobilization > 24 hrs                              1  [  ] ICU/CCU stay > 24 hours                             1  [  x] Age > 60                                                         1    IMPROVE VTE Score: 2

## 2020-08-30 NOTE — DISCHARGE NOTE PROVIDER - NSDCMRMEDTOKEN_GEN_ALL_CORE_FT
amLODIPine 10 mg oral tablet: 1 tab(s) orally once a day  aspirin 81 mg oral tablet: 1 tab(s) orally once a day  cefpodoxime 200 mg oral tablet: 1 tab(s) orally 2 times a day   ferrous sulfate 325 mg (65 mg elemental iron) oral tablet: 1 tab(s) orally once a day  pantoprazole 40 mg oral delayed release tablet: 1 tab(s) orally once a day (before a meal)  simvastatin 20 mg oral tablet: 1 tab(s) orally once a day (at bedtime) amLODIPine 10 mg oral tablet: 1 tab(s) orally once a day  aspirin 81 mg oral tablet: 1 tab(s) orally once a day  cefpodoxime 200 mg oral tablet: 1 tab(s) orally 2 times a day   simvastatin 20 mg oral tablet: 1 tab(s) orally once a day (at bedtime)

## 2020-08-30 NOTE — DISCHARGE NOTE PROVIDER - CARE PROVIDERS DIRECT ADDRESSES
[email or web address]

## 2020-08-30 NOTE — PROGRESS NOTE ADULT - SUBJECTIVE AND OBJECTIVE BOX
Dr. Headley  Office [phone]  Cell [phone]  Catalina SMALLWOOD  Cell [phone]      Patient is a 87y old  Female who presents with a chief complaint of increased weakkness (30 Aug 2020 10:29)      Patient seen and examined at bedside. No chest pain/sob    VITALS:  T(F): 98.2 (08-30-20 @ 11:06), Max: 98.8 (08-29-20 @ 16:23)  HR: 66 (08-30-20 @ 11:06)  BP: 133/73 (08-30-20 @ 11:06)  RR: 19 (08-30-20 @ 11:06)  SpO2: 92% (08-30-20 @ 11:06)  Wt(kg): --    08-29 @ 07:01  -  08-30 @ 07:00  --------------------------------------------------------  IN: 460 mL / OUT: 2000 mL / NET: -1540 mL    08-30 @ 07:01  -  08-30 @ 11:09  --------------------------------------------------------  IN: 60 mL / OUT: 0 mL / NET: 60 mL          PHYSICAL EXAM:  Constitutional: NAD  Neck: No JVD  Respiratory: CTAB, no wheezes, rales or rhonchi  Cardiovascular: S1, S2, RRR  Gastrointestinal: BS+, soft, NT/ND  Extremities: No peripheral edema    Hospital Medications:   MEDICATIONS  (STANDING):  amLODIPine   Tablet 10 milliGRAM(s) Oral daily  aspirin enteric coated 81 milliGRAM(s) Oral daily  cefTRIAXone   IVPB      cefTRIAXone   IVPB 1000 milliGRAM(s) IV Intermittent every 24 hours  enoxaparin Injectable 30 milliGRAM(s) SubCutaneous daily  mirtazapine 7.5 milliGRAM(s) Oral at bedtime  pantoprazole    Tablet 40 milliGRAM(s) Oral before breakfast  potassium chloride   Powder 20 milliEquivalent(s) Oral once  simvastatin 20 milliGRAM(s) Oral at bedtime      LABS:  08-30    138  |  107  |  16  ----------------------------<  93  3.4<L>   |  26  |  0.92    Ca    8.1<L>      30 Aug 2020 06:58  Phos  2.8     08-29  Mg     2.2     08-29      Creatinine Trend: 0.92 <--, 1.15 <--, 1.54 <--, 1.47 <--, 1.08 <--, 1.22 <--                                11.7   9.75  )-----------( 198      ( 30 Aug 2020 06:58 )             37.2     Urine Studies:  Urinalysis - [08-25-20 @ 12:40]      Color Yellow / Appearance very cloudy / SG 1.025 / pH 5.0      Gluc Negative / Ketone Moderate  / Bili Negative / Urobili 1       Blood Large / Protein 500 / Leuk Est Moderate / Nitrite Positive      RBC 6-10 / WBC >50 / Hyaline  / Gran  / Sq Epi  / Non Sq Epi Few / Bacteria Many            RADIOLOGY & ADDITIONAL STUDIES:

## 2020-08-30 NOTE — DISCHARGE NOTE PROVIDER - HOSPITAL COURSE
87F PMHx CAD, MI w/ stents, AAA. 1 day of abdominal pain. 4 episdoes of nbnb vomiting and weakness for 1 day.        ER Course: UA: suggestive of UTI, CT abdomen/pelvis:  Mild left hydroureteronephrosis down to level of an 8.8 mm mid ureteral calculus. BMP: renal failure         The Patient was admitted to medical bed. Urology, ID and Nephrology were consulted and followed the patient. The patient was started on IVF and Antibiotics and underwent Cystourethroscopy with Left ureter stent placement. Blood and urine culture were positive for Klebsiella and E. coli. The patient is urinating well and renal failure resolved. She will be discharged home with oral antibiotics and will follow up with Urology in outpatient.

## 2020-08-30 NOTE — DISCHARGE NOTE PROVIDER - NSDCCPCAREPLAN_GEN_ALL_CORE_FT
PRINCIPAL DISCHARGE DIAGNOSIS  Diagnosis: Ureteral calculus, left  Assessment and Plan of Treatment: Ureteral calculus, left.  Plan: CT abdomen/pelvis: Mild left hydroureteronephrosis down to level of an 8.8 mm mid ureteral calculus.   - s/p Cystourethroscopy w/ stent placement   - Urology following   - bladder scan; benign, urinating well, renal function improving.    Problem/Plan - 2:  ·  Problem: Pyelonephritis.  Plan: Klebsiella, E. coli   ID consult appreciated.   cont w/ IV abx.    Problem/Plan - 3:  ·  Problem: Bacteremia.  Plan: Gram negative; Klebsiella   ID consult appreciated.   cont w/ IV abx.    Problem/Plan - 4:  ·  Problem: LISA (acute kidney injury).  Plan: presumed secondary to obstructive ureter stone   improving now   Nephrology consult appreciated  cont to monitor; avoid nephrotoxic medications.    Problem/Plan - 5:  ·  Problem: Hypokalemia.  Plan: replace  Hypophosphatemia; replaced.    Problem/Plan - 6:  Problem: Coronary artery disease involving native heart without angina pectoris, unspecified vessel or lesion type. Plan: cont w/ ASA and statin.   Problem/Plan - 7:  ·  Problem: Benign essential HTN.  Plan: continue home meds.      SECONDARY DISCHARGE DIAGNOSES  Diagnosis: Pyelonephritis  Assessment and Plan of Treatment:     Diagnosis: LISA (acute kidney injury)  Assessment and Plan of Treatment:     Diagnosis: UTI (urinary tract infection)  Assessment and Plan of Treatment: PRINCIPAL DISCHARGE DIAGNOSIS  Diagnosis: Ureteral calculus, left  Assessment and Plan of Treatment: CT abdomen/pelvis: Mild left hydroureteronephrosis down to level of an 8.8 mm mid ureteral calculus.   - s/p Cystourethroscopy w/ stent placement   - Must follow up with Urology Dr. Leary for Stent removal      SECONDARY DISCHARGE DIAGNOSES  Diagnosis: Pyelonephritis  Assessment and Plan of Treatment: Klebsiella, E. coli   continue with Vantin for 9 more days.   Follow up with PCP for monitoring    Diagnosis: Bacteremia  Assessment and Plan of Treatment: Klebsiella, E. coli   continue with Vantin for 9 more days.   Follow up with PCP for monitoring    Diagnosis: LISA (acute kidney injury)  Assessment and Plan of Treatment: Secondary to Urinary obstruction   Resolved w/ Stent placement and IVF   Hypokalemia: replaced.    Diagnosis: Benign essential HTN  Assessment and Plan of Treatment: Continue with Norvasc    Diagnosis: Coronary artery disease involving native heart without angina pectoris, unspecified vessel or lesion type  Assessment and Plan of Treatment: Plan: cont w/ ASA and statin.

## 2020-09-08 NOTE — PHYSICAL EXAM
[General Appearance - Well Developed] : well developed [Normal Appearance] : normal appearance [General Appearance - Well Nourished] : well nourished [Edema] : no peripheral edema [General Appearance - In No Acute Distress] : no acute distress [Well Groomed] : well groomed [Exaggerated Use Of Accessory Muscles For Inspiration] : no accessory muscle use [Respiration, Rhythm And Depth] : normal respiratory rhythm and effort [Abdomen Soft] : soft [Costovertebral Angle Tenderness] : no ~M costovertebral angle tenderness [Abdomen Tenderness] : non-tender [Normal Station and Gait] : the gait and station were normal for the patient's age [] : no rash [Oriented To Time, Place, And Person] : oriented to person, place, and time [No Focal Deficits] : no focal deficits [Mood] : the mood was normal [Not Anxious] : not anxious [Affect] : the affect was normal [No Palpable Adenopathy] : no palpable adenopathy

## 2020-09-08 NOTE — REVIEW OF SYSTEMS
[see HPI] : see HPI [Negative] : Heme/Lymph [Constipation] : constipation [Vomiting] : vomiting [Diarrhea] : diarrhea [History of kidney stones] : history of kidney stones [Urine Infection (bladder/kidney)] : bladder/kidney infection [Joint Pain] : joint pain [Limb Weakness] : limb weakness [Difficulty Walking] : difficulty walking [Muscle Weakness] : muscle weakness [Feelings Of Weakness] : feelings of weakness

## 2020-09-08 NOTE — END OF VISIT
[FreeTextEntry3] : Medical record entries made by the scribe today, were at my direction and personally dictated to them by me, Dr. Perez Leary on 09/08/2020. I have reviewed the chart and agree that the record accurately reflects my personal performance of the history, physical exam, assessment, and plan.

## 2020-09-08 NOTE — ASSESSMENT
[FreeTextEntry1] : 87 year old female presents with 8.8 mm ureteral stone, s/p JJ-stent 8/25/2020. \par \par Encouraged fluids. \par c/s sent-cathspecimen\par \par Will schedule ureteroscopic laser litho with stone extraction and stent exchange\par Reviewed rationale, reisk, altenraitves-all questions answered\par continue atb at least one more week\par will restart 3 days pre op\par \par med clearance pending

## 2020-09-08 NOTE — HISTORY OF PRESENT ILLNESS
[FreeTextEntry1] : 87 year old female presents for discussion re definitive treatment of ureteral stone\par \par Pt with hydro L kidney, 8mm ureteral stone one week ago, urgent L JJ-catheter placement, UTI treated with Iv atb and now with  Cefpodoxime, which she is still taking. \par \par Doing okay now, no dysuria, no fever. \par Here with her two sons to discuss

## 2020-09-19 NOTE — HISTORY OF PRESENT ILLNESS
[FreeTextEntry1] : 87 year old female presents to discuss upcoming procedure for removal of ureteral stone\par \par Pt with hydro L kidney, 8mm ureteral stone two weeks ago, urgent L JJ-catheter placement, UTI treated with Iv atb and now with  Cefpodoxime, which she is still taking. \par \par Patients 2 sons present for discussion\par \par \par

## 2020-09-19 NOTE — ASSESSMENT
[FreeTextEntry1] : 87 year old female with L uretal stone to discuss upcoming procedure. \par \par f/u cath culture today.\par \par Uteroscopic stone extraction with lithotripsy scheduled\par discussed again with pt and two sons\par awaiting med eval and clearance\par Rationale, benefit, risk, and alternatives reviewed. All questions were answered. Pt understood.\par Patients 2 sons were also present, they understood.\par \par

## 2020-09-19 NOTE — END OF VISIT
[FreeTextEntry3] : Medical record entries made by the scribe today, were at my direction and personally dictated to them by me, Dr. Perez Leary on 09/18/2020. I have reviewed the chart and agree that the record accurately reflects my personal performance of the history, physical exam, assessment, and plan.

## 2020-09-23 PROBLEM — K29.60 REFLUX GASTRITIS: Status: ACTIVE | Noted: 2020-01-01

## 2020-09-23 PROBLEM — N20.0 MULTIPLE KIDNEY STONES: Status: ACTIVE | Noted: 2020-01-01

## 2020-09-23 PROBLEM — Z95.0 PACEMAKER: Status: ACTIVE | Noted: 2020-01-01

## 2020-09-23 PROBLEM — E78.00 HIGH CHOLESTEROL: Status: ACTIVE | Noted: 2020-01-01

## 2020-09-23 PROBLEM — D64.9 ANEMIA: Status: ACTIVE | Noted: 2020-01-01

## 2020-09-23 PROBLEM — Z80.1 FAMILY HISTORY OF LUNG CANCER: Status: ACTIVE | Noted: 2020-01-01

## 2020-09-23 PROBLEM — I25.10 CAD (CORONARY ARTERY DISEASE): Status: ACTIVE | Noted: 2020-01-01

## 2020-09-23 PROBLEM — Z82.3 FAMILY HISTORY OF CEREBROVASCULAR ACCIDENT (CVA): Status: ACTIVE | Noted: 2020-01-01

## 2020-09-23 NOTE — HISTORY OF PRESENT ILLNESS
[de-identified] : 87 year old female here to establish care. The patients complete medical, family and social history was documented and reviewed with the patient.  The patients medications were identified and also reviewed with the patient as well as any allergies to any medications. All active and previous medical problems were identified and discussed with the patient.  Any new problems were documented. \par many issues to discuss and needs addressing\par also wishes to have cpe\par admitted to the hospital - discharged 8/30/2020 \par brought in by fidelina bell and home aid dilcia\[place]

## 2020-09-23 NOTE — ASSESSMENT
[FreeTextEntry1] : hcm\par will get record of vaccination history\par does not want hcm - no mammo/colonoscopy\par \par hcp\par discussed with son at length - see above\par \par multiple kidney stones\par seeing urology - possible removal of stent next week\par \par hospital admission\par discharged 8/30/2020 for uti, impaction and dehydration\par \par low blood potassium\par on potassium, however has not been taking, will recheck today\par \par thyroid\par Patient had a diagnosis of thyroid disorder. Blood was drawn to assess levels of TSH and T4. Patient will continue on current dose of medications at this time unless instructed otherwise. Patient was advised to take thyroid medications on a empty stomach and to wait at least 45 minutes before eating for appropriate absorption.\par will check levels\par \par low calcium\par is on calcium, however patient with kidney stoney, will contact urology for opinion on calcium supplements\par \par history of anemia\par on ferrous sulfate every day, could have been issue with impaction, suggest decrease to tiw and repeat bw\par \par hypertension\par The patient has a diagnosis of hypertension. Blood work was drawn in office and will be followed.  The diagnosis was discussed with patient and need for medication compliance and possible side affects and risks of noncompliance. Patient was told to adhere to a low salt diet and try to incorporate exercise daily.\par \par CAD\par on statin, pacemaker, must fu with cardiology\par reflux\par on pantoprazole

## 2020-09-23 NOTE — PHYSICAL EXAM
[Well Nourished] : well nourished [Clear to Auscultation] : lungs were clear to auscultation bilaterally [Regular Rhythm] : with a regular rhythm [Normal S1, S2] : normal S1 and S2 [Normal Affect] : the affect was normal [Alert and Oriented x3] : oriented to person, place, and time [Normal Insight/Judgement] : insight and judgment were intact [de-identified] : weakness globally.   minimal to no movement of her bilateral legs, full arom of both upper extremities, 3+/5, mild movement of her feet

## 2020-09-23 NOTE — HEALTH RISK ASSESSMENT
[No] : No [No falls in past year] : Patient reported no falls in the past year [0] : 2) Feeling down, depressed, or hopeless: Not at all (0) [Patient declined mammogram] : Patient declined mammogram [Patient declined PAP Smear] : Patient declined PAP Smear [Patient declined bone density test] : Patient declined bone density test [Patient declined colonoscopy] : Patient declined colonoscopy [Alone] : lives alone [Retired] : retired [] :  [# Of Children ___] : has [unfilled] children [Independent] : using telephone [Full assistance needed] : managing finances [With Patient/Caregiver] : With Patient/Caregiver [Fair] : ~his/her~ current health as fair  [Very Good] : ~his/her~  mood as very good [] : No [[id number]] : 0 [de-identified] : with aid or son  [de-identified] : medications/finances done by children [AdvancecareDate] : 09/23/2020 [FreeTextEntry4] : does not have health care proxy - 18 minutes - will have the conversation - appoint one of her sons

## 2020-09-29 NOTE — ED PROVIDER NOTE - CLINICAL SUMMARY MEDICAL DECISION MAKING FREE TEXT BOX
Hydrated and  will send home with Blood cx and follow up with urology Hydrated and  will send home with Blood cx and follow up.    Unable to tolerate PO omero will admit for close monitoring.

## 2020-09-29 NOTE — ED PROVIDER NOTE - OBJECTIVE STATEMENT
presents with vomiting at PST.  Scheduled for stent removal this coming friday Develped N/V while waiting.  pt is bed bound and normally vomits under these conditions as per son.  Mental status is at baseline as per son. presents with vomiting at PST.  Scheduled for stent removal this coming friday Develped N/V while waiting.  pt is bed bound and normally vomits under these conditions as per son.  Mental status is at baseline as per son.    Denies abd pain

## 2020-09-29 NOTE — ED PROVIDER NOTE - PATIENT PORTAL LINK FT
You can access the FollowMyHealth Patient Portal offered by [facility] by registering at the following website: [email or web address]. By joining [facility]’s FollowMyHealth portal, you will also be able to view your health information using other applications (apps) compatible with our system.

## 2020-09-29 NOTE — ED ADULT TRIAGE NOTE - CHIEF COMPLAINT QUOTE
pt brought from pre surgical testing , projectile vomiting , pt has a stent in bladder to be removed , pt more sleepy than ususal as per son

## 2020-09-29 NOTE — ED ADULT NURSE NOTE - NSIMPLEMENTINTERV_GEN_ALL_ED
Implemented All Fall Risk Interventions:  [place] to call system. Call bell, personal items and telephone within reach. Instruct patient to call for assistance. Room bathroom lighting operational. Non-slip footwear when patient is off stretcher. Physically safe environment: no spills, clutter or unnecessary equipment. Stretcher in lowest position, wheels locked, appropriate side rails in place. Provide visual cue, wrist band, yellow gown, etc. Monitor gait and stability. Monitor for mental status changes and reorient to person, place, and time. Review medications for side effects contributing to fall risk. Reinforce activity limits and safety measures with patient and family.

## 2020-09-29 NOTE — ED PROVIDER NOTE - CARE PROVIDER_API CALL
Perez Leary  UROLOGY  [address], [address] [place] [address]  Phone: [phone]  Fax: [phone]  Established Patient  Follow Up Time: 1-3 Days

## 2020-09-29 NOTE — ED PROVIDER NOTE - PHYSICAL EXAMINATION
[place]:  General: No distress.  Mentation at baseline.   HEENT: WNL  Chest/Lungs: CTAB, No wheeze, No retractions, No increased work of breathing, Normal rate  Heart: S1S2 RRR, No M/R/G, Pules equal Bilaterally in upper and lower extremities distally  Abd: soft, NT/ND, No guarding, No rebound.  No hernias, no palpable masses.  Extrem: FROM in all joints, no significant edema noted, No ulcers.  Cap refil < 2sec.  Skin: No rash noted, warm dry.  Neuro:  Grossly normal.  No difficulty ambulating. No focal deficits.  Psychiatric: No evidence of delusions. No SI/HI.

## 2020-09-29 NOTE — ED ADULT NURSE REASSESSMENT NOTE - NS ED NURSE REASSESS COMMENT FT1
Report given to LALO valiente,  at change of shift. Review of patients reason for hospital visit and emergency department care reviewed and history of patient discussed. Pt is in no notable distress at this time.

## 2020-09-29 NOTE — H&P ADULT - ASSESSMENT
88 y/o female PMH MI CAD s/p CABG x 4, HTN, HLD and AAA, presents with one week history of nausea and now two days of vomiting   Sepsis and UTI- with recent infection with klebsiella start Zosyn and follow urine and blood culture - renal ultrasound ? possible stent blockage   ARF- Prerenal and possible stent blockage   CAD / Htn- cont aspirin Norvasc home med , statin, unclear as to why NOT on BB?   DVT PPX- Heparin SQ

## 2020-09-30 NOTE — CHART NOTE - NSCHARTNOTEFT_GEN_A_CORE
Patient is a 87y old  Female who presents with a chief complaint of Nausea vomiting early sepsis UTI. Patient status post blood cx result with GPC in pairs. Patient was on zosyn but ABX was discontinued. patient is currently asymptomatic. Primary care team will follow with appropriate plan of care.      Vital Signs Last 24 Hrs  T(C): 36.6 (30 Sep 2020 17:29), Max: 36.7 (30 Sep 2020 11:06)  T(F): 97.9 (30 Sep 2020 17:29), Max: 98 (30 Sep 2020 11:06)  HR: 73 (30 Sep 2020 17:29) (72 - 88)  BP: 154/81 (30 Sep 2020 17:29) (122/73 - 154/81)  BP(mean): --  RR: 18 (30 Sep 2020 17:29) (16 - 18)  SpO2: 98% (30 Sep 2020 17:29) (97% - 98%)      Labs:                          12.0   10.22 )-----------( 243      ( 29 Sep 2020 13:01 )             37.3     09-30    140  |  105  |  8   ----------------------------<  98  3.5   |  29  |  0.91    Ca    9.1      30 Sep 2020 14:35  Phos  2.7     09-30  Mg     2.0     09-30    TPro  8.9<H>  /  Alb  3.3  /  TBili  0.5  /  DBili  x   /  AST  26  /  ALT  15  /  AlkPhos  102  09-29        Radiology:    Physical Exam:  General: WN/WD NAD  Neurology: A&Ox3, nonfocal, MORALES x 4  Head:  Normocephalic, atraumatic  Respiratory: CTA B/L  CV: RRR, S1S2, no murmur  Abdominal: Soft, NT, ND no palpable mass  MSK: No edema, + peripheral pulses, FROM all 4 extremity    Assessment & Plan:  HPI:  86 y/o female PMH MI CAD s/p CABG x 4, HTN, HLD and AAA, presents with one week history of nausea and now two days of vomiting , patient was recently discharged from  hospital after being treated for ARF and  bacteremia and sepsis 2/2 left 8.8 midureteral calculus and post obstructive ARF last month, patient underwent Cystourethroscopy with insertion of stent and UTI with Klebsiella pneumoniae, was sent home on oral antibiotics and they finished about two weeks ago.  Today denied any flank pain- admits to dysuria and frequency and nausea nd vomiting in ED is found to have positive UA and ARF. States she has been taking her medications regularly despite her nausea and only today since being in ED she hasn't taken her home meds   Now denied any fever or chills denied any cough or sick contacts  (29 Sep 2020 19:26)    >  >  >  >  I&O's Detail    30 Sep 2020 07:01  -  30 Sep 2020 22:41  --------------------------------------------------------  IN:    IV PiggyBack: 100 mL    Oral Fluid: 1080 mL  Total IN: 1180 mL    OUT:    Voided (mL): 1150 mL  Total OUT: 1150 mL    Total NET: 30 mL            Follow up with Attending in AM.

## 2020-09-30 NOTE — PROGRESS NOTE ADULT - ASSESSMENT
88 y/o female PMH MI s/p CABG x 4, HTN, HLD with remote h/o sepsis, bacteremia, ARF 2/2 Left 8.8 midureteral calculus s/p Cysto ureteroscopy with insertion of stent on 8/25/20 with Dr. Leary who was seen in PST yesterday for scheduled Uscope and possible stent removal this Friday, however patient developed Nausea/vomiting and was sent to the ER for further evaluation.   Patient admitted with nausea/vomiting, unable to tolerate po intake  Renal US shows Left ureteral stent and non obstructive L nephrolithiasis.   UCx no growth     Assessment and Plan:   1. Nausea and vomiting , unable to tolerate PO intake  possibly ? stent blockage?   recent admission for klebsiella and E.coli  pyelonephritis sec to  Left ureteral 8.8mm calculus s/p Cystourethroscopy w/ stent placement 8/25/20 with Dr. Leary  9/30 Renal US: Left ureteral stent. Nonobstructive left nephrolithiasis. No hydronephrosis.  Urology consult appreciated, plan for ureteroscopy/laser lithotripsy/stent exchange Fri  UCx no growth,   will hold abx for now , discussed with Urology   NPO MN Thurs, Preop labs, Covid: neg      2. LISA (acute kidney injury), presumed sec to intravascular depletion from vomiting ?   s/p 2L IVF   follow BMP  cont to monitor; avoid nephrotoxic medications.     3. Hypokalemia --replete and monitor     4.  Coronary artery disease involving native heart without angina pectoris, unspecified vessel or lesion type.   cont aspirin Norvasc home med , statin, unclear as to why NOT on BB?     5. Benign essential HTN.  Plan: continue home meds.     6.  Preventive measure.  Plan: DVTp: Heparin SQ-dvt ppx  fall precautions      86 y/o female PMH MI s/p CABG x 4, HTN, HLD with remote h/o sepsis, bacteremia, ARF 2/2 Left 8.8 midureteral calculus s/p Cysto ureteroscopy with insertion of stent on 8/25/20 with Dr. Leary who was seen in PST yesterday for scheduled Uscope and possible stent removal this Friday, however patient developed Nausea/vomiting and was sent to the ER for further evaluation.   Patient admitted with nausea/vomiting, unable to tolerate po intake  Renal US shows Left ureteral stent and non obstructive L nephrolithiasis.   UCx no growth     Functional Quadriplegia   patient noted to have b/l foot drop R>L  she stated she has been bedbound for past 6 mo. denies history of CVA   denies back pain . stated suddenly could not ambulate x 6 months.   strength b/l UE 5/5 ; strength b/l LE 0-1/5  will obtain vitamin B12, RPR, folate and vit D levels, TSH  neurology consult --Dr. LAVON Charles   will obtain CT brain     Assessment and Plan:   1. Nausea and vomiting , unable to tolerate PO intake  possibly ? stent blockage?   recent admission for klebsiella and E.coli  pyelonephritis sec to  Left ureteral 8.8mm calculus s/p Cystourethroscopy w/ stent placement 8/25/20 with Dr. Leary  9/30 Renal US: Left ureteral stent. Nonobstructive left nephrolithiasis. No hydronephrosis.  Urology consult appreciated, plan for ureteroscopy/laser lithotripsy/stent exchange Fri  UCx no growth,   will hold abx for now , discussed with Urology   NPO MN Thurs, Preop labs,   Covid: neg      2. LISA (acute kidney injury), presumed sec to intravascular depletion from vomiting ?   s/p 2L IVF   follow BMP  cont to monitor; avoid nephrotoxic medications.     3. Hypokalemia --replete and monitor     4.  Coronary artery disease involving native heart without angina pectoris, unspecified vessel or lesion type.   cont aspirin Norvasc home med , statin, unclear as to why NOT on BB?     5. Benign essential HTN.  Plan: continue home meds.     6.  Preventive measure.  Plan: DVTp: Heparin SQ-dvt ppx  fall precautions

## 2020-09-30 NOTE — PROGRESS NOTE ADULT - SUBJECTIVE AND OBJECTIVE BOX
HPI:  86 y/o female PMH MI CAD s/p CABG x 4, HTN, HLD and AAA, presents with one week history of nausea and now two days of vomiting , patient was recently discharged from  hospital after being treated for ARF and  bacteremia and sepsis 2/2 left 8.8 midureteral calculus and post obstructive ARF last month, patient underwent Cystourethroscopy with insertion of stent and UTI with Klebsiella pneumoniae, was sent home on oral antibiotics and they finished about two weeks ago.  Today denied any flank pain- admits to dysuria and frequency and nausea nd vomiting in ED is found to have positive UA and ARF. States she has been taking her medications regularly despite her nausea and only today since being in ED she hasn't taken her home meds   Now denied any fever or chills denied any cough or sick contacts  (29 Sep 2020 19:26)      SUBJECTIVE & OBJECTIVE: Pt seen and examined at bedside.   no overnight events.     PHYSICAL EXAM:  T(C): 36.7 (20 @ 11:06), Max: 36.8 (20 @ 14:00)  HR: 72 (20 @ 11:06) (67 - 88)  BP: 122/73 (20 @ 11:06) (101/52 - 162/103)  RR: 17 (20 @ 11:06) (15 - 21)  SpO2: 98% (20 @ 11:06) (96% - 98%)  Wt(kg): --   Weight (kg): 69 ( @ 23:08)  I&O's Detail    30 Sep 2020 07:01  -  30 Sep 2020 12:42  --------------------------------------------------------  IN:    Oral Fluid: 480 mL  Total IN: 480 mL    OUT:  Total OUT: 0 mL    Total NET: 480 mL        GENERAL: NAD, well-groomed, well-developed  HEAD:  Atraumatic, Normocephalic  EYES: EOMI, PERRLA, conjunctiva and sclera clear  ENMT: Moist mucous membranes  NECK: Supple, No JVD  NERVOUS SYSTEM:  Alert & Oriented X3, nonfocal   CHEST/LUNG: CTAB  HEART: Regular rate and rhythm; No murmurs, rubs, or gallops  ABDOMEN: Soft, Nontender, Nondistended; Bowel sounds present  EXTREMITIES:  2+ Peripheral Pulses b/l, No clubbing, cyanosis, or edema b/l     MEDICATIONS  (STANDING):  amLODIPine   Tablet 10 milliGRAM(s) Oral daily  aspirin enteric coated 81 milliGRAM(s) Oral daily  heparin   Injectable 5000 Unit(s) SubCutaneous every 8 hours  piperacillin/tazobactam IVPB.. 3.375 Gram(s) IV Intermittent every 8 hours  simvastatin 20 milliGRAM(s) Oral at bedtime    MEDICATIONS  (PRN):  bisacodyl 5 milliGRAM(s) Oral every 12 hours PRN Constipation  ondansetron Injectable 4 milliGRAM(s) IV Push three times a day PRN Nausea and/or Vomiting      LABS:                        12.0   10.22 )-----------( 243      ( 29 Sep 2020 13:01 )             37.3         140  |  105  |  12  ----------------------------<  162<H>  3.3<L>   |  26  |  1.30    Ca    9.5      29 Sep 2020 13:02    TPro  8.9<H>  /  Alb  3.3  /  TBili  0.5  /  DBili  x   /  AST  26  /  ALT  15  /  AlkPhos  102        Urinalysis Basic - ( 29 Sep 2020 16:58 )    Color: Yellow / Appearance: Clear / S.015 / pH: x  Gluc: x / Ketone: Negative  / Bili: Negative / Urobili: Negative mg/dL   Blood: x / Protein: 15 mg/dL / Nitrite: Negative   Leuk Esterase: Moderate / RBC: 25-50 /HPF / WBC 6-10   Sq Epi: x / Non Sq Epi: Few / Bacteria: Few        RECENT CULTURES:  Culture - Urine (20 @ 15:00)    Specimen Source: .Urine Clean Catch (Midstream)    Culture Results:   <10,000 CFU/mL Normal Urogenital Ivon          RADIOLOGY & ADDITIONAL TESTS:    Imaging Personally Reviewed:  [ ] YES  [ ] NO    Consultant(s) Notes Reviewed:  [x] YES  [ ] NO    Care Discussed with Consultants/Other Providers [x] YES  [ ] NO    Care discussed in detail with patient.  All questions and concerns addressed.      HPI:  86 y/o female PMH MI CAD s/p CABG x 4, HTN, HLD and AAA, presents with one week history of nausea and now two days of vomiting , patient was recently discharged from  hospital after being treated for ARF and  bacteremia and sepsis 2/2 left 8.8 midureteral calculus and post obstructive ARF last month, patient underwent Cystourethroscopy with insertion of stent and UTI with Klebsiella pneumoniae, was sent home on oral antibiotics and they finished about two weeks ago.  Today denied any flank pain- admits to dysuria and frequency and nausea nd vomiting in ED is found to have positive UA and ARF. States she has been taking her medications regularly despite her nausea and only today since being in ED she hasn't taken her home meds   Now denied any fever or chills denied any cough or sick contacts  (29 Sep 2020 19:26)      SUBJECTIVE & OBJECTIVE: Pt seen and examined at bedside.   no overnight events.   complaining of left ankle pain. denied trauma   + constipation     Denies chills, N/V, dizziness, HA, cough, CP, palpitations, SOB, abdominal pain, dysuria.     PHYSICAL EXAM:  T(C): 36.7 (20 @ 11:06), Max: 36.8 (20 @ 14:00)  HR: 72 (20 @ 11:06) (67 - 88)  BP: 122/73 (20 @ 11:06) (101/52 - 162/103)  RR: 17 (20 @ 11:06) (15 - 21)  SpO2: 98% (20 @ 11:06) (96% - 98%)  Wt(kg): --   Weight (kg): 69 ( @ 23:08)  I&O's Detail    30 Sep 2020 07:01  -  30 Sep 2020 12:42  --------------------------------------------------------  IN:    Oral Fluid: 480 mL  Total IN: 480 mL    OUT:  Total OUT: 0 mL    Total NET: 480 mL        GENERAL: NAD, speaking in full sentences   HEAD:  Atraumatic, Normocephalic  EYES: EOMI, PERRLA, conjunctiva and sclera clear  ENMT: Moist mucous membranes  NECK: Supple, No JVD  NERVOUS SYSTEM:  Alert & Oriented X3, nonfocal   CHEST/LUNG: CTAB  HEART: Regular rate and rhythm; No murmurs, rubs, or gallops  ABDOMEN: Soft, Nontender, Nondistended; Bowel sounds present  EXTREMITIES:  2+ Peripheral Pulses b/l, No clubbing, cyanosis, or edema b/l   b/l foot drop   NO pain on palpation of spine     MEDICATIONS  (STANDING):  amLODIPine   Tablet 10 milliGRAM(s) Oral daily  aspirin enteric coated 81 milliGRAM(s) Oral daily  heparin   Injectable 5000 Unit(s) SubCutaneous every 8 hours  piperacillin/tazobactam IVPB.. 3.375 Gram(s) IV Intermittent every 8 hours  simvastatin 20 milliGRAM(s) Oral at bedtime    MEDICATIONS  (PRN):  bisacodyl 5 milliGRAM(s) Oral every 12 hours PRN Constipation  ondansetron Injectable 4 milliGRAM(s) IV Push three times a day PRN Nausea and/or Vomiting      LABS:                                   12.0   10.22 )-----------( 243      ( 29 Sep 2020 13:01 )             37.3   09-30    140  |  105  |  8   ----------------------------<  98  3.5   |  29  |  0.91    Ca    9.1      30 Sep 2020 14:35  Phos  2.7     09-30  Mg     2.0     09-30    TPro  8.9<H>  /  Alb  3.3  /  TBili  0.5  /  DBili  x   /  AST  26  /  ALT  15  /  AlkPhos  102  09-29    Urinalysis Basic - ( 29 Sep 2020 16:58 )    Color: Yellow / Appearance: Clear / S.015 / pH: x  Gluc: x / Ketone: Negative  / Bili: Negative / Urobili: Negative mg/dL   Blood: x / Protein: 15 mg/dL / Nitrite: Negative   Leuk Esterase: Moderate / RBC: 25-50 /HPF / WBC 6-10   Sq Epi: x / Non Sq Epi: Few / Bacteria: Few        RECENT CULTURES:  Culture - Urine (20 @ 15:00)    Specimen Source: .Urine Clean Catch (Midstream)    Culture Results:   <10,000 CFU/mL Normal Urogenital Ivon          RADIOLOGY & ADDITIONAL TESTS:  < from: Xray Chest 1 View- PORTABLE-Urgent (20 @ 13:05) >  INTERPRETATION:  History: Vomiting    Portable chest x-ray is compared to 2020.    Studies rotated. Sternal wires and mediastinal clips are again seen. The heart is not enlarged. The lungs are clear. There is osteopenia of the bony structures.    Impression: Pacemaker. CABG. No active disease.    < end of copied text >      Imaging Personally Reviewed:  [ ] YES  [ ] NO    Consultant(s) Notes Reviewed:  [x] YES  [ ] NO    Care Discussed with Consultants/Other Providers [x] YES  [ ] NO    Care discussed in detail with patient.  All questions and concerns addressed.      HPI:  88 y/o female PMH MI CAD s/p CABG x 4, HTN, HLD and AAA, presents with one week history of nausea and now two days of vomiting , patient was recently discharged from  hospital after being treated for ARF and  bacteremia and sepsis 2/2 left 8.8 midureteral calculus and post obstructive ARF last month, patient underwent Cystourethroscopy with insertion of stent and UTI with Klebsiella pneumoniae, was sent home on oral antibiotics and they finished about two weeks ago.  Today denied any flank pain- admits to dysuria and frequency and nausea nd vomiting in ED is found to have positive UA and ARF. States she has been taking her medications regularly despite her nausea and only today since being in ED she hasn't taken her home meds   Now denied any fever or chills denied any cough or sick contacts  (29 Sep 2020 19:26)      SUBJECTIVE & OBJECTIVE: Pt seen and examined at bedside.   no overnight events.   complaining of left ankle pain. denied trauma   + constipation     Denies chills, N/V, dizziness, HA, cough, CP, palpitations, SOB, abdominal pain, dysuria.     PHYSICAL EXAM:  T(C): 36.7 (20 @ 11:06), Max: 36.8 (20 @ 14:00)  HR: 72 (20 @ 11:06) (67 - 88)  BP: 122/73 (20 @ 11:06) (101/52 - 162/103)  RR: 17 (20 @ 11:06) (15 - 21)  SpO2: 98% (20 @ 11:06) (96% - 98%)  Wt(kg): --   Weight (kg): 69 ( @ 23:08)  I&O's Detail    30 Sep 2020 07:01  -  30 Sep 2020 12:42  --------------------------------------------------------  IN:    Oral Fluid: 480 mL  Total IN: 480 mL    OUT:  Total OUT: 0 mL    Total NET: 480 mL        GENERAL: NAD, speaking in full sentences   HEAD:  Atraumatic, Normocephalic  EYES: EOMI, PERRLA, conjunctiva and sclera clear  ENMT: Moist mucous membranes  NECK: Supple, No JVD  NERVOUS SYSTEM:  Alert & Oriented X3, strength b/l UE 5/5 ; strength b/l LE 0-1/5  CHEST/LUNG: CTAB  HEART: Regular rate and rhythm; No murmurs, rubs, or gallops  ABDOMEN: Soft, Nontender, Nondistended; Bowel sounds present  EXTREMITIES:  2+ Peripheral Pulses b/l, No clubbing, cyanosis, or edema b/l   b/l foot drop   NO pain on palpation of spine     MEDICATIONS  (STANDING):  amLODIPine   Tablet 10 milliGRAM(s) Oral daily  aspirin enteric coated 81 milliGRAM(s) Oral daily  heparin   Injectable 5000 Unit(s) SubCutaneous every 8 hours  piperacillin/tazobactam IVPB.. 3.375 Gram(s) IV Intermittent every 8 hours  simvastatin 20 milliGRAM(s) Oral at bedtime    MEDICATIONS  (PRN):  bisacodyl 5 milliGRAM(s) Oral every 12 hours PRN Constipation  ondansetron Injectable 4 milliGRAM(s) IV Push three times a day PRN Nausea and/or Vomiting      LABS:                                   12.0   10.22 )-----------( 243      ( 29 Sep 2020 13:01 )             37.3   09-30    140  |  105  |  8   ----------------------------<  98  3.5   |  29  |  0.91    Ca    9.1      30 Sep 2020 14:35  Phos  2.7     09-30  Mg     2.0     09-30    TPro  8.9<H>  /  Alb  3.3  /  TBili  0.5  /  DBili  x   /  AST  26  /  ALT  15  /  AlkPhos  102  09-29    Urinalysis Basic - ( 29 Sep 2020 16:58 )    Color: Yellow / Appearance: Clear / S.015 / pH: x  Gluc: x / Ketone: Negative  / Bili: Negative / Urobili: Negative mg/dL   Blood: x / Protein: 15 mg/dL / Nitrite: Negative   Leuk Esterase: Moderate / RBC: 25-50 /HPF / WBC 6-10   Sq Epi: x / Non Sq Epi: Few / Bacteria: Few        RECENT CULTURES:  Culture - Urine (20 @ 15:00)    Specimen Source: .Urine Clean Catch (Midstream)    Culture Results:   <10,000 CFU/mL Normal Urogenital Ivon          RADIOLOGY & ADDITIONAL TESTS:  < from: Xray Chest 1 View- PORTABLE-Urgent (20 @ 13:05) >  INTERPRETATION:  History: Vomiting    Portable chest x-ray is compared to 2020.    Studies rotated. Sternal wires and mediastinal clips are again seen. The heart is not enlarged. The lungs are clear. There is osteopenia of the bony structures.    Impression: Pacemaker. CABG. No active disease.    < end of copied text >      Imaging Personally Reviewed:  [ ] YES  [ ] NO    Consultant(s) Notes Reviewed:  [x] YES  [ ] NO    Care Discussed with Consultants/Other Providers [x] YES  [ ] NO    Care discussed in detail with patient.  All questions and concerns addressed.

## 2020-09-30 NOTE — PROVIDER CONTACT NOTE (CRITICAL VALUE NOTIFICATION) - SITUATION
blood culture form aerobic bottle:  Gram positive cocci in pairs.  PCR in 3 hrs
blood culture growth from aerobic bottle:  Gram positive cocci in pairs.  PCR in 3 hrs
blood culture form aerobic bottle:  Gram positive cocci in pairs.  PCR in 3 hrs

## 2020-09-30 NOTE — CONSULT NOTE ADULT - ASSESSMENT
Subjective Complaints:      Consult requested by ER doctor:                  Attending:     History of Present Illness:  Chief Complaint/Reason for Admission:  History of Present Illness:  HPI:  86 y/o female PMH MI CAD s/p CABG x 4, HTN, HLD and AAA, presents with one week history of nausea and now two days of vomiting , patient was recently discharged from  hospital after being treated for ARF and  bacteremia and sepsis 2/2 left 8.8 midureteral calculus and post obstructive ARF last month, patient underwent Cystourethroscopy with insertion of stent and UTI with Klebsiella pneumoniae, was sent home on oral antibiotics and they finished about two weeks ago.  Today denied any flank pain- admits to dysuria and frequency and nausea nd vomiting in ED is found to have positive UA and ARF. States she has been taking her medications regularly despite her nausea and only today since being in ED she hasn't taken her home meds   Now denied any fever or chills denied any cough or sick contacts  (29 Sep 2020 19:26)        PAST MEDICAL & SURGICAL HISTORY:  CAD (coronary artery disease)    AAA (abdominal aortic aneurysm)    Myocardial infarction    HLD (hyperlipidemia)    Benign essential HTN    S/P CABG x 4    87yFemale    MEDICATIONS  (STANDING):  amLODIPine   Tablet 10 milliGRAM(s) Oral daily  aspirin enteric coated 81 milliGRAM(s) Oral daily  heparin   Injectable 5000 Unit(s) SubCutaneous every 8 hours  senna 2 Tablet(s) Oral at bedtime  simvastatin 20 milliGRAM(s) Oral at bedtime    MEDICATIONS  (PRN):  bisacodyl 5 milliGRAM(s) Oral every 12 hours PRN Constipation  ondansetron Injectable 4 milliGRAM(s) IV Push three times a day PRN Nausea and/or Vomiting      Allergies    No Known Allergies    Intolerances      FAMILY HISTORY:      REVIEW OF SYSTEMS:  General:  No wt loss, fevers, chills, night sweats  Eyes:  Good vision, no reported pain  ENT:  No sore throat, pain, runny nose, dysphagia  CV:  No pain, palpitatioins, hypo/hypertension  Resp:  No dyspnea, cough, tachypnea, wheezing  GI:  No pain, nausea, vomiting, diarrhea, constipatiion  :  No pain, bleeding, incontinence, nocturia  Muscle:  No pain, weakness  Breast:  No pain, abscess, mass, discharge  Neuro:  No weakness, tingling, memory problems  Psych:  No fatigue, insomnia, mood problems, depression  Endocrine:  No polyuria, polydypsia, cold/heat intolerance  Heme:  No petechiae, ecchymosis, easy bruisability  Skin:  No rash, tattoos, scars, edema      Vital Signs Last 24 Hrs  T(C): 36.6 (30 Sep 2020 17:29), Max: 36.7 (29 Sep 2020 21:58)  T(F): 97.9 (30 Sep 2020 17:29), Max: 98 (29 Sep 2020 21:58)  HR: 73 (30 Sep 2020 17:29) (72 - 88)  BP: 154/81 (30 Sep 2020 17:29) (122/73 - 154/81)  BP(mean): --  RR: 18 (30 Sep 2020 17:29) (16 - 21)  SpO2: 98% (30 Sep 2020 17:29) (96% - 98%)    GENERAL PHYSICAL EXAM:  General:  Appears stated age, well-groomed, well-nourished, no distress  HEENT:  NC/AT, patent nares w/ pink mucosa, OP clear w/o lesions, PERRL, EOMI, conjunctivae clear, no thyromegaly, nodules, adenopathy, no JVD  Chest:  Full & symmetric excursion, no increased effort, breath sounds clear  Cardiovascular:  Regular rhythm, S1, S2, no murmur/rub/S3/S4, no carotid/femoral/abdominal bruit, radial/pedal pulses 2+, no edema  Abdomen:  Soft, non-tender, non-distended, normoactive bowel sounds, no HSM  Extremities:  Gait & station:   Digits:   Nails:   Joints, Bones, Muscles:   ROM:   Stability:  Skin:  No rash/erythema/ecchymoses/petechiae/wounds/abscess/warm/dry  Musculoskeletal:  Full ROM in all joints w/o swelling/tenderness/effusion    NEUROLOGICAL EXAM:  HENT:  Normocephalic head; atraumatic head.  Neck supple.  ENT: normal looking.  Mental State:    Alert.  Fully oriented to person, place and date.  Coherent.  Speech clear and intact.  Cooperative.  Responds appropriately.    Cranial Nerves:  II-XII:   Pupils round and reactive to light and accommodation.  Extraocular movements full.  Visual fields full (no homonymous hemianopsia).  Visual acuity wnl.  Facial symmetry intact.  Tongue midline.  Motor Functions:  Moves all extremities.        arm 3/5 legs weakNESS CARLY FOOT DROP  WASTING OF TIBIALIS ANTERIOR  WEAKNESS PLANTER FLEXION 3/5   Sensory Functions:   Intact to touch and pinprick to face and extremities.    Reflexes:  Deep tendon reflexess,  ABSENT Babinski absent).  Cerebellar Testing:    Finger to nose intact.  Nystagmus absent.  Neurovascular: Carotid auscultation full without bruits.      LABS:                        12.0   10.22 )-----------( 243      ( 29 Sep 2020 13:01 )             37.3     09-30    140  |  105  |  8   ----------------------------<  98  3.5   |  29  |  0.91    Ca    9.1      30 Sep 2020 14:35  Phos  2.7     -  Mg     2.0     -    TPro  8.9<H>  /  Alb  3.3  /  TBili  0.5  /  DBili  x   /  AST  26  /  ALT  15  /  AlkPhos  102  -        Urinalysis Basic - ( 29 Sep 2020 16:58 )    Color: Yellow / Appearance: Clear / S.015 / pH: x  Gluc: x / Ketone: Negative  / Bili: Negative / Urobili: Negative mg/dL   Blood: x / Protein: 15 mg/dL / Nitrite: Negative   Leuk Esterase: Moderate / RBC: 25-50 /HPF / WBC 6-10   Sq Epi: x / Non Sq Epi: Few / Bacteria: Few        RADIOLOGY & ADDITIONAL STUDIES:      Assessment & Opinion:  EVENTS NOTED  AWAKE ALERT  SPEECH FLUENT FOLLOW COMMANDS POOR MEMEORY POOR HISTORIAN  CT HEAD  SMALL VESSEL DISEASE  SMALL LACUNAR  INFARCT  NO BLEED ARM 3/5 LEGS WEAKN ESS CARLY FOOT DROP  NO SEIZURE HX OF UTI HAD STENT CAD DISEASE  FOR TSH B12 RPR EEG ON ASA 81 MG  WILL FOLLOW METABOLIC ENCEPHALAOPATHY     Recommendations:  .  Carotid doppler.  Echocardiogram.  EEG.   DVT prophylaxis as ordered. TSH B12 RPR   Medications:

## 2020-09-30 NOTE — CONSULT NOTE ADULT - ATTENDING COMMENTS
as above  scheduled for ureteroscopy/laser lithotripsy/stent exchange Fri  Neg urine c/s last week   admission for nausea/vomited but none since admit-currently eatig without issue    To preop tomorrow for OR Fri

## 2020-09-30 NOTE — CONSULT NOTE ADULT - SUBJECTIVE AND OBJECTIVE BOX
UROLOGY CONSULT NOTE    Patient is a 87y old  Female who presents with a chief complaint of Nausea vomiting early sepsis UTI (29 Sep 2020 19:26)    HPI:  86 y/o female PMH MI CAD s/p CABG x 4, HTN, HLD and AAA, presents with one week history of nausea and now two days of vomiting , patient was recently discharged from [facility] after being treated for ARF and  bacteremia and sepsis 2/2 left 8.8 midureteral calculus and post obstructive ARF last month, patient underwent Cystourethroscopy with insertion of stent and UTI with Klebsiella pneumoniae, was sent home on oral antibiotics and they finished about two weeks ago.  Today denied any flank pain- admits to dysuria and frequency and nausea nd vomiting in ED is found to have positive UA and ARF. States she has been taking her medications regularly despite her nausea and only today since being in ED she hasn't taken her home meds   Now denied any fever or chills denied any cough or sick contacts  (29 Sep 2020 19:26)    PAST MEDICAL & SURGICAL HISTORY:  CAD (coronary artery disease)    AAA (abdominal aortic aneurysm)    Myocardial infarction    HLD (hyperlipidemia)    Benign essential HTN    S/P CABG x 4      Review of Systems:  Contained within HPI    MEDICATIONS  (STANDING):  amLODIPine   Tablet 10 milliGRAM(s) Oral daily  aspirin enteric coated 81 milliGRAM(s) Oral daily  heparin   Injectable 5000 Unit(s) SubCutaneous every 8 hours  piperacillin/tazobactam IVPB.. 3.375 Gram(s) IV Intermittent every 8 hours  simvastatin 20 milliGRAM(s) Oral at bedtime    MEDICATIONS  (PRN):  bisacodyl 5 milliGRAM(s) Oral every 12 hours PRN Constipation  ondansetron Injectable 4 milliGRAM(s) IV Push three times a day PRN Nausea and/or Vomiting      Allergies    No Known Allergies      SOCIAL HISTORY   Denies tobacco, alcohol or illicit drug use     FAMILY HISTORY:  Denies    Vital Signs Last 24 Hrs  T(C): 36.6 (30 Sep 2020 05:33), Max: 36.8 (29 Sep 2020 12:31)  T(F): 97.9 (30 Sep 2020 05:33), Max: 98.3 (29 Sep 2020 12:31)  HR: 74 (30 Sep 2020 05:33) (66 - 88)  BP: 135/78 (30 Sep 2020 05:33) (62/47 - 162/103)  RR: 16 (30 Sep 2020 05:33) (15 - 21)  SpO2: 97% (30 Sep 2020 05:33) (96% - 98%)    Physical Exam:    General:  NAD  Eyes : APRIL  HENT:  WNL, no JVD  Chest:  clear breath sounds  Cardiovascular: S1S2, Regular rate & rhythm  Abdomen:  Soft, NTND  : No SP tenderness or bladder distention   Extremities: No calf tenderness b/l  Skin:  No rash  Musculoskeletal:  normal strength  Neuro/Psych:  Alert, and awake      LABS:                        12.0   10.22 )-----------( 243      ( 29 Sep 2020 13:01 )             37.3         140  |  105  |  12  ----------------------------<  162<H>  3.3<L>   |  26  |  1.30    Ca    9.5      29 Sep 2020 13:02    TPro  8.9<H>  /  Alb  3.3  /  TBili  0.5  /  DBili  x   /  AST  26  /  ALT  15  /  AlkPhos  102        Urinalysis Basic - ( 29 Sep 2020 16:58 )    Color: Yellow / Appearance: Clear / S.015 / pH: x  Gluc: x / Ketone: Negative  / Bili: Negative / Urobili: Negative mg/dL   Blood: x / Protein: 15 mg/dL / Nitrite: Negative   Leuk Esterase: Moderate / RBC: 25-50 /HPF / WBC 6-10   Sq Epi: x / Non Sq Epi: Few / Bacteria: Few    RADIOLOGY & ADDITIONAL STUDIES:  < from: US Renal (20 @ 08:58) >  EXAM:  US KIDNEY(S)                            PROCEDURE DATE:  2020          INTERPRETATION:  CLINICAL INFORMATION: Follow-up left hydronephrosis    COMPARISON: CT dated 2020    TECHNIQUE: Sonography of the kidneys and bladder.    FINDINGS:    Right kidney: 10.2 cm. No renal mass, hydronephrosis or calculi.    Left kidney: 9.2 cm. No renal mass or hydronephrosis. Calculi measuring up to 1.2 cm. Left-sided stent.    Urinary bladder: Bladder diverticulum.    IMPRESSION:    Left ureteral stent.  Nonobstructive left nephrolithiasis.  No hydronephrosis.    < end of copied text >

## 2020-10-01 NOTE — CONSULT NOTE ADULT - ASSESSMENT
Bacteremia  Suspect procurement contaminant  No concern of active or uncontrolled infection on exam  POD#0 stent exchange  I do not see a role for additional antibiotics at this time    Suggestions--  Observe off antibiotics  Thank you for the courtesy of this referral.  Joselo Serrano MD  Attending Physician  [facility]  Division of Infectious Diseases  [phone]

## 2020-10-01 NOTE — PROGRESS NOTE ADULT - SUBJECTIVE AND OBJECTIVE BOX
Neurology Progress Note    No acute events. Trouble walking since last hospital admission, which she cannot recall. Specifically weak in left leg. No pain. She has ankle pain. CT head is unremarkable. No saddle anesthesia or incontinence.     Neuro Exam: AOx3. Follows commands. No dysarthria. PERRL. Moving arms against gravity and strong  bilaterally. Right leg 4/5 motor with weakness in dorsiflexion (2/5). Left leg unable to raise above gravity (proximally 1/5 and distally 3/5). sensory decreased in left leg. Reflexes diminished and toes down.     CT head- no acute process    A/P:  Left leg weakness  UTI  ARF  CAD  HTN    - recommend MRI L-spine  - PT/OT and possible rehab  - will need outpatient EMG Neurology Progress Note    No acute events. Trouble walking since last hospital admission, which she cannot recall. Specifically weak in left leg. No pain. She has ankle pain. CT head is unremarkable. No saddle anesthesia or incontinence.     Neuro Exam: AOx3. Follows commands. No dysarthria. PERRL. Moving arms against gravity and strong  bilaterally. Right leg 4/5 motor with weakness in dorsiflexion (2/5). Left leg unable to raise above gravity (proximally 1/5 and distally 3/5). sensory decreased in left leg. Reflexes diminished and toes down.     CT head- no acute process    A/P:  Left leg weakness  UTI  ARF  CAD  HTN    - recommend CT L-spine. Unable to do MRI due to pacemaker  - PT/OT and possible rehab  - will need outpatient EMG

## 2020-10-01 NOTE — PROGRESS NOTE ADULT - ASSESSMENT
86 y/o female PMH MI s/p CABG x 4, HTN, HLD with remote h/o sepsis, bacteremia, ARF 2/2 Left 8.8 midureteral calculus s/p Cysto ureteroscopy with insertion of stent on 8/25/20 with Dr. Leary who was seen in PST yesterday for scheduled Uscope and possible stent removal this Friday, however patient developed Nausea/vomiting and was sent to the ER for further evaluation.   Patient admitted with nausea/vomiting, unable to tolerate po intake  Renal US shows Left ureteral stent and non obstructive L nephrolithiasis.   UCx no growth     Assessment and Plan:     # Functional Quadriplegia   patient noted to have b/l foot drop R>L  she stated she has been bedbound for past 6 mo. denies history of CVA   denies back pain . stated suddenly could not ambulate x 6 months.   strength b/l UE 5/5 ; strength b/l LE 0-1/5  will obtain vitamin B12, RPR, folate and vit D levels, TSH  neurology consult appreciated , will need outpatient EMG  CT head moderate periventricular and deep white matter ischemia. Tiny old lacunar infarction is seen in the LEFT caudate nucleus. Mild cerebellar atrophy.  Unable to do MRI due to pacemaker  CT L spine No evidence for acute displaced fracture or malalignment.  PT : home with home PT   patient has wheelchair at home       # Nausea and vomiting , unable to tolerate PO intake  possibly ? stent blockage?   recent admission for klebsiella and E.coli  pyelonephritis sec to  Left ureteral 8.8mm calculus s/p Cystourethroscopy w/ stent placement 8/25/20 with Dr. Leary  9/30 Renal US: Left ureteral stent. Nonobstructive left nephrolithiasis. No hydronephrosis.  Urology consult appreciated, plan for ureteroscopy/laser lithotripsy/stent exchange Fri  UCx no growth,   will hold abx for now , discussed with Urology   appears that one blood Cx was contaminated   the other with no growth   ID consult appreciated   no e/o active infection at this time  , agree no need for abx , continue monitor   NPO MN Thurs, Preop labs,   Covid: neg    #Constipation   Large amount of rectal stool with presacral fat stranding suggesting stercoral colitis. seen on CT LS  will start lactulose bid , enema x 1 today   monitor for BMs       # LISA (acute kidney injury), presumed sec to intravascular depletion from vomiting ?   s/p 2L IVF   resolved     #  Hypokalemia --repleted     # Coronary artery disease involving native heart without angina pectoris, unspecified vessel or lesion type.   cont aspirin Norvasc home med , statin, unclear as to why NOT on BB? presumed due to symptomatic bradycardia requiring pacemaker     # . Benign essential HTN.  Plan: continue home meds.     6.  Preventive measure.  Plan: DVTp: Heparin SQ-dvt ppx  fall precautions   out of bed to chair

## 2020-10-01 NOTE — PROGRESS NOTE ADULT - SUBJECTIVE AND OBJECTIVE BOX
HPI:  88 y/o female PMH MI CAD s/p CABG x 4, HTN, HLD and AAA, presents with one week history of nausea and now two days of vomiting , patient was recently discharged from  hospital after being treated for ARF and  bacteremia and sepsis 2/2 left 8.8 midureteral calculus and post obstructive ARF last month, patient underwent Cystourethroscopy with insertion of stent and UTI with Klebsiella pneumoniae, was sent home on oral antibiotics and they finished about two weeks ago.  Today denied any flank pain- admits to dysuria and frequency and nausea nd vomiting in ED is found to have positive UA and ARF. States she has been taking her medications regularly despite her nausea and only today since being in ED she hasn't taken her home meds   Now denied any fever or chills denied any cough or sick contacts  (29 Sep 2020 19:26)      SUBJECTIVE & OBJECTIVE: Pt seen and examined at bedside.   no overnight events.   + constipation     Denies chills, N/V, dizziness, HA, cough, CP, palpitations, SOB, abdominal pain, dysuria.     PHYSICAL EXAM:  Vital Signs Last 24 Hrs  T(C): 36.8 (01 Oct 2020 16:40), Max: 36.8 (30 Sep 2020 23:28)  T(F): 98.2 (01 Oct 2020 16:40), Max: 98.3 (30 Sep 2020 23:28)  HR: 68 (01 Oct 2020 16:40) (68 - 77)  BP: 126/64 (01 Oct 2020 16:40) (111/54 - 126/64)  BP(mean): --  RR: 18 (01 Oct 2020 16:40) (17 - 18)  SpO2: 95% (01 Oct 2020 16:40) (95% - 98%)        GENERAL: NAD, speaking in full sentences   HEAD:  Atraumatic, Normocephalic  EYES: EOMI, PERRLA, conjunctiva and sclera clear  ENMT: Moist mucous membranes  NECK: Supple, No JVD  NERVOUS SYSTEM:  Alert & Oriented X3, strength b/l UE 5/5 ; strength b/l LE 0-1/5  CHEST/LUNG: CTAB  HEART: Regular rate and rhythm; No murmurs, rubs, or gallops  ABDOMEN: Soft, Nontender, Nondistended; Bowel sounds present  EXTREMITIES:  2+ Peripheral Pulses b/l, No clubbing, cyanosis, or edema b/l   b/l foot drop   NO pain on palpation of spine   pain on left ankle palpation     MEDICATIONS  (STANDING):  amLODIPine   Tablet 10 milliGRAM(s) Oral daily  aspirin enteric coated 81 milliGRAM(s) Oral daily  heparin   Injectable 5000 Unit(s) SubCutaneous every 8 hours  piperacillin/tazobactam IVPB.. 3.375 Gram(s) IV Intermittent every 8 hours  simvastatin 20 milliGRAM(s) Oral at bedtime    MEDICATIONS  (PRN):  bisacodyl 5 milliGRAM(s) Oral every 12 hours PRN Constipation  ondansetron Injectable 4 milliGRAM(s) IV Push three times a day PRN Nausea and/or Vomiting      LABS:                                   11.0   6.06  )-----------( 223      ( 01 Oct 2020 08:46 )             34.5   10-    143  |  107  |  10  ----------------------------<  74  3.5   |  28  |  0.88    Ca    9.1      01 Oct 2020 08:46  Phos  3.4     10-  Mg     2.0     10-    TPro  8.1  /  Alb  3.2<L>  /  TBili  0.6  /  DBili  x   /  AST  23  /  ALT  9<L>  /  AlkPhos  94  10-      Urinalysis Basic - ( 29 Sep 2020 16:58 )    Color: Yellow / Appearance: Clear / S.015 / pH: x  Gluc: x / Ketone: Negative  / Bili: Negative / Urobili: Negative mg/dL   Blood: x / Protein: 15 mg/dL / Nitrite: Negative   Leuk Esterase: Moderate / RBC: 25-50 /HPF / WBC 6-10   Sq Epi: x / Non Sq Epi: Few / Bacteria: Few        RECENT CULTURES:  Culture - Urine (20 @ 15:00)    Specimen Source: .Urine Clean Catch (Midstream)    Culture Results:   <10,000 CFU/mL Normal Urogenital Ivon          RADIOLOGY & ADDITIONAL TESTS:  < from: Xray Chest 1 View- PORTABLE-Urgent (20 @ 13:05) >  INTERPRETATION:  History: Vomiting    Portable chest x-ray is compared to 2020.    Studies rotated. Sternal wires and mediastinal clips are again seen. The heart is not enlarged. The lungs are clear. There is osteopenia of the bony structures.    Impression: Pacemaker. CABG. No active disease.    < end of copied text >    < from: Xray Ankle 2 Views, Left (20 @ 17:48) >  INTERPRETATION:  Left ankle. Patient has local pain. 2 views.    There is an inferior calcaneal spur. The ankle is relatively free of degeneration.    No bone destruction or fracture is evident.    IMPRESSION: No acute finding.    < end of copied text >    < from: CT Lumbar Spine No Cont (10.01.20 @ 17:22) >  INTERPRETATION:  CLINICAL INFORMATION: Leg Weakness. left leg weakness. .    TECHNIQUE: Noncontrast CT scan of the lumbar spine was performed. Thin section axialimages were obtained with sagittal and coronal reformations.    COMPARISON: CT abdomen and pelvis 10/6/2018    FINDINGS:    Lumbar levoscoliosis. Grade 1 anterolisthesis at L4-L5. Vertebral body heights are within normal limits. Multilevel intervertebral disc height loss with vacuum disc phenomenon. Multilevel disc bulges. Multilevel bilateral neuroforaminal narrowing.    Stent graft repair of abdominal aortic aneurysm. Left nephroureteral stent. Left renal hemorrhagic cyst. Large amount of rectal stool with presacral fat stranding suggesting stercoral colitis.    There is no prevertebral soft tissue swelling. The paraspinal soft tissues are unremarkable.      IMPRESSION:  1.  No evidence for acute displaced fracture or malalignment.  2.  Large amount of rectal stool with presacral fat stranding suggesting stercoral colitis.    < end of copied text >    < from: CT Head No Cont (20 @ 17:54) >  INTERPRETATION:  CT head without IV contrast    CLINICAL INFORMATION: Unable to ambulate    TECHNIQUE: Contiguous axial 5 mm sections were obtained through the head. Sagittal and coronal 2-D reformatted images were also obtained.   This scan was performed using automatic exposure control (radiation dose reduction software) to obtain a diagnostic image quality scan with patient dose as low as reasonably achievable.    FINDINGS:   No previous examinations are available for review.    The brain demonstrates moderate periventricular and deep white matter ischemia. Tiny old lacunar infarction is seen in the LEFT caudate nucleus.   No acute cerebral cortical infarct is seen.  No intracranial hemorrhage is found.  No mass effect is found in the brain.    The ventricles, sulci and basal cisterns show mild cerebellar atrophy.    The orbits are unremarkable.  The paranasal sinuses are clear.  The nasal cavity appears intact.  The nasopharynx is symmetric.  The central skull base, petrous temporal bones and calvarium remain intact.      IMPRESSION:   moderate periventricular and deep white matter ischemia. Tiny old lacunar infarction is seen in the LEFT caudate nucleus. Mild cerebellar atrophy.    < end of copied text >            Imaging Personally Reviewed:  [ ] YES  [ ] NO    Consultant(s) Notes Reviewed:  [x] YES  [ ] NO    Care Discussed with Consultants/Other Providers [x] YES  [ ] NO    Care discussed in detail with patient.  All questions and concerns addressed.

## 2020-10-01 NOTE — PROGRESS NOTE ADULT - SUBJECTIVE AND OBJECTIVE BOX
Patient seen and examined bedside resting comfortably.  No complaints offered.   Abdominal pain is well controlled.  Denies nausea and vomiting. Tolerating diet.  Denies chest pain, dyspnea, cough.    T(F): 97.7 (10-01-20 @ 11:08), Max: 98.3 (09-30-20 @ 23:28)  HR: 72 (10-01-20 @ 11:08) (69 - 77)  BP: 122/77 (10-01-20 @ 11:08) (111/54 - 154/81)  RR: 17 (10-01-20 @ 11:08) (17 - 18)  SpO2: 98% (10-01-20 @ 11:08) (95% - 98%)    PHYSICAL EXAM:  General: NAD. Cachectic appearance.  Neuro:  Alert & oriented x 3  Abdomen: soft, NTND. Normactive BS  : wearing a Primafit device.    LABS:                        11.0   6.06  )-----------( 223      ( 01 Oct 2020 08:46 )             34.5     10-01    143  |  107  |  10  ----------------------------<  74  3.5   |  28  |  0.88    Ca    9.1      01 Oct 2020 08:46  Phos  3.4     10-01  Mg     2.0     10-01    TPro  8.1  /  Alb  3.2<L>  /  TBili  0.6  /  DBili  x   /  AST  23  /  ALT  9<L>  /  AlkPhos  94  10-01    PT/INR - ( 01 Oct 2020 08:46 )   PT: 12.6 sec;   INR: 1.09 ratio    PTT - ( 01 Oct 2020 08:46 )  PTT:39.7 sec      Culture - Blood (09.30.20 @ 00:18)   - Coagulase negative Staphylococcus: Detec   Gram Stain:   Growth in aerobic bottle: Gram positive cocci in pairs   Growth in anaerobic bottle: Gram positive cocci in pairs   Specimen Source: .Blood Blood-Peripheral   Organism: Blood Culture PCR   Culture Results:   Growth in aerobic bottle: Gram positive cocci in pairsI&O's Detail      < from: US Renal (09.30.20 @ 08:58) >    EXAM:  US KIDNEY(S)                            PROCEDURE DATE:  09/30/2020          INTERPRETATION:  CLINICAL INFORMATION: Follow-up left hydronephrosis    COMPARISON: CT dated 8/25/2020    TECHNIQUE: Sonography of the kidneys and bladder.    FINDINGS:    Right kidney: 10.2 cm. No renal mass, hydronephrosis or calculi.    Left kidney: 9.2 cm. No renal mass or hydronephrosis. Calculi measuring up to 1.2 cm. Left-sided stent.    Urinary bladder: Bladder diverticulum.    Culture - Blood (09.30.20 @ 00:18)   - Coagulase negative Staphylococcus: Detec   Gram Stain:   Growth in aerobic bottle: Gram positive cocci in pairs   Growth in anaerobic bottle: Gram positive cocci in pairs   Specimen Source: .Blood Blood-Peripheral   Organism: Blood Culture PCR   Culture Results:   Growth in aerobic bottle: Gram positive cocci in pairs   Growth in anaerobic bottle: Gram positive cocci in pairs     IMPRESSION:    Left ureteral stent.  Nonobstructive left nephrolithiasis.  No hydronephrosis.          30 Sep 2020 07:01  -  01 Oct 2020 07:00  --------------------------------------------------------  IN:    IV PiggyBack: 100 mL    Oral Fluid: 1080 mL  Total IN: 1180 mL    OUT:    Voided (mL): 1650 mL  Total OUT: 1650 mL    Total NET: -470 mL          Impression:     Left ureteral stent.  Nonobstructive left nephrolithiasis.  No hydronephrosis.  VOMITTING  + blood  & urine culture      Plan:  - continue medical treatment  - ID consult for antibiotics  - Case discussed with Dr. Dennis: pt tentatively scheduled for OR tomorrow. Awaiting ID evaluation to see if blood culture is really true, or , contaminant - Discussed with Dr. Rojas.

## 2020-10-01 NOTE — CONSULT NOTE ADULT - SUBJECTIVE AND OBJECTIVE BOX
[facility]  Division of Infectious Diseases  [phone]    MITCHEL MULLINS  87y, Female  [id number]    HPI--  Patient previously known to me. 87F s/p cysto/stent for L ureteral obstruction on 2020. Patient had UTI with Klebsiella septicemia and urine cultures growing Klebsiella, E. coli, and Bifidobacterium spp. Patient now readmitted with nausea and vomiting. Patient states duration of illness 1 day PTA, though admit note states 1 week. Denies any fevers, chills, or rigors. Denies any pain. Patient admitted with concern of stent dysfunction. In ED afebrile with normal WBC. BP initially low, then rapidly normalized and them hypertensive. Blood cx / CNS, urine cx polymicrobial. ID "clearance" requested, d/w Dr. Rojas that there was no ID objection earlier today, though taken to OR prior to this note being written, apparently.     Patient feels fine has no complaints.     PMH/PSH--  CAD (coronary artery disease)    AAA (abdominal aortic aneurysm)    Myocardial infarction    S/P CABG x 4    HLD (hyperlipidemia)    Benign essential HTN    S/P CABG x 4        Allergies--  No Known Allergies      Medications--  Antibiotics:   Immunologic:   Other: amLODIPine   Tablet  aspirin enteric coated  bisacodyl PRN  heparin   Injectable  magnesium hydroxide Suspension PRN  ondansetron Injectable PRN  senna  simvastatin    Antimicrobials last 90 days per EMR: MEDICATIONS  (STANDING):  piperacillin/tazobactam IVPB.   200 mL/Hr IV Intermittent (20 @ 19:47)    piperacillin/tazobactam IVPB..   25 mL/Hr IV Intermittent (20 @ 12:08)   25 mL/Hr IV Intermittent (20 @ 05:18)        Social History--  EtOH: denies   Tobacco: denies current use, distant prior use  Drug Use: denies     Family/Marital History--  Parents , old age  Not relevant to presentation        Review of Systems:  A >=10-point review of systems was obtained.   Review of systems otherwise negative except as previously noted.    Physical Exam--  Vital Signs: T(F): 97.7 (10-01-20 @ 11:08), Max: 98.3 (20 @ 23:28)  HR: 72 (10-01-20 @ 11:08)  BP: 122/77 (10-01-20 @ 11:08)  RR: 17 (10-01-20 @ 11:08)  SpO2: 98% (10-01-20 @ 11:08)  Wt(kg): --  General: Nontoxic-appearing Female in no acute distress.  HEENT: AT/NC. PERRL. EOMI. Anicteric. Conjunctiva pink and moist. Oropharynx clear.  Neck: Not rigid. No sense of mass.  Nodes: None palpable.  Lungs: Clear bilaterally without rales, wheezing or rhonchi  Heart: Regular rate and rhythm. No Murmur. No rub. No gallop. No palpable thrill.  Abdomen: Bowel sounds present and normoactive. Soft. Nondistended. Nontender. No sense of mass. No organomegaly.  Back: No spinal tenderness. No costovertebral angle tenderness.   Extremities: No cyanosis or clubbing. No edema.   Skin: Warm. Dry. Good turgor. No rash. No vasculitic stigmata.  Psychiatric: Appropriate affect and mood for situation.       Laboratory & Imaging Data--  CBC                        11.0   6.06  )-----------( 223      ( 01 Oct 2020 08:46 )             34.5     WBC trend  WBC Count: 6.06 K/uL (10-01-20 @ 08:46)  WBC Count: 10.22 K/uL (20 @ 13:01)      Chemistries  10-01    143  |  107  |  10  ----------------------------<  74  3.5   |  28  |  0.88    Creatinine Trend  0.88 mg/dL (10-01-20 @ 08:46)  0.91 mg/dL (20 @ 14:35)  1.30 mg/dL (20 @ 13:02)      Ca    9.1      01 Oct 2020 08:46  Phos  3.4     10-01  Mg     2.0     10-01    TPro  8.1  /  Alb  3.2<L>  /  TBili  0.6  /  DBili  x   /  AST  23  /  ALT  9<L>  /  AlkPhos  94  10-01      Culture Data    Culture - Urine (collected 30 Sep 2020 00:47)  Source: .Urine Clean Catch (Midstream)  Final Report (30 Sep 2020 23:50):    >=3 organisms. Probable collection contamination.    Culture - Blood (collected 30 Sep 2020 00:18)  Source: .Blood Blood-Peripheral  Preliminary Report (01 Oct 2020 01:11):    No growth to date.    Culture - Blood (collected 30 Sep 2020 00:18)  Source: .Blood Blood-Peripheral  Gram Stain (prelim) (01 Oct 2020 00:33):    Growth in aerobic bottle: Gram positive cocci in pairs    Growth in anaerobic bottle: Gram positive cocci in pairs  Preliminary Report (01 Oct 2020 00:33):    Growth in aerobic bottle: Gram positive cocci in pairs    Growth in anaerobic bottle: Gram positive cocci in pairs    ***Blood Panel PCR results on this specimen are available    approximately 3 hours after the Gram stain result.***    Gram stain, PCR, and/or culture results may not always    correspond due to difference in methodologies.    ************************************************************    This PCR assay was performed using [employer].    The following targets are tested for: Enterococcus,    vancomycin resistant enterococci, Listeria monocytogenes,    coagulase negative staphylococci, S. aureus,    methicillin resistant S. aureus, Streptococcus agalactiae    (Group B), S. pneumoniae, S. pyogenes (Group A),    Acinetobacter baumannii, Enterobacter cloacae, E. coli,    Klebsiella oxytoca, K. pneumoniae, Proteus sp.,    Serratia marcescens, Haemophilus influenzae,    Neisseria meningitidis, Pseudomonas aeruginosa, Candida    albicans, C. glabrata, C krusei, C parapsilosis,    C. tropicalis and the KPC resistance gene.    "Due to technical problems, Proteus sp. will Not be reported as part of    the BCID panel until further notice"  Organism: Blood Culture PCR (01 Oct 2020 00:44)  Organism: Blood Culture PCR (01 Oct 2020 00:44)    < from: US Renal (20 @ 08:58) >  INTERPRETATION:  CLINICAL INFORMATION: Follow-up left hydronephrosis    COMPARISON: CT dated 2020    TECHNIQUE: Sonography of the kidneys and bladder.    FINDINGS:    Right kidney: 10.2 cm. No renal mass, hydronephrosis or calculi.    Left kidney: 9.2 cm. No renal mass or hydronephrosis. Calculi measuring up to 1.2 cm. Left-sided stent.    Urinary bladder: Bladder diverticulum.    IMPRESSION:    Left ureteral stent.  Nonobstructive left nephrolithiasis.  No hydronephrosis.    < end of copied text >

## 2020-10-02 NOTE — CHART NOTE - NSCHARTNOTEFT_GEN_A_CORE
Pre-operative Note    - Pre-operative Diagnosis: Ureteral stone s/p stent insertion     - Procedure: Ureteroscopy, laser lithotripsy, stent exchange     - Labs:                        10.3   6.09  )-----------( 235      ( 02 Oct 2020 07:19 )             32.0     10-02    140  |  106  |  8   ----------------------------<  72  3.1<L>   |  25  |  0.76    Ca    8.7      02 Oct 2020 07:19  Phos  3.4     10-01  Mg     2.0     10-01    TPro  8.1  /  Alb  3.2<L>  /  TBili  0.6  /  DBili  x   /  AST  23  /  ALT  9<L>  /  AlkPhos  94  10-01    PT/INR - ( 01 Oct 2020 08:46 )   PT: 12.6 sec;   INR: 1.09 ratio      PTT - ( 01 Oct 2020 08:46 )  PTT:39.7 sec    Type & Screen #1: pending     - CXR: completed 8/25/2020    - EKG: In chart.     - Blood: Not needed.     - Orders:  > NPO at midnight  > Perioperative antibiotics not needed.    - Permits:  > Potassium repleted.   > Consent in chart.  > Case scheduled with OR.

## 2020-10-02 NOTE — PROGRESS NOTE ADULT - ASSESSMENT
Bacteremia  Suspect procurement contaminant  No concern of active or uncontrolled infection on exam  Now slated for stent exchange today, yesterday's procedure note erroneous  WIll give additional antibiotics at this time  Still no objection to proposed procedure.    Suggestions--  Vancomycin 1g IVPB x1 and ceftriaxone 1g x1 now  D/W Dr. Rojas.     Dr. Fer Layne covering the service this weekend. Please call [phone] for any ID issues that need attention     Joselo Serrano MD  Attending Physician  [facility]  Division of Infectious Diseases  [phone]

## 2020-10-02 NOTE — PROGRESS NOTE ADULT - SUBJECTIVE AND OBJECTIVE BOX
for uscope/laser lithotripsy left ureteral stone    d/w pt at lengthas well as her two sons previously    she was admitted form PST due to a claim that she was vomiting though has not done so since admit  cath c/s  last week negative-currently with contam c/s    blood c/s- 1 bottle with staph epi-prob contam    For left uscope/laser litho/stent exchange  rationale risk alternatives reviewed   all quetions answered

## 2020-10-02 NOTE — PRE-OP CHECKLIST - SELECT TESTS ORDERED
INR/Hepatic Function/UCG/EKG/BMP/CBC/PT/PTT/Results in MD note/Spirometry/Type and Screen/Urinalysis/COVID/CMP/Type and Cross

## 2020-10-02 NOTE — PROGRESS NOTE ADULT - ASSESSMENT
86 y/o female PMH MI s/p CABG x 4, HTN, HLD with remote h/o sepsis, bacteremia, ARF 2/2 Left 8.8 midureteral calculus s/p Cysto ureteroscopy with insertion of stent on 8/25/20 with Dr. Leary who was seen in PST yesterday for scheduled Uscope and possible stent removal this Friday, however patient developed Nausea/vomiting and was sent to the ER for further evaluation.   Patient admitted with nausea/vomiting, unable to tolerate po intake  Renal US shows Left ureteral stent and non obstructive L nephrolithiasis.   UCx no growth     Assessment and Plan:     # Functional Quadriplegia   patient noted to have b/l foot drop R>L  she stated she has been bedbound for past 6 mo. denies history of CVA   denies back pain . stated suddenly could not ambulate x 6 months.   strength b/l UE 5/5 ; strength b/l LE 0-1/5  neurology consult appreciated , will need outpatient EMG  CT head moderate periventricular and deep white matter ischemia. Tiny old lacunar infarction is seen in the LEFT caudate nucleus. Mild cerebellar atrophy.  Unable to do MRI due to pacemaker  CT L spine No evidence for acute displaced fracture or malalignment.  will obtain vitamin B12 folate level, Vit D  wnl   RPR neg  TSH slightly elevated, fT4/tT3 wnl. --patient clinically euthyroid   PT : home with home PT   patient has wheelchair at home       # Nausea and vomiting , unable to tolerate PO intake  possibly ? stent blockage?   recent admission for klebsiella and E.coli  pyelonephritis sec to  Left ureteral 8.8mm calculus s/p Cystourethroscopy w/ stent placement 8/25/20 with Dr. Leary  9/30 Renal US: Left ureteral stent. Nonobstructive left nephrolithiasis. No hydronephrosis.  Urology consult appreciated, plan for ureteroscopy/laser lithotripsy/stent exchange today 10/2  UCx no growth,   appears that one blood Cx was contaminated   the other with no growth   ID following, given 1dose Ceftriaxone and Vanc today   no e/o active infection at this time  , agree no need for abx , continue monitor   Covid: neg    #Constipation , resolved   Large amount of rectal stool with presacral fat stranding suggesting stercoral colitis. seen on CT LS  large BM overnight 10/1-10/2  continue with current Bowel regimen       # LISA (acute kidney injury), presumed sec to intravascular depletion from vomiting ?   s/p 2L IVF   resolved     #  Hypokalemia --replete and monitor     # Coronary artery disease involving native heart without angina pectoris, unspecified vessel or lesion type.   cont aspirin Norvasc home med , statin, unclear as to why NOT on BB? presumed due to symptomatic bradycardia requiring pacemaker     # . Benign essential HTN.  Plan: continue home meds.     6.  Preventive measure.  Plan: DVTp: Heparin SQ-dvt ppx  fall precautions   out of bed to chair

## 2020-10-02 NOTE — PRE-OP CHECKLIST - HEART RATE (BEATS/MIN)
Lab appt scheduled. Reminder letter sent in mail  
Orders are in they may be scheduled  
Please see if you can find these orders. GML  
They need a CBC, CMP, Lipid, TSH, Free T3, and Free T4. Orders on your desk.  
69

## 2020-10-02 NOTE — BRIEF OPERATIVE NOTE - NSICDXBRIEFPREOP_GEN_ALL_CORE_FT
PRE-OP DIAGNOSIS:  Ureteral stone with hydronephrosis 01-Oct-2020 16:13:56  Hansel Carcamo  Left ureteral stone 01-Oct-2020 16:13:46  Hansel Carcamo  
PRE-OP DIAGNOSIS:  Calculus, ureter 02-Oct-2020 17:31:32  Perez Leary

## 2020-10-02 NOTE — BRIEF OPERATIVE NOTE - NSICDXBRIEFPROCEDURE_GEN_ALL_CORE_FT
PROCEDURES:  Retrograde urography 01-Oct-2020 16:15:18  Hansel Carcamo  Insertion, stent, double-J, ureter 01-Oct-2020 16:15:10  Hansel Carcamo  Manipulation, calculus, ureter 01-Oct-2020 16:14:47  Hansel Carcamo  
PROCEDURES:  Cystoscopy with retrograde pyelography, with insertion or replacement of ureteral stent 02-Oct-2020 17:34:23  Perez Leary  Cystoscopy with left ureteroscopy with extraction of calculus using stone retrieval basket 02-Oct-2020 17:33:25  Perez Leary  Cystoscopy with left ureteroscopic laser lithotripsy 02-Oct-2020 17:32:47  Perez Leary

## 2020-10-02 NOTE — PROGRESS NOTE ADULT - SUBJECTIVE AND OBJECTIVE BOX
[facility]  Division of Infectious Diseases  [phone]    Name: MITCHEL MULLINS  Age: 87y  Gender: Female  MRN: [id number]    Interval History--  Notes reviewed. Yesterday's operative note now cancelled in EMR, apparently written in error.  Patient slated for OR today.  Patient without complaints.     Past Medical History--  CAD (coronary artery disease)  AAA (abdominal aortic aneurysm)  Myocardial infarction  S/P CABG x 4  HLD (hyperlipidemia)  Benign essential HTN  S/P CABG x 4        For details regarding the patient's social history, family history, and other miscellaneous elements, please refer the initial infectious diseases consultation and/or the admitting history and physical examination for this admission.    Allergies    No Known Allergies    Intolerances        Medications--  Antibiotics:    Immunologic:    Other:  amLODIPine   Tablet  aspirin enteric coated  bisacodyl PRN  heparin   Injectable  lactulose Syrup  ondansetron Injectable PRN  potassium chloride    Tablet ER  potassium chloride  10 mEq/100 mL IVPB  senna  simethicone PRN  simvastatin  sodium chloride 0.9%.      Review of Systems--  A 10-point review of systems was obtained.   Review of systems otherwise negative except as previously noted.    Physical Examination--  Vital Signs: T(F): 98 (10-02-20 @ 07:41), Max: 98.2 (10-01-20 @ 16:40)  HR: 61 (10-02-20 @ 07:41)  BP: 123/62 (10-02-20 @ 07:41)  RR: 17 (10-02-20 @ 07:41)  SpO2: 97% (10-02-20 @ 07:41)  Wt(kg): --  General: Nontoxic-appearing Female in no acute distress.  HEENT: AT/NC. PERRL. EOMI. Anicteric. Conjunctiva pink and moist. Oropharynx clear. Dentition poor.   Neck: Not rigid. No sense of mass.  Nodes: None palpable.  Lungs: Clear bilaterally without rales, wheezing or rhonchi  Heart: Regular rate and rhythm. No Murmur. No rub. No gallop. No palpable thrill.  Abdomen: Bowel sounds present and normoactive. Soft. Nondistended. Nontender. No sense of mass. No organomegaly.  Back: No spinal tenderness. No costovertebral angle tenderness.   Extremities: No cyanosis or clubbing. No edema.   Skin: Warm. Dry. Good turgor. No rash. No vasculitic stigmata.  Psychiatric: Appropriate affect and mood for situation.         Laboratory Studies--  CBC                        10.3   6.09  )-----------( 235      ( 02 Oct 2020 07:19 )             32.0       Chemistries  10-02    140  |  106  |  8   ----------------------------<  72  3.1<L>   |  25  |  0.76    Ca    8.7      02 Oct 2020 07:19  Phos  3.4     10-01  Mg     2.0     10-01    TPro  8.1  /  Alb  3.2<L>  /  TBili  0.6  /  DBili  x   /  AST  23  /  ALT  9<L>  /  AlkPhos  94  10-01      Culture Data    Culture - Urine (collected 30 Sep 2020 00:47)  Source: .Urine Clean Catch (Midstream)  Final Report (30 Sep 2020 23:50):    >=3 organisms. Probable collection contamination.    Culture - Blood (collected 30 Sep 2020 00:18)  Source: .Blood Blood-Peripheral  Preliminary Report (01 Oct 2020 01:11):    No growth to date.    Culture - Blood (collected 30 Sep 2020 00:18)  Source: .Blood Blood-Peripheral  Gram Stain (01 Oct 2020 22:25):    Growth in aerobic bottle: Gram positive cocci in pairs    Growth in anaerobic bottle: Gram positive cocci in pairs  Final Report (01 Oct 2020 22:25):    Growth in aerobic and anaerobic bottles: Staphylococcus epidermidis    Coag Negative Staphylococcus    Single set isolate, possible contaminant. Contact    Microbiology if susceptibility testing clinically    indicated.    ***Blood Panel PCR results on this specimen are available    approximately 3 hours after the Gram stain result.***    Gram stain, PCR, and/or culture results may not always    correspond due to difference in methodologies.    ************************************************************    This PCR assay was performed using [employer].    The following targets are tested for: Enterococcus,    vancomycin resistant enterococci, Listeria monocytogenes,    coagulase negative staphylococci, S. aureus,    methicillin resistant S. aureus, Streptococcus agalactiae    (Group B), S. pneumoniae, S. pyogenes (Group A),    Acinetobacter baumannii, Enterobacter cloacae, E. coli,    Klebsiella oxytoca, K. pneumoniae, Proteus sp.,    Serratia marcescens, Haemophilus influenzae,    Neisseria meningitidis, Pseudomonas aeruginosa, Candida    albicans, C. glabrata, C krusei, C parapsilosis,    C. tropicalis and the KPC resistance gene.    "Due to technical problems, Proteus sp. will Not be reported as part of    the BCID panel until further notice"  Organism: Blood Culture PCR (01 Oct 2020 22:25)  Organism: Blood Culture PCR (01 Oct 2020 22:25)

## 2020-10-02 NOTE — PROGRESS NOTE ADULT - SUBJECTIVE AND OBJECTIVE BOX
HPI:  86 y/o female PMH MI CAD s/p CABG x 4, HTN, HLD and AAA, presents with one week history of nausea and now two days of vomiting , patient was recently discharged from  hospital after being treated for ARF and  bacteremia and sepsis 2/2 left 8.8 midureteral calculus and post obstructive ARF last month, patient underwent Cystourethroscopy with insertion of stent and UTI with Klebsiella pneumoniae, was sent home on oral antibiotics and they finished about two weeks ago.  Today denied any flank pain- admits to dysuria and frequency and nausea nd vomiting in ED is found to have positive UA and ARF. States she has been taking her medications regularly despite her nausea and only today since being in ED she hasn't taken her home meds   Now denied any fever or chills denied any cough or sick contacts  (29 Sep 2020 19:26)      SUBJECTIVE & OBJECTIVE: Pt seen and examined at bedside.   no overnight events.   + large BM overnight     Denies chills, N/V, dizziness, HA, cough, CP, palpitations, SOB, abdominal pain, dysuria.     PHYSICAL EXAM:  Vital Signs Last 24 Hrs  T(C): 36.7 (02 Oct 2020 07:41), Max: 36.8 (01 Oct 2020 16:40)  T(F): 98 (02 Oct 2020 07:41), Max: 98.2 (01 Oct 2020 16:40)  HR: 61 (02 Oct 2020 07:41) (61 - 68)  BP: 123/62 (02 Oct 2020 07:41) (123/62 - 148/74)  BP(mean): --  RR: 17 (02 Oct 2020 07:41) (17 - 18)  SpO2: 97% (02 Oct 2020 07:41) (95% - 97%) on RA         GENERAL: NAD, speaking in full sentences   HEAD:  Atraumatic, Normocephalic  EYES: EOMI, PERRLA, conjunctiva and sclera clear  ENMT: Moist mucous membranes  NECK: Supple, No JVD  NERVOUS SYSTEM:  Alert & Oriented X3, strength b/l UE 5/5 ; strength b/l LE 0-1/5  CHEST/LUNG: CTAB  HEART: Regular rate and rhythm; No murmurs, rubs, or gallops  ABDOMEN: Soft, Nontender, Nondistended; Bowel sounds present  EXTREMITIES:  2+ Peripheral Pulses b/l, No clubbing, cyanosis, or edema b/l   b/l foot drop   NO pain on palpation of spine   pain on left ankle palpation     MEDICATIONS  (STANDING):  amLODIPine   Tablet 10 milliGRAM(s) Oral daily  aspirin enteric coated 81 milliGRAM(s) Oral daily  heparin   Injectable 5000 Unit(s) SubCutaneous every 8 hours  piperacillin/tazobactam IVPB.. 3.375 Gram(s) IV Intermittent every 8 hours  simvastatin 20 milliGRAM(s) Oral at bedtime    MEDICATIONS  (PRN):  bisacodyl 5 milliGRAM(s) Oral every 12 hours PRN Constipation  ondansetron Injectable 4 milliGRAM(s) IV Push three times a day PRN Nausea and/or Vomiting      LABS:                                   10.3   6.09  )-----------( 235      ( 02 Oct 2020 07:19 )             32.0   10-02    140  |  106  |  8   ----------------------------<  72  3.1<L>   |  25  |  0.76    Ca    8.7      02 Oct 2020 07:19  Phos  3.4     10-01  Mg     2.0     10-01    TPro  8.1  /  Alb  3.2<L>  /  TBili  0.6  /  DBili  x   /  AST  23  /  ALT  9<L>  /  AlkPhos  94  10-01      Urinalysis Basic - ( 29 Sep 2020 16:58 )    Color: Yellow / Appearance: Clear / S.015 / pH: x  Gluc: x / Ketone: Negative  / Bili: Negative / Urobili: Negative mg/dL   Blood: x / Protein: 15 mg/dL / Nitrite: Negative   Leuk Esterase: Moderate / RBC: 25-50 /HPF / WBC 6-10   Sq Epi: x / Non Sq Epi: Few / Bacteria: Few        RECENT CULTURES:  Culture - Urine (20 @ 15:00)    Specimen Source: .Urine Clean Catch (Midstream)    Culture Results:   <10,000 CFU/mL Normal Urogenital Ivon          RADIOLOGY & ADDITIONAL TESTS:  < from: Xray Chest 1 View- PORTABLE-Urgent (20 @ 13:05) >  INTERPRETATION:  History: Vomiting    Portable chest x-ray is compared to 2020.    Studies rotated. Sternal wires and mediastinal clips are again seen. The heart is not enlarged. The lungs are clear. There is osteopenia of the bony structures.    Impression: Pacemaker. CABG. No active disease.    < end of copied text >    < from: Xray Ankle 2 Views, Left (20 @ 17:48) >  INTERPRETATION:  Left ankle. Patient has local pain. 2 views.    There is an inferior calcaneal spur. The ankle is relatively free of degeneration.    No bone destruction or fracture is evident.    IMPRESSION: No acute finding.    < end of copied text >    < from: CT Lumbar Spine No Cont (10.01.20 @ 17:22) >  INTERPRETATION:  CLINICAL INFORMATION: Leg Weakness. left leg weakness. .    TECHNIQUE: Noncontrast CT scan of the lumbar spine was performed. Thin section axialimages were obtained with sagittal and coronal reformations.    COMPARISON: CT abdomen and pelvis 10/6/2018    FINDINGS:    Lumbar levoscoliosis. Grade 1 anterolisthesis at L4-L5. Vertebral body heights are within normal limits. Multilevel intervertebral disc height loss with vacuum disc phenomenon. Multilevel disc bulges. Multilevel bilateral neuroforaminal narrowing.    Stent graft repair of abdominal aortic aneurysm. Left nephroureteral stent. Left renal hemorrhagic cyst. Large amount of rectal stool with presacral fat stranding suggesting stercoral colitis.    There is no prevertebral soft tissue swelling. The paraspinal soft tissues are unremarkable.      IMPRESSION:  1.  No evidence for acute displaced fracture or malalignment.  2.  Large amount of rectal stool with presacral fat stranding suggesting stercoral colitis.    < end of copied text >    < from: CT Head No Cont (20 @ 17:54) >  INTERPRETATION:  CT head without IV contrast    CLINICAL INFORMATION: Unable to ambulate    TECHNIQUE: Contiguous axial 5 mm sections were obtained through the head. Sagittal and coronal 2-D reformatted images were also obtained.   This scan was performed using automatic exposure control (radiation dose reduction software) to obtain a diagnostic image quality scan with patient dose as low as reasonably achievable.    FINDINGS:   No previous examinations are available for review.    The brain demonstrates moderate periventricular and deep white matter ischemia. Tiny old lacunar infarction is seen in the LEFT caudate nucleus.   No acute cerebral cortical infarct is seen.  No intracranial hemorrhage is found.  No mass effect is found in the brain.    The ventricles, sulci and basal cisterns show mild cerebellar atrophy.    The orbits are unremarkable.  The paranasal sinuses are clear.  The nasal cavity appears intact.  The nasopharynx is symmetric.  The central skull base, petrous temporal bones and calvarium remain intact.      IMPRESSION:   moderate periventricular and deep white matter ischemia. Tiny old lacunar infarction is seen in the LEFT caudate nucleus. Mild cerebellar atrophy.    < end of copied text >            Imaging Personally Reviewed:  [ ] YES  [ ] NO    Consultant(s) Notes Reviewed:  [x] YES  [ ] NO    Care Discussed with Consultants/Other Providers [x] YES  [ ] NO    Care discussed in detail with patient.  All questions and concerns addressed.

## 2020-10-02 NOTE — BRIEF OPERATIVE NOTE - NSICDXBRIEFPOSTOP_GEN_ALL_CORE_FT
POST-OP DIAGNOSIS:  Hydronephrosis with ureteral calculus 01-Oct-2020 16:15:36  Hansel Carcamo  
POST-OP DIAGNOSIS:  Left renal stone 02-Oct-2020 17:35:04  Perez Leary

## 2020-10-02 NOTE — PROGRESS NOTE ADULT - SUBJECTIVE AND OBJECTIVE BOX
Post-op check    S/P cysto, L ureteroscopy, retrograde pyelography, laser litho, L stent POD#0  87 year old Female seen and examined at bedside. Denies pain or complaints. Voiding well via primafit. Denies chest pain, shortness of breath, nausea/ vomiting, and dizziness.     Vital Signs Last 24 Hrs  T(F): 97.6 (10-02-20 @ 21:20), Max: 98 (10-02-20 @ 00:00)  HR: 70 (10-02-20 @ 21:20)  BP: 116/62 (10-02-20 @ 21:20)  RR: 20 (10-02-20 @ 21:20)  SpO2: 95% (10-02-20 @ 21:20)      GENERAL: Alert, NAD  CHEST/LUNG: Clear to auscultation bilaterally, respirations nonlabored  HEART: S1S2, Regular rate and rhythm  ABDOMEN: soft NTND  : Primafit in place with clear abram urine output. No suprapubic tenderness  EXTREMITIES:  no calf tenderness, No edema, intermittent compression devices in place bilaterally    Assessment: 87F S/P cysto, L ureteroscopy, retrograde pyelography, laser litho, L stent POD#0    Plan:  - Monitor urine output  - DVT prophylaxis, Incentive Spirometer, OOB, Ambulating, pain control  - Continue antibiotics (Cipro)  - f/u labs   - continue current management per medicine

## 2020-10-03 NOTE — PROGRESS NOTE ADULT - ASSESSMENT
Subjective Complaints:  Historian:             Vital Signs Last 24 Hrs  T(C): 36.4 (03 Oct 2020 17:19), Max: 36.9 (03 Oct 2020 04:56)  T(F): 97.6 (03 Oct 2020 17:19), Max: 98.4 (03 Oct 2020 04:56)  HR: 64 (03 Oct 2020 17:19) (64 - 75)  BP: 124/68 (03 Oct 2020 17:19) (116/62 - 152/69)  BP(mean): --  RR: 17 (03 Oct 2020 17:19) (16 - 20)  SpO2: 98% (03 Oct 2020 17:19) (94% - 98%)    GENERAL PHYSICAL EXAM:  General:  Appears stated age, well-groomed, well-nourished, no distress  HEENT:  NC/AT, patent nares w/ pink mucosa, OP clear w/o lesions, PERRL, EOMI, conjunctivae clear, no thyromegaly, nodules, adenopathy, no JVD  Chest:  Full & symmetric excursion, no increased effort, breath sounds clear  Cardiovascular:  Regular rhythm, S1, S2, no murmur/rub/S3/S4, no carotid/femoral/abdominal bruit, radial/pedal pulses 2+, no edema  Abdomen:  Soft, non-tender, non-distended, normoactive bowel sounds, no HSM  Extremities:  Gait & station:   Digits:   Nails:   Joints, Bones, Muscles:   ROM:   Stability:  Skin:  No rash/erythema/ecchymoses/petechiae/wounds/abscess/warm/dry  Musculoskeletal:  Full ROM in all joints w/o swelling/tenderness/effusion        LABS:                        10.4   6.89  )-----------( 189      ( 03 Oct 2020 07:53 )             33.6     10-03    137  |  103  |  9   ----------------------------<  94  4.0   |  24  |  1.05    Ca    9.1      03 Oct 2020 07:53  Phos  2.9     10-03  Mg     2.1     10-03            RADIOLOGY & ADDITIONAL STUDIES:        Neurology Progress Note:      Mental Status: awake alert speechb fluent  follows commands       Cranial Nerves: 2 /12 intact      Motor:    arm 3/5 legs weakenss cristy foot drop         Sensory:intact  numbness legs       Cerebellar: defrd      Gait:defrd      Assesment/Plan:  uti   cristy foot drop  s/p ureteroscopy  neuropathy  for emg out pt,  will follow

## 2020-10-03 NOTE — PROGRESS NOTE ADULT - SUBJECTIVE AND OBJECTIVE BOX
UROLOGY PROGRESS HPI:  S/P cysto, L ureteroscopy, retrograde pyelography, laser litho, L stent POD#1  Patient seen and examined at bedside. Denies pain or complaints. Voiding via primafit. Denies chest pain, shortness of breath, nausea/ vomiting, and dizziness.    Vital Signs Last 24 Hrs  T(C): 36.4 (03 Oct 2020 17:19), Max: 36.9 (03 Oct 2020 04:56)  T(F): 97.6 (03 Oct 2020 17:19), Max: 98.4 (03 Oct 2020 04:56)  HR: 64 (03 Oct 2020 17:19) (64 - 72)  BP: 124/68 (03 Oct 2020 17:19) (118/64 - 152/69)  BP(mean): --  RR: 17 (03 Oct 2020 17:19) (16 - 18)  SpO2: 98% (03 Oct 2020 17:19) (94% - 98%)      PHYSICAL EXAM:  GENERAL: Alert, NAD  CHEST/LUNG: Clear to auscultation bilaterally, respirations nonlabored  HEART: S1S2, Regular rate and rhythm  ABDOMEN: soft NTND  : Primafit in place with clear abram urine output. No suprapubic tenderness  EXTREMITIES:  no calf tenderness, No edema, intermittent compression devices in place bilaterally    I&O's Detail    02 Oct 2020 07:01  -  03 Oct 2020 07:00  --------------------------------------------------------  IN:    Oral Fluid: 360 mL  Total IN: 360 mL    OUT:    Voided (mL): 1100 mL  Total OUT: 1100 mL    Total NET: -740 mL          LABS:                        10.4   6.89  )-----------( 189      ( 03 Oct 2020 07:53 )             33.6     10-03    137  |  103  |  9   ----------------------------<  94  4.0   |  24  |  1.05    Ca    9.1      03 Oct 2020 07:53  Phos  2.9     10-03  Mg     2.1     10-03      Culture Results:   No growth (10-03 @ 01:06)      Assessment: 87F S/P cysto, L ureteroscopy, retrograde pyelography, laser litho, L stent POD#1    Plan:  - Monitor urine output  - DVT prophylaxis, Incentive Spirometer, OOB, Ambulating, pain control  - Continue antibiotics (Cipro)  - f/u labs   - continue current management per medicine  - d/c planning per medicine with outpatient urology follow up Wednesday/Thursday

## 2020-10-03 NOTE — PROGRESS NOTE ADULT - ASSESSMENT
88 y/o female PMH MI s/p CABG x 4, HTN, HLD with remote h/o sepsis, bacteremia, ARF 2/2 Left 8.8 midureteral calculus s/p Cysto ureteroscopy with insertion of stent on 8/25/20 with Dr. Leary who was seen in PST yesterday for scheduled Uscope and possible stent removal this Friday, however patient developed Nausea/vomiting and was sent to the ER for further evaluation.   Patient admitted with nausea/vomiting, unable to tolerate po intake  Renal US shows Left ureteral stent and non obstructive L nephrolithiasis.     Assessment and Plan:     # Functional Quadriplegia   patient noted to have b/l foot drop R>L  she stated she has been bedbound for past 6 mo. denies history of CVA   denies back pain . stated suddenly could not ambulate x 6 months.   strength b/l UE 5/5 ; strength b/l LE 0-1/5  CT head moderate periventricular and deep white matter ischemia. Tiny old lacunar infarction is seen in the LEFT caudate nucleus. Mild cerebellar atrophy.  Unable to do MRI due to pacemaker  CT L spine No evidence for acute displaced fracture or malalignment.  will obtain vitamin B12 folate level, Vit D  wnl   RPR neg  TSH slightly elevated, fT4/tT3 wnl. --patient clinically euthyroid   PT : home with home PT   patient has wheelchair at home     neurology consult appreciated , will need outpatient EMG  10/1/20 EEG abnormal--.  abnormal record because ofthe suspicious sharp and spike waves noticed clinical correlation is recommended for seizures.    no active seizures noted   neurology to advise.       # Nausea and vomiting , resolved   possibly ? stent blockage?   recent admission for klebsiella and E.coli  pyelonephritis sec to  Left ureteral 8.8mm calculus s/p Cystourethroscopy w/ stent placement 8/25/20 with Dr. Leary  9/30 Renal US: Left ureteral stent. Nonobstructive left nephrolithiasis. No hydronephrosis.  Urology consult appreciated, S/P cysto, L ureteroscopy, retrograde pyelography, laser litho, L stent 10/2   continue with cipro x 3 days   9/30 UCx contaminated   appears that one blood Cx was contaminated   the other with no growth   10/3 Urine Cx no growth   ID following  no e/o active infection at this time   Covid: neg    #Constipation , resolved   Large amount of rectal stool with presacral fat stranding suggesting stercoral colitis. seen on CT LS  large BM overnight 10/1-10/2  continue with current Bowel regimen       # LISA (acute kidney injury), presumed sec to intravascular depletion from vomiting ?   s/p 2L IVF   resolved     #  Hypokalemia --replete and monitor     # Coronary artery disease involving native heart without angina pectoris, unspecified vessel or lesion type.   s/p PPM  cont aspirin Norvasc home med , statin,  NOT on BB presumed due to symptomatic bradycardia requiring pacemaker     # . Benign essential HTN.  Plan: continue home meds.     6.  Preventive measure.  Plan: DVTp: Heparin SQ-dvt ppx  fall precautions   out of bed to chair

## 2020-10-03 NOTE — PROGRESS NOTE ADULT - SUBJECTIVE AND OBJECTIVE BOX
HPI:  86 y/o female PMH MI CAD s/p CABG x 4, HTN, HLD and AAA, presents with one week history of nausea and now two days of vomiting , patient was recently discharged from  hospital after being treated for ARF and  bacteremia and sepsis 2/2 left 8.8 midureteral calculus and post obstructive ARF last month, patient underwent Cystourethroscopy with insertion of stent and UTI with Klebsiella pneumoniae, was sent home on oral antibiotics and they finished about two weeks ago.  Today denied any flank pain- admits to dysuria and frequency and nausea nd vomiting in ED is found to have positive UA and ARF. States she has been taking her medications regularly despite her nausea and only today since being in ED she hasn't taken her home meds   Now denied any fever or chills denied any cough or sick contacts  (29 Sep 2020 19:26)      SUBJECTIVE & OBJECTIVE: Pt seen and examined at bedside.   no overnight events.     Denies chills, N/V, dizziness, HA, cough, CP, palpitations, SOB, abdominal pain, dysuria.     PHYSICAL EXAM:  Vital Signs Last 24 Hrs  T(C): 36.4 (03 Oct 2020 17:19), Max: 36.9 (03 Oct 2020 04:56)  T(F): 97.6 (03 Oct 2020 17:19), Max: 98.4 (03 Oct 2020 04:56)  HR: 64 (03 Oct 2020 17:19) (64 - 72)  BP: 124/68 (03 Oct 2020 17:19) (118/64 - 152/69)  BP(mean): --  RR: 17 (03 Oct 2020 17:19) (16 - 18)  SpO2: 98% (03 Oct 2020 17:19) (94% - 98%) on RA       GENERAL: NAD, speaking in full sentences   HEAD:  Atraumatic, Normocephalic  EYES: EOMI, PERRLA, conjunctiva and sclera clear  ENMT: Moist mucous membranes  NECK: Supple, No JVD  NERVOUS SYSTEM:  Alert & Oriented X3, strength b/l UE 5/5 ; strength b/l LE 0-1/5  CHEST/LUNG: CTAB  HEART: Regular rate and rhythm; No murmurs, rubs, or gallops  ABDOMEN: Soft, Nontender, Nondistended; Bowel sounds present  EXTREMITIES:  2+ Peripheral Pulses b/l, No clubbing, cyanosis, or edema b/l   b/l foot drop       MEDICATIONS  (STANDING):  amLODIPine   Tablet 10 milliGRAM(s) Oral daily  aspirin enteric coated 81 milliGRAM(s) Oral daily  heparin   Injectable 5000 Unit(s) SubCutaneous every 8 hours  piperacillin/tazobactam IVPB.. 3.375 Gram(s) IV Intermittent every 8 hours  simvastatin 20 milliGRAM(s) Oral at bedtime    MEDICATIONS  (PRN):  bisacodyl 5 milliGRAM(s) Oral every 12 hours PRN Constipation  ondansetron Injectable 4 milliGRAM(s) IV Push three times a day PRN Nausea and/or Vomiting      LABS:                                   10.4   6.89  )-----------( 189      ( 03 Oct 2020 07:53 )             33.6   10-    137  |  103  |  9   ----------------------------<  94  4.0   |  24  |  1.05    Ca    9.1      03 Oct 2020 07:53  Phos  2.9     10-03  Mg     2.1     10-03      Urinalysis Basic - ( 29 Sep 2020 16:58 )    Color: Yellow / Appearance: Clear / S.015 / pH: x  Gluc: x / Ketone: Negative  / Bili: Negative / Urobili: Negative mg/dL   Blood: x / Protein: 15 mg/dL / Nitrite: Negative   Leuk Esterase: Moderate / RBC: 25-50 /HPF / WBC 6-10   Sq Epi: x / Non Sq Epi: Few / Bacteria: Few        RECENT CULTURES:  Culture - Urine (20 @ 15:00)    Specimen Source: .Urine Clean Catch (Midstream)    Culture Results:   <10,000 CFU/mL Normal Urogenital Ivon          RADIOLOGY & ADDITIONAL TESTS:  < from: Xray Chest 1 View- PORTABLE-Urgent (20 @ 13:05) >  INTERPRETATION:  History: Vomiting    Portable chest x-ray is compared to 2020.    Studies rotated. Sternal wires and mediastinal clips are again seen. The heart is not enlarged. The lungs are clear. There is osteopenia of the bony structures.    Impression: Pacemaker. CABG. No active disease.    < end of copied text >    < from: Xray Ankle 2 Views, Left (20 @ 17:48) >  INTERPRETATION:  Left ankle. Patient has local pain. 2 views.    There is an inferior calcaneal spur. The ankle is relatively free of degeneration.    No bone destruction or fracture is evident.    IMPRESSION: No acute finding.    < end of copied text >    < from: CT Lumbar Spine No Cont (10.01.20 @ 17:22) >  INTERPRETATION:  CLINICAL INFORMATION: Leg Weakness. left leg weakness. .    TECHNIQUE: Noncontrast CT scan of the lumbar spine was performed. Thin section axialimages were obtained with sagittal and coronal reformations.    COMPARISON: CT abdomen and pelvis 10/6/2018    FINDINGS:    Lumbar levoscoliosis. Grade 1 anterolisthesis at L4-L5. Vertebral body heights are within normal limits. Multilevel intervertebral disc height loss with vacuum disc phenomenon. Multilevel disc bulges. Multilevel bilateral neuroforaminal narrowing.    Stent graft repair of abdominal aortic aneurysm. Left nephroureteral stent. Left renal hemorrhagic cyst. Large amount of rectal stool with presacral fat stranding suggesting stercoral colitis.    There is no prevertebral soft tissue swelling. The paraspinal soft tissues are unremarkable.      IMPRESSION:  1.  No evidence for acute displaced fracture or malalignment.  2.  Large amount of rectal stool with presacral fat stranding suggesting stercoral colitis.    < end of copied text >    < from: CT Head No Cont (20 @ 17:54) >  INTERPRETATION:  CT head without IV contrast    CLINICAL INFORMATION: Unable to ambulate    TECHNIQUE: Contiguous axial 5 mm sections were obtained through the head. Sagittal and coronal 2-D reformatted images were also obtained.   This scan was performed using automatic exposure control (radiation dose reduction software) to obtain a diagnostic image quality scan with patient dose as low as reasonably achievable.    FINDINGS:   No previous examinations are available for review.    The brain demonstrates moderate periventricular and deep white matter ischemia. Tiny old lacunar infarction is seen in the LEFT caudate nucleus.   No acute cerebral cortical infarct is seen.  No intracranial hemorrhage is found.  No mass effect is found in the brain.    The ventricles, sulci and basal cisterns show mild cerebellar atrophy.    The orbits are unremarkable.  The paranasal sinuses are clear.  The nasal cavity appears intact.  The nasopharynx is symmetric.  The central skull base, petrous temporal bones and calvarium remain intact.      IMPRESSION:   moderate periventricular and deep white matter ischemia. Tiny old lacunar infarction is seen in the LEFT caudate nucleus. Mild cerebellar atrophy.    < end of copied text >      < from: EEG Awake or Drowsy (10.01.20 @ 13:18) >  PROCEDURE DATE:  10/01/2020        INTERPRETATION:  The background activity contains in the  alpha range 7-8 Hz bilaterally symmetrical over the posterior region ranging from 20/30 microvolt activity noticed most of the recording the theta and alpha activity noticed during the recording there are some suspicious sharp and spike waves noticed. During photic station abnormalities noticed hyperventilation was not performed    Impression abnormal record because ofthe suspicious sharp and spike waves noticed clinical correlation is recommended for seizures.      < end of copied text >        Imaging Personally Reviewed:  [ ] YES  [ ] NO    Consultant(s) Notes Reviewed:  [x] YES  [ ] NO    Care Discussed with Consultants/Other Providers [x] YES  [ ] NO    Care discussed in detail with patient.  All questions and concerns addressed.

## 2020-10-04 NOTE — PROGRESS NOTE ADULT - ASSESSMENT
Subjective Complaints:  Historian:             Vital Signs Last 24 Hrs  T(C): 36.7 (04 Oct 2020 17:37), Max: 36.9 (04 Oct 2020 04:56)  T(F): 98 (04 Oct 2020 17:37), Max: 98.4 (04 Oct 2020 04:56)  HR: 65 (04 Oct 2020 17:37) (61 - 65)  BP: 109/61 (04 Oct 2020 17:37) (109/61 - 130/73)  BP(mean): --  RR: 17 (04 Oct 2020 17:37) (17 - 18)  SpO2: 96% (04 Oct 2020 17:37) (96% - 99%)    GENERAL PHYSICAL EXAM:  General:  Appears stated age, well-groomed, well-nourished, no distress  HEENT:  NC/AT, patent nares w/ pink mucosa, OP clear w/o lesions, PERRL, EOMI, conjunctivae clear, no thyromegaly, nodules, adenopathy, no JVD  Chest:  Full & symmetric excursion, no increased effort, breath sounds clear  Cardiovascular:  Regular rhythm, S1, S2, no murmur/rub/S3/S4, no carotid/femoral/abdominal bruit, radial/pedal pulses 2+, no edema  Abdomen:  Soft, non-tender, non-distended, normoactive bowel sounds, no HSM  Extremities:  Gait & station:   Digits:   Nails:   Joints, Bones, Muscles:   ROM:   Stability:  Skin:  No rash/erythema/ecchymoses/petechiae/wounds/abscess/warm/dry  Musculoskeletal:  Full ROM in all joints w/o swelling/tenderness/effusion        LABS:                        10.4   6.89  )-----------( 189      ( 03 Oct 2020 07:53 )             33.6     10-03    137  |  103  |  9   ----------------------------<  94  4.0   |  24  |  1.05    Ca    9.1      03 Oct 2020 07:53  Phos  2.9     10-03  Mg     2.1     10-03            RADIOLOGY & ADDITIONAL STUDIES:        Neurology Progress Note:      Mental Status: awaek alert  speech fluent  follows commands       Cranial Nerves: 2 /12 intact      Motor:    arm 3.5  legs cristy foot drop         Sensory: intact      Cerebellar: defrd      Gait: unavle to walk cristy foot drop       Assesment/Plan: uti s/p stent in ureter   cristy foot drop for emg out pt,  will folow

## 2020-10-04 NOTE — DISCHARGE NOTE PROVIDER - CARE PROVIDERS DIRECT ADDRESSES
,DirectAddress_Unknown,[email or web address],DirectAddress_Unknown

## 2020-10-04 NOTE — DISCHARGE NOTE PROVIDER - NSDCMRMEDTOKEN_GEN_ALL_CORE_FT
amLODIPine 10 mg oral tablet: 1 tab(s) orally once a day  amLODIPine 10 mg oral tablet: 1 tab(s) orally once a day  aspirin 81 mg oral tablet: 1 tab(s) orally once a day  senna oral tablet: 2 tab(s) orally once a day (at bedtime)  simvastatin 20 mg oral tablet: 1 tab(s) orally once a day (at bedtime)  simvastatin 20 mg oral tablet: 1 tab(s) orally once a day (at bedtime)   amLODIPine 10 mg oral tablet: 1 tab(s) orally once a day  aspirin 81 mg oral tablet: 1 tab(s) orally once a day  polyethylene glycol 3350 oral powder for reconstitution: 17 gram(s) orally once a day  senna oral tablet: 2 tab(s) orally once a day (at bedtime)  simvastatin 20 mg oral tablet: 1 tab(s) orally once a day (at bedtime)

## 2020-10-04 NOTE — DISCHARGE NOTE PROVIDER - HOSPITAL COURSE
88 y/o female PMH MI s/p CABG x 4, HTN, HLD with remote h/o sepsis, bacteremia, ARF 2/2 Left 8.8 midureteral calculus s/p Cysto ureteroscopy with insertion of stent on 8/25/20 with Dr. Leary who was seen in PST yesterday for scheduled Uscope and possible stent removal this Friday, however patient developed Nausea/vomiting and was sent to the ER for further evaluation.   Patient admitted with nausea/vomiting, unable to tolerate po intake  Renal US shows Left ureteral stent and non obstructive L nephrolithiasis.     Assessment and Plan:     # Functional Quadriplegia   patient noted to have b/l foot drop R>L  she stated she has been bedbound for past 6 mo. denies history of CVA   denies back pain . stated suddenly could not ambulate x 6 months.   strength b/l UE 5/5 ; strength b/l LE 0-1/5  CT head moderate periventricular and deep white matter ischemia. Tiny old lacunar infarction is seen in the LEFT caudate nucleus. Mild cerebellar atrophy.  Unable to do MRI due to pacemaker  CT L spine No evidence for acute displaced fracture or malalignment.  will obtain vitamin B12 folate level, Vit D  wnl   RPR neg  TSH slightly elevated, fT4/tT3 wnl. --patient clinically euthyroid   PT : home with home PT   patient has wheelchair at home     neurology consult appreciated , will need outpatient EMG  10/1/20 EEG abnormal--.  abnormal record because of the  suspicious sharp and spike waves noticed clinical correlation is recommended for seizures.    no active seizures noted , discussed with Neurology spikes on eeg are nonspecific and  clinically no e/o seizures  can follow-up outpt         # Nausea and vomiting , resolved   possibly ? stent blockage?   recent admission for klebsiella and E.coli  pyelonephritis sec to  Left ureteral 8.8mm calculus s/p Cystourethroscopy w/ stent placement 8/25/20 with Dr. Leary  9/30 Renal US: Left ureteral stent. Nonobstructive left nephrolithiasis. No hydronephrosis.  Urology consult appreciated, S/P cysto, L ureteroscopy, retrograde pyelography, laser litho, L stent 10/2   continue with cipro x 3 days   9/30 UCx contaminated   appears that one blood Cx was contaminated   the other with no growth   10/3 Urine Cx no growth   ID following  no e/o active infection at this time   Covid: neg    #Constipation , resolved   Large amount of rectal stool with presacral fat stranding suggesting stercoral colitis. seen on CT LS  large BM overnight 10/1-10/2  will give lactulose x 1 today and enema x 1       # LISA (acute kidney injury), presumed sec to intravascular depletion from vomiting ?   s/p 2L IVF   resolved     #  Hypokalemia --replete and monitor     # Coronary artery disease involving native heart without angina pectoris, unspecified vessel or lesion type.   s/p PPM  cont aspirin Norvasc home med , statin,  NOT on BB presumed due to symptomatic bradycardia requiring pacemaker     # . Benign essential HTN.  Plan: continue home meds.     Discharge time : 40 min       RETURN PARAMETERS DISCUSSED WITH PATIENT, PATIENT EXPRESSED UNDERSTANDING AND IS AGREEABLE. DISCUSSED WITH PATIENT ON REFRAINING FROM DRIVING UNTIL FOLLOW-UP/ CLEARED BY PMD. PATIENT EXPRESSED UNDERSTANDING.        86 y/o female PMH MI s/p CABG x 4, HTN, HLD with remote h/o sepsis, bacteremia, ARF 2/2 Left 8.8 midureteral calculus s/p Cysto ureteroscopy with insertion of stent on 8/25/20 with Dr. Leary who was seen in PST yesterday for scheduled Uscope and possible stent removal this Friday, however patient developed Nausea/vomiting and was sent to the ER for further evaluation.   Patient admitted with nausea/vomiting, unable to tolerate po intake  Renal US shows Left ureteral stent and non obstructive L nephrolithiasis.     Assessment and Plan:   # Nausea and vomiting , resolved   possibly ? stent blockage?   recent admission for klebsiella and E.coli  pyelonephritis sec to  Left ureteral 8.8mm calculus s/p Cystourethroscopy w/ stent placement 8/25/20 with Dr. Leary  9/30 Renal US: Left ureteral stent. Nonobstructive left nephrolithiasis. No hydronephrosis.  Urology consult appreciated, S/P cysto, L ureteroscopy, retrograde pyelography, laser litho, L stent 10/2   continue with cipro x 3 days   9/30 UCx contaminated   appears that one blood Cx was contaminated   the other with no growth   10/3 Urine Cx no growth   ID following  no e/o active infection at this time   Covid: neg    # Functional Quadriplegia   patient noted to have b/l foot drop R>L  she stated she has been bedbound for past 6 mo. denies history of CVA   denies back pain . stated suddenly could not ambulate x 6 months.   strength b/l UE 5/5 ; strength b/l LE 0-1/5  CT head moderate periventricular and deep white matter ischemia. Tiny old lacunar infarction is seen in the LEFT caudate nucleus. Mild cerebellar atrophy.  Unable to do MRI due to pacemaker  CT L spine No evidence for acute displaced fracture or malalignment.  will obtain vitamin B12 folate level, Vit D  wnl   RPR neg  TSH slightly elevated, fT4/tT3 wnl. --patient clinically euthyroid   PT : home with home PT   patient has wheelchair at home     neurology consult appreciated , will need outpatient EMG  10/1/20 EEG abnormal--.  abnormal record because of the  suspicious sharp and spike waves noticed clinical correlation is recommended for seizures.    no active seizures noted , discussed with Neurology spikes on eeg are nonspecific and  clinically no e/o seizures  can follow-up outpt         #Constipation , resolved   Large amount of rectal stool with presacral fat stranding suggesting stercoral colitis. seen on CT LS  +daily BMs   continue with senna at bedtime       # LISA (acute kidney injury), presumed sec to intravascular depletion from vomiting ?   s/p 2L IVF   resolved     #  Hypokalemia --replete and monitor     # Coronary artery disease involving native heart without angina pectoris, unspecified vessel or lesion type.   s/p PPM  cont aspirin Norvasc home med , statin,  NOT on BB presumed due to symptomatic bradycardia requiring pacemaker     # . Benign essential HTN.  Plan: continue home meds.     Discharge time : 40 min       RETURN PARAMETERS DISCUSSED WITH PATIENT, PATIENT EXPRESSED UNDERSTANDING AND IS AGREEABLE. DISCUSSED WITH PATIENT ON REFRAINING FROM DRIVING UNTIL FOLLOW-UP/ CLEARED BY PMD. PATIENT EXPRESSED UNDERSTANDING.

## 2020-10-04 NOTE — DISCHARGE NOTE PROVIDER - CARE PROVIDER_API CALL
Julia Charles  NEUROLOGY  [address] [place] [address]  Phone: [phone]  Fax: [phone]  Follow Up Time: 2 weeks    Perez Leary  UROLOGY  [address] [facility], [address] [place] [address]  Phone: [phone]  Fax: [phone]  Scheduled Appointment: 10/14/2020    your primary care doctor,   Phone: (   )    -  Fax: (   )    -  Follow Up Time: 1 week

## 2020-10-04 NOTE — DISCHARGE NOTE PROVIDER - PROVIDER TOKENS
PROVIDER:[TOKEN:[[id number]],FOLLOWUP:[2 weeks]],PROVIDER:[TOKEN:[[id number]],SCHEDULEDAPPT:[10/14/2020]],FREE:[LAST:[your primary care doctor],PHONE:[(   )    -],FAX:[(   )    -],FOLLOWUP:[1 week]]

## 2020-10-04 NOTE — DISCHARGE NOTE PROVIDER - NSDCCPCAREPLAN_GEN_ALL_CORE_FT
PRINCIPAL DISCHARGE DIAGNOSIS  Diagnosis: Non-intractable vomiting without nausea  Assessment and Plan of Treatment: S/P cysto, L ureteroscopy, retrograde pyelography, laser litho, L stent 10/2      SECONDARY DISCHARGE DIAGNOSES  Diagnosis: Constipation  Assessment and Plan of Treatment:     Diagnosis: CAD in native artery  Assessment and Plan of Treatment:

## 2020-10-05 NOTE — DIETITIAN INITIAL EVALUATION ADULT. - DIET TYPE
Recommend add Ensure Enlive x 2/day (provides 700 kcal, 40 g protein) to better meet estimated nutrient needs

## 2020-10-05 NOTE — PROGRESS NOTE ADULT - SUBJECTIVE AND OBJECTIVE BOX
[facility]  Division of Infectious Diseases  [phone]    Name: MITCHEL MULLINS  Age: 87y  Gender: Female  MRN: [id number]    Interval History--  Notes reviewed. Feels fine. No complaints.   Started on Cipro by urology.    Past Medical History--  CAD (coronary artery disease)    AAA (abdominal aortic aneurysm)    Myocardial infarction    S/P CABG x 4    HLD (hyperlipidemia)    Benign essential HTN    S/P CABG x 4        For details regarding the patient's social history, family history, and other miscellaneous elements, please refer the initial infectious diseases consultation and/or the admitting history and physical examination for this admission.    Allergies    No Known Allergies    Intolerances        Medications--  Antibiotics:  ciprofloxacin     Tablet 250 milliGRAM(s) Oral every 12 hours    Immunologic:    Other:  amLODIPine   Tablet  aspirin enteric coated  bisacodyl PRN  heparin   Injectable  ondansetron Injectable PRN  senna  simethicone PRN  simvastatin      Review of Systems--  A 10-point review of systems was obtained.   Review of systems otherwise negative except as previously noted.    Physical Examination--  Vital Signs: T(F): 97.9 (10-05-20 @ 05:32), Max: 98.1 (10-04-20 @ 23:24)  HR: 65 (10-05-20 @ 05:32)  BP: 137/77 (10-05-20 @ 05:32)  RR: 18 (10-05-20 @ 05:32)  SpO2: 96% (10-05-20 @ 05:32)  Wt(kg): --  General: Nontoxic-appearing Female in no acute distress.  HEENT: AT/NC. PERRL. EOMI. Anicteric. Conjunctiva pink and moist. Oropharynx clear. Dentition poor.   Neck: Not rigid. No sense of mass.  Nodes: None palpable.  Lungs: Clear bilaterally without rales, wheezing or rhonchi  Heart: Regular rate and rhythm. No Murmur. No rub. No gallop. No palpable thrill.  Abdomen: Bowel sounds present and normoactive. Soft. Nondistended. Nontender. No sense of mass. No organomegaly.  Back: No spinal tenderness. No costovertebral angle tenderness.   Extremities: No cyanosis or clubbing. No edema.   Skin: Warm. Dry. Good turgor. No rash. No vasculitic stigmata.  Psychiatric: Appropriate affect and mood for situation.       Laboratory Studies--  CBC      Chemistries          Culture Data    Culture - Urine (collected 03 Oct 2020 01:06)  Source: .Urine BLADDER URINE  C/S  Final Report (04 Oct 2020 16:33):    No growth    Culture - Urine (collected 30 Sep 2020 00:47)  Source: .Urine Clean Catch (Midstream)  Final Report (30 Sep 2020 23:50):    >=3 organisms. Probable collection contamination.    Culture - Blood (collected 30 Sep 2020 00:18)  Source: .Blood Blood-Peripheral  Final Report (05 Oct 2020 01:00):    No Growth Final    Culture - Blood (collected 30 Sep 2020 00:18)  Source: .Blood Blood-Peripheral  Gram Stain (01 Oct 2020 22:25):    Growth in aerobic bottle: Gram positive cocci in pairs    Growth in anaerobic bottle: Gram positive cocci in pairs  Final Report (01 Oct 2020 22:25):    Growth in aerobic and anaerobic bottles: Staphylococcus epidermidis    Coag Negative Staphylococcus    Single set isolate, possible contaminant. Contact    Microbiology if susceptibility testing clinically    indicated.    ***Blood Panel PCR results on this specimen are available    approximately 3 hours after the Gram stain result.***    Gram stain, PCR, and/or culture results may not always    correspond due to difference in methodologies.    ************************************************************    This PCR assay was performed using [employer].    The following targets are tested for: Enterococcus,    vancomycin resistant enterococci, Listeria monocytogenes,    coagulase negative staphylococci, S. aureus,    methicillin resistant S. aureus, Streptococcus agalactiae    (Group B), S. pneumoniae, S. pyogenes (Group A),    Acinetobacter baumannii, Enterobacter cloacae, E. coli,    Klebsiella oxytoca, K. pneumoniae, Proteus sp.,    Serratia marcescens, Haemophilus influenzae,    Neisseria meningitidis, Pseudomonas aeruginosa, Candida    albicans, C. glabrata, C krusei, C parapsilosis,    C. tropicalis and the KPC resistance gene.    "Due to technical problems, Proteus sp. will Not be reported as part of    the BCID panel until further notice"  Organism: Blood Culture PCR (01 Oct 2020 22:25)  Organism: Blood Culture PCR (01 Oct 2020 22:25)

## 2020-10-05 NOTE — CHART NOTE - TREATMENT: THE FOLLOWING DIET HAS BEEN RECOMMENDED
Diet, DASH/TLC:   Sodium & Cholesterol Restricted  Supplement Feeding Modality:  Oral  Ensure Enlive Cans or Servings Per Day:  1       Frequency:  Two Times a day (10-05-20 @ 12:24) [Pending Verification By Attending]  Diet, DASH/TLC:   Sodium & Cholesterol Restricted (10-02-20 @ 17:42) [Active]

## 2020-10-05 NOTE — PROGRESS NOTE ADULT - SUBJECTIVE AND OBJECTIVE BOX
HPI:  86 y/o female PMH MI CAD s/p CABG x 4, HTN, HLD and AAA, presents with one week history of nausea and now two days of vomiting , patient was recently discharged from  hospital after being treated for ARF and  bacteremia and sepsis 2/2 left 8.8 midureteral calculus and post obstructive ARF last month, patient underwent Cystourethroscopy with insertion of stent and UTI with Klebsiella pneumoniae, was sent home on oral antibiotics and they finished about two weeks ago.  Today denied any flank pain- admits to dysuria and frequency and nausea nd vomiting in ED is found to have positive UA and ARF. States she has been taking her medications regularly despite her nausea and only today since being in ED she hasn't taken her home meds   Now denied any fever or chills denied any cough or sick contacts  (29 Sep 2020 19:26)      SUBJECTIVE & OBJECTIVE: Pt seen and examined at bedside.   no overnight events.   denies pain   +BMs daily     Denies chills, N/V, dizziness, HA, cough, CP, palpitations, SOB, abdominal pain, dysuria.     PHYSICAL EXAM:  Vital Signs Last 24 Hrs  T(C): 36.6 (05 Oct 2020 05:32), Max: 36.7 (04 Oct 2020 17:37)  T(F): 97.9 (05 Oct 2020 05:32), Max: 98.1 (04 Oct 2020 23:24)  HR: 65 (05 Oct 2020 05:32) (64 - 65)  BP: 137/77 (05 Oct 2020 05:32) (109/61 - 139/76)  BP(mean): --  RR: 18 (05 Oct 2020 05:32) (17 - 18)  SpO2: 96% (05 Oct 2020 05:32) (96% - 98%) on RA       GENERAL: NAD, speaking in full sentences   HEAD:  Atraumatic, Normocephalic  EYES: EOMI, PERRLA, conjunctiva and sclera clear  ENMT: Moist mucous membranes  NECK: Supple, No JVD  NERVOUS SYSTEM:  Alert & Oriented X3, strength b/l UE 5/5 ; strength b/l LE 0-1/5  CHEST/LUNG: CTAB  HEART: Regular rate and rhythm; No murmurs, rubs, or gallops  ABDOMEN: Soft, Nontender, Nondistended; Bowel sounds present  EXTREMITIES:  2+ Peripheral Pulses b/l, No clubbing, cyanosis, or edema b/l   b/l foot drop       MEDICATIONS  (STANDING):  amLODIPine   Tablet 10 milliGRAM(s) Oral daily  aspirin enteric coated 81 milliGRAM(s) Oral daily  heparin   Injectable 5000 Unit(s) SubCutaneous every 8 hours  piperacillin/tazobactam IVPB.. 3.375 Gram(s) IV Intermittent every 8 hours  simvastatin 20 milliGRAM(s) Oral at bedtime    MEDICATIONS  (PRN):  bisacodyl 5 milliGRAM(s) Oral every 12 hours PRN Constipation  ondansetron Injectable 4 milliGRAM(s) IV Push three times a day PRN Nausea and/or Vomiting      LABS:                        no new labs     Urinalysis Basic - ( 29 Sep 2020 16:58 )    Color: Yellow / Appearance: Clear / S.015 / pH: x  Gluc: x / Ketone: Negative  / Bili: Negative / Urobili: Negative mg/dL   Blood: x / Protein: 15 mg/dL / Nitrite: Negative   Leuk Esterase: Moderate / RBC: 25-50 /HPF / WBC 6-10   Sq Epi: x / Non Sq Epi: Few / Bacteria: Few        RECENT CULTURES:  Culture - Urine (20 @ 15:00)    Specimen Source: .Urine Clean Catch (Midstream)    Culture Results:   <10,000 CFU/mL Normal Urogenital Ivon          RADIOLOGY & ADDITIONAL TESTS:  < from: Xray Chest 1 View- PORTABLE-Urgent (20 @ 13:05) >  INTERPRETATION:  History: Vomiting    Portable chest x-ray is compared to 2020.    Studies rotated. Sternal wires and mediastinal clips are again seen. The heart is not enlarged. The lungs are clear. There is osteopenia of the bony structures.    Impression: Pacemaker. CABG. No active disease.    < end of copied text >    < from: Xray Ankle 2 Views, Left (20 @ 17:48) >  INTERPRETATION:  Left ankle. Patient has local pain. 2 views.    There is an inferior calcaneal spur. The ankle is relatively free of degeneration.    No bone destruction or fracture is evident.    IMPRESSION: No acute finding.    < end of copied text >    < from: CT Lumbar Spine No Cont (10.01.20 @ 17:22) >  INTERPRETATION:  CLINICAL INFORMATION: Leg Weakness. left leg weakness. .    TECHNIQUE: Noncontrast CT scan of the lumbar spine was performed. Thin section axialimages were obtained with sagittal and coronal reformations.    COMPARISON: CT abdomen and pelvis 10/6/2018    FINDINGS:    Lumbar levoscoliosis. Grade 1 anterolisthesis at L4-L5. Vertebral body heights are within normal limits. Multilevel intervertebral disc height loss with vacuum disc phenomenon. Multilevel disc bulges. Multilevel bilateral neuroforaminal narrowing.    Stent graft repair of abdominal aortic aneurysm. Left nephroureteral stent. Left renal hemorrhagic cyst. Large amount of rectal stool with presacral fat stranding suggesting stercoral colitis.    There is no prevertebral soft tissue swelling. The paraspinal soft tissues are unremarkable.      IMPRESSION:  1.  No evidence for acute displaced fracture or malalignment.  2.  Large amount of rectal stool with presacral fat stranding suggesting stercoral colitis.    < end of copied text >    < from: CT Head No Cont (20 @ 17:54) >  INTERPRETATION:  CT head without IV contrast    CLINICAL INFORMATION: Unable to ambulate    TECHNIQUE: Contiguous axial 5 mm sections were obtained through the head. Sagittal and coronal 2-D reformatted images were also obtained.   This scan was performed using automatic exposure control (radiation dose reduction software) to obtain a diagnostic image quality scan with patient dose as low as reasonably achievable.    FINDINGS:   No previous examinations are available for review.    The brain demonstrates moderate periventricular and deep white matter ischemia. Tiny old lacunar infarction is seen in the LEFT caudate nucleus.   No acute cerebral cortical infarct is seen.  No intracranial hemorrhage is found.  No mass effect is found in the brain.    The ventricles, sulci and basal cisterns show mild cerebellar atrophy.    The orbits are unremarkable.  The paranasal sinuses are clear.  The nasal cavity appears intact.  The nasopharynx is symmetric.  The central skull base, petrous temporal bones and calvarium remain intact.      IMPRESSION:   moderate periventricular and deep white matter ischemia. Tiny old lacunar infarction is seen in the LEFT caudate nucleus. Mild cerebellar atrophy.    < end of copied text >      < from: EEG Awake or Drowsy (10.01.20 @ 13:18) >  PROCEDURE DATE:  10/01/2020        INTERPRETATION:  The background activity contains in the  alpha range 7-8 Hz bilaterally symmetrical over the posterior region ranging from 20/30 microvolt activity noticed most of the recording the theta and alpha activity noticed during the recording there are some suspicious sharp and spike waves noticed. During photic station abnormalities noticed hyperventilation was not performed    Impression abnormal record because ofthe suspicious sharp and spike waves noticed clinical correlation is recommended for seizures.      < end of copied text >        Imaging Personally Reviewed:  [ ] YES  [ ] NO    Consultant(s) Notes Reviewed:  [x] YES  [ ] NO    Care Discussed with Consultants/Other Providers [x] YES  [ ] NO    Care discussed in detail with patient.  All questions and concerns addressed.

## 2020-10-05 NOTE — DIETITIAN INITIAL EVALUATION ADULT. - OTHER INFO
Pt presented with N/V x 2 days, found with sepsis, UTI, ARF. s/p cystoscopy with stent replacement/insertion and extraction of calculus (10/01). Pending discharge home.  Pt states her appetite and intake were "normal" PTA. Unable to give diet recall as she cannot remember what she usually eats at home. States she eats 2-3 meals/day usually prepared by her son or HHA at home. States she takes vitamin D and iron supplement at home along with drinking 1 Ensure/day.   Per flow sheets pt consuming % documented meals. Observed untouched lunch tray at time of visit- pt states she is "not hungry right now". Denies difficulty chewing/swallowing. States no N/V. Unable to recall last BM- per hospitalist note pt having daily BMs with constipation resolved. Pt reports no recent weight changes; UBW is 150 pounds.   Encouraged intake at time of visit and offered to take preferences- pt politely refused; pt amenable to receiving Ensure Enlive x 2/day (provides 700 kcal, 40 g protein) while at the hospital.

## 2020-10-05 NOTE — DIETITIAN INITIAL EVALUATION ADULT. - PERTINENT MEDS FT
Detail Level: Zone
Benzoyl Peroxide Pregnancy And Lactation Text: This medication is Pregnancy Category C. It is unknown if benzoyl peroxide is excreted in breast milk.
Doxycycline Pregnancy And Lactation Text: This medication is Pregnancy Category D and not consider safe during pregnancy. It is also excreted in breast milk but is considered safe for shorter treatment courses.
Use Enhanced Medication Counseling?: No
High Dose Vitamin A Counseling: Side effects reviewed, pt to contact office should one occur.
MEDICATIONS  (STANDING):  amLODIPine   Tablet 10 milliGRAM(s) Oral daily  aspirin enteric coated 81 milliGRAM(s) Oral daily  ciprofloxacin     Tablet 250 milliGRAM(s) Oral every 12 hours  heparin   Injectable 5000 Unit(s) SubCutaneous every 8 hours  senna 2 Tablet(s) Oral at bedtime  simvastatin 20 milliGRAM(s) Oral at bedtime    MEDICATIONS  (PRN):  bisacodyl 5 milliGRAM(s) Oral every 12 hours PRN Constipation  ondansetron Injectable 4 milliGRAM(s) IV Push three times a day PRN Nausea and/or Vomiting  simethicone 80 milliGRAM(s) Chew every 6 hours PRN Gas
Topical Clindamycin Counseling: Patient counseled that this medication may cause skin irritation or allergic reactions.  In the event of skin irritation, the patient was advised to reduce the amount of the drug applied or use it less frequently.   The patient verbalized understanding of the proper use and possible adverse effects of clindamycin.  All of the patient's questions and concerns were addressed.
Azithromycin Counseling:  I discussed with the patient the risks of azithromycin including but not limited to GI upset, allergic reaction, drug rash, diarrhea, and yeast infections.
Birth Control Pills Pregnancy And Lactation Text: This medication should be avoided if pregnant and for the first 30 days post-partum.
Spironolactone Pregnancy And Lactation Text: This medication can cause feminization of the male fetus and should be avoided during pregnancy. The active metabolite is also found in breast milk.
Erythromycin Counseling:  I discussed with the patient the risks of erythromycin including but not limited to GI upset, allergic reaction, drug rash, diarrhea, increase in liver enzymes, and yeast infections.
Topical Retinoid counseling:  Patient advised to apply a pea-sized amount only at bedtime and wait 30 minutes after washing their face before applying.  If too drying, patient may add a non-comedogenic moisturizer. The patient verbalized understanding of the proper use and possible adverse effects of retinoids.  All of the patient's questions and concerns were addressed.
High Dose Vitamin A Pregnancy And Lactation Text: High dose vitamin A therapy is contraindicated during pregnancy and breast feeding.
Topical Clindamycin Pregnancy And Lactation Text: This medication is Pregnancy Category B and is considered safe during pregnancy. It is unknown if it is excreted in breast milk.
Azithromycin Pregnancy And Lactation Text: This medication is considered safe during pregnancy and is also secreted in breast milk.
Dapsone Counseling: I discussed with the patient the risks of dapsone including but not limited to hemolytic anemia, agranulocytosis, rashes, methemoglobinemia, kidney failure, peripheral neuropathy, headaches, GI upset, and liver toxicity.  Patients who start dapsone require monitoring including baseline LFTs and weekly CBCs for the first month, then every month thereafter.  The patient verbalized understanding of the proper use and possible adverse effects of dapsone.  All of the patient's questions and concerns were addressed.
Tetracycline Counseling: Patient counseled regarding possible photosensitivity and increased risk for sunburn.  Patient instructed to avoid sunlight, if possible.  When exposed to sunlight, patients should wear protective clothing, sunglasses, and sunscreen.  The patient was instructed to call the office immediately if the following severe adverse effects occur:  hearing changes, easy bruising/bleeding, severe headache, or vision changes.  The patient verbalized understanding of the proper use and possible adverse effects of tetracycline.  All of the patient's questions and concerns were addressed. Patient understands to avoid pregnancy while on therapy due to potential birth defects.
Erythromycin Pregnancy And Lactation Text: This medication is Pregnancy Category B and is considered safe during pregnancy. It is also excreted in breast milk.
Topical Retinoid Pregnancy And Lactation Text: This medication is Pregnancy Category C. It is unknown if this medication is excreted in breast milk.
Minocycline Counseling: Patient advised regarding possible photosensitivity and discoloration of the teeth, skin, lips, tongue and gums.  Patient instructed to avoid sunlight, if possible.  When exposed to sunlight, patients should wear protective clothing, sunglasses, and sunscreen.  The patient was instructed to call the office immediately if the following severe adverse effects occur:  hearing changes, easy bruising/bleeding, severe headache, or vision changes.  The patient verbalized understanding of the proper use and possible adverse effects of minocycline.  All of the patient's questions and concerns were addressed.
Topical Sulfur Applications Counseling: Topical Sulfur Counseling: Patient counseled that this medication may cause skin irritation or allergic reactions.  In the event of skin irritation, the patient was advised to reduce the amount of the drug applied or use it less frequently.   The patient verbalized understanding of the proper use and possible adverse effects of topical sulfur application.  All of the patient's questions and concerns were addressed.
Bactrim Counseling:  I discussed with the patient the risks of sulfa antibiotics including but not limited to GI upset, allergic reaction, drug rash, diarrhea, dizziness, photosensitivity, and yeast infections.  Rarely, more serious reactions can occur including but not limited to aplastic anemia, agranulocytosis, methemoglobinemia, blood dyscrasias, liver or kidney failure, lung infiltrates or desquamative/blistering drug rashes.
Dapsone Pregnancy And Lactation Text: This medication is Pregnancy Category C and is not considered safe during pregnancy or breast feeding.
Tetracycline Pregnancy And Lactation Text: This medication is Pregnancy Category D and not consider safe during pregnancy. It is also excreted in breast milk.
Isotretinoin Counseling: Patient should get monthly blood tests, not donate blood, not drive at night if vision affected, not share medication, and not undergo elective surgery for 6 months after tx completed. Side effects reviewed, pt to contact office should one occur.
Tazorac Counseling:  Patient advised that medication is irritating and drying.  Patient may need to apply sparingly and wash off after an hour before eventually leaving it on overnight.  The patient verbalized understanding of the proper use and possible adverse effects of tazorac.  All of the patient's questions and concerns were addressed.
Topical Sulfur Applications Pregnancy And Lactation Text: This medication is Pregnancy Category C and has an unknown safety profile during pregnancy. It is unknown if this topical medication is excreted in breast milk.
Bactrim Pregnancy And Lactation Text: This medication is Pregnancy Category D and is known to cause fetal risk.  It is also excreted in breast milk.
Doxycycline Counseling:  Patient counseled regarding possible photosensitivity and increased risk for sunburn.  Patient instructed to avoid sunlight, if possible.  When exposed to sunlight, patients should wear protective clothing, sunglasses, and sunscreen.  The patient was instructed to call the office immediately if the following severe adverse effects occur:  hearing changes, easy bruising/bleeding, severe headache, or vision changes.  The patient verbalized understanding of the proper use and possible adverse effects of doxycycline.  All of the patient's questions and concerns were addressed.
Benzoyl Peroxide Counseling: Patient counseled that medicine may cause skin irritation and bleach clothing.  In the event of skin irritation, the patient was advised to reduce the amount of the drug applied or use it less frequently.   The patient verbalized understanding of the proper use and possible adverse effects of benzoyl peroxide.  All of the patient's questions and concerns were addressed.
Isotretinoin Pregnancy And Lactation Text: This medication is Pregnancy Category X and is considered extremely dangerous during pregnancy. It is unknown if it is excreted in breast milk.
Tazorac Pregnancy And Lactation Text: This medication is not safe during pregnancy. It is unknown if this medication is excreted in breast milk.
Spironolactone Counseling: Patient advised regarding risks of diarrhea, abdominal pain, hyperkalemia, birth defects (for female patients), liver toxicity and renal toxicity. The patient may need blood work to monitor liver and kidney function and potassium levels while on therapy. The patient verbalized understanding of the proper use and possible adverse effects of spironolactone.  All of the patient's questions and concerns were addressed.
Birth Control Pills Counseling: Birth Control Pill Counseling: I discussed with the patient the potential side effects of OCPs including but not limited to increased risk of stroke, heart attack, thrombophlebitis, deep venous thrombosis, hepatic adenomas, breast changes, GI upset, headaches, and depression.  The patient verbalized understanding of the proper use and possible adverse effects of OCPs. All of the patient's questions and concerns were addressed.

## 2020-10-05 NOTE — DIETITIAN INITIAL EVALUATION ADULT. - PERTINENT LABORATORY DATA
10-03 Na137 mmol/L Glu 94 mg/dL K+ 4.0 mmol/L Cr  1.05 mg/dL BUN 9 mg/dL 10-03 Phos 2.9 mg/dL 10-01 Alb 3.2 g/dL<L>

## 2020-10-05 NOTE — PROGRESS NOTE ADULT - SUBJECTIVE AND OBJECTIVE BOX
Neurology Progress Note    No acute events.     Neuro Exam: AOx3. Follows commands. No dysarthria. PERRL. Moving arms against gravity and strong  bilaterally. Right leg 4/5 motor with weakness in dorsiflexion (2/5). Left leg unable to raise above gravity (proximally 1/5 and distally 3/5). sensory decreased in left leg.     CT head- no acute process  CT L-spine- no significant disease    A/P:  Left leg weakness  UTI  ARF  CAD  HTN    - PT/OT and possible rehab  - will need outpatient EMG  - EEG report reviewed. No symptoms of seizure disorder. No need for AED.  - D/C planning

## 2020-10-05 NOTE — PROGRESS NOTE ADULT - ASSESSMENT
86 y/o female PMH MI s/p CABG x 4, HTN, HLD with remote h/o sepsis, bacteremia, ARF 2/2 Left 8.8 midureteral calculus s/p Cysto ureteroscopy with insertion of stent on 8/25/20 with Dr. Leary who was seen in PST yesterday for scheduled Uscope and possible stent removal this Friday, however patient developed Nausea/vomiting and was sent to the ER for further evaluation.   Patient admitted with nausea/vomiting, unable to tolerate po intake  Renal US shows Left ureteral stent and non obstructive L nephrolithiasis.     Assessment and Plan:     # Functional Quadriplegia   patient noted to have b/l foot drop R>L  she stated she has been bedbound for past 6 mo. denies history of CVA   denies back pain . stated suddenly could not ambulate x 6 months.   strength b/l UE 5/5 ; strength b/l LE 0-1/5  CT head moderate periventricular and deep white matter ischemia. Tiny old lacunar infarction is seen in the LEFT caudate nucleus. Mild cerebellar atrophy.  Unable to do MRI due to pacemaker  CT L spine No evidence for acute displaced fracture or malalignment.  will obtain vitamin B12 folate level, Vit D  wnl   RPR neg  TSH slightly elevated, fT4/tT3 wnl. --patient clinically euthyroid   PT : home with home PT   patient has wheelchair at home     neurology consult appreciated , will need outpatient EMG  10/1/20 EEG abnormal--.  abnormal record because ofthe suspicious sharp and spike waves noticed clinical correlation is recommended for seizures.    no active seizures noted   discussed with Neurology spikes on eeg are nonspecific and  clinically no e/o seizures      # Nausea and vomiting , resolved   possibly ? stent blockage?   recent admission for klebsiella and E.coli  pyelonephritis sec to  Left ureteral 8.8mm calculus s/p Cystourethroscopy w/ stent placement 8/25/20 with Dr. Leary  9/30 Renal US: Left ureteral stent. Nonobstructive left nephrolithiasis. No hydronephrosis.  Urology consult appreciated, S/P cysto, L ureteroscopy, retrograde pyelography, laser litho, L stent 10/2   continue with cipro x 3 days   9/30 UCx contaminated   appears that one blood Cx was contaminated   the other with no growth   10/3 Urine Cx no growth   ID following  no e/o active infection at this time   Covid: neg    #Constipation , resolved   Large amount of rectal stool with presacral fat stranding suggesting stercoral colitis. seen on CT LS  has daily BMs (per RN and patient)  continue with bowel regimen       # LISA (acute kidney injury), presumed sec to intravascular depletion from vomiting ?   s/p 2L IVF   resolved     #  Hypokalemia --replete and monitor     # Coronary artery disease involving native heart without angina pectoris, unspecified vessel or lesion type.   s/p PPM  cont aspirin Norvasc home med , statin,  NOT on BB presumed due to symptomatic bradycardia requiring pacemaker     # . Benign essential HTN.  Plan: continue home meds.     6.  Preventive measure.  Plan: DVTp: Heparin SQ-dvt ppx  fall precautions   out of bed to chair

## 2020-10-05 NOTE — DIETITIAN INITIAL EVALUATION ADULT. - ETIOLOGY
Inadequate protein/energy intake in pt with extensive cardiac history, HTN and increased needs with stage II presure injury

## 2020-10-05 NOTE — DIETITIAN INITIAL EVALUATION ADULT. - PHYSICAL APPEARANCE
other (specify)/underweight/BMI=25.3; Edema: 1+ bilateral feet Nutrition focused physical exam conducted:  Subcutaneous fat loss: [mild] Orbital fat pads region, [ mild]Buccal fat region, [ moderate]Triceps region,  [ unable]Ribs region.  Muscle wasting: [severe ]Temples region, [ moderate]Clavicle region, [mild ]Shoulder region, [unable ]Scapula region, [ moderate]Interosseous region,  [WDL ]thigh region, [unable-wearing TEDS]Calf region

## 2020-10-05 NOTE — CONSULT NOTE ADULT - SUBJECTIVE AND OBJECTIVE BOX
Full consult dictated    Plan; Pt with multiple medical problems - admitted with n/v and mild sepsis.  Pt with constipation.  Ct Scan shows stercoral colitis and stool in rectum.  +diverticulosis.  Suggest continuing dulcolax and lactulose. If constipation gets worse will advance to magnesium citrate.  Will follow as needed.

## 2020-10-05 NOTE — PROGRESS NOTE ADULT - ASSESSMENT
Bacteremia  Suspect procurement contaminant  No concern of active or uncontrolled infection on exam  Now slated for stent exchange today, yesterday's procedure note erroneous  WIll give additional antibiotics at this time  Still no objection to proposed procedure.    10/5: Stable without concern for active or uncontrolled infection. 10/4 urine cx negative.     Suggestions--  I see no role for antibiotics at this time.  I'll sign off.     Thank you for the courtesy of this referral.    Joselo Serrano MD  Attending Physician  [facility]  Division of Infectious Diseases  [phone]

## 2020-10-06 NOTE — PROGRESS NOTE ADULT - ASSESSMENT
88 y/o female PMH MI s/p CABG x 4, HTN, HLD with remote h/o sepsis, bacteremia, ARF 2/2 Left 8.8 midureteral calculus s/p Cysto ureteroscopy with insertion of stent on 8/25/20 with Dr. Leary who was seen in PST yesterday for scheduled Uscope and possible stent removal this Friday, however patient developed Nausea/vomiting and was sent to the ER for further evaluation.   Patient admitted with nausea/vomiting, unable to tolerate po intake  Renal US shows Left ureteral stent and non obstructive L nephrolithiasis.     Assessment and Plan:     # Functional Quadriplegia   patient noted to have b/l foot drop R>L  she stated she has been bedbound for past 6 mo. denies history of CVA   denies back pain . stated suddenly could not ambulate x 6 months.   strength b/l UE 5/5 ; strength b/l LE 0-1/5  CT head moderate periventricular and deep white matter ischemia. Tiny old lacunar infarction is seen in the LEFT caudate nucleus. Mild cerebellar atrophy.  Unable to do MRI due to pacemaker  CT L spine No evidence for acute displaced fracture or malalignment.  will obtain vitamin B12 folate level, Vit D  wnl   RPR neg  TSH slightly elevated, fT4/tT3 wnl. --patient clinically euthyroid   PT : home with home PT   patient has wheelchair at home     neurology consult appreciated , will need outpatient EMG  10/1/20 EEG abnormal--.  abnormal record because ofthe suspicious sharp and spike waves noticed clinical correlation is recommended for seizures.    no active seizures noted   discussed with Neurology spikes on eeg are nonspecific and  clinically no e/o seizures      # Nausea and vomiting --1 episode of dark bilious vomiting today 10/6 after she drank lactulose - refused NGT and also no e/o obstruction on CTAP   possibly ? stent blockage?   recent admission for klebsiella and E.coli  pyelonephritis sec to  Left ureteral 8.8mm calculus s/p Cystourethroscopy w/ stent placement 8/25/20 with Dr. Leary  9/30 Renal US: Left ureteral stent. Nonobstructive left nephrolithiasis. No hydronephrosis.  Urology consult appreciated, S/P cysto, L ureteroscopy, retrograde pyelography, laser litho, L stent 10/2   continue with cipro x 3 days   9/30 UCx contaminated   appears that one blood Cx was contaminated   the other with no growth   10/3 Urine Cx no growth   ID following  no e/o active infection at this time   Covid: neg  10/6 follow-up KUB , official read     #Constipation , resolved   Large amount of rectal stool with presacral fat stranding suggesting stercoral colitis. seen on CT LS  has daily BMs (per RN and patient)  continue with bowel regimen   lactulose daily   GI following       # LISA (acute kidney injury), presumed sec to intravascular depletion from vomiting ?   s/p 2L IVF   resolved     #  Hypokalemia --replete and monitor     # Coronary artery disease involving native heart without angina pectoris, unspecified vessel or lesion type.   s/p PPM  cont aspirin Norvasc home med , statin,  NOT on BB presumed due to symptomatic bradycardia requiring pacemaker     # . Benign essential HTN.  Plan: continue home meds.     6.  Preventive measure.  Plan: DVTp: Heparin SQ-dvt ppx  fall precautions   out of bed to chair     DISPO: D/C HOME . AWAITING REACTIVATION OF HHA

## 2020-10-06 NOTE — PROGRESS NOTE ADULT - ASSESSMENT
Subjective Complaints:  Historian:             Vital Signs Last 24 Hrs  T(C): 36.7 (06 Oct 2020 16:32), Max: 36.8 (06 Oct 2020 04:38)  T(F): 98.1 (06 Oct 2020 16:32), Max: 98.3 (06 Oct 2020 04:38)  HR: 74 (06 Oct 2020 16:32) (62 - 74)  BP: 151/84 (06 Oct 2020 16:32) (134/66 - 151/84)  BP(mean): --  RR: 18 (06 Oct 2020 16:32) (17 - 18)  SpO2: 96% (06 Oct 2020 16:32) (95% - 97%)    GENERAL PHYSICAL EXAM:  General:  Appears stated age, well-groomed, well-nourished, no distress  HEENT:  NC/AT, patent nares w/ pink mucosa, OP clear w/o lesions, PERRL, EOMI, conjunctivae clear, no thyromegaly, nodules, adenopathy, no JVD  Chest:  Full & symmetric excursion, no increased effort, breath sounds clear  Cardiovascular:  Regular rhythm, S1, S2, no murmur/rub/S3/S4, no carotid/femoral/abdominal bruit, radial/pedal pulses 2+, no edema  Abdomen:  Soft, non-tender, non-distended, normoactive bowel sounds, no HSM  Extremities:  Gait & station:   Digits:   Nails:   Joints, Bones, Muscles:   ROM:   Stability:  Skin:  No rash/erythema/ecchymoses/petechiae/wounds/abscess/warm/dry  Musculoskeletal:  Full ROM in all joints w/o swelling/tenderness/effusion        LABS:                RADIOLOGY & ADDITIONAL STUDIES:        Neurology Progress Note:      Mental Status: awaek alert speech fluent       Cranial Nerves: 2 /12 intact      Motor:    arm 3/5 legs  cristy foot drop         Sensory:intact      Cerebellar: defrd      Gait:defrd      Assesment/Plan:  uti cristy foot drop for emg out pt, for sub acute rehab

## 2020-10-06 NOTE — PROGRESS NOTE ADULT - SUBJECTIVE AND OBJECTIVE BOX
Pt still c/o constipation  Ct Scan c/w stercoral colitis  Will attempt laxatives      MEDICATIONS  (STANDING):  amLODIPine   Tablet 10 milliGRAM(s) Oral daily  aspirin enteric coated 81 milliGRAM(s) Oral daily  heparin   Injectable 5000 Unit(s) SubCutaneous every 8 hours  lactulose Syrup 10 Gram(s) Oral daily  senna 2 Tablet(s) Oral at bedtime  simvastatin 20 milliGRAM(s) Oral at bedtime    MEDICATIONS  (PRN):  bisacodyl 5 milliGRAM(s) Oral every 12 hours PRN Constipation  ondansetron Injectable 4 milliGRAM(s) IV Push three times a day PRN Nausea and/or Vomiting  simethicone 80 milliGRAM(s) Chew every 6 hours PRN Gas      Allergies    No Known Allergies    Intolerances        Vital Signs Last 24 Hrs  T(C): 36.8 (06 Oct 2020 04:38), Max: 36.8 (06 Oct 2020 04:38)  T(F): 98.3 (06 Oct 2020 04:38), Max: 98.3 (06 Oct 2020 04:38)  HR: 62 (06 Oct 2020 04:38) (62 - 72)  BP: 136/80 (06 Oct 2020 04:38) (130/62 - 145/73)  BP(mean): --  RR: 18 (06 Oct 2020 04:38) (16 - 18)  SpO2: 97% (06 Oct 2020 04:38) (96% - 97%)    PHYSICAL EXAM:  General: NAD.  CVS: S1, S2  Chest: air entry bilaterally present  Abd: BS present, soft, non-tender      Magnesium citrate x 1 bottle  start miralax daily

## 2020-10-06 NOTE — PROGRESS NOTE ADULT - SUBJECTIVE AND OBJECTIVE BOX
HPI:  88 y/o female PMH MI CAD s/p CABG x 4, HTN, HLD and AAA, presents with one week history of nausea and now two days of vomiting , patient was recently discharged from  hospital after being treated for ARF and  bacteremia and sepsis 2/2 left 8.8 midureteral calculus and post obstructive ARF last month, patient underwent Cystourethroscopy with insertion of stent and UTI with Klebsiella pneumoniae, was sent home on oral antibiotics and they finished about two weeks ago.  Today denied any flank pain- admits to dysuria and frequency and nausea nd vomiting in ED is found to have positive UA and ARF. States she has been taking her medications regularly despite her nausea and only today since being in ED she hasn't taken her home meds   Now denied any fever or chills denied any cough or sick contacts  (29 Sep 2020 19:26)      SUBJECTIVE & OBJECTIVE: Pt seen and examined at bedside.   no overnight events.   denies pain   +BMs daily     today had 1 episode of vomiting with bilious looking vomitus, patient refused NGT placement   KUB obtained   radiologist reviewed  CTAP PO contrast from yesterday with radiologist today,  no e/o obstruction on CT   clinically patient looks OK    Denies chills, N/V, dizziness, HA, cough, CP, palpitations, SOB, abdominal pain, dysuria.     PHYSICAL EXAM:  Vital Signs Last 24 Hrs  T(C): 36.7 (06 Oct 2020 16:32), Max: 36.8 (06 Oct 2020 04:38)  T(F): 98.1 (06 Oct 2020 16:32), Max: 98.3 (06 Oct 2020 04:38)  HR: 74 (06 Oct 2020 16:32) (62 - 74)  BP: 151/84 (06 Oct 2020 16:32) (134/66 - 151/84)  BP(mean): --  RR: 18 (06 Oct 2020 16:32) (17 - 18)  SpO2: 96% (06 Oct 2020 16:32) (95% - 97%) on RA       GENERAL: NAD, speaking in full sentences   HEAD:  Atraumatic, Normocephalic  EYES: EOMI, PERRLA, conjunctiva and sclera clear  ENMT: Moist mucous membranes  NECK: Supple, No JVD  NERVOUS SYSTEM:  Alert & Oriented X3, strength b/l UE 5/5 ; strength b/l LE 0-1/5  CHEST/LUNG: CTAB  HEART: Regular rate and rhythm; No murmurs, rubs, or gallops  ABDOMEN: Soft, Nontender, Nondistended; Bowel sounds present  EXTREMITIES:  2+ Peripheral Pulses b/l, No clubbing, cyanosis, or edema b/l   b/l foot drop       MEDICATIONS  (STANDING):  amLODIPine   Tablet 10 milliGRAM(s) Oral daily  aspirin enteric coated 81 milliGRAM(s) Oral daily  heparin   Injectable 5000 Unit(s) SubCutaneous every 8 hours  piperacillin/tazobactam IVPB.. 3.375 Gram(s) IV Intermittent every 8 hours  simvastatin 20 milliGRAM(s) Oral at bedtime    MEDICATIONS  (PRN):  bisacodyl 5 milliGRAM(s) Oral every 12 hours PRN Constipation  ondansetron Injectable 4 milliGRAM(s) IV Push three times a day PRN Nausea and/or Vomiting      LABS:                        no new labs     Urinalysis Basic - ( 29 Sep 2020 16:58 )    Color: Yellow / Appearance: Clear / S.015 / pH: x  Gluc: x / Ketone: Negative  / Bili: Negative / Urobili: Negative mg/dL   Blood: x / Protein: 15 mg/dL / Nitrite: Negative   Leuk Esterase: Moderate / RBC: 25-50 /HPF / WBC 6-10   Sq Epi: x / Non Sq Epi: Few / Bacteria: Few        RECENT CULTURES:  Culture - Urine (20 @ 15:00)    Specimen Source: .Urine Clean Catch (Midstream)    Culture Results:   <10,000 CFU/mL Normal Urogenital Ivon          RADIOLOGY & ADDITIONAL TESTS:  < from: Xray Chest 1 View- PORTABLE-Urgent (20 @ 13:05) >  INTERPRETATION:  History: Vomiting    Portable chest x-ray is compared to 2020.    Studies rotated. Sternal wires and mediastinal clips are again seen. The heart is not enlarged. The lungs are clear. There is osteopenia of the bony structures.    Impression: Pacemaker. CABG. No active disease.    < end of copied text >    < from: Xray Ankle 2 Views, Left (20 @ 17:48) >  INTERPRETATION:  Left ankle. Patient has local pain. 2 views.    There is an inferior calcaneal spur. The ankle is relatively free of degeneration.    No bone destruction or fracture is evident.    IMPRESSION: No acute finding.    < end of copied text >    < from: CT Lumbar Spine No Cont (10.01.20 @ 17:22) >  INTERPRETATION:  CLINICAL INFORMATION: Leg Weakness. left leg weakness. .    TECHNIQUE: Noncontrast CT scan of the lumbar spine was performed. Thin section axialimages were obtained with sagittal and coronal reformations.    COMPARISON: CT abdomen and pelvis 10/6/2018    FINDINGS:    Lumbar levoscoliosis. Grade 1 anterolisthesis at L4-L5. Vertebral body heights are within normal limits. Multilevel intervertebral disc height loss with vacuum disc phenomenon. Multilevel disc bulges. Multilevel bilateral neuroforaminal narrowing.    Stent graft repair of abdominal aortic aneurysm. Left nephroureteral stent. Left renal hemorrhagic cyst. Large amount of rectal stool with presacral fat stranding suggesting stercoral colitis.    There is no prevertebral soft tissue swelling. The paraspinal soft tissues are unremarkable.      IMPRESSION:  1.  No evidence for acute displaced fracture or malalignment.  2.  Large amount of rectal stool with presacral fat stranding suggesting stercoral colitis.    < end of copied text >    < from: CT Head No Cont (20 @ 17:54) >  INTERPRETATION:  CT head without IV contrast    CLINICAL INFORMATION: Unable to ambulate    TECHNIQUE: Contiguous axial 5 mm sections were obtained through the head. Sagittal and coronal 2-D reformatted images were also obtained.   This scan was performed using automatic exposure control (radiation dose reduction software) to obtain a diagnostic image quality scan with patient dose as low as reasonably achievable.    FINDINGS:   No previous examinations are available for review.    The brain demonstrates moderate periventricular and deep white matter ischemia. Tiny old lacunar infarction is seen in the LEFT caudate nucleus.   No acute cerebral cortical infarct is seen.  No intracranial hemorrhage is found.  No mass effect is found in the brain.    The ventricles, sulci and basal cisterns show mild cerebellar atrophy.    The orbits are unremarkable.  The paranasal sinuses are clear.  The nasal cavity appears intact.  The nasopharynx is symmetric.  The central skull base, petrous temporal bones and calvarium remain intact.      IMPRESSION:   moderate periventricular and deep white matter ischemia. Tiny old lacunar infarction is seen in the LEFT caudate nucleus. Mild cerebellar atrophy.    < end of copied text >      < from: EEG Awake or Drowsy (10.01.20 @ 13:18) >  PROCEDURE DATE:  10/01/2020        INTERPRETATION:  The background activity contains in the  alpha range 7-8 Hz bilaterally symmetrical over the posterior region ranging from 20/30 microvolt activity noticed most of the recording the theta and alpha activity noticed during the recording there are some suspicious sharp and spike waves noticed. During photic station abnormalities noticed hyperventilation was not performed    Impression abnormal record because ofthe suspicious sharp and spike waves noticed clinical correlation is recommended for seizures.      < end of copied text >    < from: CT Abdomen and Pelvis w/ Oral Cont (10.05.20 @ 15:20) >  INTERPRETATION:  CLINICAL INFORMATION: Constipation    COMPARISON: 2020    PROCEDURE:  CT of the Abdomen and Pelvis was performed without intravenous contrast.  Intravenous contrast: None.  Oral contrast: positive contrast was administered.  Sagittal and coronal reformats were performed.    FINDINGS:  LOWER CHEST: Status post sternotomy. Basilar atelectasis. Partially imaged cardiac leads. Coronary artery calcifications.    Please note that evaluation of the abdominal organs and vascular structures is limited by lack of intravenous contrast.    LIVER: Hepatic hypodensities, possibly cysts.  BILE DUCTS: Normal caliber.  GALLBLADDER: Within normal limits.  SPLEEN: Within normal limits.  PANCREAS: Within normal limits.  ADRENALS: Within normal limits.  KIDNEYS/URETERS: Nonobstructive left intrarenal calculi measuring up to 6 mm. No hydronephrosis. Left ureteral stent.    BLADDER: Small amount of intravesical gas, possibly iatrogenic. Right posterolateral diverticulum.  REPRODUCTIVE ORGANS: No adnexal mass.    BOWEL: No bowel obstruction. Appendix is normal. Colonic diverticulosis. Rectum distended by stool. Rectal wall thickening.  PERITONEUM: No ascites.  VESSELS: Abdominal aortic aneurysm (native sac diameter 4.1 cm, stable) status post endovascular repair. Right internal iliac artery embolization coils.  RETROPERITONEUM/LYMPH NODES: No lymphadenopathy.  ABDOMINAL WALL: Bilateral groin surgical clips.  BONES: Degenerative changes.    IMPRESSION:  Rectum distended by stool with wall thickening, compatible with stercoral colitis.  Otherwise, no bowel obstruction.    < end of copied text >          Imaging Personally Reviewed:  [ ] YES  [ ] NO    Consultant(s) Notes Reviewed:  [x] YES  [ ] NO    Care Discussed with Consultants/Other Providers [x] YES  [ ] NO    Care discussed in detail with patient.  All questions and concerns addressed.

## 2020-10-07 NOTE — PROGRESS NOTE ADULT - ASSESSMENT
88 y/o female PMH MI s/p CABG x 4, HTN, HLD with remote h/o sepsis, bacteremia, ARF 2/2 Left 8.8 midureteral calculus s/p Cysto ureteroscopy with insertion of stent on 8/25/20 with Dr. Leary who was seen in PST yesterday for scheduled Uscope and possible stent removal this Friday, however patient developed Nausea/vomiting and was sent to the ER for further evaluation.   Patient admitted with nausea/vomiting, unable to tolerate po intake  Renal US shows Left ureteral stent and non obstructive L nephrolithiasis.     Assessment and Plan:     # Functional Quadriplegia   patient noted to have b/l foot drop R>L  she stated she has been bedbound for past 6 mo. denies history of CVA   denies back pain . stated suddenly could not ambulate x 6 months.   strength b/l UE 5/5 ; strength b/l LE 0-1/5  CT head moderate periventricular and deep white matter ischemia. Tiny old lacunar infarction is seen in the LEFT caudate nucleus. Mild cerebellar atrophy.  Unable to do MRI due to pacemaker  CT L spine No evidence for acute displaced fracture or malalignment.  TSH slightly elevated, fT4/tT3 wnl. --patient clinically euthyroid   PT : home with home PT   patient has wheelchair at home   neurology consult appreciated , will need outpatient EMG  10/1/20 EEG abnormal--.  abnormal record because ofthe suspicious sharp and spike waves noticed clinical correlation is recommended for seizures.    no active seizures noted   discussed with Neurology spikes on eeg are nonspecific and  clinically no e/o seizures      # Nausea and vomiting --1 episode of dark bilious vomiting today 10/6 after she drank lactulose - refused NGT and also no e/o obstruction on CTAP   possibly ? stent blockage?   recent admission for klebsiella and E.coli  pyelonephritis sec to  Left ureteral 8.8mm calculus s/p Cystourethroscopy w/ stent placement 8/25/20 with Dr. Leary  9/30 Renal US: Left ureteral stent. Nonobstructive left nephrolithiasis. No hydronephrosis.  Urology consult appreciated, S/P cysto, L ureteroscopy, retrograde pyelography, laser litho, L stent 10/2   continue with cipro x 3 days   eating well today. abdomen soft     #Constipation , resolved   Large amount of rectal stool with presacral fat stranding suggesting stercoral colitis. seen on CT LS  has daily BMs (per RN and patient)  continue with bowel regimen   lactulose daily   GI following       # LISA (acute kidney injury), presumed sec to intravascular depletion from vomiting ?   s/p 2L IVF   resolved     #  Hypokalemia --replete and monitor     # Coronary artery disease involving native heart without angina pectoris, unspecified vessel or lesion type.   s/p PPM  cont aspirin Norvasc home med , statin,  NOT on BB presumed due to symptomatic bradycardia requiring pacemaker     # . Benign essential HTN.  Plan: continue home meds.     6.  Preventive measure.  Plan: DVTp: Heparin SQ-dvt ppx  fall precautions   out of bed to chair     DISPO: D/C HOME . AWAITING REACTIVATION OF HHA  stable for dc

## 2020-10-07 NOTE — DISCHARGE NOTE NURSING/CASE MANAGEMENT/SOCIAL WORK - NSDCPEWEB_GEN_ALL_CORE
[place] website --- [email or web address] for Tobacco [email or web address] website --- [email or web address]

## 2020-10-07 NOTE — PROGRESS NOTE ADULT - SUBJECTIVE AND OBJECTIVE BOX
Patient seen and examined bedside resting comfortably.  S/P cysto, L ureteroscopy, retrograde pyelography, laser litho, L stent POD#5  Patient seen and examined at bedside. Denies pain or complaints. Voiding via primafit.   The patient reports that she wants to return to her residence.     T(F): 97.7 (10-07-20 @ 11:07), Max: 98.1 (10-06-20 @ 16:32)  HR: 73 (10-07-20 @ 11:07) (65 - 74)  BP: 132/66 (10-07-20 @ 11:07) (132/66 - 151/84)  RR: 18 (10-07-20 @ 11:07) (18 - 18)  SpO2: 95% (10-07-20 @ 11:07) (95% - 97%)    PHYSICAL EXAM:    General: NAD, alert and awake  Chest: nonlabored respirations, CTA b/l.  Abdomen: soft, NT/ND.   Extremities: Calf soft, nontender b/l.   : No suprapubic tenderness or bladder distention.      LABS:   No new labs    I&O's Detail    06 Oct 2020 07:01  -  07 Oct 2020 07:00  --------------------------------------------------------  IN:  Total IN: 0 mL    OUT:    Voided (mL): 500 mL  Total OUT: 500 mL    Total NET: -500 mL        Assessment:    87F S/P cysto, L ureteroscopy, retrograde pyelography, laser litho, L stent POD#5. The patient is voiding via primafit. Patient hemodynamically stable.     Plan:   The patient  stable for discharge.  The patient advised to follow up this Friday,10/09/20 or next Wednesday 10/14/20 in outpatient office with Dr. Leary for stent removal. Patient course of antibiotics completed   Discussed with Dr. Leary.

## 2020-10-07 NOTE — DISCHARGE NOTE NURSING/CASE MANAGEMENT/SOCIAL WORK - PATIENT PORTAL LINK FT
You can access the FollowMyHealth Patient Portal offered by [facility] by registering at the following website: [email or web address]. By joining [employer]’s FollowMyHealth portal, you will also be able to view your health information using other applications (apps) compatible with our system.

## 2020-10-07 NOTE — DISCHARGE NOTE NURSING/CASE MANAGEMENT/SOCIAL WORK - NSDCPEEMAIL_GEN_ALL_CORE
[facility] for Tobacco Control email [email or web address]

## 2020-10-07 NOTE — PROGRESS NOTE ADULT - REASON FOR ADMISSION
Nausea vomiting early sepsis UTI
vomiting
Nausea vomiting early sepsis UTI

## 2020-10-07 NOTE — PROGRESS NOTE ADULT - NUTRITIONAL ASSESSMENT
This patient has been assessed with a concern for Malnutrition and has been determined to have a diagnosis/diagnoses of Moderate protein-calorie malnutrition.    This patient is being managed with:   Diet DASH/TLC-  Sodium & Cholesterol Restricted  Supplement Feeding Modality:  Oral  Ensure Enlive Cans or Servings Per Day:  1       Frequency:  Two Times a day  Entered: Oct  5 2020 12:24PM    
This patient has been assessed with a concern for Malnutrition and has been determined to have a diagnosis/diagnoses of Moderate protein-calorie malnutrition.    This patient is being managed with:   Diet DASH/TLC-  Sodium & Cholesterol Restricted  Supplement Feeding Modality:  Oral  Ensure Enlive Cans or Servings Per Day:  1       Frequency:  Two Times a day  Entered: Oct  5 2020 12:24PM    
This patient has been assessed with a concern for Malnutrition and has been determined to have a diagnosis/diagnoses of Moderate protein-calorie malnutrition.    This patient is being managed with:   Diet DASH/TLC-  Sodium & Cholesterol Restricted  Supplement Feeding Modality:  Oral  Ensure Enlive Cans or Servings Per Day:  1       Frequency:  Two Times a day  Entered: Oct  5 2020 12:24PM    Diet DASH/TLC-  Sodium & Cholesterol Restricted  Entered: Oct  2 2020  5:42PM    The following pending diet order is being considered for treatment of Moderate protein-calorie malnutrition:null
This patient has been assessed with a concern for Malnutrition and has been determined to have a diagnosis/diagnoses of Moderate protein-calorie malnutrition.    This patient is being managed with:   Diet DASH/TLC-  Sodium & Cholesterol Restricted  Supplement Feeding Modality:  Oral  Ensure Enlive Cans or Servings Per Day:  1       Frequency:  Two Times a day  Entered: Oct  5 2020 12:24PM    
This patient has been assessed with a concern for Malnutrition and has been determined to have a diagnosis/diagnoses of Moderate protein-calorie malnutrition.    This patient is being managed with:   Diet DASH/TLC-  Sodium & Cholesterol Restricted  Supplement Feeding Modality:  Oral  Ensure Enlive Cans or Servings Per Day:  1       Frequency:  Two Times a day  Entered: Oct  5 2020 12:24PM    Diet DASH/TLC-  Sodium & Cholesterol Restricted  Entered: Oct  2 2020  5:42PM    The following pending diet order is being considered for treatment of Moderate protein-calorie malnutrition:null
This patient has been assessed with a concern for Malnutrition and has been determined to have a diagnosis/diagnoses of Moderate protein-calorie malnutrition.    This patient is being managed with:   Diet DASH/TLC-  Sodium & Cholesterol Restricted  Supplement Feeding Modality:  Oral  Ensure Enlive Cans or Servings Per Day:  1       Frequency:  Two Times a day  Entered: Oct  5 2020 12:24PM    
This patient has been assessed with a concern for Malnutrition and has been determined to have a diagnosis/diagnoses of Moderate protein-calorie malnutrition.    This patient is being managed with:   Diet DASH/TLC-  Sodium & Cholesterol Restricted  Supplement Feeding Modality:  Oral  Ensure Enlive Cans or Servings Per Day:  1       Frequency:  Two Times a day  Entered: Oct  5 2020 12:24PM

## 2020-10-07 NOTE — PROGRESS NOTE ADULT - PROVIDER SPECIALTY LIST ADULT
Gastroenterology
Gastroenterology
Hospitalist
Infectious Disease
Infectious Disease
Neurology
Urology
Hospitalist

## 2020-10-07 NOTE — PROGRESS NOTE ADULT - SUBJECTIVE AND OBJECTIVE BOX
Pt feeling ,much better  Tolerating diet; pt is having bowel movements      MEDICATIONS  (STANDING):  amLODIPine   Tablet 10 milliGRAM(s) Oral daily  aspirin enteric coated 81 milliGRAM(s) Oral daily  heparin   Injectable 5000 Unit(s) SubCutaneous every 8 hours  lactulose Syrup 10 Gram(s) Oral daily  polyethylene glycol 3350 17 Gram(s) Oral daily  senna 2 Tablet(s) Oral at bedtime  simvastatin 20 milliGRAM(s) Oral at bedtime    MEDICATIONS  (PRN):  bisacodyl 5 milliGRAM(s) Oral every 12 hours PRN Constipation  ondansetron Injectable 4 milliGRAM(s) IV Push three times a day PRN Nausea and/or Vomiting  simethicone 80 milliGRAM(s) Chew every 6 hours PRN Gas      Allergies    No Known Allergies    Intolerances        Vital Signs Last 24 Hrs  T(C): 36.5 (07 Oct 2020 11:07), Max: 36.7 (06 Oct 2020 16:32)  T(F): 97.7 (07 Oct 2020 11:07), Max: 98.1 (06 Oct 2020 16:32)  HR: 73 (07 Oct 2020 11:07) (65 - 74)  BP: 132/66 (07 Oct 2020 11:07) (132/66 - 151/84)  BP(mean): --  RR: 18 (07 Oct 2020 11:07) (18 - 18)  SpO2: 95% (07 Oct 2020 11:07) (95% - 97%)    PHYSICAL EXAM:  General: NAD.  CVS: S1, S2  Chest: air entry bilaterally present  Abd: BS present, soft, non-tender      continue present management  Stable from GI standpoint

## 2020-11-09 PROBLEM — N20.1 LEFT URETERAL STONE: Status: ACTIVE | Noted: 2020-01-01

## 2020-11-09 NOTE — PHYSICAL EXAM
[No Respiratory Distress] : no respiratory distress  [Normal] : affect was normal and insight and judgment were intact [de-identified] : in bed

## 2020-11-09 NOTE — PLAN
[FreeTextEntry1] : Hospital records reviewed-patient needed to follow-up with Neurology outpatient for further eval of EEG and weakness. \par \par Needs Urology follow-up. \par Per son has not seen any other specialist-needs to see Cardio. \par \par Unable to visible skin irritation today-recommending in home nursing evaluation for full assessment.\par Recommending Care Management referral as well. \par Advised any worsening symptoms require re-evaluation. \par Patient and son expressed understanding. \par

## 2020-11-09 NOTE — REVIEW OF SYSTEMS
[Negative] : Gastrointestinal [Headache] : no headache [Dizziness] : no dizziness [FreeTextEntry8] : Incontinent

## 2020-11-09 NOTE — HISTORY OF PRESENT ILLNESS
[Home] : at home, [unfilled] , at the time of the visit. [Other Location: e.g. Home (Enter Location, City,State)___] : at [unfilled] [Family Member] : family member [Formal Caregiver] : formal caregiver [Verbal consent obtained from patient] : the patient, [unfilled] [FreeTextEntry1] : Here for follow-up; was in hospital at beginning of October.  [de-identified] : Here for follow-up; was in hospital at beginning of October. \par Was in hospital for UTI and Kidney stones; stent was removed a few weeks. \par \par Needed hospital follow-up. \par Per son-Hydralazine 10mg every 8 hours (December 2019)\par Iron three tablets a week. Multivitamin \par \par Has been feeling well.  Son reports patient is bedbound and has an aide with her during the day.  He is with her at night. \par Son and aide note that she has some redness around groin; deny any open wounds.  \par Report that she is wet overnight, no foul odor to urine. Aide reports she had similar issue before and required rx medication for skin 2 years ago.  \par \par Medications reviewed with patient and son. \par \par \par

## 2020-11-12 PROBLEM — R94.01 ABNORMAL EEG: Status: ACTIVE | Noted: 2020-01-01

## 2020-11-12 NOTE — HISTORY OF PRESENT ILLNESS
[FreeTextEntry1] : *** 11/12/2020  ***\par Ms. MULLINS reports that she can't walk, son indicates this is problem for 2 years. Ms. MULLINS reports that legs will not hold her weight.  Ms. MULLINS reports she has no bladder control, wears diaper. Ms. MULLINS has no pain in the legs or numbness.  She indicates that the perianal region does not feel normal when she cleans herself.  She reports that she is able move legs without a problem.  Feels weakest in R upper leg, she has difficulty elevating legs and keeping htem in the air.  \par Ms. MULLINS reports that she has never had seizures. Son endorses that he has never had seizure.  \par \par PMH - uretal stents now removed, no HTN, no DM. CAD s/p CABG x 4 vessels - 2019.  \par All - nkda\par \par Soc - uses marijuana daily, no tobacco, occasional ETOH - wine. \par FH - no h/o seizures; no h/o paraplegia with aging. \par ROS - negative x 14 systems. \par \par

## 2020-11-12 NOTE — PHYSICAL EXAM
[FreeTextEntry1] : alert and oriented x 3, speech fluent, names easily, follows requests, good recall for recent and remote events.\par EOM full without sustained nystagmus, PERRL, intact LT V1-V3 bilaterally, face symmetrical, no dysarthria, tongue midline, normal shoulder elevation.\par Motor - normal motion in upper extremities bilaterally. normal rapid-alternating movements, no drift; LE not well visualized.\par Sensory - intact LT x 4 ext\par Coord - no tremor, ataxia\par Gait - unable to stand. \par

## 2020-11-12 NOTE — ASSESSMENT
[FreeTextEntry1] : Ms. MULLINS presents for follow-up after dC from [facility].  She is referred for evaluation of her inability to ambulate and for abnormal EEG that showed sharp waves - she has no history of convulsive or non-convulsive seizures. Prior workup for loss of ambulation is not readily available. \par \par Plan:\par 1. f/u with office visit with general neurology to assess ambulation. \par 2. at time of office visit patient should have rEEG to determine if there are epileptiform abnormalities. I am disinclined to treat EEG finding in absence of clinical history of seizures. \par \par

## 2020-12-23 PROBLEM — N39.0 ACUTE UTI: Status: RESOLVED | Noted: 2020-01-01 | Resolved: 2020-01-01

## 2020-12-30 NOTE — ED ADULT NURSE NOTE - EXTENSIONS OF SELF_ADULT
Left VM for mother that the portal needs to be set up for the appt with Dr. Durant on Monday January 4 at 10:30.  Information was sent to mother 8-24-20.  Left AST phone number and portal support team phone numver for parent.   None

## 2021-01-01 ENCOUNTER — NON-APPOINTMENT (OUTPATIENT)
Age: 86
End: 2021-01-01

## 2021-01-01 ENCOUNTER — APPOINTMENT (OUTPATIENT)
Dept: HOME HEALTH SERVICES | Facility: HOME HEALTH | Age: 86
End: 2021-01-01
Payer: MEDICARE

## 2021-01-01 ENCOUNTER — TRANSCRIPTION ENCOUNTER (OUTPATIENT)
Age: 86
End: 2021-01-01

## 2021-01-01 ENCOUNTER — INPATIENT (INPATIENT)
Facility: HOSPITAL | Age: 86
LOS: 4 days | Discharge: HOME HEALTH | End: 2021-05-25
Attending: EMERGENCY MEDICINE | Admitting: INTERNAL MEDICINE
Payer: MEDICARE

## 2021-01-01 ENCOUNTER — INPATIENT (INPATIENT)
Facility: HOSPITAL | Age: 86
LOS: 19 days | Discharge: HOME | End: 2021-05-05
Attending: INTERNAL MEDICINE | Admitting: INTERNAL MEDICINE
Payer: MEDICARE

## 2021-01-01 VITALS
HEART RATE: 80 BPM | SYSTOLIC BLOOD PRESSURE: 130 MMHG | TEMPERATURE: 97 F | RESPIRATION RATE: 18 BRPM | OXYGEN SATURATION: 97 % | WEIGHT: 169.98 LBS | DIASTOLIC BLOOD PRESSURE: 80 MMHG

## 2021-01-01 VITALS
RESPIRATION RATE: 16 BRPM | SYSTOLIC BLOOD PRESSURE: 110 MMHG | DIASTOLIC BLOOD PRESSURE: 75 MMHG | OXYGEN SATURATION: 94 % | HEART RATE: 81 BPM

## 2021-01-01 VITALS
RESPIRATION RATE: 16 BRPM | SYSTOLIC BLOOD PRESSURE: 90 MMHG | OXYGEN SATURATION: 96 % | HEART RATE: 69 BPM | DIASTOLIC BLOOD PRESSURE: 50 MMHG

## 2021-01-01 VITALS
TEMPERATURE: 98 F | HEIGHT: 65 IN | HEART RATE: 75 BPM | WEIGHT: 160.06 LBS | DIASTOLIC BLOOD PRESSURE: 112 MMHG | SYSTOLIC BLOOD PRESSURE: 182 MMHG | OXYGEN SATURATION: 99 % | RESPIRATION RATE: 17 BRPM

## 2021-01-01 VITALS
HEART RATE: 77 BPM | OXYGEN SATURATION: 93 % | TEMPERATURE: 98 F | WEIGHT: 147.05 LBS | RESPIRATION RATE: 17 BRPM | DIASTOLIC BLOOD PRESSURE: 73 MMHG | SYSTOLIC BLOOD PRESSURE: 120 MMHG

## 2021-01-01 VITALS
SYSTOLIC BLOOD PRESSURE: 131 MMHG | HEART RATE: 87 BPM | OXYGEN SATURATION: 96 % | RESPIRATION RATE: 18 BRPM | TEMPERATURE: 98 F | HEIGHT: 64 IN | DIASTOLIC BLOOD PRESSURE: 88 MMHG

## 2021-01-01 DIAGNOSIS — I63.59 CEREBRAL INFARCTION DUE TO UNSPECIFIED OCCLUSION OR STENOSIS OF OTHER CEREBRAL ARTERY: ICD-10-CM

## 2021-01-01 DIAGNOSIS — I10 ESSENTIAL (PRIMARY) HYPERTENSION: ICD-10-CM

## 2021-01-01 DIAGNOSIS — H02.402 UNSPECIFIED PTOSIS OF LEFT EYELID: ICD-10-CM

## 2021-01-01 DIAGNOSIS — D64.9 ANEMIA, UNSPECIFIED: ICD-10-CM

## 2021-01-01 DIAGNOSIS — Z09 ENCOUNTER FOR FOLLOW-UP EXAMINATION AFTER COMPLETED TREATMENT FOR CONDITIONS OTHER THAN MALIGNANT NEOPLASM: ICD-10-CM

## 2021-01-01 DIAGNOSIS — D62 ACUTE POSTHEMORRHAGIC ANEMIA: ICD-10-CM

## 2021-01-01 DIAGNOSIS — I21.4 NON-ST ELEVATION (NSTEMI) MYOCARDIAL INFARCTION: ICD-10-CM

## 2021-01-01 DIAGNOSIS — R53.2 FUNCTIONAL QUADRIPLEGIA: ICD-10-CM

## 2021-01-01 DIAGNOSIS — K57.31 DIVERTICULOSIS OF LARGE INTESTINE WITHOUT PERFORATION OR ABSCESS WITH BLEEDING: ICD-10-CM

## 2021-01-01 DIAGNOSIS — E87.6 HYPOKALEMIA: ICD-10-CM

## 2021-01-01 DIAGNOSIS — H66.90 OTITIS MEDIA, UNSPECIFIED, UNSPECIFIED EAR: ICD-10-CM

## 2021-01-01 DIAGNOSIS — K92.2 GASTROINTESTINAL HEMORRHAGE, UNSPECIFIED: ICD-10-CM

## 2021-01-01 DIAGNOSIS — R51.9 HEADACHE, UNSPECIFIED: ICD-10-CM

## 2021-01-01 DIAGNOSIS — E03.9 HYPOTHYROIDISM, UNSPECIFIED: ICD-10-CM

## 2021-01-01 DIAGNOSIS — K59.00 CONSTIPATION, UNSPECIFIED: ICD-10-CM

## 2021-01-01 DIAGNOSIS — Z87.440 PERSONAL HISTORY OF URINARY (TRACT) INFECTIONS: ICD-10-CM

## 2021-01-01 DIAGNOSIS — E43 UNSPECIFIED SEVERE PROTEIN-CALORIE MALNUTRITION: ICD-10-CM

## 2021-01-01 DIAGNOSIS — I67.1 CEREBRAL ANEURYSM, NONRUPTURED: ICD-10-CM

## 2021-01-01 DIAGNOSIS — R23.8 OTHER SKIN CHANGES: ICD-10-CM

## 2021-01-01 DIAGNOSIS — Z51.5 ENCOUNTER FOR PALLIATIVE CARE: ICD-10-CM

## 2021-01-01 DIAGNOSIS — I25.10 ATHEROSCLEROTIC HEART DISEASE OF NATIVE CORONARY ARTERY WITHOUT ANGINA PECTORIS: ICD-10-CM

## 2021-01-01 DIAGNOSIS — Z66 DO NOT RESUSCITATE: ICD-10-CM

## 2021-01-01 DIAGNOSIS — R62.7 ADULT FAILURE TO THRIVE: ICD-10-CM

## 2021-01-01 DIAGNOSIS — I67.2 CEREBRAL ATHEROSCLEROSIS: ICD-10-CM

## 2021-01-01 DIAGNOSIS — Z79.82 LONG TERM (CURRENT) USE OF ASPIRIN: ICD-10-CM

## 2021-01-01 DIAGNOSIS — R63.0 ANOREXIA: ICD-10-CM

## 2021-01-01 DIAGNOSIS — I25.10 ATHEROSCLEROTIC HEART DISEASE OF NATIVE CORONARY ARTERY W/OUT ANGINA PECTORIS: ICD-10-CM

## 2021-01-01 DIAGNOSIS — E78.5 HYPERLIPIDEMIA, UNSPECIFIED: ICD-10-CM

## 2021-01-01 DIAGNOSIS — Z95.1 PRESENCE OF AORTOCORONARY BYPASS GRAFT: ICD-10-CM

## 2021-01-01 DIAGNOSIS — R26.2 DIFFICULTY IN WALKING, NOT ELSEWHERE CLASSIFIED: ICD-10-CM

## 2021-01-01 DIAGNOSIS — H70.009 ACUTE MASTOIDITIS WITHOUT COMPLICATIONS, UNSPECIFIED EAR: ICD-10-CM

## 2021-01-01 DIAGNOSIS — H61.22 IMPACTED CERUMEN, LEFT EAR: ICD-10-CM

## 2021-01-01 DIAGNOSIS — I72.0 ANEURYSM OF CAROTID ARTERY: ICD-10-CM

## 2021-01-01 DIAGNOSIS — Z23 ENCOUNTER FOR IMMUNIZATION: ICD-10-CM

## 2021-01-01 DIAGNOSIS — H70.92 UNSPECIFIED MASTOIDITIS, LEFT EAR: ICD-10-CM

## 2021-01-01 DIAGNOSIS — J18.9 PNEUMONIA, UNSPECIFIED ORGANISM: ICD-10-CM

## 2021-01-01 DIAGNOSIS — I25.2 OLD MYOCARDIAL INFARCTION: ICD-10-CM

## 2021-01-01 DIAGNOSIS — R47.1 DYSARTHRIA AND ANARTHRIA: ICD-10-CM

## 2021-01-01 LAB
% ALBUMIN: 45.4 % — SIGNIFICANT CHANGE UP
% ALPHA 1: 8.3 % — SIGNIFICANT CHANGE UP
% ALPHA 2: 13.9 % — SIGNIFICANT CHANGE UP
% BETA: 13 % — SIGNIFICANT CHANGE UP
% GAMMA: 19.4 % — SIGNIFICANT CHANGE UP
% M SPIKE: 3.9 % — SIGNIFICANT CHANGE UP
-  STAPHYLOCOCCUS EPIDERMIDIS: SIGNIFICANT CHANGE UP
ALBUMIN SERPL ELPH-MCNC: 2.5 G/DL — LOW (ref 3.3–5)
ALBUMIN SERPL ELPH-MCNC: 2.6 G/DL — LOW (ref 3.3–5)
ALBUMIN SERPL ELPH-MCNC: 2.7 G/DL — LOW (ref 3.3–5)
ALBUMIN SERPL ELPH-MCNC: 2.9 G/DL — LOW (ref 3.6–5.5)
ALBUMIN SERPL ELPH-MCNC: 3 G/DL — LOW (ref 3.3–5)
ALBUMIN SERPL ELPH-MCNC: 3.5 G/DL — SIGNIFICANT CHANGE UP (ref 3.3–5)
ALBUMIN SERPL ELPH-MCNC: 4 G/DL — SIGNIFICANT CHANGE UP (ref 3.3–5)
ALBUMIN/GLOB SERPL ELPH: 0.8 RATIO — SIGNIFICANT CHANGE UP
ALP SERPL-CCNC: 60 U/L — SIGNIFICANT CHANGE UP (ref 40–120)
ALP SERPL-CCNC: 65 U/L — SIGNIFICANT CHANGE UP (ref 40–120)
ALP SERPL-CCNC: 68 U/L — SIGNIFICANT CHANGE UP (ref 40–120)
ALP SERPL-CCNC: 76 U/L — SIGNIFICANT CHANGE UP (ref 40–120)
ALP SERPL-CCNC: 78 U/L — SIGNIFICANT CHANGE UP (ref 40–120)
ALP SERPL-CCNC: 91 U/L — SIGNIFICANT CHANGE UP (ref 40–120)
ALPHA1 GLOB SERPL ELPH-MCNC: 0.5 G/DL — HIGH (ref 0.1–0.4)
ALPHA2 GLOB SERPL ELPH-MCNC: 0.9 G/DL — SIGNIFICANT CHANGE UP (ref 0.5–1)
ALT FLD-CCNC: 14 U/L — SIGNIFICANT CHANGE UP (ref 12–78)
ALT FLD-CCNC: 15 U/L — SIGNIFICANT CHANGE UP (ref 12–78)
ALT FLD-CCNC: 15 U/L — SIGNIFICANT CHANGE UP (ref 12–78)
ALT FLD-CCNC: 17 U/L — SIGNIFICANT CHANGE UP (ref 12–78)
AMYLASE P1 CFR SERPL: 90 U/L — SIGNIFICANT CHANGE UP (ref 25–115)
ANION GAP SERPL CALC-SCNC: 10 MMOL/L — SIGNIFICANT CHANGE UP (ref 5–17)
ANION GAP SERPL CALC-SCNC: 10 MMOL/L — SIGNIFICANT CHANGE UP (ref 5–17)
ANION GAP SERPL CALC-SCNC: 12 MMOL/L — SIGNIFICANT CHANGE UP (ref 5–17)
ANION GAP SERPL CALC-SCNC: 13 MMOL/L — SIGNIFICANT CHANGE UP (ref 5–17)
ANION GAP SERPL CALC-SCNC: 14 MMOL/L — SIGNIFICANT CHANGE UP (ref 5–17)
ANION GAP SERPL CALC-SCNC: 17 MMOL/L
ANION GAP SERPL CALC-SCNC: 4 MMOL/L — LOW (ref 5–17)
ANION GAP SERPL CALC-SCNC: 6 MMOL/L — SIGNIFICANT CHANGE UP (ref 5–17)
ANION GAP SERPL CALC-SCNC: 6 MMOL/L — SIGNIFICANT CHANGE UP (ref 5–17)
ANION GAP SERPL CALC-SCNC: 7 MMOL/L — SIGNIFICANT CHANGE UP (ref 5–17)
ANION GAP SERPL CALC-SCNC: 7 MMOL/L — SIGNIFICANT CHANGE UP (ref 5–17)
ANION GAP SERPL CALC-SCNC: 8 MMOL/L — SIGNIFICANT CHANGE UP (ref 5–17)
ANION GAP SERPL CALC-SCNC: 9 MMOL/L — SIGNIFICANT CHANGE UP (ref 5–17)
APAP SERPL-MCNC: < 2 UG/ML (ref 10–30)
APPEARANCE UR: ABNORMAL
APPEARANCE UR: CLEAR — SIGNIFICANT CHANGE UP
APTT BLD: 21.5 SEC — LOW (ref 27.5–35.5)
AST SERPL-CCNC: 29 U/L — SIGNIFICANT CHANGE UP (ref 15–37)
AST SERPL-CCNC: 30 U/L — SIGNIFICANT CHANGE UP (ref 15–37)
AST SERPL-CCNC: 31 U/L — SIGNIFICANT CHANGE UP (ref 15–37)
AST SERPL-CCNC: 32 U/L — SIGNIFICANT CHANGE UP (ref 15–37)
AST SERPL-CCNC: 34 U/L — SIGNIFICANT CHANGE UP (ref 15–37)
AST SERPL-CCNC: 41 U/L — HIGH (ref 15–37)
B-GLOBULIN SERPL ELPH-MCNC: 0.8 G/DL — SIGNIFICANT CHANGE UP (ref 0.5–1)
BACTERIA # UR AUTO: ABNORMAL
BACTERIA # UR AUTO: ABNORMAL
BASOPHILS # BLD AUTO: 0.02 K/UL
BASOPHILS # BLD AUTO: 0.02 K/UL — SIGNIFICANT CHANGE UP (ref 0–0.2)
BASOPHILS # BLD AUTO: 0.02 K/UL — SIGNIFICANT CHANGE UP (ref 0–0.2)
BASOPHILS # BLD AUTO: 0.05 K/UL — SIGNIFICANT CHANGE UP (ref 0–0.2)
BASOPHILS NFR BLD AUTO: 0.2 % — SIGNIFICANT CHANGE UP (ref 0–2)
BASOPHILS NFR BLD AUTO: 0.3 %
BASOPHILS NFR BLD AUTO: 0.3 % — SIGNIFICANT CHANGE UP (ref 0–2)
BASOPHILS NFR BLD AUTO: 0.4 % — SIGNIFICANT CHANGE UP (ref 0–2)
BILIRUB DIRECT SERPL-MCNC: 0.78 MG/DL — HIGH (ref 0.05–0.2)
BILIRUB INDIRECT FLD-MCNC: 1 MG/DL — SIGNIFICANT CHANGE UP (ref 0.2–1)
BILIRUB SERPL-MCNC: 0.7 MG/DL — SIGNIFICANT CHANGE UP (ref 0.2–1.2)
BILIRUB SERPL-MCNC: 0.8 MG/DL — SIGNIFICANT CHANGE UP (ref 0.2–1.2)
BILIRUB SERPL-MCNC: 0.8 MG/DL — SIGNIFICANT CHANGE UP (ref 0.2–1.2)
BILIRUB SERPL-MCNC: 1.1 MG/DL — SIGNIFICANT CHANGE UP (ref 0.2–1.2)
BILIRUB SERPL-MCNC: 1.8 MG/DL — HIGH (ref 0.2–1.2)
BILIRUB SERPL-MCNC: 2.5 MG/DL — HIGH (ref 0.2–1.2)
BILIRUB UR-MCNC: NEGATIVE — SIGNIFICANT CHANGE UP
BILIRUB UR-MCNC: NEGATIVE — SIGNIFICANT CHANGE UP
BLD GP AB SCN SERPL QL: SIGNIFICANT CHANGE UP
BUN SERPL-MCNC: 10 MG/DL — SIGNIFICANT CHANGE UP (ref 7–23)
BUN SERPL-MCNC: 10 MG/DL — SIGNIFICANT CHANGE UP (ref 7–23)
BUN SERPL-MCNC: 11 MG/DL — SIGNIFICANT CHANGE UP (ref 7–23)
BUN SERPL-MCNC: 11 MG/DL — SIGNIFICANT CHANGE UP (ref 7–23)
BUN SERPL-MCNC: 12 MG/DL — SIGNIFICANT CHANGE UP (ref 7–23)
BUN SERPL-MCNC: 13 MG/DL
BUN SERPL-MCNC: 14 MG/DL — SIGNIFICANT CHANGE UP (ref 7–23)
BUN SERPL-MCNC: 15 MG/DL — SIGNIFICANT CHANGE UP (ref 7–23)
BUN SERPL-MCNC: 16 MG/DL — SIGNIFICANT CHANGE UP (ref 7–23)
BUN SERPL-MCNC: 17 MG/DL — SIGNIFICANT CHANGE UP (ref 7–23)
BUN SERPL-MCNC: 18 MG/DL — SIGNIFICANT CHANGE UP (ref 7–23)
BUN SERPL-MCNC: 19 MG/DL — SIGNIFICANT CHANGE UP (ref 7–23)
BUN SERPL-MCNC: 21 MG/DL — SIGNIFICANT CHANGE UP (ref 7–23)
BUN SERPL-MCNC: 22 MG/DL — SIGNIFICANT CHANGE UP (ref 7–23)
BUN SERPL-MCNC: 23 MG/DL — SIGNIFICANT CHANGE UP (ref 7–23)
BUN SERPL-MCNC: 24 MG/DL — HIGH (ref 7–23)
BUN SERPL-MCNC: 26 MG/DL — HIGH (ref 7–23)
BUN SERPL-MCNC: 8 MG/DL — SIGNIFICANT CHANGE UP (ref 7–23)
CALCIUM SERPL-MCNC: 7.5 MG/DL — LOW (ref 8.5–10.1)
CALCIUM SERPL-MCNC: 8 MG/DL — LOW (ref 8.5–10.1)
CALCIUM SERPL-MCNC: 8 MG/DL — LOW (ref 8.5–10.1)
CALCIUM SERPL-MCNC: 8.1 MG/DL — LOW (ref 8.5–10.1)
CALCIUM SERPL-MCNC: 8.3 MG/DL — LOW (ref 8.5–10.1)
CALCIUM SERPL-MCNC: 8.7 MG/DL — SIGNIFICANT CHANGE UP (ref 8.5–10.1)
CALCIUM SERPL-MCNC: 8.8 MG/DL — SIGNIFICANT CHANGE UP (ref 8.5–10.1)
CALCIUM SERPL-MCNC: 8.9 MG/DL — SIGNIFICANT CHANGE UP (ref 8.5–10.1)
CALCIUM SERPL-MCNC: 8.9 MG/DL — SIGNIFICANT CHANGE UP (ref 8.5–10.1)
CALCIUM SERPL-MCNC: 9 MG/DL — SIGNIFICANT CHANGE UP (ref 8.5–10.1)
CALCIUM SERPL-MCNC: 9 MG/DL — SIGNIFICANT CHANGE UP (ref 8.5–10.1)
CALCIUM SERPL-MCNC: 9.1 MG/DL
CALCIUM SERPL-MCNC: 9.1 MG/DL — SIGNIFICANT CHANGE UP (ref 8.5–10.1)
CALCIUM SERPL-MCNC: 9.3 MG/DL — SIGNIFICANT CHANGE UP (ref 8.5–10.1)
CALCIUM SERPL-MCNC: 9.3 MG/DL — SIGNIFICANT CHANGE UP (ref 8.5–10.1)
CHLORIDE SERPL-SCNC: 101 MMOL/L
CHLORIDE SERPL-SCNC: 101 MMOL/L — SIGNIFICANT CHANGE UP (ref 96–108)
CHLORIDE SERPL-SCNC: 102 MMOL/L — SIGNIFICANT CHANGE UP (ref 96–108)
CHLORIDE SERPL-SCNC: 102 MMOL/L — SIGNIFICANT CHANGE UP (ref 96–108)
CHLORIDE SERPL-SCNC: 104 MMOL/L — SIGNIFICANT CHANGE UP (ref 96–108)
CHLORIDE SERPL-SCNC: 105 MMOL/L — SIGNIFICANT CHANGE UP (ref 96–108)
CHLORIDE SERPL-SCNC: 105 MMOL/L — SIGNIFICANT CHANGE UP (ref 96–108)
CHLORIDE SERPL-SCNC: 106 MMOL/L — SIGNIFICANT CHANGE UP (ref 96–108)
CHLORIDE SERPL-SCNC: 107 MMOL/L — SIGNIFICANT CHANGE UP (ref 96–108)
CHLORIDE SERPL-SCNC: 108 MMOL/L — SIGNIFICANT CHANGE UP (ref 96–108)
CHLORIDE SERPL-SCNC: 110 MMOL/L — HIGH (ref 96–108)
CK SERPL-CCNC: 55 U/L — SIGNIFICANT CHANGE UP (ref 26–192)
CO2 SERPL-SCNC: 21 MMOL/L
CO2 SERPL-SCNC: 22 MMOL/L — SIGNIFICANT CHANGE UP (ref 22–31)
CO2 SERPL-SCNC: 23 MMOL/L — SIGNIFICANT CHANGE UP (ref 22–31)
CO2 SERPL-SCNC: 23 MMOL/L — SIGNIFICANT CHANGE UP (ref 22–31)
CO2 SERPL-SCNC: 24 MMOL/L — SIGNIFICANT CHANGE UP (ref 22–31)
CO2 SERPL-SCNC: 24 MMOL/L — SIGNIFICANT CHANGE UP (ref 22–31)
CO2 SERPL-SCNC: 25 MMOL/L — SIGNIFICANT CHANGE UP (ref 22–31)
CO2 SERPL-SCNC: 26 MMOL/L — SIGNIFICANT CHANGE UP (ref 22–31)
CO2 SERPL-SCNC: 27 MMOL/L — SIGNIFICANT CHANGE UP (ref 22–31)
CO2 SERPL-SCNC: 28 MMOL/L — SIGNIFICANT CHANGE UP (ref 22–31)
CO2 SERPL-SCNC: 29 MMOL/L — SIGNIFICANT CHANGE UP (ref 22–31)
CO2 SERPL-SCNC: 32 MMOL/L — HIGH (ref 22–31)
COLOR SPEC: YELLOW — SIGNIFICANT CHANGE UP
COLOR SPEC: YELLOW — SIGNIFICANT CHANGE UP
COMMENT - URINE: SIGNIFICANT CHANGE UP
COVID-19 SPIKE DOMAIN AB INTERP: NEGATIVE — SIGNIFICANT CHANGE UP
COVID-19 SPIKE DOMAIN AB INTERP: POSITIVE
COVID-19 SPIKE DOMAIN ANTIBODY RESULT: 0.4 U/ML — SIGNIFICANT CHANGE UP
COVID-19 SPIKE DOMAIN ANTIBODY RESULT: 6.02 U/ML — HIGH
CREAT SERPL-MCNC: 0.62 MG/DL — SIGNIFICANT CHANGE UP (ref 0.5–1.3)
CREAT SERPL-MCNC: 0.64 MG/DL — SIGNIFICANT CHANGE UP (ref 0.5–1.3)
CREAT SERPL-MCNC: 0.64 MG/DL — SIGNIFICANT CHANGE UP (ref 0.5–1.3)
CREAT SERPL-MCNC: 0.65 MG/DL — SIGNIFICANT CHANGE UP (ref 0.5–1.3)
CREAT SERPL-MCNC: 0.66 MG/DL — SIGNIFICANT CHANGE UP (ref 0.5–1.3)
CREAT SERPL-MCNC: 0.69 MG/DL — SIGNIFICANT CHANGE UP (ref 0.5–1.3)
CREAT SERPL-MCNC: 0.72 MG/DL — SIGNIFICANT CHANGE UP (ref 0.5–1.3)
CREAT SERPL-MCNC: 0.73 MG/DL — SIGNIFICANT CHANGE UP (ref 0.5–1.3)
CREAT SERPL-MCNC: 0.76 MG/DL — SIGNIFICANT CHANGE UP (ref 0.5–1.3)
CREAT SERPL-MCNC: 0.8 MG/DL — SIGNIFICANT CHANGE UP (ref 0.5–1.3)
CREAT SERPL-MCNC: 0.82 MG/DL — SIGNIFICANT CHANGE UP (ref 0.5–1.3)
CREAT SERPL-MCNC: 0.83 MG/DL
CREAT SERPL-MCNC: 0.85 MG/DL — SIGNIFICANT CHANGE UP (ref 0.5–1.3)
CREAT SERPL-MCNC: 0.89 MG/DL — SIGNIFICANT CHANGE UP (ref 0.5–1.3)
CREAT SERPL-MCNC: 0.92 MG/DL — SIGNIFICANT CHANGE UP (ref 0.5–1.3)
CREAT SERPL-MCNC: 0.95 MG/DL — SIGNIFICANT CHANGE UP (ref 0.5–1.3)
CREAT SERPL-MCNC: 0.96 MG/DL — SIGNIFICANT CHANGE UP (ref 0.5–1.3)
CREAT SERPL-MCNC: 0.98 MG/DL — SIGNIFICANT CHANGE UP (ref 0.5–1.3)
CREAT SERPL-MCNC: 0.99 MG/DL — SIGNIFICANT CHANGE UP (ref 0.5–1.3)
CREAT SERPL-MCNC: 1.17 MG/DL — SIGNIFICANT CHANGE UP (ref 0.5–1.3)
CULTURE RESULTS: SIGNIFICANT CHANGE UP
DIFF PNL FLD: ABNORMAL
DIFF PNL FLD: ABNORMAL
EOSINOPHIL # BLD AUTO: 0 K/UL — SIGNIFICANT CHANGE UP (ref 0–0.5)
EOSINOPHIL # BLD AUTO: 0.01 K/UL — SIGNIFICANT CHANGE UP (ref 0–0.5)
EOSINOPHIL # BLD AUTO: 0.06 K/UL — SIGNIFICANT CHANGE UP (ref 0–0.5)
EOSINOPHIL # BLD AUTO: 0.1 K/UL
EOSINOPHIL # BLD AUTO: 0.12 K/UL — SIGNIFICANT CHANGE UP (ref 0–0.5)
EOSINOPHIL # BLD AUTO: 0.15 K/UL — SIGNIFICANT CHANGE UP (ref 0–0.5)
EOSINOPHIL NFR BLD AUTO: 0 % — SIGNIFICANT CHANGE UP (ref 0–6)
EOSINOPHIL NFR BLD AUTO: 0.1 % — SIGNIFICANT CHANGE UP (ref 0–6)
EOSINOPHIL NFR BLD AUTO: 0.4 % — SIGNIFICANT CHANGE UP (ref 0–6)
EOSINOPHIL NFR BLD AUTO: 0.8 % — SIGNIFICANT CHANGE UP (ref 0–6)
EOSINOPHIL NFR BLD AUTO: 1.2 % — SIGNIFICANT CHANGE UP (ref 0–6)
EOSINOPHIL NFR BLD AUTO: 1.5 %
EPI CELLS # UR: SIGNIFICANT CHANGE UP
FERRITIN SERPL-MCNC: 549 NG/ML — HIGH (ref 15–150)
FLUAV AG NPH QL: SIGNIFICANT CHANGE UP
FLUBV AG NPH QL: SIGNIFICANT CHANGE UP
FOLATE SERPL-MCNC: >20 NG/ML — SIGNIFICANT CHANGE UP
GAMMA GLOBULIN: 1.2 G/DL — SIGNIFICANT CHANGE UP (ref 0.6–1.6)
GLUCOSE BLDC GLUCOMTR-MCNC: 130 MG/DL — HIGH (ref 70–99)
GLUCOSE BLDC GLUCOMTR-MCNC: 183 MG/DL — HIGH (ref 70–99)
GLUCOSE BLDC GLUCOMTR-MCNC: 92 MG/DL — SIGNIFICANT CHANGE UP (ref 70–99)
GLUCOSE SERPL-MCNC: 103 MG/DL — HIGH (ref 70–99)
GLUCOSE SERPL-MCNC: 105 MG/DL — HIGH (ref 70–99)
GLUCOSE SERPL-MCNC: 106 MG/DL — HIGH (ref 70–99)
GLUCOSE SERPL-MCNC: 112 MG/DL — HIGH (ref 70–99)
GLUCOSE SERPL-MCNC: 127 MG/DL — HIGH (ref 70–99)
GLUCOSE SERPL-MCNC: 140 MG/DL — HIGH (ref 70–99)
GLUCOSE SERPL-MCNC: 174 MG/DL — HIGH (ref 70–99)
GLUCOSE SERPL-MCNC: 57 MG/DL — LOW (ref 70–99)
GLUCOSE SERPL-MCNC: 66 MG/DL — LOW (ref 70–99)
GLUCOSE SERPL-MCNC: 72 MG/DL
GLUCOSE SERPL-MCNC: 74 MG/DL — SIGNIFICANT CHANGE UP (ref 70–99)
GLUCOSE SERPL-MCNC: 75 MG/DL — SIGNIFICANT CHANGE UP (ref 70–99)
GLUCOSE SERPL-MCNC: 82 MG/DL — SIGNIFICANT CHANGE UP (ref 70–99)
GLUCOSE SERPL-MCNC: 83 MG/DL — SIGNIFICANT CHANGE UP (ref 70–99)
GLUCOSE SERPL-MCNC: 84 MG/DL — SIGNIFICANT CHANGE UP (ref 70–99)
GLUCOSE SERPL-MCNC: 87 MG/DL — SIGNIFICANT CHANGE UP (ref 70–99)
GLUCOSE SERPL-MCNC: 90 MG/DL — SIGNIFICANT CHANGE UP (ref 70–99)
GLUCOSE SERPL-MCNC: 90 MG/DL — SIGNIFICANT CHANGE UP (ref 70–99)
GLUCOSE SERPL-MCNC: 92 MG/DL — SIGNIFICANT CHANGE UP (ref 70–99)
GLUCOSE SERPL-MCNC: 94 MG/DL — SIGNIFICANT CHANGE UP (ref 70–99)
GLUCOSE UR QL: 50 MG/DL
GLUCOSE UR QL: NEGATIVE MG/DL — SIGNIFICANT CHANGE UP
GRAM STN FLD: SIGNIFICANT CHANGE UP
GRAN CASTS # UR COMP ASSIST: ABNORMAL /LPF
GRAN CASTS # UR COMP ASSIST: ABNORMAL /LPF
HAPTOGLOB SERPL-MCNC: 211 MG/DL — HIGH (ref 34–200)
HCT VFR BLD CALC: 24.7 % — LOW (ref 34.5–45)
HCT VFR BLD CALC: 26.5 % — LOW (ref 34.5–45)
HCT VFR BLD CALC: 27.8 % — LOW (ref 34.5–45)
HCT VFR BLD CALC: 28.6 % — LOW (ref 34.5–45)
HCT VFR BLD CALC: 28.8 % — LOW (ref 34.5–45)
HCT VFR BLD CALC: 29 % — LOW (ref 34.5–45)
HCT VFR BLD CALC: 29.7 % — LOW (ref 34.5–45)
HCT VFR BLD CALC: 29.8 % — LOW (ref 34.5–45)
HCT VFR BLD CALC: 30 % — LOW (ref 34.5–45)
HCT VFR BLD CALC: 30.1 %
HCT VFR BLD CALC: 30.2 % — LOW (ref 34.5–45)
HCT VFR BLD CALC: 31.1 % — LOW (ref 34.5–45)
HCT VFR BLD CALC: 31.2 % — LOW (ref 34.5–45)
HCT VFR BLD CALC: 31.5 % — LOW (ref 34.5–45)
HCT VFR BLD CALC: 31.5 % — LOW (ref 34.5–45)
HCT VFR BLD CALC: 32.4 % — LOW (ref 34.5–45)
HCT VFR BLD CALC: 33.3 % — LOW (ref 34.5–45)
HCT VFR BLD CALC: 33.6 % — LOW (ref 34.5–45)
HCT VFR BLD CALC: 33.7 % — LOW (ref 34.5–45)
HCT VFR BLD CALC: 33.9 % — LOW (ref 34.5–45)
HCT VFR BLD CALC: 38.6 % — SIGNIFICANT CHANGE UP (ref 34.5–45)
HCT VFR BLD CALC: 38.9 % — SIGNIFICANT CHANGE UP (ref 34.5–45)
HCT VFR BLD CALC: 44.6 % — SIGNIFICANT CHANGE UP (ref 34.5–45)
HCT VFR BLD CALC: 48.1 % — HIGH (ref 34.5–45)
HGB BLD-MCNC: 10 G/DL — LOW (ref 11.5–15.5)
HGB BLD-MCNC: 10.1 G/DL — LOW (ref 11.5–15.5)
HGB BLD-MCNC: 10.1 G/DL — LOW (ref 11.5–15.5)
HGB BLD-MCNC: 10.3 G/DL — LOW (ref 11.5–15.5)
HGB BLD-MCNC: 10.5 G/DL — LOW (ref 11.5–15.5)
HGB BLD-MCNC: 10.5 G/DL — LOW (ref 11.5–15.5)
HGB BLD-MCNC: 10.6 G/DL — LOW (ref 11.5–15.5)
HGB BLD-MCNC: 10.7 G/DL — LOW (ref 11.5–15.5)
HGB BLD-MCNC: 10.8 G/DL — LOW (ref 11.5–15.5)
HGB BLD-MCNC: 12.3 G/DL — SIGNIFICANT CHANGE UP (ref 11.5–15.5)
HGB BLD-MCNC: 12.4 G/DL — SIGNIFICANT CHANGE UP (ref 11.5–15.5)
HGB BLD-MCNC: 14.5 G/DL — SIGNIFICANT CHANGE UP (ref 11.5–15.5)
HGB BLD-MCNC: 15.7 G/DL — HIGH (ref 11.5–15.5)
HGB BLD-MCNC: 8.1 G/DL — LOW (ref 11.5–15.5)
HGB BLD-MCNC: 8.7 G/DL — LOW (ref 11.5–15.5)
HGB BLD-MCNC: 8.9 G/DL — LOW (ref 11.5–15.5)
HGB BLD-MCNC: 9.1 G/DL — LOW (ref 11.5–15.5)
HGB BLD-MCNC: 9.2 G/DL — LOW (ref 11.5–15.5)
HGB BLD-MCNC: 9.4 G/DL — LOW (ref 11.5–15.5)
HGB BLD-MCNC: 9.5 G/DL — LOW (ref 11.5–15.5)
HGB BLD-MCNC: 9.5 G/DL — LOW (ref 11.5–15.5)
HGB BLD-MCNC: 9.7 G/DL
HGB BLD-MCNC: 9.8 G/DL — LOW (ref 11.5–15.5)
HGB BLD-MCNC: 9.9 G/DL — LOW (ref 11.5–15.5)
HYALINE CASTS # UR AUTO: ABNORMAL /LPF
IMM GRANULOCYTES NFR BLD AUTO: 0.3 %
IMM GRANULOCYTES NFR BLD AUTO: 0.4 % — SIGNIFICANT CHANGE UP (ref 0–1.5)
IMM GRANULOCYTES NFR BLD AUTO: 0.4 % — SIGNIFICANT CHANGE UP (ref 0–1.5)
IMM GRANULOCYTES NFR BLD AUTO: 1 % — SIGNIFICANT CHANGE UP (ref 0–1.5)
IMM GRANULOCYTES NFR BLD AUTO: 1.2 % — SIGNIFICANT CHANGE UP (ref 0–1.5)
IMM GRANULOCYTES NFR BLD AUTO: 1.5 % — SIGNIFICANT CHANGE UP (ref 0–1.5)
INR BLD: 1.11 RATIO — SIGNIFICANT CHANGE UP (ref 0.88–1.16)
INR BLD: 1.14 RATIO — SIGNIFICANT CHANGE UP (ref 0.88–1.16)
INTERPRETATION SERPL IFE-IMP: SIGNIFICANT CHANGE UP
IRON SATN MFR SERPL: 15 UG/DL — LOW (ref 30–160)
IRON SATN MFR SERPL: 7 % — LOW (ref 14–50)
KAPPA LC SER QL IFE: 3.29 MG/DL — HIGH (ref 0.33–1.94)
KAPPA/LAMBDA FREE LIGHT CHAIN RATIO, SERUM: 0.96 RATIO — SIGNIFICANT CHANGE UP (ref 0.26–1.65)
KETONES UR-MCNC: ABNORMAL
KETONES UR-MCNC: ABNORMAL
LACTATE SERPL-SCNC: 1.6 MMOL/L — SIGNIFICANT CHANGE UP (ref 0.7–2)
LACTATE SERPL-SCNC: 1.6 MMOL/L — SIGNIFICANT CHANGE UP (ref 0.7–2)
LACTATE SERPL-SCNC: 3.7 MMOL/L — HIGH (ref 0.7–2)
LACTATE SERPL-SCNC: 4.8 MMOL/L — CRITICAL HIGH (ref 0.7–2)
LAMBDA LC SER QL IFE: 3.44 MG/DL — HIGH (ref 0.57–2.63)
LDH SERPL L TO P-CCNC: 318 U/L — HIGH (ref 50–242)
LEUKOCYTE ESTERASE UR-ACNC: ABNORMAL
LEUKOCYTE ESTERASE UR-ACNC: NEGATIVE — SIGNIFICANT CHANGE UP
LIDOCAIN IGE QN: 28 U/L — LOW (ref 73–393)
LIDOCAIN IGE QN: 50 U/L — LOW (ref 73–393)
LYMPHOCYTES # BLD AUTO: 0.75 K/UL — LOW (ref 1–3.3)
LYMPHOCYTES # BLD AUTO: 0.75 K/UL — LOW (ref 1–3.3)
LYMPHOCYTES # BLD AUTO: 0.79 K/UL — LOW (ref 1–3.3)
LYMPHOCYTES # BLD AUTO: 1.06 K/UL — SIGNIFICANT CHANGE UP (ref 1–3.3)
LYMPHOCYTES # BLD AUTO: 1.34 K/UL — SIGNIFICANT CHANGE UP (ref 1–3.3)
LYMPHOCYTES # BLD AUTO: 1.44 K/UL
LYMPHOCYTES # BLD AUTO: 10.4 % — LOW (ref 13–44)
LYMPHOCYTES # BLD AUTO: 5.4 % — LOW (ref 13–44)
LYMPHOCYTES # BLD AUTO: 7.2 % — LOW (ref 13–44)
LYMPHOCYTES # BLD AUTO: 7.5 % — LOW (ref 13–44)
LYMPHOCYTES # BLD AUTO: 9.6 % — LOW (ref 13–44)
LYMPHOCYTES NFR BLD AUTO: 22.3 %
M-SPIKE: 0.2 G/DL — HIGH (ref 0–0)
MAGNESIUM SERPL-MCNC: 1.6 MG/DL — SIGNIFICANT CHANGE UP (ref 1.6–2.6)
MAGNESIUM SERPL-MCNC: 1.7 MG/DL — SIGNIFICANT CHANGE UP (ref 1.6–2.6)
MAGNESIUM SERPL-MCNC: 1.8 MG/DL — SIGNIFICANT CHANGE UP (ref 1.6–2.6)
MAGNESIUM SERPL-MCNC: 1.9 MG/DL — SIGNIFICANT CHANGE UP (ref 1.6–2.6)
MAGNESIUM SERPL-MCNC: 2.2 MG/DL — SIGNIFICANT CHANGE UP (ref 1.6–2.6)
MAN DIFF?: NORMAL
MCHC RBC-ENTMCNC: 27.3 PG — SIGNIFICANT CHANGE UP (ref 27–34)
MCHC RBC-ENTMCNC: 27.5 PG — SIGNIFICANT CHANGE UP (ref 27–34)
MCHC RBC-ENTMCNC: 27.6 PG — SIGNIFICANT CHANGE UP (ref 27–34)
MCHC RBC-ENTMCNC: 27.7 PG — SIGNIFICANT CHANGE UP (ref 27–34)
MCHC RBC-ENTMCNC: 27.8 PG — SIGNIFICANT CHANGE UP (ref 27–34)
MCHC RBC-ENTMCNC: 27.8 PG — SIGNIFICANT CHANGE UP (ref 27–34)
MCHC RBC-ENTMCNC: 27.9 PG — SIGNIFICANT CHANGE UP (ref 27–34)
MCHC RBC-ENTMCNC: 28 PG — SIGNIFICANT CHANGE UP (ref 27–34)
MCHC RBC-ENTMCNC: 28 PG — SIGNIFICANT CHANGE UP (ref 27–34)
MCHC RBC-ENTMCNC: 28.1 PG — SIGNIFICANT CHANGE UP (ref 27–34)
MCHC RBC-ENTMCNC: 28.2 PG — SIGNIFICANT CHANGE UP (ref 27–34)
MCHC RBC-ENTMCNC: 28.3 PG — SIGNIFICANT CHANGE UP (ref 27–34)
MCHC RBC-ENTMCNC: 28.5 PG — SIGNIFICANT CHANGE UP (ref 27–34)
MCHC RBC-ENTMCNC: 28.8 PG — SIGNIFICANT CHANGE UP (ref 27–34)
MCHC RBC-ENTMCNC: 29.3 PG
MCHC RBC-ENTMCNC: 30.9 GM/DL — LOW (ref 32–36)
MCHC RBC-ENTMCNC: 31.2 GM/DL — LOW (ref 32–36)
MCHC RBC-ENTMCNC: 31.5 GM/DL — LOW (ref 32–36)
MCHC RBC-ENTMCNC: 31.5 GM/DL — LOW (ref 32–36)
MCHC RBC-ENTMCNC: 31.7 GM/DL — LOW (ref 32–36)
MCHC RBC-ENTMCNC: 31.7 GM/DL — LOW (ref 32–36)
MCHC RBC-ENTMCNC: 31.9 GM/DL — LOW (ref 32–36)
MCHC RBC-ENTMCNC: 32.1 GM/DL — SIGNIFICANT CHANGE UP (ref 32–36)
MCHC RBC-ENTMCNC: 32.1 GM/DL — SIGNIFICANT CHANGE UP (ref 32–36)
MCHC RBC-ENTMCNC: 32.2 GM/DL
MCHC RBC-ENTMCNC: 32.4 GM/DL — SIGNIFICANT CHANGE UP (ref 32–36)
MCHC RBC-ENTMCNC: 32.4 GM/DL — SIGNIFICANT CHANGE UP (ref 32–36)
MCHC RBC-ENTMCNC: 32.5 GM/DL — SIGNIFICANT CHANGE UP (ref 32–36)
MCHC RBC-ENTMCNC: 32.5 GM/DL — SIGNIFICANT CHANGE UP (ref 32–36)
MCHC RBC-ENTMCNC: 32.6 GM/DL — SIGNIFICANT CHANGE UP (ref 32–36)
MCHC RBC-ENTMCNC: 32.7 GM/DL — SIGNIFICANT CHANGE UP (ref 32–36)
MCHC RBC-ENTMCNC: 32.8 GM/DL — SIGNIFICANT CHANGE UP (ref 32–36)
MCHC RBC-ENTMCNC: 32.8 GM/DL — SIGNIFICANT CHANGE UP (ref 32–36)
MCHC RBC-ENTMCNC: 33 GM/DL — SIGNIFICANT CHANGE UP (ref 32–36)
MCHC RBC-ENTMCNC: 33.6 GM/DL — SIGNIFICANT CHANGE UP (ref 32–36)
MCV RBC AUTO: 83.5 FL — SIGNIFICANT CHANGE UP (ref 80–100)
MCV RBC AUTO: 84.4 FL — SIGNIFICANT CHANGE UP (ref 80–100)
MCV RBC AUTO: 85 FL — SIGNIFICANT CHANGE UP (ref 80–100)
MCV RBC AUTO: 85.4 FL — SIGNIFICANT CHANGE UP (ref 80–100)
MCV RBC AUTO: 85.5 FL — SIGNIFICANT CHANGE UP (ref 80–100)
MCV RBC AUTO: 85.7 FL — SIGNIFICANT CHANGE UP (ref 80–100)
MCV RBC AUTO: 86 FL — SIGNIFICANT CHANGE UP (ref 80–100)
MCV RBC AUTO: 86.1 FL — SIGNIFICANT CHANGE UP (ref 80–100)
MCV RBC AUTO: 86.3 FL — SIGNIFICANT CHANGE UP (ref 80–100)
MCV RBC AUTO: 86.7 FL — SIGNIFICANT CHANGE UP (ref 80–100)
MCV RBC AUTO: 86.9 FL — SIGNIFICANT CHANGE UP (ref 80–100)
MCV RBC AUTO: 87.1 FL — SIGNIFICANT CHANGE UP (ref 80–100)
MCV RBC AUTO: 87.1 FL — SIGNIFICANT CHANGE UP (ref 80–100)
MCV RBC AUTO: 87.3 FL — SIGNIFICANT CHANGE UP (ref 80–100)
MCV RBC AUTO: 87.4 FL — SIGNIFICANT CHANGE UP (ref 80–100)
MCV RBC AUTO: 87.5 FL — SIGNIFICANT CHANGE UP (ref 80–100)
MCV RBC AUTO: 87.7 FL — SIGNIFICANT CHANGE UP (ref 80–100)
MCV RBC AUTO: 88.6 FL — SIGNIFICANT CHANGE UP (ref 80–100)
MCV RBC AUTO: 88.8 FL — SIGNIFICANT CHANGE UP (ref 80–100)
MCV RBC AUTO: 90.7 FL — SIGNIFICANT CHANGE UP (ref 80–100)
MCV RBC AUTO: 90.9 FL
MCV RBC AUTO: 91.1 FL — SIGNIFICANT CHANGE UP (ref 80–100)
METHOD TYPE: SIGNIFICANT CHANGE UP
MONOCYTES # BLD AUTO: 0.36 K/UL — SIGNIFICANT CHANGE UP (ref 0–0.9)
MONOCYTES # BLD AUTO: 0.5 K/UL — SIGNIFICANT CHANGE UP (ref 0–0.9)
MONOCYTES # BLD AUTO: 0.72 K/UL
MONOCYTES # BLD AUTO: 1.16 K/UL — HIGH (ref 0–0.9)
MONOCYTES # BLD AUTO: 1.43 K/UL — HIGH (ref 0–0.9)
MONOCYTES # BLD AUTO: 1.56 K/UL — HIGH (ref 0–0.9)
MONOCYTES NFR BLD AUTO: 10.7 % — SIGNIFICANT CHANGE UP (ref 2–14)
MONOCYTES NFR BLD AUTO: 11.1 %
MONOCYTES NFR BLD AUTO: 4.6 % — SIGNIFICANT CHANGE UP (ref 2–14)
MONOCYTES NFR BLD AUTO: 5 % — SIGNIFICANT CHANGE UP (ref 2–14)
MONOCYTES NFR BLD AUTO: 9 % — SIGNIFICANT CHANGE UP (ref 2–14)
MONOCYTES NFR BLD AUTO: 9.7 % — SIGNIFICANT CHANGE UP (ref 2–14)
NEUTROPHILS # BLD AUTO: 10.01 K/UL — HIGH (ref 1.8–7.4)
NEUTROPHILS # BLD AUTO: 11.92 K/UL — HIGH (ref 1.8–7.4)
NEUTROPHILS # BLD AUTO: 11.92 K/UL — HIGH (ref 1.8–7.4)
NEUTROPHILS # BLD AUTO: 4.16 K/UL
NEUTROPHILS # BLD AUTO: 6.68 K/UL — SIGNIFICANT CHANGE UP (ref 1.8–7.4)
NEUTROPHILS # BLD AUTO: 8.69 K/UL — HIGH (ref 1.8–7.4)
NEUTROPHILS NFR BLD AUTO: 64.5 %
NEUTROPHILS NFR BLD AUTO: 77.5 % — HIGH (ref 43–77)
NEUTROPHILS NFR BLD AUTO: 80.8 % — HIGH (ref 43–77)
NEUTROPHILS NFR BLD AUTO: 82.2 % — HIGH (ref 43–77)
NEUTROPHILS NFR BLD AUTO: 85 % — HIGH (ref 43–77)
NEUTROPHILS NFR BLD AUTO: 86.9 % — HIGH (ref 43–77)
NITRITE UR-MCNC: NEGATIVE — SIGNIFICANT CHANGE UP
NITRITE UR-MCNC: NEGATIVE — SIGNIFICANT CHANGE UP
NRBC # BLD: 0 /100 WBCS — SIGNIFICANT CHANGE UP (ref 0–0)
OB PNL STL: NEGATIVE — SIGNIFICANT CHANGE UP
OB PNL STL: NEGATIVE — SIGNIFICANT CHANGE UP
ORGANISM # SPEC MICROSCOPIC CNT: SIGNIFICANT CHANGE UP
ORGANISM # SPEC MICROSCOPIC CNT: SIGNIFICANT CHANGE UP
PH UR: 6 — SIGNIFICANT CHANGE UP (ref 5–8)
PH UR: 6 — SIGNIFICANT CHANGE UP (ref 5–8)
PHOSPHATE SERPL-MCNC: 1.4 MG/DL — LOW (ref 2.5–4.5)
PHOSPHATE SERPL-MCNC: 2 MG/DL — LOW (ref 2.5–4.5)
PHOSPHATE SERPL-MCNC: 2.7 MG/DL — SIGNIFICANT CHANGE UP (ref 2.5–4.5)
PHOSPHATE SERPL-MCNC: 3 MG/DL — SIGNIFICANT CHANGE UP (ref 2.5–4.5)
PLATELET # BLD AUTO: 121 K/UL — LOW (ref 150–400)
PLATELET # BLD AUTO: 128 K/UL — LOW (ref 150–400)
PLATELET # BLD AUTO: 129 K/UL — LOW (ref 150–400)
PLATELET # BLD AUTO: 132 K/UL — LOW (ref 150–400)
PLATELET # BLD AUTO: 134 K/UL — LOW (ref 150–400)
PLATELET # BLD AUTO: 148 K/UL — LOW (ref 150–400)
PLATELET # BLD AUTO: 164 K/UL — SIGNIFICANT CHANGE UP (ref 150–400)
PLATELET # BLD AUTO: 170 K/UL — SIGNIFICANT CHANGE UP (ref 150–400)
PLATELET # BLD AUTO: 173 K/UL — SIGNIFICANT CHANGE UP (ref 150–400)
PLATELET # BLD AUTO: 178 K/UL
PLATELET # BLD AUTO: 183 K/UL — SIGNIFICANT CHANGE UP (ref 150–400)
PLATELET # BLD AUTO: 201 K/UL — SIGNIFICANT CHANGE UP (ref 150–400)
PLATELET # BLD AUTO: 210 K/UL — SIGNIFICANT CHANGE UP (ref 150–400)
PLATELET # BLD AUTO: 279 K/UL — SIGNIFICANT CHANGE UP (ref 150–400)
PLATELET # BLD AUTO: 289 K/UL — SIGNIFICANT CHANGE UP (ref 150–400)
PLATELET # BLD AUTO: 295 K/UL — SIGNIFICANT CHANGE UP (ref 150–400)
PLATELET # BLD AUTO: 336 K/UL — SIGNIFICANT CHANGE UP (ref 150–400)
PLATELET # BLD AUTO: 339 K/UL — SIGNIFICANT CHANGE UP (ref 150–400)
PLATELET # BLD AUTO: 339 K/UL — SIGNIFICANT CHANGE UP (ref 150–400)
PLATELET # BLD AUTO: 340 K/UL — SIGNIFICANT CHANGE UP (ref 150–400)
PLATELET # BLD AUTO: 346 K/UL — SIGNIFICANT CHANGE UP (ref 150–400)
PLATELET # BLD AUTO: 363 K/UL — SIGNIFICANT CHANGE UP (ref 150–400)
POTASSIUM SERPL-MCNC: 2.7 MMOL/L — CRITICAL LOW (ref 3.5–5.3)
POTASSIUM SERPL-MCNC: 2.8 MMOL/L — CRITICAL LOW (ref 3.5–5.3)
POTASSIUM SERPL-MCNC: 2.8 MMOL/L — CRITICAL LOW (ref 3.5–5.3)
POTASSIUM SERPL-MCNC: 3 MMOL/L — LOW (ref 3.5–5.3)
POTASSIUM SERPL-MCNC: 3.3 MMOL/L — LOW (ref 3.5–5.3)
POTASSIUM SERPL-MCNC: 3.3 MMOL/L — LOW (ref 3.5–5.3)
POTASSIUM SERPL-MCNC: 3.4 MMOL/L — LOW (ref 3.5–5.3)
POTASSIUM SERPL-MCNC: 3.5 MMOL/L — SIGNIFICANT CHANGE UP (ref 3.5–5.3)
POTASSIUM SERPL-MCNC: 3.5 MMOL/L — SIGNIFICANT CHANGE UP (ref 3.5–5.3)
POTASSIUM SERPL-MCNC: 3.6 MMOL/L — SIGNIFICANT CHANGE UP (ref 3.5–5.3)
POTASSIUM SERPL-MCNC: 3.7 MMOL/L — SIGNIFICANT CHANGE UP (ref 3.5–5.3)
POTASSIUM SERPL-MCNC: 3.8 MMOL/L — SIGNIFICANT CHANGE UP (ref 3.5–5.3)
POTASSIUM SERPL-MCNC: 3.9 MMOL/L — SIGNIFICANT CHANGE UP (ref 3.5–5.3)
POTASSIUM SERPL-MCNC: 4 MMOL/L — SIGNIFICANT CHANGE UP (ref 3.5–5.3)
POTASSIUM SERPL-MCNC: 4.2 MMOL/L — SIGNIFICANT CHANGE UP (ref 3.5–5.3)
POTASSIUM SERPL-SCNC: 2.7 MMOL/L — CRITICAL LOW (ref 3.5–5.3)
POTASSIUM SERPL-SCNC: 2.8 MMOL/L — CRITICAL LOW (ref 3.5–5.3)
POTASSIUM SERPL-SCNC: 2.8 MMOL/L — CRITICAL LOW (ref 3.5–5.3)
POTASSIUM SERPL-SCNC: 3 MMOL/L
POTASSIUM SERPL-SCNC: 3 MMOL/L — LOW (ref 3.5–5.3)
POTASSIUM SERPL-SCNC: 3.3 MMOL/L — LOW (ref 3.5–5.3)
POTASSIUM SERPL-SCNC: 3.3 MMOL/L — LOW (ref 3.5–5.3)
POTASSIUM SERPL-SCNC: 3.4 MMOL/L — LOW (ref 3.5–5.3)
POTASSIUM SERPL-SCNC: 3.5 MMOL/L — SIGNIFICANT CHANGE UP (ref 3.5–5.3)
POTASSIUM SERPL-SCNC: 3.5 MMOL/L — SIGNIFICANT CHANGE UP (ref 3.5–5.3)
POTASSIUM SERPL-SCNC: 3.6 MMOL/L — SIGNIFICANT CHANGE UP (ref 3.5–5.3)
POTASSIUM SERPL-SCNC: 3.7 MMOL/L — SIGNIFICANT CHANGE UP (ref 3.5–5.3)
POTASSIUM SERPL-SCNC: 3.8 MMOL/L — SIGNIFICANT CHANGE UP (ref 3.5–5.3)
POTASSIUM SERPL-SCNC: 3.9 MMOL/L — SIGNIFICANT CHANGE UP (ref 3.5–5.3)
POTASSIUM SERPL-SCNC: 4 MMOL/L — SIGNIFICANT CHANGE UP (ref 3.5–5.3)
POTASSIUM SERPL-SCNC: 4.2 MMOL/L — SIGNIFICANT CHANGE UP (ref 3.5–5.3)
PROCALCITONIN SERPL-MCNC: 0.18 NG/ML — HIGH (ref 0.02–0.1)
PROT PATTERN SERPL ELPH-IMP: SIGNIFICANT CHANGE UP
PROT SERPL-MCNC: 6.4 G/DL — SIGNIFICANT CHANGE UP (ref 6–8.3)
PROT SERPL-MCNC: 6.4 G/DL — SIGNIFICANT CHANGE UP (ref 6–8.3)
PROT SERPL-MCNC: 6.5 GM/DL — SIGNIFICANT CHANGE UP (ref 6–8.3)
PROT SERPL-MCNC: 6.8 GM/DL — SIGNIFICANT CHANGE UP (ref 6–8.3)
PROT SERPL-MCNC: 6.9 GM/DL — SIGNIFICANT CHANGE UP (ref 6–8.3)
PROT SERPL-MCNC: 7.3 GM/DL — SIGNIFICANT CHANGE UP (ref 6–8.3)
PROT SERPL-MCNC: 8.4 GM/DL — HIGH (ref 6–8.3)
PROT SERPL-MCNC: 9.4 GM/DL — HIGH (ref 6–8.3)
PROT UR-MCNC: 30 MG/DL
PROT UR-MCNC: 500 MG/DL
PROTHROM AB SERPL-ACNC: 12.8 SEC — SIGNIFICANT CHANGE UP (ref 10.6–13.6)
PROTHROM AB SERPL-ACNC: 13.1 SEC — SIGNIFICANT CHANGE UP (ref 10.6–13.6)
RAPID RVP RESULT: SIGNIFICANT CHANGE UP
RBC # BLD: 2.87 M/UL — LOW (ref 3.8–5.2)
RBC # BLD: 3.02 M/UL — LOW (ref 3.8–5.2)
RBC # BLD: 3.16 M/UL — LOW (ref 3.8–5.2)
RBC # BLD: 3.31 M/UL
RBC # BLD: 3.33 M/UL — LOW (ref 3.8–5.2)
RBC # BLD: 3.38 M/UL — LOW (ref 3.8–5.2)
RBC # BLD: 3.41 M/UL — LOW (ref 3.8–5.2)
RBC # BLD: 3.52 M/UL — LOW (ref 3.8–5.2)
RBC # BLD: 3.57 M/UL — LOW (ref 3.8–5.2)
RBC # BLD: 3.57 M/UL — LOW (ref 3.8–5.2)
RBC # BLD: 3.58 M/UL — LOW (ref 3.8–5.2)
RBC # BLD: 3.65 M/UL — LOW (ref 3.8–5.2)
RBC # BLD: 3.69 M/UL — LOW (ref 3.8–5.2)
RBC # BLD: 3.69 M/UL — LOW (ref 3.8–5.2)
RBC # BLD: 3.79 M/UL — LOW (ref 3.8–5.2)
RBC # BLD: 3.85 M/UL — SIGNIFICANT CHANGE UP (ref 3.8–5.2)
RBC # BLD: 3.85 M/UL — SIGNIFICANT CHANGE UP (ref 3.8–5.2)
RBC # BLD: 3.87 M/UL — SIGNIFICANT CHANGE UP (ref 3.8–5.2)
RBC # BLD: 3.91 M/UL — SIGNIFICANT CHANGE UP (ref 3.8–5.2)
RBC # BLD: 4.39 M/UL — SIGNIFICANT CHANGE UP (ref 3.8–5.2)
RBC # BLD: 4.43 M/UL — SIGNIFICANT CHANGE UP (ref 3.8–5.2)
RBC # BLD: 5.12 M/UL — SIGNIFICANT CHANGE UP (ref 3.8–5.2)
RBC # BLD: 5.61 M/UL — HIGH (ref 3.8–5.2)
RBC # FLD: 13.9 % — SIGNIFICANT CHANGE UP (ref 10.3–14.5)
RBC # FLD: 14.1 % — SIGNIFICANT CHANGE UP (ref 10.3–14.5)
RBC # FLD: 14.2 % — SIGNIFICANT CHANGE UP (ref 10.3–14.5)
RBC # FLD: 14.2 % — SIGNIFICANT CHANGE UP (ref 10.3–14.5)
RBC # FLD: 14.3 % — SIGNIFICANT CHANGE UP (ref 10.3–14.5)
RBC # FLD: 14.4 % — SIGNIFICANT CHANGE UP (ref 10.3–14.5)
RBC # FLD: 14.6 % — HIGH (ref 10.3–14.5)
RBC # FLD: 14.6 % — HIGH (ref 10.3–14.5)
RBC # FLD: 14.9 % — HIGH (ref 10.3–14.5)
RBC # FLD: 15 % — HIGH (ref 10.3–14.5)
RBC # FLD: 15.1 % — HIGH (ref 10.3–14.5)
RBC # FLD: 15.2 % — HIGH (ref 10.3–14.5)
RBC # FLD: 15.3 % — HIGH (ref 10.3–14.5)
RBC # FLD: 15.8 % — HIGH (ref 10.3–14.5)
RBC # FLD: 15.8 % — HIGH (ref 10.3–14.5)
RBC # FLD: 15.9 % — HIGH (ref 10.3–14.5)
RBC # FLD: 18.2 %
RBC CASTS # UR COMP ASSIST: ABNORMAL /HPF (ref 0–4)
RBC CASTS # UR COMP ASSIST: ABNORMAL /HPF (ref 0–4)
RETICS #: 89.7 K/UL — SIGNIFICANT CHANGE UP (ref 25–125)
RETICS/RBC NFR: 2.4 % — SIGNIFICANT CHANGE UP (ref 0.5–2.5)
SALICYLATES SERPL-MCNC: <1.7 MG/DL — LOW (ref 2.8–20)
SARS-COV-2 IGG+IGM SERPL QL IA: 0.4 U/ML — SIGNIFICANT CHANGE UP
SARS-COV-2 IGG+IGM SERPL QL IA: 6.02 U/ML — HIGH
SARS-COV-2 IGG+IGM SERPL QL IA: NEGATIVE — SIGNIFICANT CHANGE UP
SARS-COV-2 IGG+IGM SERPL QL IA: POSITIVE
SARS-COV-2 RNA SPEC QL NAA+PROBE: SIGNIFICANT CHANGE UP
SARS-COV-2 RNA SPEC QL NAA+PROBE: SIGNIFICANT CHANGE UP
SODIUM SERPL-SCNC: 135 MMOL/L — SIGNIFICANT CHANGE UP (ref 135–145)
SODIUM SERPL-SCNC: 136 MMOL/L — SIGNIFICANT CHANGE UP (ref 135–145)
SODIUM SERPL-SCNC: 136 MMOL/L — SIGNIFICANT CHANGE UP (ref 135–145)
SODIUM SERPL-SCNC: 137 MMOL/L — SIGNIFICANT CHANGE UP (ref 135–145)
SODIUM SERPL-SCNC: 139 MMOL/L
SODIUM SERPL-SCNC: 139 MMOL/L — SIGNIFICANT CHANGE UP (ref 135–145)
SODIUM SERPL-SCNC: 140 MMOL/L — SIGNIFICANT CHANGE UP (ref 135–145)
SODIUM SERPL-SCNC: 141 MMOL/L — SIGNIFICANT CHANGE UP (ref 135–145)
SODIUM SERPL-SCNC: 142 MMOL/L — SIGNIFICANT CHANGE UP (ref 135–145)
SODIUM SERPL-SCNC: 142 MMOL/L — SIGNIFICANT CHANGE UP (ref 135–145)
SP GR SPEC: 1.01 — SIGNIFICANT CHANGE UP (ref 1.01–1.02)
SP GR SPEC: 1.02 — SIGNIFICANT CHANGE UP (ref 1.01–1.02)
SPECIMEN SOURCE: SIGNIFICANT CHANGE UP
T4 FREE SERPL-MCNC: 1.2 NG/DL — SIGNIFICANT CHANGE UP (ref 0.9–1.8)
TIBC SERPL-MCNC: 200 UG/DL — LOW (ref 220–430)
TROPONIN I SERPL-MCNC: 0.03 NG/ML — SIGNIFICANT CHANGE UP (ref 0.01–0.04)
TROPONIN I SERPL-MCNC: 0.07 NG/ML — HIGH (ref 0.01–0.04)
TSH SERPL-ACNC: 2.34 UIU/ML
TSH SERPL-MCNC: 4.14 UIU/ML — HIGH (ref 0.36–3.74)
TSH SERPL-MCNC: 5.09 UIU/ML — HIGH (ref 0.36–3.74)
UIBC SERPL-MCNC: 185 UG/DL — SIGNIFICANT CHANGE UP (ref 110–370)
UROBILINOGEN FLD QL: NEGATIVE MG/DL — SIGNIFICANT CHANGE UP
UROBILINOGEN FLD QL: NEGATIVE MG/DL — SIGNIFICANT CHANGE UP
VIT B12 SERPL-MCNC: >2000 PG/ML — HIGH (ref 232–1245)
WBC # BLD: 10 K/UL — SIGNIFICANT CHANGE UP (ref 3.8–10.5)
WBC # BLD: 10.02 K/UL — SIGNIFICANT CHANGE UP (ref 3.8–10.5)
WBC # BLD: 10.86 K/UL — HIGH (ref 3.8–10.5)
WBC # BLD: 11.81 K/UL — HIGH (ref 3.8–10.5)
WBC # BLD: 12.46 K/UL — HIGH (ref 3.8–10.5)
WBC # BLD: 12.9 K/UL — HIGH (ref 3.8–10.5)
WBC # BLD: 12.91 K/UL — HIGH (ref 3.8–10.5)
WBC # BLD: 13.15 K/UL — HIGH (ref 3.8–10.5)
WBC # BLD: 13.8 K/UL — HIGH (ref 3.8–10.5)
WBC # BLD: 14.53 K/UL — HIGH (ref 3.8–10.5)
WBC # BLD: 14.53 K/UL — HIGH (ref 3.8–10.5)
WBC # BLD: 14.76 K/UL — HIGH (ref 3.8–10.5)
WBC # BLD: 14.76 K/UL — HIGH (ref 3.8–10.5)
WBC # BLD: 15.11 K/UL — HIGH (ref 3.8–10.5)
WBC # BLD: 15.16 K/UL — HIGH (ref 3.8–10.5)
WBC # BLD: 7.66 K/UL — SIGNIFICANT CHANGE UP (ref 3.8–10.5)
WBC # BLD: 7.83 K/UL — SIGNIFICANT CHANGE UP (ref 3.8–10.5)
WBC # BLD: 7.85 K/UL — SIGNIFICANT CHANGE UP (ref 3.8–10.5)
WBC # BLD: 8.3 K/UL — SIGNIFICANT CHANGE UP (ref 3.8–10.5)
WBC # BLD: 8.42 K/UL — SIGNIFICANT CHANGE UP (ref 3.8–10.5)
WBC # BLD: 9.18 K/UL — SIGNIFICANT CHANGE UP (ref 3.8–10.5)
WBC # FLD AUTO: 10 K/UL — SIGNIFICANT CHANGE UP (ref 3.8–10.5)
WBC # FLD AUTO: 10.02 K/UL — SIGNIFICANT CHANGE UP (ref 3.8–10.5)
WBC # FLD AUTO: 10.86 K/UL — HIGH (ref 3.8–10.5)
WBC # FLD AUTO: 11.81 K/UL — HIGH (ref 3.8–10.5)
WBC # FLD AUTO: 12.46 K/UL — HIGH (ref 3.8–10.5)
WBC # FLD AUTO: 12.9 K/UL — HIGH (ref 3.8–10.5)
WBC # FLD AUTO: 12.91 K/UL — HIGH (ref 3.8–10.5)
WBC # FLD AUTO: 13.15 K/UL — HIGH (ref 3.8–10.5)
WBC # FLD AUTO: 13.8 K/UL — HIGH (ref 3.8–10.5)
WBC # FLD AUTO: 14.53 K/UL — HIGH (ref 3.8–10.5)
WBC # FLD AUTO: 14.53 K/UL — HIGH (ref 3.8–10.5)
WBC # FLD AUTO: 14.76 K/UL — HIGH (ref 3.8–10.5)
WBC # FLD AUTO: 14.76 K/UL — HIGH (ref 3.8–10.5)
WBC # FLD AUTO: 15.11 K/UL — HIGH (ref 3.8–10.5)
WBC # FLD AUTO: 15.16 K/UL — HIGH (ref 3.8–10.5)
WBC # FLD AUTO: 6.46 K/UL
WBC # FLD AUTO: 7.66 K/UL — SIGNIFICANT CHANGE UP (ref 3.8–10.5)
WBC # FLD AUTO: 7.83 K/UL — SIGNIFICANT CHANGE UP (ref 3.8–10.5)
WBC # FLD AUTO: 7.85 K/UL — SIGNIFICANT CHANGE UP (ref 3.8–10.5)
WBC # FLD AUTO: 8.3 K/UL — SIGNIFICANT CHANGE UP (ref 3.8–10.5)
WBC # FLD AUTO: 8.42 K/UL — SIGNIFICANT CHANGE UP (ref 3.8–10.5)
WBC # FLD AUTO: 9.18 K/UL — SIGNIFICANT CHANGE UP (ref 3.8–10.5)
WBC UR QL: ABNORMAL
WBC UR QL: SIGNIFICANT CHANGE UP

## 2021-01-01 PROCEDURE — 99233 SBSQ HOSP IP/OBS HIGH 50: CPT

## 2021-01-01 PROCEDURE — 74177 CT ABD & PELVIS W/CONTRAST: CPT | Mod: 26

## 2021-01-01 PROCEDURE — 99223 1ST HOSP IP/OBS HIGH 75: CPT

## 2021-01-01 PROCEDURE — 99349 HOME/RES VST EST MOD MDM 40: CPT

## 2021-01-01 PROCEDURE — 99232 SBSQ HOSP IP/OBS MODERATE 35: CPT

## 2021-01-01 PROCEDURE — 70498 CT ANGIOGRAPHY NECK: CPT | Mod: 26,MA

## 2021-01-01 PROCEDURE — 99231 SBSQ HOSP IP/OBS SF/LOW 25: CPT

## 2021-01-01 PROCEDURE — 99497 ADVNCD CARE PLAN 30 MIN: CPT | Mod: 25

## 2021-01-01 PROCEDURE — 99345 HOME/RES VST NEW HIGH MDM 75: CPT

## 2021-01-01 PROCEDURE — 71045 X-RAY EXAM CHEST 1 VIEW: CPT | Mod: 26

## 2021-01-01 PROCEDURE — 90792 PSYCH DIAG EVAL W/MED SRVCS: CPT

## 2021-01-01 PROCEDURE — 99222 1ST HOSP IP/OBS MODERATE 55: CPT

## 2021-01-01 PROCEDURE — 99053 MED SERV 10PM-8AM 24 HR FAC: CPT

## 2021-01-01 PROCEDURE — 71275 CT ANGIOGRAPHY CHEST: CPT | Mod: 26

## 2021-01-01 PROCEDURE — 36569 INSJ PICC 5 YR+ W/O IMAGING: CPT

## 2021-01-01 PROCEDURE — 70496 CT ANGIOGRAPHY HEAD: CPT | Mod: 26,MA

## 2021-01-01 PROCEDURE — 70450 CT HEAD/BRAIN W/O DYE: CPT | Mod: 26,59,MA

## 2021-01-01 PROCEDURE — 74019 RADEX ABDOMEN 2 VIEWS: CPT | Mod: 26

## 2021-01-01 PROCEDURE — 74177 CT ABD & PELVIS W/CONTRAST: CPT | Mod: 26,MA

## 2021-01-01 PROCEDURE — 99239 HOSP IP/OBS DSCHRG MGMT >30: CPT

## 2021-01-01 PROCEDURE — 76937 US GUIDE VASCULAR ACCESS: CPT | Mod: 26,59

## 2021-01-01 PROCEDURE — 93010 ELECTROCARDIOGRAM REPORT: CPT

## 2021-01-01 PROCEDURE — 99285 EMERGENCY DEPT VISIT HI MDM: CPT | Mod: 25

## 2021-01-01 PROCEDURE — 70450 CT HEAD/BRAIN W/O DYE: CPT | Mod: 26,MA

## 2021-01-01 PROCEDURE — 30903 CONTROL OF NOSEBLEED: CPT

## 2021-01-01 PROCEDURE — 99285 EMERGENCY DEPT VISIT HI MDM: CPT

## 2021-01-01 PROCEDURE — 71250 CT THORAX DX C-: CPT | Mod: 26

## 2021-01-01 PROCEDURE — 74174 CTA ABD&PLVS W/CONTRAST: CPT | Mod: 26

## 2021-01-01 RX ORDER — PANTOPRAZOLE SODIUM 20 MG/1
1 TABLET, DELAYED RELEASE ORAL
Qty: 30 | Refills: 0
Start: 2021-01-01 | End: 2021-01-01

## 2021-01-01 RX ORDER — LEVOTHYROXINE SODIUM 125 MCG
1 TABLET ORAL
Qty: 0 | Refills: 0 | DISCHARGE

## 2021-01-01 RX ORDER — FERROUS SULFATE TAB EC 325 MG (65 MG FE EQUIVALENT) 325 (65 FE) MG
325 (65 FE) TABLET DELAYED RESPONSE ORAL
Refills: 0 | Status: COMPLETED | COMMUNITY
End: 2021-01-01

## 2021-01-01 RX ORDER — ASPIRIN 81 MG/1
81 TABLET ORAL
Refills: 0 | Status: COMPLETED | COMMUNITY
End: 2021-01-01

## 2021-01-01 RX ORDER — POLYETHYLENE GLYCOL 3350 17 G/17G
17 POWDER, FOR SOLUTION ORAL
Refills: 0 | Status: DISCONTINUED | OUTPATIENT
Start: 2021-01-01 | End: 2021-01-01

## 2021-01-01 RX ORDER — PANTOPRAZOLE SODIUM 20 MG/1
40 TABLET, DELAYED RELEASE ORAL DAILY
Refills: 0 | Status: DISCONTINUED | OUTPATIENT
Start: 2021-01-01 | End: 2021-01-01

## 2021-01-01 RX ORDER — ONDANSETRON 8 MG/1
4 TABLET, FILM COATED ORAL ONCE
Refills: 0 | Status: COMPLETED | OUTPATIENT
Start: 2021-01-01 | End: 2021-01-01

## 2021-01-01 RX ORDER — SIMVASTATIN 20 MG/1
20 TABLET, FILM COATED ORAL
Qty: 90 | Refills: 1 | Status: COMPLETED | COMMUNITY
End: 2021-01-01

## 2021-01-01 RX ORDER — HEPARIN SODIUM 5000 [USP'U]/ML
5000 INJECTION INTRAVENOUS; SUBCUTANEOUS EVERY 12 HOURS
Refills: 0 | Status: DISCONTINUED | OUTPATIENT
Start: 2021-01-01 | End: 2021-01-01

## 2021-01-01 RX ORDER — POLYETHYLENE GLYCOL 3350 17 G/17G
17 POWDER, FOR SOLUTION ORAL
Qty: 510 | Refills: 0
Start: 2021-01-01 | End: 2021-01-01

## 2021-01-01 RX ORDER — ASPIRIN/CALCIUM CARB/MAGNESIUM 324 MG
81 TABLET ORAL DAILY
Refills: 0 | Status: DISCONTINUED | OUTPATIENT
Start: 2021-01-01 | End: 2021-01-01

## 2021-01-01 RX ORDER — POTASSIUM CHLORIDE 20 MEQ
10 PACKET (EA) ORAL
Refills: 0 | Status: COMPLETED | OUTPATIENT
Start: 2021-01-01 | End: 2021-01-01

## 2021-01-01 RX ORDER — OXYMETAZOLINE HYDROCHLORIDE 0.5 MG/ML
1 SPRAY NASAL ONCE
Refills: 0 | Status: DISCONTINUED | OUTPATIENT
Start: 2021-01-01 | End: 2021-01-01

## 2021-01-01 RX ORDER — FERROUS SULFATE 325(65) MG
1 TABLET ORAL
Qty: 0 | Refills: 0 | DISCHARGE

## 2021-01-01 RX ORDER — LEVOTHYROXINE SODIUM 125 MCG
1 TABLET ORAL
Qty: 0 | Refills: 0 | DISCHARGE
Start: 2021-01-01

## 2021-01-01 RX ORDER — AMLODIPINE BESYLATE 2.5 MG/1
5 TABLET ORAL DAILY
Refills: 0 | Status: DISCONTINUED | OUTPATIENT
Start: 2021-01-01 | End: 2021-01-01

## 2021-01-01 RX ORDER — AMLODIPINE BESYLATE 2.5 MG/1
10 TABLET ORAL DAILY
Refills: 0 | Status: DISCONTINUED | OUTPATIENT
Start: 2021-01-01 | End: 2021-01-01

## 2021-01-01 RX ORDER — POTASSIUM CHLORIDE 1500 MG/1
20 TABLET, EXTENDED RELEASE ORAL
Qty: 30 | Refills: 5 | Status: COMPLETED | COMMUNITY
Start: 2020-01-01 | End: 2021-01-01

## 2021-01-01 RX ORDER — SIMVASTATIN 20 MG/1
1 TABLET, FILM COATED ORAL
Qty: 30 | Refills: 0

## 2021-01-01 RX ORDER — AMLODIPINE BESYLATE 10 MG/1
10 TABLET ORAL
Qty: 90 | Refills: 0 | Status: COMPLETED | COMMUNITY
End: 2021-01-01

## 2021-01-01 RX ORDER — PANTOPRAZOLE SODIUM 20 MG/1
1 TABLET, DELAYED RELEASE ORAL
Qty: 30 | Refills: 0
Start: 2021-01-01

## 2021-01-01 RX ORDER — SENNA PLUS 8.6 MG/1
2 TABLET ORAL AT BEDTIME
Refills: 0 | Status: DISCONTINUED | OUTPATIENT
Start: 2021-01-01 | End: 2021-01-01

## 2021-01-01 RX ORDER — ACETAMINOPHEN 500 MG
650 TABLET ORAL EVERY 6 HOURS
Refills: 0 | Status: DISCONTINUED | OUTPATIENT
Start: 2021-01-01 | End: 2021-01-01

## 2021-01-01 RX ORDER — POTASSIUM CHLORIDE 20 MEQ
40 PACKET (EA) ORAL ONCE
Refills: 0 | Status: COMPLETED | OUTPATIENT
Start: 2021-01-01 | End: 2021-01-01

## 2021-01-01 RX ORDER — MIRTAZAPINE 45 MG/1
7.5 TABLET, ORALLY DISINTEGRATING ORAL AT BEDTIME
Refills: 0 | Status: DISCONTINUED | OUTPATIENT
Start: 2021-01-01 | End: 2021-01-01

## 2021-01-01 RX ORDER — PANTOPRAZOLE SODIUM 20 MG/1
40 TABLET, DELAYED RELEASE ORAL ONCE
Refills: 0 | Status: COMPLETED | OUTPATIENT
Start: 2021-01-01 | End: 2021-01-01

## 2021-01-01 RX ORDER — SODIUM CHLORIDE 9 MG/ML
500 INJECTION INTRAMUSCULAR; INTRAVENOUS; SUBCUTANEOUS ONCE
Refills: 0 | Status: COMPLETED | OUTPATIENT
Start: 2021-01-01 | End: 2021-01-01

## 2021-01-01 RX ORDER — POTASSIUM CHLORIDE 20 MEQ
40 PACKET (EA) ORAL EVERY 4 HOURS
Refills: 0 | Status: COMPLETED | OUTPATIENT
Start: 2021-01-01 | End: 2021-01-01

## 2021-01-01 RX ORDER — POLYETHYLENE GLYCOL 3350 17 G/17G
17 POWDER, FOR SOLUTION ORAL DAILY
Refills: 0 | Status: DISCONTINUED | OUTPATIENT
Start: 2021-01-01 | End: 2021-01-01

## 2021-01-01 RX ORDER — SODIUM CHLORIDE 9 MG/ML
1000 INJECTION INTRAMUSCULAR; INTRAVENOUS; SUBCUTANEOUS
Refills: 0 | Status: COMPLETED | OUTPATIENT
Start: 2021-01-01 | End: 2021-01-01

## 2021-01-01 RX ORDER — SODIUM CHLORIDE 9 MG/ML
1500 INJECTION INTRAMUSCULAR; INTRAVENOUS; SUBCUTANEOUS ONCE
Refills: 0 | Status: COMPLETED | OUTPATIENT
Start: 2021-01-01 | End: 2021-01-01

## 2021-01-01 RX ORDER — POTASSIUM CHLORIDE 20 MEQ
20 PACKET (EA) ORAL
Refills: 0 | Status: COMPLETED | OUTPATIENT
Start: 2021-01-01 | End: 2021-01-01

## 2021-01-01 RX ORDER — AMPICILLIN SODIUM AND SULBACTAM SODIUM 250; 125 MG/ML; MG/ML
INJECTION, POWDER, FOR SUSPENSION INTRAMUSCULAR; INTRAVENOUS
Refills: 0 | Status: DISCONTINUED | OUTPATIENT
Start: 2021-01-01 | End: 2021-01-01

## 2021-01-01 RX ORDER — MIRTAZAPINE 7.5 MG/1
7.5 TABLET, FILM COATED ORAL
Qty: 90 | Refills: 3 | Status: COMPLETED | COMMUNITY
End: 2021-01-01

## 2021-01-01 RX ORDER — MUPIROCIN 20 MG/G
1 OINTMENT TOPICAL
Refills: 0 | Status: COMPLETED | OUTPATIENT
Start: 2021-01-01 | End: 2021-01-01

## 2021-01-01 RX ORDER — POTASSIUM CHLORIDE 20 MEQ
10 PACKET (EA) ORAL
Refills: 0 | Status: DISCONTINUED | OUTPATIENT
Start: 2021-01-01 | End: 2021-01-01

## 2021-01-01 RX ORDER — CARBAMIDE PEROXIDE 81.86 MG/ML
1 SOLUTION/ DROPS AURICULAR (OTIC) ONCE
Refills: 0 | Status: COMPLETED | OUTPATIENT
Start: 2021-01-01 | End: 2021-01-01

## 2021-01-01 RX ORDER — LEVOTHYROXINE SODIUM 125 MCG
25 TABLET ORAL DAILY
Refills: 0 | Status: DISCONTINUED | OUTPATIENT
Start: 2021-01-01 | End: 2021-01-01

## 2021-01-01 RX ORDER — POTASSIUM CHLORIDE 20 MEQ
40 PACKET (EA) ORAL ONCE
Refills: 0 | Status: DISCONTINUED | OUTPATIENT
Start: 2021-01-01 | End: 2021-01-01

## 2021-01-01 RX ORDER — LEVOTHYROXINE SODIUM 125 MCG
100 TABLET ORAL DAILY
Refills: 0 | Status: DISCONTINUED | OUTPATIENT
Start: 2021-01-01 | End: 2021-01-01

## 2021-01-01 RX ORDER — LEVOTHYROXINE SODIUM 0.1 MG/1
100 TABLET ORAL
Qty: 90 | Refills: 3 | Status: ACTIVE | COMMUNITY
Start: 1900-01-01 | End: 1900-01-01

## 2021-01-01 RX ORDER — AMOXICILLIN AND CLAVULANATE POTASSIUM 600; 42.9 MG/5ML; MG/5ML
600-42.9 FOR SUSPENSION ORAL TWICE DAILY
Qty: 1 | Refills: 0 | Status: COMPLETED | COMMUNITY
Start: 2021-01-01 | End: 2021-01-01

## 2021-01-01 RX ORDER — PANTOPRAZOLE SODIUM 20 MG/1
40 TABLET, DELAYED RELEASE ORAL EVERY 12 HOURS
Refills: 0 | Status: DISCONTINUED | OUTPATIENT
Start: 2021-01-01 | End: 2021-01-01

## 2021-01-01 RX ORDER — AMPICILLIN SODIUM AND SULBACTAM SODIUM 250; 125 MG/ML; MG/ML
3 INJECTION, POWDER, FOR SUSPENSION INTRAMUSCULAR; INTRAVENOUS EVERY 6 HOURS
Refills: 0 | Status: DISCONTINUED | OUTPATIENT
Start: 2021-01-01 | End: 2021-01-01

## 2021-01-01 RX ORDER — ONDANSETRON 8 MG/1
8 TABLET, FILM COATED ORAL ONCE
Refills: 0 | Status: COMPLETED | OUTPATIENT
Start: 2021-01-01 | End: 2021-01-01

## 2021-01-01 RX ORDER — ACETAMINOPHEN 500 MG
2 TABLET ORAL
Qty: 0 | Refills: 0 | DISCHARGE
Start: 2021-01-01

## 2021-01-01 RX ORDER — LANOLIN ALCOHOL/MO/W.PET/CERES
3 CREAM (GRAM) TOPICAL ONCE
Refills: 0 | Status: COMPLETED | OUTPATIENT
Start: 2021-01-01 | End: 2021-01-01

## 2021-01-01 RX ORDER — HYDRALAZINE HCL 50 MG
10 TABLET ORAL ONCE
Refills: 0 | Status: COMPLETED | OUTPATIENT
Start: 2021-01-01 | End: 2021-01-01

## 2021-01-01 RX ORDER — POTASSIUM CHLORIDE 20 MEQ
1 PACKET (EA) ORAL
Qty: 0 | Refills: 0 | DISCHARGE

## 2021-01-01 RX ORDER — ASPIRIN/CALCIUM CARB/MAGNESIUM 324 MG
1 TABLET ORAL
Qty: 0 | Refills: 0 | DISCHARGE

## 2021-01-01 RX ORDER — PANTOPRAZOLE SODIUM 20 MG/1
8 TABLET, DELAYED RELEASE ORAL
Qty: 80 | Refills: 0 | Status: DISCONTINUED | OUTPATIENT
Start: 2021-01-01 | End: 2021-01-01

## 2021-01-01 RX ORDER — POTASSIUM CHLORIDE 40 MEQ/15ML
40 MEQ/15ML SOLUTION ORAL
Qty: 1 | Refills: 0 | Status: COMPLETED | COMMUNITY
Start: 2021-01-01 | End: 2021-01-01

## 2021-01-01 RX ORDER — AMLODIPINE BESYLATE 2.5 MG/1
1 TABLET ORAL
Qty: 0 | Refills: 0 | DISCHARGE

## 2021-01-01 RX ORDER — HYDRALAZINE HYDROCHLORIDE 10 MG/1
10 TABLET ORAL EVERY 8 HOURS
Qty: 21 | Refills: 0 | Status: COMPLETED | COMMUNITY
End: 2021-01-01

## 2021-01-01 RX ORDER — SIMVASTATIN 20 MG/1
20 TABLET, FILM COATED ORAL AT BEDTIME
Refills: 0 | Status: DISCONTINUED | OUTPATIENT
Start: 2021-01-01 | End: 2021-01-01

## 2021-01-01 RX ORDER — ONDANSETRON 8 MG/1
4 TABLET, FILM COATED ORAL EVERY 6 HOURS
Refills: 0 | Status: DISCONTINUED | OUTPATIENT
Start: 2021-01-01 | End: 2021-01-01

## 2021-01-01 RX ORDER — NYSTATIN CREAM 100000 [USP'U]/G
1 CREAM TOPICAL
Qty: 30 | Refills: 0
Start: 2021-01-01

## 2021-01-01 RX ORDER — METOCLOPRAMIDE HCL 10 MG
10 TABLET ORAL ONCE
Refills: 0 | Status: COMPLETED | OUTPATIENT
Start: 2021-01-01 | End: 2021-01-01

## 2021-01-01 RX ORDER — TRAMADOL HYDROCHLORIDE 50 MG/1
25 TABLET ORAL ONCE
Refills: 0 | Status: DISCONTINUED | OUTPATIENT
Start: 2021-01-01 | End: 2021-01-01

## 2021-01-01 RX ORDER — DEXTROSE MONOHYDRATE, SODIUM CHLORIDE, AND POTASSIUM CHLORIDE 50; .745; 4.5 G/1000ML; G/1000ML; G/1000ML
1000 INJECTION, SOLUTION INTRAVENOUS
Refills: 0 | Status: DISCONTINUED | OUTPATIENT
Start: 2021-01-01 | End: 2021-01-01

## 2021-01-01 RX ORDER — MIRTAZAPINE 45 MG/1
1 TABLET, ORALLY DISINTEGRATING ORAL
Qty: 30 | Refills: 0
Start: 2021-01-01

## 2021-01-01 RX ORDER — CHLORHEXIDINE GLUCONATE 213 G/1000ML
1 SOLUTION TOPICAL DAILY
Refills: 0 | Status: DISCONTINUED | OUTPATIENT
Start: 2021-01-01 | End: 2021-01-01

## 2021-01-01 RX ORDER — SODIUM CHLORIDE 9 MG/ML
1000 INJECTION, SOLUTION INTRAVENOUS
Refills: 0 | Status: DISCONTINUED | OUTPATIENT
Start: 2021-01-01 | End: 2021-01-01

## 2021-01-01 RX ORDER — FERROUS SULFATE 325(65) MG
325 TABLET ORAL DAILY
Refills: 0 | Status: DISCONTINUED | OUTPATIENT
Start: 2021-01-01 | End: 2021-01-01

## 2021-01-01 RX ORDER — AMPICILLIN SODIUM AND SULBACTAM SODIUM 250; 125 MG/ML; MG/ML
3 INJECTION, POWDER, FOR SUSPENSION INTRAMUSCULAR; INTRAVENOUS ONCE
Refills: 0 | Status: COMPLETED | OUTPATIENT
Start: 2021-01-01 | End: 2021-01-01

## 2021-01-01 RX ORDER — IOHEXOL 300 MG/ML
30 INJECTION, SOLUTION INTRAVENOUS ONCE
Refills: 0 | Status: COMPLETED | OUTPATIENT
Start: 2021-01-01 | End: 2021-01-01

## 2021-01-01 RX ORDER — SODIUM CHLORIDE 9 MG/ML
1000 INJECTION INTRAMUSCULAR; INTRAVENOUS; SUBCUTANEOUS
Refills: 0 | Status: DISCONTINUED | OUTPATIENT
Start: 2021-01-01 | End: 2021-01-01

## 2021-01-01 RX ORDER — MAGNESIUM SULFATE 500 MG/ML
1 VIAL (ML) INJECTION ONCE
Refills: 0 | Status: COMPLETED | OUTPATIENT
Start: 2021-01-01 | End: 2021-01-01

## 2021-01-01 RX ORDER — NYSTATIN CREAM 100000 [USP'U]/G
1 CREAM TOPICAL EVERY 8 HOURS
Refills: 0 | Status: DISCONTINUED | OUTPATIENT
Start: 2021-01-01 | End: 2021-01-01

## 2021-01-01 RX ORDER — ACETAMINOPHEN 500 MG
650 TABLET ORAL ONCE
Refills: 0 | Status: COMPLETED | OUTPATIENT
Start: 2021-01-01 | End: 2021-01-01

## 2021-01-01 RX ORDER — SODIUM,POTASSIUM PHOSPHATES 278-250MG
1 POWDER IN PACKET (EA) ORAL
Refills: 0 | Status: COMPLETED | OUTPATIENT
Start: 2021-01-01 | End: 2021-01-01

## 2021-01-01 RX ADMIN — CHLORHEXIDINE GLUCONATE 1 APPLICATION(S): 213 SOLUTION TOPICAL at 21:26

## 2021-01-01 RX ADMIN — Medication 650 MILLIGRAM(S): at 22:15

## 2021-01-01 RX ADMIN — SODIUM CHLORIDE 1500 MILLILITER(S): 9 INJECTION INTRAMUSCULAR; INTRAVENOUS; SUBCUTANEOUS at 10:19

## 2021-01-01 RX ADMIN — NYSTATIN CREAM 1 APPLICATION(S): 100000 CREAM TOPICAL at 21:15

## 2021-01-01 RX ADMIN — AMLODIPINE BESYLATE 10 MILLIGRAM(S): 2.5 TABLET ORAL at 05:12

## 2021-01-01 RX ADMIN — PANTOPRAZOLE SODIUM 40 MILLIGRAM(S): 20 TABLET, DELAYED RELEASE ORAL at 05:14

## 2021-01-01 RX ADMIN — NYSTATIN CREAM 1 APPLICATION(S): 100000 CREAM TOPICAL at 21:14

## 2021-01-01 RX ADMIN — Medication 650 MILLIGRAM(S): at 17:28

## 2021-01-01 RX ADMIN — AMLODIPINE BESYLATE 10 MILLIGRAM(S): 2.5 TABLET ORAL at 05:09

## 2021-01-01 RX ADMIN — AMLODIPINE BESYLATE 10 MILLIGRAM(S): 2.5 TABLET ORAL at 05:15

## 2021-01-01 RX ADMIN — TRAMADOL HYDROCHLORIDE 25 MILLIGRAM(S): 50 TABLET ORAL at 18:50

## 2021-01-01 RX ADMIN — Medication 650 MILLIGRAM(S): at 21:40

## 2021-01-01 RX ADMIN — SIMVASTATIN 20 MILLIGRAM(S): 20 TABLET, FILM COATED ORAL at 21:40

## 2021-01-01 RX ADMIN — SIMVASTATIN 20 MILLIGRAM(S): 20 TABLET, FILM COATED ORAL at 21:45

## 2021-01-01 RX ADMIN — PANTOPRAZOLE SODIUM 40 MILLIGRAM(S): 20 TABLET, DELAYED RELEASE ORAL at 05:06

## 2021-01-01 RX ADMIN — MIRTAZAPINE 7.5 MILLIGRAM(S): 45 TABLET, ORALLY DISINTEGRATING ORAL at 21:18

## 2021-01-01 RX ADMIN — Medication 650 MILLIGRAM(S): at 21:16

## 2021-01-01 RX ADMIN — SENNA PLUS 2 TABLET(S): 8.6 TABLET ORAL at 21:45

## 2021-01-01 RX ADMIN — AMLODIPINE BESYLATE 10 MILLIGRAM(S): 2.5 TABLET ORAL at 05:48

## 2021-01-01 RX ADMIN — ONDANSETRON 4 MILLIGRAM(S): 8 TABLET, FILM COATED ORAL at 21:38

## 2021-01-01 RX ADMIN — Medication 650 MILLIGRAM(S): at 22:09

## 2021-01-01 RX ADMIN — AMPICILLIN SODIUM AND SULBACTAM SODIUM 200 GRAM(S): 250; 125 INJECTION, POWDER, FOR SUSPENSION INTRAMUSCULAR; INTRAVENOUS at 10:59

## 2021-01-01 RX ADMIN — Medication 650 MILLIGRAM(S): at 08:04

## 2021-01-01 RX ADMIN — MIRTAZAPINE 7.5 MILLIGRAM(S): 45 TABLET, ORALLY DISINTEGRATING ORAL at 21:14

## 2021-01-01 RX ADMIN — Medication 650 MILLIGRAM(S): at 21:21

## 2021-01-01 RX ADMIN — NYSTATIN CREAM 1 APPLICATION(S): 100000 CREAM TOPICAL at 13:57

## 2021-01-01 RX ADMIN — POLYETHYLENE GLYCOL 3350 17 GRAM(S): 17 POWDER, FOR SOLUTION ORAL at 12:01

## 2021-01-01 RX ADMIN — AMLODIPINE BESYLATE 10 MILLIGRAM(S): 2.5 TABLET ORAL at 05:07

## 2021-01-01 RX ADMIN — Medication 650 MILLIGRAM(S): at 01:30

## 2021-01-01 RX ADMIN — SENNA PLUS 2 TABLET(S): 8.6 TABLET ORAL at 22:16

## 2021-01-01 RX ADMIN — SIMVASTATIN 20 MILLIGRAM(S): 20 TABLET, FILM COATED ORAL at 21:39

## 2021-01-01 RX ADMIN — Medication 20 MILLIEQUIVALENT(S): at 16:17

## 2021-01-01 RX ADMIN — Medication 650 MILLIGRAM(S): at 18:47

## 2021-01-01 RX ADMIN — SIMVASTATIN 20 MILLIGRAM(S): 20 TABLET, FILM COATED ORAL at 21:56

## 2021-01-01 RX ADMIN — MIRTAZAPINE 7.5 MILLIGRAM(S): 45 TABLET, ORALLY DISINTEGRATING ORAL at 21:56

## 2021-01-01 RX ADMIN — Medication 650 MILLIGRAM(S): at 21:17

## 2021-01-01 RX ADMIN — NYSTATIN CREAM 1 APPLICATION(S): 100000 CREAM TOPICAL at 22:12

## 2021-01-01 RX ADMIN — Medication 650 MILLIGRAM(S): at 11:37

## 2021-01-01 RX ADMIN — SENNA PLUS 2 TABLET(S): 8.6 TABLET ORAL at 22:00

## 2021-01-01 RX ADMIN — Medication 20 MILLIEQUIVALENT(S): at 17:29

## 2021-01-01 RX ADMIN — Medication 650 MILLIGRAM(S): at 21:13

## 2021-01-01 RX ADMIN — Medication 650 MILLIGRAM(S): at 15:13

## 2021-01-01 RX ADMIN — AMLODIPINE BESYLATE 10 MILLIGRAM(S): 2.5 TABLET ORAL at 05:14

## 2021-01-01 RX ADMIN — CHLORHEXIDINE GLUCONATE 1 APPLICATION(S): 213 SOLUTION TOPICAL at 22:52

## 2021-01-01 RX ADMIN — POLYETHYLENE GLYCOL 3350 17 GRAM(S): 17 POWDER, FOR SOLUTION ORAL at 14:23

## 2021-01-01 RX ADMIN — Medication 81 MILLIGRAM(S): at 14:23

## 2021-01-01 RX ADMIN — SODIUM CHLORIDE 1500 MILLILITER(S): 9 INJECTION INTRAMUSCULAR; INTRAVENOUS; SUBCUTANEOUS at 11:30

## 2021-01-01 RX ADMIN — SIMVASTATIN 20 MILLIGRAM(S): 20 TABLET, FILM COATED ORAL at 22:12

## 2021-01-01 RX ADMIN — Medication 100 GRAM(S): at 11:32

## 2021-01-01 RX ADMIN — Medication 100 MICROGRAM(S): at 05:31

## 2021-01-01 RX ADMIN — PANTOPRAZOLE SODIUM 40 MILLIGRAM(S): 20 TABLET, DELAYED RELEASE ORAL at 05:30

## 2021-01-01 RX ADMIN — Medication 100 MICROGRAM(S): at 05:26

## 2021-01-01 RX ADMIN — Medication 100 MICROGRAM(S): at 05:07

## 2021-01-01 RX ADMIN — Medication 1 TABLET(S): at 19:05

## 2021-01-01 RX ADMIN — Medication 650 MILLIGRAM(S): at 18:30

## 2021-01-01 RX ADMIN — DEXTROSE MONOHYDRATE, SODIUM CHLORIDE, AND POTASSIUM CHLORIDE 75 MILLILITER(S): 50; .745; 4.5 INJECTION, SOLUTION INTRAVENOUS at 15:24

## 2021-01-01 RX ADMIN — SODIUM CHLORIDE 125 MILLILITER(S): 9 INJECTION INTRAMUSCULAR; INTRAVENOUS; SUBCUTANEOUS at 08:30

## 2021-01-01 RX ADMIN — HEPARIN SODIUM 5000 UNIT(S): 5000 INJECTION INTRAVENOUS; SUBCUTANEOUS at 18:55

## 2021-01-01 RX ADMIN — Medication 100 MICROGRAM(S): at 05:30

## 2021-01-01 RX ADMIN — PANTOPRAZOLE SODIUM 40 MILLIGRAM(S): 20 TABLET, DELAYED RELEASE ORAL at 05:20

## 2021-01-01 RX ADMIN — Medication 650 MILLIGRAM(S): at 18:15

## 2021-01-01 RX ADMIN — Medication 100 MICROGRAM(S): at 05:53

## 2021-01-01 RX ADMIN — Medication 1 TABLET(S): at 05:11

## 2021-01-01 RX ADMIN — NYSTATIN CREAM 1 APPLICATION(S): 100000 CREAM TOPICAL at 05:53

## 2021-01-01 RX ADMIN — CHLORHEXIDINE GLUCONATE 1 APPLICATION(S): 213 SOLUTION TOPICAL at 21:15

## 2021-01-01 RX ADMIN — Medication 650 MILLIGRAM(S): at 16:18

## 2021-01-01 RX ADMIN — SIMVASTATIN 20 MILLIGRAM(S): 20 TABLET, FILM COATED ORAL at 21:26

## 2021-01-01 RX ADMIN — NYSTATIN CREAM 1 APPLICATION(S): 100000 CREAM TOPICAL at 16:16

## 2021-01-01 RX ADMIN — PANTOPRAZOLE SODIUM 40 MILLIGRAM(S): 20 TABLET, DELAYED RELEASE ORAL at 05:55

## 2021-01-01 RX ADMIN — Medication 650 MILLIGRAM(S): at 21:15

## 2021-01-01 RX ADMIN — Medication 650 MILLIGRAM(S): at 21:14

## 2021-01-01 RX ADMIN — SIMVASTATIN 20 MILLIGRAM(S): 20 TABLET, FILM COATED ORAL at 21:30

## 2021-01-01 RX ADMIN — Medication 40 MILLIEQUIVALENT(S): at 17:48

## 2021-01-01 RX ADMIN — SODIUM CHLORIDE 75 MILLILITER(S): 9 INJECTION INTRAMUSCULAR; INTRAVENOUS; SUBCUTANEOUS at 08:13

## 2021-01-01 RX ADMIN — AMLODIPINE BESYLATE 10 MILLIGRAM(S): 2.5 TABLET ORAL at 05:24

## 2021-01-01 RX ADMIN — SIMVASTATIN 20 MILLIGRAM(S): 20 TABLET, FILM COATED ORAL at 21:57

## 2021-01-01 RX ADMIN — PANTOPRAZOLE SODIUM 40 MILLIGRAM(S): 20 TABLET, DELAYED RELEASE ORAL at 05:49

## 2021-01-01 RX ADMIN — Medication 650 MILLIGRAM(S): at 17:13

## 2021-01-01 RX ADMIN — HEPARIN SODIUM 5000 UNIT(S): 5000 INJECTION INTRAVENOUS; SUBCUTANEOUS at 05:11

## 2021-01-01 RX ADMIN — Medication 100 MILLIEQUIVALENT(S): at 07:38

## 2021-01-01 RX ADMIN — Medication 40 MILLIEQUIVALENT(S): at 14:12

## 2021-01-01 RX ADMIN — NYSTATIN CREAM 1 APPLICATION(S): 100000 CREAM TOPICAL at 05:26

## 2021-01-01 RX ADMIN — AMLODIPINE BESYLATE 10 MILLIGRAM(S): 2.5 TABLET ORAL at 05:20

## 2021-01-01 RX ADMIN — Medication 650 MILLIGRAM(S): at 19:30

## 2021-01-01 RX ADMIN — NYSTATIN CREAM 1 APPLICATION(S): 100000 CREAM TOPICAL at 14:51

## 2021-01-01 RX ADMIN — PANTOPRAZOLE SODIUM 40 MILLIGRAM(S): 20 TABLET, DELAYED RELEASE ORAL at 05:58

## 2021-01-01 RX ADMIN — Medication 100 MICROGRAM(S): at 05:32

## 2021-01-01 RX ADMIN — HEPARIN SODIUM 5000 UNIT(S): 5000 INJECTION INTRAVENOUS; SUBCUTANEOUS at 18:00

## 2021-01-01 RX ADMIN — HEPARIN SODIUM 5000 UNIT(S): 5000 INJECTION INTRAVENOUS; SUBCUTANEOUS at 17:49

## 2021-01-01 RX ADMIN — Medication 100 MILLIEQUIVALENT(S): at 08:48

## 2021-01-01 RX ADMIN — Medication 40 MILLIEQUIVALENT(S): at 17:40

## 2021-01-01 RX ADMIN — PANTOPRAZOLE SODIUM 40 MILLIGRAM(S): 20 TABLET, DELAYED RELEASE ORAL at 05:25

## 2021-01-01 RX ADMIN — DEXTROSE MONOHYDRATE, SODIUM CHLORIDE, AND POTASSIUM CHLORIDE 75 MILLILITER(S): 50; .745; 4.5 INJECTION, SOLUTION INTRAVENOUS at 16:19

## 2021-01-01 RX ADMIN — MIRTAZAPINE 7.5 MILLIGRAM(S): 45 TABLET, ORALLY DISINTEGRATING ORAL at 21:43

## 2021-01-01 RX ADMIN — NYSTATIN CREAM 1 APPLICATION(S): 100000 CREAM TOPICAL at 05:06

## 2021-01-01 RX ADMIN — Medication 100 MICROGRAM(S): at 05:06

## 2021-01-01 RX ADMIN — HEPARIN SODIUM 5000 UNIT(S): 5000 INJECTION INTRAVENOUS; SUBCUTANEOUS at 05:12

## 2021-01-01 RX ADMIN — Medication 100 MILLIEQUIVALENT(S): at 18:42

## 2021-01-01 RX ADMIN — Medication 40 MILLIEQUIVALENT(S): at 05:58

## 2021-01-01 RX ADMIN — Medication 3 MILLIGRAM(S): at 00:21

## 2021-01-01 RX ADMIN — NYSTATIN CREAM 1 APPLICATION(S): 100000 CREAM TOPICAL at 05:33

## 2021-01-01 RX ADMIN — PANTOPRAZOLE SODIUM 40 MILLIGRAM(S): 20 TABLET, DELAYED RELEASE ORAL at 22:34

## 2021-01-01 RX ADMIN — Medication 650 MILLIGRAM(S): at 07:53

## 2021-01-01 RX ADMIN — AMLODIPINE BESYLATE 10 MILLIGRAM(S): 2.5 TABLET ORAL at 14:23

## 2021-01-01 RX ADMIN — MIRTAZAPINE 7.5 MILLIGRAM(S): 45 TABLET, ORALLY DISINTEGRATING ORAL at 21:21

## 2021-01-01 RX ADMIN — Medication 20 MILLIEQUIVALENT(S): at 11:42

## 2021-01-01 RX ADMIN — Medication 1 TABLET(S): at 16:58

## 2021-01-01 RX ADMIN — SENNA PLUS 2 TABLET(S): 8.6 TABLET ORAL at 21:14

## 2021-01-01 RX ADMIN — NYSTATIN CREAM 1 APPLICATION(S): 100000 CREAM TOPICAL at 05:16

## 2021-01-01 RX ADMIN — PANTOPRAZOLE SODIUM 40 MILLIGRAM(S): 20 TABLET, DELAYED RELEASE ORAL at 06:13

## 2021-01-01 RX ADMIN — NYSTATIN CREAM 1 APPLICATION(S): 100000 CREAM TOPICAL at 05:20

## 2021-01-01 RX ADMIN — SODIUM CHLORIDE 75 MILLILITER(S): 9 INJECTION INTRAMUSCULAR; INTRAVENOUS; SUBCUTANEOUS at 11:00

## 2021-01-01 RX ADMIN — IOHEXOL 30 MILLILITER(S): 300 INJECTION, SOLUTION INTRAVENOUS at 11:05

## 2021-01-01 RX ADMIN — NYSTATIN CREAM 1 APPLICATION(S): 100000 CREAM TOPICAL at 13:20

## 2021-01-01 RX ADMIN — Medication 650 MILLIGRAM(S): at 12:14

## 2021-01-01 RX ADMIN — SIMVASTATIN 20 MILLIGRAM(S): 20 TABLET, FILM COATED ORAL at 21:28

## 2021-01-01 RX ADMIN — CHLORHEXIDINE GLUCONATE 1 APPLICATION(S): 213 SOLUTION TOPICAL at 21:18

## 2021-01-01 RX ADMIN — AMLODIPINE BESYLATE 10 MILLIGRAM(S): 2.5 TABLET ORAL at 06:09

## 2021-01-01 RX ADMIN — SODIUM CHLORIDE 50 MILLILITER(S): 9 INJECTION, SOLUTION INTRAVENOUS at 19:59

## 2021-01-01 RX ADMIN — Medication 40 MILLIEQUIVALENT(S): at 21:19

## 2021-01-01 RX ADMIN — Medication 650 MILLIGRAM(S): at 01:51

## 2021-01-01 RX ADMIN — AMLODIPINE BESYLATE 10 MILLIGRAM(S): 2.5 TABLET ORAL at 05:27

## 2021-01-01 RX ADMIN — DEXTROSE MONOHYDRATE, SODIUM CHLORIDE, AND POTASSIUM CHLORIDE 75 MILLILITER(S): 50; .745; 4.5 INJECTION, SOLUTION INTRAVENOUS at 22:49

## 2021-01-01 RX ADMIN — NYSTATIN CREAM 1 APPLICATION(S): 100000 CREAM TOPICAL at 14:36

## 2021-01-01 RX ADMIN — Medication 650 MILLIGRAM(S): at 21:18

## 2021-01-01 RX ADMIN — Medication 650 MILLIGRAM(S): at 08:09

## 2021-01-01 RX ADMIN — Medication 650 MILLIGRAM(S): at 06:25

## 2021-01-01 RX ADMIN — Medication 40 MILLIEQUIVALENT(S): at 14:07

## 2021-01-01 RX ADMIN — Medication 40 MILLIEQUIVALENT(S): at 14:56

## 2021-01-01 RX ADMIN — SIMVASTATIN 20 MILLIGRAM(S): 20 TABLET, FILM COATED ORAL at 22:53

## 2021-01-01 RX ADMIN — AMLODIPINE BESYLATE 10 MILLIGRAM(S): 2.5 TABLET ORAL at 05:49

## 2021-01-01 RX ADMIN — SIMVASTATIN 20 MILLIGRAM(S): 20 TABLET, FILM COATED ORAL at 21:20

## 2021-01-01 RX ADMIN — Medication 1 TABLET(S): at 18:57

## 2021-01-01 RX ADMIN — CHLORHEXIDINE GLUCONATE 1 APPLICATION(S): 213 SOLUTION TOPICAL at 22:11

## 2021-01-01 RX ADMIN — PANTOPRAZOLE SODIUM 40 MILLIGRAM(S): 20 TABLET, DELAYED RELEASE ORAL at 06:02

## 2021-01-01 RX ADMIN — Medication 1 TABLET(S): at 05:31

## 2021-01-01 RX ADMIN — Medication 325 MILLIGRAM(S): at 12:05

## 2021-01-01 RX ADMIN — SIMVASTATIN 20 MILLIGRAM(S): 20 TABLET, FILM COATED ORAL at 21:15

## 2021-01-01 RX ADMIN — HEPARIN SODIUM 5000 UNIT(S): 5000 INJECTION INTRAVENOUS; SUBCUTANEOUS at 05:24

## 2021-01-01 RX ADMIN — SIMVASTATIN 20 MILLIGRAM(S): 20 TABLET, FILM COATED ORAL at 22:18

## 2021-01-01 RX ADMIN — DEXTROSE MONOHYDRATE, SODIUM CHLORIDE, AND POTASSIUM CHLORIDE 75 MILLILITER(S): 50; .745; 4.5 INJECTION, SOLUTION INTRAVENOUS at 06:12

## 2021-01-01 RX ADMIN — Medication 650 MILLIGRAM(S): at 13:29

## 2021-01-01 RX ADMIN — SENNA PLUS 2 TABLET(S): 8.6 TABLET ORAL at 21:30

## 2021-01-01 RX ADMIN — Medication 650 MILLIGRAM(S): at 12:31

## 2021-01-01 RX ADMIN — Medication 100 MICROGRAM(S): at 05:57

## 2021-01-01 RX ADMIN — Medication 25 MICROGRAM(S): at 05:24

## 2021-01-01 RX ADMIN — POLYETHYLENE GLYCOL 3350 17 GRAM(S): 17 POWDER, FOR SOLUTION ORAL at 06:03

## 2021-01-01 RX ADMIN — TRAMADOL HYDROCHLORIDE 25 MILLIGRAM(S): 50 TABLET ORAL at 17:47

## 2021-01-01 RX ADMIN — Medication 10 MILLIGRAM(S): at 22:22

## 2021-01-01 RX ADMIN — NYSTATIN CREAM 1 APPLICATION(S): 100000 CREAM TOPICAL at 14:09

## 2021-01-01 RX ADMIN — AMLODIPINE BESYLATE 10 MILLIGRAM(S): 2.5 TABLET ORAL at 05:58

## 2021-01-01 RX ADMIN — Medication 650 MILLIGRAM(S): at 14:03

## 2021-01-01 RX ADMIN — ONDANSETRON 8 MILLIGRAM(S): 8 TABLET, FILM COATED ORAL at 09:10

## 2021-01-01 RX ADMIN — HEPARIN SODIUM 5000 UNIT(S): 5000 INJECTION INTRAVENOUS; SUBCUTANEOUS at 05:31

## 2021-01-01 RX ADMIN — MUPIROCIN 1 APPLICATION(S): 20 OINTMENT TOPICAL at 18:17

## 2021-01-01 RX ADMIN — Medication 100 MICROGRAM(S): at 05:19

## 2021-01-01 RX ADMIN — Medication 650 MILLIGRAM(S): at 18:05

## 2021-01-01 RX ADMIN — CHLORHEXIDINE GLUCONATE 1 APPLICATION(S): 213 SOLUTION TOPICAL at 21:41

## 2021-01-01 RX ADMIN — SIMVASTATIN 20 MILLIGRAM(S): 20 TABLET, FILM COATED ORAL at 21:16

## 2021-01-01 RX ADMIN — SIMVASTATIN 20 MILLIGRAM(S): 20 TABLET, FILM COATED ORAL at 21:06

## 2021-01-01 RX ADMIN — Medication 81 MILLIGRAM(S): at 10:59

## 2021-01-01 RX ADMIN — PANTOPRAZOLE SODIUM 40 MILLIGRAM(S): 20 TABLET, DELAYED RELEASE ORAL at 05:15

## 2021-01-01 RX ADMIN — DEXTROSE MONOHYDRATE, SODIUM CHLORIDE, AND POTASSIUM CHLORIDE 75 MILLILITER(S): 50; .745; 4.5 INJECTION, SOLUTION INTRAVENOUS at 17:17

## 2021-01-01 RX ADMIN — Medication 1 TABLET(S): at 22:18

## 2021-01-01 RX ADMIN — POLYETHYLENE GLYCOL 3350 17 GRAM(S): 17 POWDER, FOR SOLUTION ORAL at 11:50

## 2021-01-01 RX ADMIN — Medication 650 MILLIGRAM(S): at 12:26

## 2021-01-01 RX ADMIN — Medication 650 MILLIGRAM(S): at 23:44

## 2021-01-01 RX ADMIN — Medication 650 MILLIGRAM(S): at 14:27

## 2021-01-01 RX ADMIN — NYSTATIN CREAM 1 APPLICATION(S): 100000 CREAM TOPICAL at 05:30

## 2021-01-01 RX ADMIN — PANTOPRAZOLE SODIUM 40 MILLIGRAM(S): 20 TABLET, DELAYED RELEASE ORAL at 17:39

## 2021-01-01 RX ADMIN — NYSTATIN CREAM 1 APPLICATION(S): 100000 CREAM TOPICAL at 05:49

## 2021-01-01 RX ADMIN — AMLODIPINE BESYLATE 10 MILLIGRAM(S): 2.5 TABLET ORAL at 05:06

## 2021-01-01 RX ADMIN — CHLORHEXIDINE GLUCONATE 1 APPLICATION(S): 213 SOLUTION TOPICAL at 22:18

## 2021-01-01 RX ADMIN — NYSTATIN CREAM 1 APPLICATION(S): 100000 CREAM TOPICAL at 14:25

## 2021-01-01 RX ADMIN — Medication 650 MILLIGRAM(S): at 17:34

## 2021-01-01 RX ADMIN — SODIUM CHLORIDE 75 MILLILITER(S): 9 INJECTION INTRAMUSCULAR; INTRAVENOUS; SUBCUTANEOUS at 17:54

## 2021-01-01 RX ADMIN — HEPARIN SODIUM 5000 UNIT(S): 5000 INJECTION INTRAVENOUS; SUBCUTANEOUS at 05:06

## 2021-01-01 RX ADMIN — Medication 100 MILLIEQUIVALENT(S): at 09:54

## 2021-01-01 RX ADMIN — Medication 40 MILLIEQUIVALENT(S): at 11:01

## 2021-01-01 RX ADMIN — AMLODIPINE BESYLATE 10 MILLIGRAM(S): 2.5 TABLET ORAL at 05:22

## 2021-01-01 RX ADMIN — POLYETHYLENE GLYCOL 3350 17 GRAM(S): 17 POWDER, FOR SOLUTION ORAL at 05:57

## 2021-01-01 RX ADMIN — AMLODIPINE BESYLATE 10 MILLIGRAM(S): 2.5 TABLET ORAL at 06:14

## 2021-01-01 RX ADMIN — PANTOPRAZOLE SODIUM 10 MG/HR: 20 TABLET, DELAYED RELEASE ORAL at 10:10

## 2021-01-01 RX ADMIN — AMLODIPINE BESYLATE 10 MILLIGRAM(S): 2.5 TABLET ORAL at 05:31

## 2021-01-01 RX ADMIN — Medication 650 MILLIGRAM(S): at 02:30

## 2021-01-01 RX ADMIN — HEPARIN SODIUM 5000 UNIT(S): 5000 INJECTION INTRAVENOUS; SUBCUTANEOUS at 17:32

## 2021-01-01 RX ADMIN — HEPARIN SODIUM 5000 UNIT(S): 5000 INJECTION INTRAVENOUS; SUBCUTANEOUS at 05:09

## 2021-01-01 RX ADMIN — AMLODIPINE BESYLATE 10 MILLIGRAM(S): 2.5 TABLET ORAL at 05:53

## 2021-01-01 RX ADMIN — Medication 40 MILLIEQUIVALENT(S): at 21:15

## 2021-01-01 RX ADMIN — NYSTATIN CREAM 1 APPLICATION(S): 100000 CREAM TOPICAL at 21:40

## 2021-01-01 RX ADMIN — HEPARIN SODIUM 5000 UNIT(S): 5000 INJECTION INTRAVENOUS; SUBCUTANEOUS at 17:09

## 2021-01-01 RX ADMIN — Medication 650 MILLIGRAM(S): at 07:10

## 2021-01-01 RX ADMIN — POLYETHYLENE GLYCOL 3350 17 GRAM(S): 17 POWDER, FOR SOLUTION ORAL at 21:45

## 2021-01-01 RX ADMIN — Medication 5 MILLIGRAM(S): at 21:16

## 2021-01-01 RX ADMIN — HEPARIN SODIUM 5000 UNIT(S): 5000 INJECTION INTRAVENOUS; SUBCUTANEOUS at 05:28

## 2021-01-01 RX ADMIN — MIRTAZAPINE 7.5 MILLIGRAM(S): 45 TABLET, ORALLY DISINTEGRATING ORAL at 21:26

## 2021-01-01 RX ADMIN — NYSTATIN CREAM 1 APPLICATION(S): 100000 CREAM TOPICAL at 14:11

## 2021-01-01 RX ADMIN — Medication 40 MILLIEQUIVALENT(S): at 01:03

## 2021-01-01 RX ADMIN — NYSTATIN CREAM 1 APPLICATION(S): 100000 CREAM TOPICAL at 14:24

## 2021-01-01 RX ADMIN — SIMVASTATIN 20 MILLIGRAM(S): 20 TABLET, FILM COATED ORAL at 22:09

## 2021-01-01 RX ADMIN — SIMVASTATIN 20 MILLIGRAM(S): 20 TABLET, FILM COATED ORAL at 21:44

## 2021-01-01 RX ADMIN — CARBAMIDE PEROXIDE 1 DROP(S): 81.86 SOLUTION/ DROPS AURICULAR (OTIC) at 11:58

## 2021-01-01 RX ADMIN — MUPIROCIN 1 APPLICATION(S): 20 OINTMENT TOPICAL at 17:26

## 2021-01-01 RX ADMIN — MUPIROCIN 1 APPLICATION(S): 20 OINTMENT TOPICAL at 06:14

## 2021-01-01 RX ADMIN — NYSTATIN CREAM 1 APPLICATION(S): 100000 CREAM TOPICAL at 22:18

## 2021-01-01 RX ADMIN — NYSTATIN CREAM 1 APPLICATION(S): 100000 CREAM TOPICAL at 21:20

## 2021-01-01 RX ADMIN — POLYETHYLENE GLYCOL 3350 17 GRAM(S): 17 POWDER, FOR SOLUTION ORAL at 11:25

## 2021-01-01 RX ADMIN — Medication 5 MILLIGRAM(S): at 21:24

## 2021-01-01 RX ADMIN — PANTOPRAZOLE SODIUM 40 MILLIGRAM(S): 20 TABLET, DELAYED RELEASE ORAL at 14:32

## 2021-01-01 RX ADMIN — Medication 10 MILLIGRAM(S): at 08:28

## 2021-01-01 RX ADMIN — AMLODIPINE BESYLATE 10 MILLIGRAM(S): 2.5 TABLET ORAL at 05:28

## 2021-01-01 RX ADMIN — NYSTATIN CREAM 1 APPLICATION(S): 100000 CREAM TOPICAL at 21:28

## 2021-01-01 RX ADMIN — Medication 1 TABLET(S): at 05:09

## 2021-01-01 RX ADMIN — SIMVASTATIN 20 MILLIGRAM(S): 20 TABLET, FILM COATED ORAL at 22:36

## 2021-01-01 RX ADMIN — Medication 650 MILLIGRAM(S): at 13:50

## 2021-01-01 RX ADMIN — DEXTROSE MONOHYDRATE, SODIUM CHLORIDE, AND POTASSIUM CHLORIDE 75 MILLILITER(S): 50; .745; 4.5 INJECTION, SOLUTION INTRAVENOUS at 22:11

## 2021-01-01 RX ADMIN — SIMVASTATIN 20 MILLIGRAM(S): 20 TABLET, FILM COATED ORAL at 21:18

## 2021-01-01 RX ADMIN — PANTOPRAZOLE SODIUM 40 MILLIGRAM(S): 20 TABLET, DELAYED RELEASE ORAL at 05:56

## 2021-01-01 RX ADMIN — SODIUM CHLORIDE 500 MILLILITER(S): 9 INJECTION INTRAMUSCULAR; INTRAVENOUS; SUBCUTANEOUS at 12:31

## 2021-01-01 RX ADMIN — Medication 25 MICROGRAM(S): at 06:09

## 2021-01-01 RX ADMIN — Medication 20 MILLIEQUIVALENT(S): at 05:48

## 2021-01-01 RX ADMIN — AMLODIPINE BESYLATE 10 MILLIGRAM(S): 2.5 TABLET ORAL at 05:19

## 2021-01-01 RX ADMIN — MIRTAZAPINE 7.5 MILLIGRAM(S): 45 TABLET, ORALLY DISINTEGRATING ORAL at 21:28

## 2021-01-01 RX ADMIN — Medication 100 MILLIEQUIVALENT(S): at 14:07

## 2021-01-01 RX ADMIN — AMLODIPINE BESYLATE 10 MILLIGRAM(S): 2.5 TABLET ORAL at 05:32

## 2021-01-01 RX ADMIN — Medication 100 MILLIEQUIVALENT(S): at 16:37

## 2021-01-01 RX ADMIN — MUPIROCIN 1 APPLICATION(S): 20 OINTMENT TOPICAL at 05:28

## 2021-01-01 RX ADMIN — Medication 1 ENEMA: at 14:23

## 2021-01-01 RX ADMIN — SENNA PLUS 2 TABLET(S): 8.6 TABLET ORAL at 21:15

## 2021-01-01 RX ADMIN — Medication 650 MILLIGRAM(S): at 13:05

## 2021-01-01 RX ADMIN — Medication 1 TABLET(S): at 12:04

## 2021-01-01 RX ADMIN — PANTOPRAZOLE SODIUM 40 MILLIGRAM(S): 20 TABLET, DELAYED RELEASE ORAL at 09:10

## 2021-01-01 RX ADMIN — SODIUM CHLORIDE 75 MILLILITER(S): 9 INJECTION INTRAMUSCULAR; INTRAVENOUS; SUBCUTANEOUS at 15:18

## 2021-01-01 RX ADMIN — DEXTROSE MONOHYDRATE, SODIUM CHLORIDE, AND POTASSIUM CHLORIDE 75 MILLILITER(S): 50; .745; 4.5 INJECTION, SOLUTION INTRAVENOUS at 07:34

## 2021-01-01 RX ADMIN — NYSTATIN CREAM 1 APPLICATION(S): 100000 CREAM TOPICAL at 05:14

## 2021-01-01 RX ADMIN — PANTOPRAZOLE SODIUM 40 MILLIGRAM(S): 20 TABLET, DELAYED RELEASE ORAL at 14:23

## 2021-01-01 RX ADMIN — Medication 650 MILLIGRAM(S): at 12:09

## 2021-01-01 RX ADMIN — Medication 100 MICROGRAM(S): at 05:11

## 2021-01-01 RX ADMIN — CHLORHEXIDINE GLUCONATE 1 APPLICATION(S): 213 SOLUTION TOPICAL at 22:37

## 2021-01-01 RX ADMIN — Medication 10 MILLIGRAM(S): at 11:43

## 2021-01-01 RX ADMIN — MUPIROCIN 1 APPLICATION(S): 20 OINTMENT TOPICAL at 17:56

## 2021-01-01 RX ADMIN — NYSTATIN CREAM 1 APPLICATION(S): 100000 CREAM TOPICAL at 21:56

## 2021-01-01 RX ADMIN — Medication 81 MILLIGRAM(S): at 11:01

## 2021-01-01 RX ADMIN — Medication 1 TABLET(S): at 17:09

## 2021-01-01 RX ADMIN — MUPIROCIN 1 APPLICATION(S): 20 OINTMENT TOPICAL at 17:17

## 2021-01-01 RX ADMIN — NYSTATIN CREAM 1 APPLICATION(S): 100000 CREAM TOPICAL at 05:50

## 2021-01-01 RX ADMIN — Medication 5 MILLIGRAM(S): at 21:14

## 2021-01-01 RX ADMIN — NYSTATIN CREAM 1 APPLICATION(S): 100000 CREAM TOPICAL at 22:36

## 2021-01-01 RX ADMIN — AMLODIPINE BESYLATE 10 MILLIGRAM(S): 2.5 TABLET ORAL at 06:01

## 2021-01-01 RX ADMIN — SIMVASTATIN 20 MILLIGRAM(S): 20 TABLET, FILM COATED ORAL at 21:14

## 2021-01-01 RX ADMIN — PANTOPRAZOLE SODIUM 40 MILLIGRAM(S): 20 TABLET, DELAYED RELEASE ORAL at 10:59

## 2021-01-01 RX ADMIN — Medication 650 MILLIGRAM(S): at 08:00

## 2021-01-01 RX ADMIN — SIMVASTATIN 20 MILLIGRAM(S): 20 TABLET, FILM COATED ORAL at 22:16

## 2021-01-01 RX ADMIN — Medication 25 MICROGRAM(S): at 05:28

## 2021-01-01 RX ADMIN — Medication 40 MILLIEQUIVALENT(S): at 18:56

## 2021-01-01 RX ADMIN — NYSTATIN CREAM 1 APPLICATION(S): 100000 CREAM TOPICAL at 14:28

## 2021-01-01 RX ADMIN — Medication 650 MILLIGRAM(S): at 08:13

## 2021-01-01 RX ADMIN — Medication 650 MILLIGRAM(S): at 06:30

## 2021-01-01 RX ADMIN — Medication 100 MICROGRAM(S): at 05:12

## 2021-01-01 RX ADMIN — Medication 100 MICROGRAM(S): at 05:49

## 2021-01-01 RX ADMIN — SODIUM CHLORIDE 500 MILLILITER(S): 9 INJECTION INTRAMUSCULAR; INTRAVENOUS; SUBCUTANEOUS at 11:44

## 2021-01-01 RX ADMIN — PANTOPRAZOLE SODIUM 40 MILLIGRAM(S): 20 TABLET, DELAYED RELEASE ORAL at 06:10

## 2021-01-01 RX ADMIN — NYSTATIN CREAM 1 APPLICATION(S): 100000 CREAM TOPICAL at 22:53

## 2021-01-01 RX ADMIN — Medication 100 MICROGRAM(S): at 05:14

## 2021-01-01 RX ADMIN — Medication 650 MILLIGRAM(S): at 14:57

## 2021-01-01 RX ADMIN — HEPARIN SODIUM 5000 UNIT(S): 5000 INJECTION INTRAVENOUS; SUBCUTANEOUS at 18:42

## 2021-01-01 RX ADMIN — PANTOPRAZOLE SODIUM 40 MILLIGRAM(S): 20 TABLET, DELAYED RELEASE ORAL at 05:22

## 2021-01-01 RX ADMIN — DEXTROSE MONOHYDRATE, SODIUM CHLORIDE, AND POTASSIUM CHLORIDE 75 MILLILITER(S): 50; .745; 4.5 INJECTION, SOLUTION INTRAVENOUS at 08:35

## 2021-01-01 RX ADMIN — PANTOPRAZOLE SODIUM 40 MILLIGRAM(S): 20 TABLET, DELAYED RELEASE ORAL at 17:17

## 2021-01-01 RX ADMIN — POLYETHYLENE GLYCOL 3350 17 GRAM(S): 17 POWDER, FOR SOLUTION ORAL at 23:10

## 2021-01-01 RX ADMIN — Medication 650 MILLIGRAM(S): at 12:25

## 2021-01-01 RX ADMIN — Medication 85 MILLIMOLE(S): at 15:44

## 2021-01-01 RX ADMIN — AMLODIPINE BESYLATE 10 MILLIGRAM(S): 2.5 TABLET ORAL at 05:30

## 2021-01-01 RX ADMIN — SIMVASTATIN 20 MILLIGRAM(S): 20 TABLET, FILM COATED ORAL at 21:19

## 2021-01-01 RX ADMIN — Medication 650 MILLIGRAM(S): at 22:13

## 2021-01-01 RX ADMIN — NYSTATIN CREAM 1 APPLICATION(S): 100000 CREAM TOPICAL at 21:06

## 2021-01-01 RX ADMIN — AMLODIPINE BESYLATE 10 MILLIGRAM(S): 2.5 TABLET ORAL at 05:11

## 2021-01-01 RX ADMIN — Medication 650 MILLIGRAM(S): at 11:47

## 2021-01-01 RX ADMIN — Medication 100 MICROGRAM(S): at 05:20

## 2021-01-01 RX ADMIN — SENNA PLUS 2 TABLET(S): 8.6 TABLET ORAL at 21:19

## 2021-01-01 RX ADMIN — MIRTAZAPINE 7.5 MILLIGRAM(S): 45 TABLET, ORALLY DISINTEGRATING ORAL at 21:20

## 2021-01-01 RX ADMIN — DEXTROSE MONOHYDRATE, SODIUM CHLORIDE, AND POTASSIUM CHLORIDE 75 MILLILITER(S): 50; .745; 4.5 INJECTION, SOLUTION INTRAVENOUS at 12:47

## 2021-01-01 RX ADMIN — MUPIROCIN 1 APPLICATION(S): 20 OINTMENT TOPICAL at 06:01

## 2021-01-01 RX ADMIN — Medication 25 MICROGRAM(S): at 06:14

## 2021-01-01 RX ADMIN — NYSTATIN CREAM 1 APPLICATION(S): 100000 CREAM TOPICAL at 14:13

## 2021-01-01 RX ADMIN — MUPIROCIN 1 APPLICATION(S): 20 OINTMENT TOPICAL at 05:24

## 2021-01-01 RX ADMIN — Medication 25 MICROGRAM(S): at 05:56

## 2021-01-01 RX ADMIN — Medication 40 MILLIEQUIVALENT(S): at 10:18

## 2021-01-01 RX ADMIN — MIRTAZAPINE 7.5 MILLIGRAM(S): 45 TABLET, ORALLY DISINTEGRATING ORAL at 21:15

## 2021-01-01 RX ADMIN — CHLORHEXIDINE GLUCONATE 1 APPLICATION(S): 213 SOLUTION TOPICAL at 21:56

## 2021-01-01 RX ADMIN — POLYETHYLENE GLYCOL 3350 17 GRAM(S): 17 POWDER, FOR SOLUTION ORAL at 17:20

## 2021-01-01 RX ADMIN — MUPIROCIN 1 APPLICATION(S): 20 OINTMENT TOPICAL at 17:40

## 2021-01-01 RX ADMIN — NYSTATIN CREAM 1 APPLICATION(S): 100000 CREAM TOPICAL at 14:12

## 2021-01-01 RX ADMIN — Medication 325 MILLIGRAM(S): at 11:37

## 2021-01-01 RX ADMIN — Medication 650 MILLIGRAM(S): at 13:20

## 2021-01-01 RX ADMIN — Medication 1 TABLET(S): at 17:32

## 2021-01-01 RX ADMIN — Medication 650 MILLIGRAM(S): at 06:10

## 2021-01-01 RX ADMIN — Medication 650 MILLIGRAM(S): at 00:44

## 2021-01-01 RX ADMIN — PANTOPRAZOLE SODIUM 40 MILLIGRAM(S): 20 TABLET, DELAYED RELEASE ORAL at 05:32

## 2021-01-01 RX ADMIN — Medication 650 MILLIGRAM(S): at 00:55

## 2021-01-01 RX ADMIN — PANTOPRAZOLE SODIUM 40 MILLIGRAM(S): 20 TABLET, DELAYED RELEASE ORAL at 05:19

## 2021-01-01 RX ADMIN — Medication 650 MILLIGRAM(S): at 13:14

## 2021-01-01 RX ADMIN — Medication 650 MILLIGRAM(S): at 10:58

## 2021-01-01 RX ADMIN — MUPIROCIN 1 APPLICATION(S): 20 OINTMENT TOPICAL at 06:09

## 2021-01-01 RX ADMIN — Medication 100 MICROGRAM(S): at 05:15

## 2021-01-01 RX ADMIN — Medication 650 MILLIGRAM(S): at 00:15

## 2021-01-01 RX ADMIN — MIRTAZAPINE 7.5 MILLIGRAM(S): 45 TABLET, ORALLY DISINTEGRATING ORAL at 22:12

## 2021-01-01 RX ADMIN — DEXTROSE MONOHYDRATE, SODIUM CHLORIDE, AND POTASSIUM CHLORIDE 75 MILLILITER(S): 50; .745; 4.5 INJECTION, SOLUTION INTRAVENOUS at 19:48

## 2021-01-01 RX ADMIN — CHLORHEXIDINE GLUCONATE 1 APPLICATION(S): 213 SOLUTION TOPICAL at 21:39

## 2021-01-01 RX ADMIN — AMLODIPINE BESYLATE 10 MILLIGRAM(S): 2.5 TABLET ORAL at 05:56

## 2021-01-01 RX ADMIN — Medication 650 MILLIGRAM(S): at 08:55

## 2021-01-01 RX ADMIN — MIRTAZAPINE 7.5 MILLIGRAM(S): 45 TABLET, ORALLY DISINTEGRATING ORAL at 21:40

## 2021-01-01 RX ADMIN — Medication 40 MILLIEQUIVALENT(S): at 21:52

## 2021-01-01 RX ADMIN — NYSTATIN CREAM 1 APPLICATION(S): 100000 CREAM TOPICAL at 21:39

## 2021-01-01 RX ADMIN — SENNA PLUS 2 TABLET(S): 8.6 TABLET ORAL at 23:11

## 2021-01-01 RX ADMIN — HEPARIN SODIUM 5000 UNIT(S): 5000 INJECTION INTRAVENOUS; SUBCUTANEOUS at 18:19

## 2021-01-01 RX ADMIN — Medication 650 MILLIGRAM(S): at 05:49

## 2021-01-01 RX ADMIN — NYSTATIN CREAM 1 APPLICATION(S): 100000 CREAM TOPICAL at 05:21

## 2021-01-01 RX ADMIN — NYSTATIN CREAM 1 APPLICATION(S): 100000 CREAM TOPICAL at 21:26

## 2021-01-01 RX ADMIN — CHLORHEXIDINE GLUCONATE 1 APPLICATION(S): 213 SOLUTION TOPICAL at 21:05

## 2021-01-01 RX ADMIN — Medication 100 MICROGRAM(S): at 05:09

## 2021-01-01 RX ADMIN — Medication 650 MILLIGRAM(S): at 11:46

## 2021-01-01 RX ADMIN — Medication 1 TABLET(S): at 05:12

## 2021-01-01 RX ADMIN — SIMVASTATIN 20 MILLIGRAM(S): 20 TABLET, FILM COATED ORAL at 22:00

## 2021-01-01 RX ADMIN — Medication 25 MICROGRAM(S): at 06:01

## 2021-01-01 RX ADMIN — Medication 100 MICROGRAM(S): at 05:22

## 2021-01-01 RX ADMIN — Medication 40 MILLIEQUIVALENT(S): at 14:32

## 2021-01-01 RX ADMIN — POLYETHYLENE GLYCOL 3350 17 GRAM(S): 17 POWDER, FOR SOLUTION ORAL at 11:29

## 2021-04-15 NOTE — ED ADULT NURSE NOTE - NSIMPLEMENTINTERV_GEN_ALL_ED
Implemented All Fall with Harm Risk Interventions:  [place] to call system. Call bell, personal items and telephone within reach. Instruct patient to call for assistance. Room bathroom lighting operational. Non-slip footwear when patient is off stretcher. Physically safe environment: no spills, clutter or unnecessary equipment. Stretcher in lowest position, wheels locked, appropriate side rails in place. Provide visual cue, wrist band, yellow gown, etc. Monitor gait and stability. Monitor for mental status changes and reorient to person, place, and time. Review medications for side effects contributing to fall risk. Reinforce activity limits and safety measures with patient and family. Provide visual clues: red socks.

## 2021-04-15 NOTE — H&P ADULT - NSHPLABSRESULTS_GEN_ALL_CORE
Macular Hole Counseling:  I have explained to the patient at length the diagnosis of macular hole and its pathophysiology. I have discussed the possible need for surgery, which will be determined by the retinal specialist.  Return for follow-up as scheduled. LABS:                        15.7   10.00 )-----------( 129      ( 15 Apr 2021 07:13 )             48.1     04-15    140  |  104  |  21  ----------------------------<  174<H>  3.6   |  22  |  1.17    Ca    9.3      15 Apr 2021 07:13  Mg     2.2     04-15    TPro  9.4<H>  /  Alb  4.0  /  TBili  0.8  /  DBili  x   /  AST  41<H>  /  ALT  17  /  AlkPhos  91  04-15    PT/INR - ( 15 Apr 2021 07:13 )   PT: 13.1 sec;   INR: 1.14 ratio         PTT - ( 15 Apr 2021 07:13 )  PTT:21.5 sec  CARDIAC MARKERS ( 15 Apr 2021 07:13 )  .029 ng/mL / x     / x     / x     / x          Urinalysis Basic - ( 15 Apr 2021 08:57 )    Color: Yellow / Appearance: Clear / S.020 / pH: x  Gluc: x / Ketone: Trace  / Bili: Negative / Urobili: Negative mg/dL   Blood: x / Protein: 500 mg/dL / Nitrite: Negative   Leuk Esterase: Negative / RBC: 6-10 /HPF / WBC 3-5   Sq Epi: x / Non Sq Epi: Few / Bacteria: Few      Lactate, Blood: 4.8 mmol/L (04-15 @ 11:24)  Lactate, Blood: 3.7 mmol/L (04-15 @ 08:28)          Thyroid Stimulating Hormone, Serum: 5.090 uIU/mL (04-15 @ 07:13)

## 2021-04-15 NOTE — ED PROVIDER NOTE - PHYSICAL EXAMINATION
Gen: Alert, ill appearing  Head: NC, AT, EOMI, normal lids/conjunctiva  ENT: normal hearing, patent oropharynx without erythema/exudate, uvula midline, moist mucus membranes  Neck: +supple, no tenderness/meningismus/JVD, +Trachea midline  Pulm: Bilateral BS, normal resp effort, no wheeze/stridor/retractions  CV: RRR, no M/R/G, +dist pulses  Abd: soft, NT/ND, Negative [place] signs, +BS, no palpable masses  Mskel: no edema/erythema/cyanosis  Skin: no rash, warm/dry  Neuro: AAOx3, no apparent sensory/motor deficits, coordination intact Gen: Alert, ill appearing  Head: NC, AT, EOMI, normal lids/conjunctiva  ENT: normal hearing, slight dry mucus membranes  Neck: +supple, no tenderness/meningismus/JVD, +Trachea midline  Pulm: Bilateral BS, normal resp effort, no wheeze/stridor/retractions  CV: RRR, no M/R/G, +dist pulses  Abd: soft, NT/ND, Negative [place] signs, +BS, no palpable masses  Mskel: no edema/erythema/cyanosis  Skin: no rash, warm/dry  Neuro: AAOx3, no apparent sensory/motor deficits, coordination intact

## 2021-04-15 NOTE — H&P ADULT - NSHPPHYSICALEXAM_GEN_ALL_CORE
PHYSICAL EXAMINATION:  Vital Signs Last 24 Hrs  T(C): 36.3 (15 Apr 2021 10:56), Max: 36.5 (15 Apr 2021 05:58)  T(F): 97.4 (15 Apr 2021 10:56), Max: 97.7 (15 Apr 2021 05:58)  HR: 69 (15 Apr 2021 10:56) (69 - 84)  BP: 169/87 (15 Apr 2021 10:56) (143/79 - 183/109)  BP(mean): --  RR: 14 (15 Apr 2021 10:56) (13 - 22)  SpO2: 96% (15 Apr 2021 10:56) (95% - 99%)  CAPILLARY BLOOD GLUCOSE            GENERAL: NAD, well-groomed,  HEAD:  atraumatic, normocephalic  EYES: sclera anicteric  ENMT: mucous membranes moist  NECK: supple, No JVD  CHEST/LUNG: clear to auscultation bilaterally;    no      rales   ,   no rhonchi,   HEART: normal S1, S2  ABDOMEN: BS+, soft, ND, NT   EXTREMITIES:    no    edema    b/l LEs  NEURO: awake, ,  SKIN: no     rash PHYSICAL EXAMINATION:  Vital Signs Last 24 Hrs  T(C): 36.3 (15 Apr 2021 10:56), Max: 36.5 (15 Apr 2021 05:58)  T(F): 97.4 (15 Apr 2021 10:56), Max: 97.7 (15 Apr 2021 05:58)  HR: 69 (15 Apr 2021 10:56) (69 - 84)  BP: 169/87 (15 Apr 2021 10:56) (143/79 - 183/109)  BP(mean): --  RR: 14 (15 Apr 2021 10:56) (13 - 22)  SpO2: 96% (15 Apr 2021 10:56) (95% - 99%)  CAPILLARY BLOOD GLUCOSE            GENERAL: NAD, well-groomed,  HEAD:  atraumatic, normocephalic  EYES: sclera anicteric  ENMT: mucous membranes moist  NECK: supple, No JVD  CHEST/LUNG: clear to auscultation bilaterally;    no      rales   ,   no rhonchi,   HEART: normal S1, S2  ABDOMEN: BS+, soft, ND, NT   EXTREMITIES:   mild     edema    b/l LEs  NEURO: awake, very  alert, ,  SKIN: no     rash    healing erosions at base of naostril

## 2021-04-15 NOTE — ED PROVIDER NOTE - CARE PLAN
Principal Discharge DX:	Vomiting  Secondary Diagnosis:	Failure to thrive in adult   1 Principal Discharge DX:	GI bleed  Secondary Diagnosis:	Failure to thrive in adult

## 2021-04-15 NOTE — H&P ADULT - ASSESSMENT
88    yr   female       h/o  AAA repair,  MI,  s/p CABG x 4, HTN, HLD , s/p ARF ,  PPM     Left 8.8 midureteral calculus s/p   stent on 8/25/20 with Dr. Leary       b/l foot drop R>L,  has been bedbound for past   8 months or  more     CT head,periventricular , deep white matter ischemia. Tiny old lacunar infarction is seen in the LEFT caudate nucleus. Mild cerebellar atrophy.           *      possibly ? stent blockage?   recent admission for klebsiella and E.coli  pyelonephritis sec to  Left ureteral 8.8mm calculus s/p Cystourethroscopy w/ stent placement 8/25/20 with Dr. Leary  9/30 Renal US: Left ureteral stent. Nonobstructive left nephrolithiasis. No hydronephrosis.  Urology consult appreciated, S/P cysto, L ureteroscopy, retrograde pyelography, laser litho, L stent 10/2   continue with cipro x 3 days   eating well today. abdomen soft     #Constipation , resolved   Large amount of rectal stool with presacral fat stranding suggesting stercoral colitis. seen on CT LS  has daily BMs (per RN and patient)  continue with bowel regimen   lactulose daily   GI following       # LISA (acute kidney injury), presumed sec to intravascular depletion from vomiting ?   s/p 2L IVF   resolved     #  Hypokalemia --replete and monitor     # Coronary artery disease involving native heart without angina pectoris, unspecified vessel or lesion type.   s/p PPM  cont aspirin Norvasc home med , statin,  NOT on BB presumed due to symptomatic bradycardia requiring pacemaker     # . Benign essential HTN.  Plan: continue home meds.     6.  Preventive measure.  Plan: DVTp: Heparin SQ-dvt ppx  fall precautions   out of bed to chair     DISPO: D/C HOME . AWAITING REACTIVATION OF HHA  stable for dc       88    yr   female       h/o  AAA repair  in 2012, ,  MI,  s/p CABG x 4, in 2007,  HTN, HLD , s/p ARF ,        PPM  in 2019  at [place]  [facility]       Left 8.8 midureteral calculus s/p   stent on 8/25/20  and  then removed      b/l foot drop R>L,  has been bedbound for past   3  years     CT head ,periventricular , deep white matter ischemia. Tiny old lacunar infarction is seen in the LEFT caudate nucleus.  cerebellar atrophy.             *    Vomiting today at home            and  coffee  grounds     in  er          has  prior  such history from constipation         follow hb/  gi eval         on iv ppi         iv fluids     *    CAD.  s/p CABG           has  PPM           on asa/  norvasc,  zocor  at home    *   AAA          s/p  endovascular repair, . size   is  4.1  cm      *     h/o  being bed  bound  for past 3  years , with  c/c  b/l foot drop             with functional quadriplegai    *    c/c  constipation             on laxatives/  enema     *   CT head,  with b/l   cavernous carotid  A aneurysms   and  recent  left  parietal infarct            on asa/   neuro  called by Er        per  son,   about 4  days  ago, pt  had  dysarthria         favor  no  intervention, given bed  bound  status        U/A, with negative  leuk est and nitrite       on dvt  ppx         spoke with son., arabella   bed  bound  for past  3  years/  and  stated, that pt is not  dnr       < from: CT Head No Cont (04.15.21 @ 09:49) >  IMPRESSION:  1)  chronic ischemic changes in both hemispheres with atrophy. A recent and/or subacute left parietal infarct cannot be totally excluded. There is no hemorrhagic lesion.  2)  of note are large hyperdense lesions in the cavernous carotid and medial temporal region bilaterally, larger on the left. See above. The appearance suggests bilateral cavernous carotid artery aneurysms.  CTA imaging of the head and neck recommended for further assessment, as the patient has pacemaker  Dr. Berry notified of the above findings and recommendations at 9:40 AM.  < end of copied text >       ad< from: CT Abdomen and Pelvis w/ IV Cont (04.15.21 @ 09:50) >  RITONEUM: No ascites.  VESSELS: Atherosclerotic changes. Abdominal aortic aneurysm status post endovascular repair and stable native sac size of 4.1 cm. Right internal iliac artery embolization.  RETROPERITONEUM/LYMPH NODES: Nolymphadenopathy.  ABDOMINAL WALL: Bilateral groin surgical clips.  BONES: Degenerative changes.  IMPRESSION:  No bowel obstruction.  Colonic diverticulosis without CT evidence of acute diverticulitis.  Rectum distended by stool  KADEEM JUNIOR; Attending  < end of copied text >            88    yr   female       h/o  AAA repair  in 2012, ,  MI,  s/p CABG x 4, in 2007,  HTN, HLD , s/p ARF ,        PPM  in 2019  at [place]  [facility]       Left 8.8 midureteral calculus s/p   stent on 8/25/20  and  then removed      b/l foot drop R>L,  has been bedbound for past   3  years     CT head ,periventricular , deep white matter ischemia. Tiny old lacunar infarction is seen in the LEFT caudate nucleus.  cerebellar atrophy.             *    Vomiting today at home            and  coffee  grounds     in  er          has  prior  such history from constipation         follow hb/  gi eval  derek way         on iv ppi          on    iv fluids       *    CAD.  s/p CABG           has  PPM           on asa/  norvasc,  zocor  at home    *   AAA          s/p  endovascular repair, . size   is  4.1  cm      *     h/o  being bed  bound  for past 3  years , with  c/c  b/l foot drop             with functional quadriplegia     *    c/c  constipation             on laxatives/  enema     *   CT head,  with b/l   cavernous carotid  A aneurysms   and  recent  left  parietal infarct            on asa/   neuro  called by Er        per  son,   about 4  days  ago, pt  had  dysarthria         favor  no  intervention, given bed  bound  status          U/A, with negative  leuk est and nitrite       on dvt  ppx         spoke with son., arabella   bed  bound  for past  3  years/  and  stated, that pt is not  dnr       < from: CT Head No Cont (04.15.21 @ 09:49) >  IMPRESSION:  1)  chronic ischemic changes in both hemispheres with atrophy. A recent and/or subacute left parietal infarct cannot be totally excluded. There is no hemorrhagic lesion.  2)  of note are large hyperdense lesions in the cavernous carotid and medial temporal region bilaterally, larger on the left. See above. The appearance suggests bilateral cavernous carotid artery aneurysms.  CTA imaging of the head and neck recommended for further assessment, as the patient has pacemaker  Dr. Berry notified of the above findings and recommendations at 9:40 AM.  < end of copied text >       ad< from: CT Abdomen and Pelvis w/ IV Cont (04.15.21 @ 09:50) >  RITONEUM: No ascites.  VESSELS: Atherosclerotic changes. Abdominal aortic aneurysm status post endovascular repair and stable native sac size of 4.1 cm. Right internal iliac artery embolization.  RETROPERITONEUM/LYMPH NODES: Nolymphadenopathy.  ABDOMINAL WALL: Bilateral groin surgical clips.  BONES: Degenerative changes.  IMPRESSION:  No bowel obstruction.  Colonic diverticulosis without CT evidence of acute diverticulitis.  Rectum distended by stool  KADEEM JUNIOR; Attending  < end of copied text >            88    yr   female       h/o  AAA repair  in 2012, ,  MI,  s/p CABG x 4, in 2007,  HTN, HLD , s/p ARF ,        PPM  in 2019  at [place]  [facility]       Left 8.8 midureteral calculus s/p   stent on 8/25/20  and  then removed      b/l foot drop R>L,  has been bedbound for past   3  years     CT head ,periventricular , deep white matter ischemia. Tiny old lacunar infarction is seen in the LEFT caudate nucleus.  cerebellar atrophy.             *    Vomiting today at home            and  coffee  grounds     in  er          has  prior  such history from constipation          follow hb/  gi eval  derek way          on iv ppi          on    iv fluids       *    CAD.  s/p CABG           has  PPM            on asa/  norvasc,  zocor  at home    *   AAA          s/p  endovascular repair, . size   is  4.1  cm      *     h/o  being bed  bound  for past 3  years , with  c/c  b/l foot drop             with functional quadriplegia     *    c/c  constipation             on laxatives/  enema     *   CT head,  with b/l   cavernous carotid  A aneurysms   and  recent  left  parietal infarct            on asa/   neuro  called by Er        per  son,   about 4  days  ago, pt  had  dysarthria         favor  no  intervention, given bed  bound  status          U/A, with negative  leuk est and nitrite       on dvt  ppx         spoke with son., arabella   bed  bound  for past  3  years/  and  stated, that pt is not  dnr       < from: CT Head No Cont (04.15.21 @ 09:49) >  IMPRESSION:  1)  chronic ischemic changes in both hemispheres with atrophy. A recent and/or subacute left parietal infarct cannot be totally excluded. There is no hemorrhagic lesion.  2)  of note are large hyperdense lesions in the cavernous carotid and medial temporal region bilaterally, larger on the left. See above. The appearance suggests bilateral cavernous carotid artery aneurysms.  CTA imaging of the head and neck recommended for further assessment, as the patient has pacemaker  Dr. Berry notified of the above findings and recommendations at 9:40 AM.  < end of copied text >       ad< from: CT Abdomen and Pelvis w/ IV Cont (04.15.21 @ 09:50) >  RITONEUM: No ascites.  VESSELS: Atherosclerotic changes. Abdominal aortic aneurysm status post endovascular repair and stable native sac size of 4.1 cm. Right internal iliac artery embolization.  RETROPERITONEUM/LYMPH NODES: Nolymphadenopathy.  ABDOMINAL WALL: Bilateral groin surgical clips.  BONES: Degenerative changes.  IMPRESSION:  No bowel obstruction.  Colonic diverticulosis without CT evidence of acute diverticulitis.  Rectum distended by stool  KADEEM JUNIOR; Attending  < end of copied text >

## 2021-04-15 NOTE — CONSULT NOTE ADULT - ASSESSMENT
Functional paraplegia, old (fixed plantar flexion contractures of both ankles and very weak LEs were already noted 9/30/2020).   Etiology unclear, but given chronicity ( had to have already been chronic seven months ago) it is unlikely there is a readily reversible etiology.  Among the possibilities (one or more may be at play):        Senile asthenia plus deconditioning from lack of exercise led to becoming slowlu bed-bound and developing contractures.          Possible hypothyroidism.        Undiagnosed myasthenia.         Cervical spondylosis with myelopathy (however there are no overt UMN findings on exam)         Distal symmetric motor neuropathy.      Ptosis and oculomotor palsy OS, pupil-sparing.  This is apparently new since 9/30/20.  The most common etiology is microvascular disease resulting in III N infarct, DM being the prime culprit.  However this Pt does not have DM. R/O other systemic vasculitides.  Alternatively it could be due to her L cavernous carotid artery aneurysm (but one pupillary involvement would be likley in that case).      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  Smaller non-thrombosed R cavernous carotid artery aneurysm.  Given the extensive thrombosis on the L, the relatively small size on the R, and the typical natural history of these lesions, and given her age, an interventional procedure is not indicated.       Possible recent or subacute L parietal infarct; cannot confirm with MRI as she has a pacemaker.   Functional paraplegia, old (fixed plantar flexion contractures of both ankles and very weak LEs were already noted 9/30/2020).   Etiology unclear, but given chronicity ( had to have already been chronic seven months ago) it is unlikely there is a readily reversible etiology.  Among the possibilities (one or more may be at play):        Senile asthenia plus deconditioning from lack of exercise led to becoming slowlu bed-bound and developing contractures.          Hypothyroidism.        Undiagnosed myasthenia.         Cervical spondylosis with myelopathy (however there are no overt UMN findings on exam)         Distal symmetric motor neuropathy.      Ptosis and oculomotor palsy OS, pupil-sparing.  This is apparently new since 9/30/20.  The most common etiology is microvascular disease resulting in III N infarct, DM being the prime culprit.  However this Pt does not have DM. R/O other systemic vasculitides.  Alternatively it could be due to her L cavernous carotid artery aneurysm (but one pupillary involvement would be likely in that case).      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  Smaller non-thrombosed R cavernous carotid artery aneurysm.  Given the extensive thrombosis on the L, the relatively small size on the R, and the typical natural history of these lesions, and given her age, an interventional procedure is not indicated.       Possible recent or subacute L parietal infarct; cannot confirm with MRI as she has a pacemaker.        RECOMMENDATIONS    ESR, CRP, SPEP, RF, CCP, serum immunofixation (not immunophoresis), urine for Bence Sung protein, copper, zinc, ESTELA, acetylcholine receptor ab test, methylmalonic acid, homocysteine.    Continue ASA 81mg daily and statin.    I note she has been started on Synthroid.       Non-con C-spine CT.  If significant CANAL stenosis, or instability or other evidence for possible cord compromise is found then take precautions to avoid extremes of neck movement (e.g., endotracheal intubation via oral approach with neck hyperextension).       [Irrespective of B12 and folate levels:       high Hcy w normal MMA implies folate deficiency;        high MMA and Hcy implies B12 deficiency +/-folate deficiency.]

## 2021-04-15 NOTE — ED ADULT NURSE NOTE - CHIEF COMPLAINT QUOTE
per EMS son states pt had non-bloody emesis  x 2 days a/w no appetite. Pt denies fever, abd pain , chills, diarrhea. On ASA son ginette cell [phone]

## 2021-04-15 NOTE — ED ADULT TRIAGE NOTE - CHIEF COMPLAINT QUOTE
per EMS son states pt had non-bloody emesis  x 2 days a/w no appetite. Pt denies fever, abd pain , chills, diarrhea. On ASA son ginette cell [phone]

## 2021-04-15 NOTE — ED ADULT NURSE NOTE - OBJECTIVE STATEMENT
pt received to bed 5 c/o nausea, vomiting, decreased appetite starting 2 days. denies: fever, chills, abdominal pain, diarrhea, chest pain, shortness of breath. on cardiac monitor at bedside pt received to bed 5 c/o nausea, vomiting, decreased appetite starting 2 days. pt c/o headache and states "my head feels numb on the outside and hurts on the inside." denies: fever, chills, abdominal pain, diarrhea, chest pain, shortness of breath, dizziness. on 3L nasal cannula by EMS. on cardiac monitor at bedside

## 2021-04-15 NOTE — ED ADULT NURSE NOTE - ED STAT RN HANDOFF DETAILS 2
Handoff given to RN Mendez. Patient is sleeping in bed, High-Fowlers position to prevent aspiration. Alert and oriented x4. IV accesses 22g LMC, 20g RAC. protonix infusion and fluid boluses currently infusing.

## 2021-04-15 NOTE — CONSULT NOTE ADULT - SUBJECTIVE AND OBJECTIVE BOX
History per Admission H&P    <Start of quote from H&P>  "  Reason for Admission: FTT  History of Present Illness:     88 yr    	Patient presents to the ED for vomiting and poor appetite.       Patient awake and alert but appears tired.  History obtained from son.      Patient was with home attendant yesterday when she had 2 episodes of vomiting.      After vomiting patient had refused transport to hospital    .  After home attendant left, patient slept and when she woke up stated she wanted to go to hospital.      At present she denies active nausea or abdominal pain, just says she does not feel well and has a headache, but then says "I always have a headache."  She denies dizziness or vertigo.  She denies chest pain or dyspnea.     Son reports poor appetite x1 week,  has barely eaten for the last week.   No cough noted at home.      She gets frequent UTIs.     on says that he has been unable to get her to take her home medications for months now.      At baseline she is bed and wheelchair bound, unable to walk due to deconditioning over time.        Review of Systems:  Review of Systems: REVIEW OF SYSTEMS:  GEN: no fever,    no chills  RESP: no SOB,   no cough  CVS: no chest pain,   no palpitations  GI: no abdominal pain,   no nausea,   no vomiting,   no constipation,   no diarrhea  : no dysuria,   no frequency  NEURO: no headache,   no dizziness  PSYCH: no depression,   not anxious  Derm : no rash      Allergies and Intolerances:        Allergies:  	No Known Allergies:     Home Medications:   * Patient Currently Takes Medications as of 15-Apr-2021 06:01 documented in Structured Notes  · 	polyethylene glycol 3350 oral powder for reconstitution: 17 gram(s) orally once a day  · 	senna oral tablet: 2 tab(s) orally once a day (at bedtime)  · 	aspirin 81 mg oral tablet: 1 tab(s) orally once a day  · 	simvastatin 20 mg oral tablet: 1 tab(s) orally once a day (at bedtime)  · 	amLODIPine 10 mg oral tablet: 1 tab(s) orally once a day    .    Patient History:    Tobacco Screening:  · Core Measure Site	Yes  · Has the patient used tobacco in the past 30 days?	No"        <End of quote from H&P> History per Admission H&P    <Start of quote from H&P>  "Reason for Admission: FTT  History of Present Illness:     88 yr    	Patient presents to the ED for vomiting and poor appetite.       Patient awake and alert but appears tired.  History obtained from son.      Patient was with home attendant yesterday when she had 2 episodes of vomiting.      After vomiting patient had refused transport to hospital    .  After home attendant left, patient slept and when she woke up stated she wanted to go to hospital.      At present she denies active nausea or abdominal pain, just says she does not feel well and has a headache, but then says "I always have a headache."  She denies dizziness or vertigo.  She denies chest pain or dyspnea.     Son reports poor appetite x1 week,  has barely eaten for the last week.   No cough noted at home.      She gets frequent UTIs.     on says that he has been unable to get her to take her home medications for months now.      At baseline she is bed and wheelchair bound, unable to walk due to deconditioning over time.        Review of Systems:  Review of Systems: REVIEW OF SYSTEMS:  GEN: no fever,    no chills  RESP: no SOB,   no cough  CVS: no chest pain,   no palpitations  GI: no abdominal pain,   no nausea,   no vomiting,   no constipation,   no diarrhea  : no dysuria,   no frequency  NEURO: no headache,   no dizziness  PSYCH: no depression,   not anxious  Derm : no rash      Allergies and Intolerances:        Allergies:  	No Known Allergies:     Home Medications:   * Patient Currently Takes Medications as of 15-Apr-2021 06:01 documented in Structured Notes  · 	polyethylene glycol 3350 oral powder for reconstitution: 17 gram(s) orally once a day  · 	senna oral tablet: 2 tab(s) orally once a day (at bedtime)  · 	aspirin 81 mg oral tablet: 1 tab(s) orally once a day  · 	simvastatin 20 mg oral tablet: 1 tab(s) orally once a day (at bedtime)  · 	amLODIPine 10 mg oral tablet: 1 tab(s) orally once a day    .    Patient History:    Tobacco Screening:  · Core Measure Site	Yes  · Has the patient used tobacco in the past 30 days?	No"  <End of quote from H&P>        ADDITIONAL HISTORY FROM PATIENT     When asked about her droopy L eyelid she responds that it has been present for a "long time."      EXAMINATION    Awake, alert.  Mild psychomotor retardation.  Responds that she is in a hospital, lives with her son, and that it is Tuesday (it's Thursday) April 14 or 15.  Pupils 2mm (hard to tell if reactive).  L Oculomotor (III N) palsy.        to be completed History per Admission H&P    <Start of quote from H&P>  "Reason for Admission: FTT  History of Present Illness:     88 yr    	Patient presents to the ED for vomiting and poor appetite.       Patient awake and alert but appears tired.  History obtained from son.      Patient was with home attendant yesterday when she had 2 episodes of vomiting.      After vomiting patient had refused transport to hospital    .  After home attendant left, patient slept and when she woke up stated she wanted to go to hospital.      At present she denies active nausea or abdominal pain, just says she does not feel well and has a headache, but then says "I always have a headache."  She denies dizziness or vertigo.  She denies chest pain or dyspnea.     Son reports poor appetite x1 week,  has barely eaten for the last week.   No cough noted at home.      She gets frequent UTIs.     on says that he has been unable to get her to take her home medications for months now.      At baseline she is bed and wheelchair bound, unable to walk due to deconditioning over time.        Review of Systems:  Review of Systems: REVIEW OF SYSTEMS:  GEN: no fever,    no chills  RESP: no SOB,   no cough  CVS: no chest pain,   no palpitations  GI: no abdominal pain,   no nausea,   no vomiting,   no constipation,   no diarrhea  : no dysuria,   no frequency  NEURO: no headache,   no dizziness  PSYCH: no depression,   not anxious  Derm : no rash      Allergies and Intolerances:        Allergies:  	No Known Allergies:     Home Medications:   * Patient Currently Takes Medications as of 15-Apr-2021 06:01 documented in Structured Notes  · 	polyethylene glycol 3350 oral powder for reconstitution: 17 gram(s) orally once a day  · 	senna oral tablet: 2 tab(s) orally once a day (at bedtime)  · 	aspirin 81 mg oral tablet: 1 tab(s) orally once a day  · 	simvastatin 20 mg oral tablet: 1 tab(s) orally once a day (at bedtime)  · 	amLODIPine 10 mg oral tablet: 1 tab(s) orally once a day    .    Patient History:    Tobacco Screening:  · Core Measure Site	Yes  · Has the patient used tobacco in the past 30 days?	No"  <End of quote from H&P>        ADDITIONAL HISTORY FROM PATIENT     When asked about her droopy L eyelid she responds that it has been present for a "long time."  When asked if she walks on her own, she responds "no;" as to how long that has been she says "couple months, maybe a year, I don't know" [the exam finding of her feet being in rigid plantar flexion indicates they have been so for chronically; it is essentially impossible to walk on one's own in such a case].         EXAMINATION    Awake, alert.  Medium thin build.  Mild psychomotor retardation.  Responds that she is in a hospital, lives with her son, and that it is Tuesday (it's Thursday) April 14 or 15.  Soft spoken.  Grossly normal comprehension, expression, articulation, prosody.  Names all five fingers correctly.  Crosses the midline on verbal instruction (brief delay to select the correct hand and finger).  When asked to mimic examiner's double crossover posture she correctly overlays her arms but touches her shoulders with her hands instead of touching the facial targets with specific fingers.      Pupils 2mm (hard to tell if reactive).  Impaired adduction, elevation, depression of L eye.  Partial L ptosis; she can voluntarily partially elevate the upper L eyelid.  Otherwise normal facial movements; tongue protrudes in the midline.      No gross UE drift.  UE motor strength commensurate with age, sedentary life style, and habitus (senile asthenia).  LE weakness disproportionate to UE weakness, bit consistent with being (presumably) chronically bed bound.  She offers only minimal resistance when extending at the hips.  Wiggles toes weakly.  Feet are in rigid full plantar flexion.   No grossly evident lateralized or focal weakness, but neither can be ruled out in someone as weak as she is.      P and LT sensation grossly intact; no extinction on DSS.    Reflex                           Right    Left   Comment    Biceps                             1+      1+  Triceps                            0?       1+  Patellar                            0        0  Gastroc                    Not testable - frozen in plantar flexion  Plantar                         not evaluable    Poor foot hygiene; hypertrophic nails.         SELECTED TEST RESULTS     History per Admission H&P    <Start of quote from H&P>  "Reason for Admission: FTT  History of Present Illness:     88 yr    	Patient presents to the ED for vomiting and poor appetite.       Patient awake and alert but appears tired.  History obtained from son.      Patient was with home attendant yesterday when she had 2 episodes of vomiting.      After vomiting patient had refused transport to hospital    .  After home attendant left, patient slept and when she woke up stated she wanted to go to hospital.      At present she denies active nausea or abdominal pain, just says she does not feel well and has a headache, but then says "I always have a headache."  She denies dizziness or vertigo.  She denies chest pain or dyspnea.     Son reports poor appetite x1 week,  has barely eaten for the last week.   No cough noted at home.      She gets frequent UTIs.     on says that he has been unable to get her to take her home medications for months now.      At baseline she is bed and wheelchair bound, unable to walk due to deconditioning over time.        Review of Systems:  Review of Systems: REVIEW OF SYSTEMS:  GEN: no fever,    no chills  RESP: no SOB,   no cough  CVS: no chest pain,   no palpitations  GI: no abdominal pain,   no nausea,   no vomiting,   no constipation,   no diarrhea  : no dysuria,   no frequency  NEURO: no headache,   no dizziness  PSYCH: no depression,   not anxious  Derm : no rash      Allergies and Intolerances:        Allergies:  	No Known Allergies:     Home Medications:   * Patient Currently Takes Medications as of 15-Apr-2021 06:01 documented in Structured Notes  · 	polyethylene glycol 3350 oral powder for reconstitution: 17 gram(s) orally once a day  · 	senna oral tablet: 2 tab(s) orally once a day (at bedtime)  · 	aspirin 81 mg oral tablet: 1 tab(s) orally once a day  · 	simvastatin 20 mg oral tablet: 1 tab(s) orally once a day (at bedtime)  · 	amLODIPine 10 mg oral tablet: 1 tab(s) orally once a day    .    Patient History:    Tobacco Screening:  · Core Measure Site	Yes  · Has the patient used tobacco in the past 30 days?	No"  <End of quote from H&P>        ADDITIONAL HISTORY FROM PATIENT     When asked about her droopy L eyelid she responds that it has been present for a "long time."  When asked if she walks on her own, she responds "no;" as to how long that has been she says "couple months, maybe a year, I don't know" [the exam finding of her feet being in rigid plantar flexion indicates they have been so for chronically; it is essentially impossible to walk on one's own in such a case].         EXAMINATION    Awake, alert.  Medium thin build.  Mild psychomotor retardation.  Responds that she is in a hospital, lives with her son, and that it is Tuesday (it's Thursday) April 14 or 15.  Soft spoken.  Grossly normal comprehension, expression, articulation, prosody.  Names all five fingers correctly.  Crosses the midline on verbal instruction (brief delay to select the correct hand and finger).  When asked to mimic examiner's double crossover posture she correctly overlays her arms but touches her shoulders with her hands instead of touching the facial targets with specific fingers.      Pupils 2mm (hard to tell if reactive).  Impaired adduction, elevation, depression of L eye.  Partial L ptosis; she can voluntarily partially elevate the upper L eyelid.  Otherwise normal facial movements; tongue protrudes in the midline.      No gross UE drift.  UE motor strength commensurate with age, sedentary life style, and habitus (senile asthenia).  LE weakness disproportionate to UE weakness, bit consistent with being (presumably) chronically bed bound.  She offers only minimal resistance when extending at the hips.  Wiggles toes weakly.  Feet are in rigid full plantar flexion.   No grossly evident lateralized or focal weakness, but neither can be ruled out in someone as weak as she is.      P and LT sensation grossly intact; no extinction on DSS.    Reflex                           Right    Left   Comment    Biceps                             1+      1+  Triceps                            0?       1+  Patellar                            0        0  Gastroc                    Not testable - frozen in plantar flexion  Plantar                         not evaluable    Poor foot hygiene; hypertrophic nails.         SELECTED TEST RESULTS    Hgb A1c 5.9%  B12/folate 1375/14.5 Oct 2020; 1882/11.6 in 2018  TPI neg Oct 2020  25 OH vit D 39.1 Oct 2020  TSH  5.09 now, 3.99 Oct 2020, 3.96 in 2018.       FT4 1.5; tot T3 88 in Oct 2020     History per Admission H&P    <Start of quote from H&P>  "Reason for Admission: FTT  History of Present Illness:     88 yr    	Patient presents to the ED for vomiting and poor appetite.       Patient awake and alert but appears tired.  History obtained from son.      Patient was with home attendant yesterday when she had 2 episodes of vomiting.      After vomiting patient had refused transport to hospital    .  After home attendant left, patient slept and when she woke up stated she wanted to go to hospital.      At present she denies active nausea or abdominal pain, just says she does not feel well and has a headache, but then says "I always have a headache."  She denies dizziness or vertigo.  She denies chest pain or dyspnea.     Son reports poor appetite x1 week,  has barely eaten for the last week.   No cough noted at home.      She gets frequent UTIs.     on says that he has been unable to get her to take her home medications for months now.      At baseline she is bed and wheelchair bound, unable to walk due to deconditioning over time.        Review of Systems:  Review of Systems: REVIEW OF SYSTEMS:  GEN: no fever,    no chills  RESP: no SOB,   no cough  CVS: no chest pain,   no palpitations  GI: no abdominal pain,   no nausea,   no vomiting,   no constipation,   no diarrhea  : no dysuria,   no frequency  NEURO: no headache,   no dizziness  PSYCH: no depression,   not anxious  Derm : no rash      Allergies and Intolerances:        Allergies:  	No Known Allergies:     Home Medications:   * Patient Currently Takes Medications as of 15-Apr-2021 06:01 documented in Structured Notes  · 	polyethylene glycol 3350 oral powder for reconstitution: 17 gram(s) orally once a day  · 	senna oral tablet: 2 tab(s) orally once a day (at bedtime)  · 	aspirin 81 mg oral tablet: 1 tab(s) orally once a day  · 	simvastatin 20 mg oral tablet: 1 tab(s) orally once a day (at bedtime)  · 	amLODIPine 10 mg oral tablet: 1 tab(s) orally once a day    .    Patient History:    Tobacco Screening:  · Core Measure Site	Yes  · Has the patient used tobacco in the past 30 days?	No"  <End of quote from H&P>      ADDITIONAL HISTORY FROM ADMISSION H&P OF 9/29/20:  "History of Present Illness:   86 y/o female PMH MI CAD s/p CABG x 4, HTN, HLD and AAA, presents with one week history of nausea and now two days of vomiting , patient was recently discharged from  hospital after being treated for ARF and  bacteremia and sepsis 2/2 left 8.8 midureteral calculus and post obstructive ARF last month, patient underwent Cystourethroscopy with insertion of stent and UTI with Klebsiella pneumoniae, was sent home on oral antibiotics and they finished about two weeks ago."      ADDITIONAL HISTORY FROM PATIENT:     When asked about her droopy L eyelid she responds that it has been present for a "long time."  When asked if she walks on her own, she responds "no;" as to how long that has been she says "couple months, maybe a year, I don't know" [the exam finding of her feet being in rigid plantar flexion indicates they have been so for chronically; it is essentially impossible to walk on one's own in such a case].         EXAMINATION    Awake, alert.  Medium thin build.  Mild psychomotor retardation.  Responds that she is in a hospital, lives with her son, and that it is Tuesday (it's Thursday) April 14 or 15.  Soft spoken.  Grossly normal comprehension, expression, articulation, prosody.  Names all five fingers correctly.  Crosses the midline on verbal instruction (brief delay to select the correct hand and finger).  When asked to mimic examiner's double crossover posture she correctly overlays her arms but touches her shoulders with her hands instead of touching the facial targets with specific fingers.      Pupils 2mm (hard to tell if reactive).  Impaired adduction, elevation, depression of L eye.  Partial L ptosis; she can voluntarily partially elevate the upper L eyelid.  Otherwise normal facial movements; tongue protrudes in the midline.      No gross UE drift.  UE motor strength commensurate with age, sedentary life style, and habitus (senile asthenia).  LE weakness disproportionate to UE weakness, bit consistent with being (presumably) chronically bed bound.  She offers only minimal resistance when extending at the hips.  Wiggles toes weakly.  Feet are in rigid full plantar flexion.   No grossly evident lateralized or focal weakness, but neither can be ruled out in someone as weak as she is.      P and LT sensation grossly intact; no extinction on DSS.    Reflex                           Right    Left   Comment    Biceps                             1+      1+  Triceps                            0?       1+  Patellar                            0        0  Gastroc                    Not testable - frozen in plantar flexion  Plantar                         not evaluable    Poor foot hygiene; hypertrophic nails.         SELECTED TEST RESULTS    Hgb A1c 5.9%  B12/folate 1375/14.5 Oct 2020; 1882/11.6 in 2018  TPI neg Oct 2020  25 OH vit D 39.1 Oct 2020  TSH  5.09 now, 3.99 Oct 2020, 3.96 in 2018.       FT4 1.5; tot T3 88 in Oct 2020     History per Admission H&P    <Start of quote from H&P>  "Reason for Admission: FTT  History of Present Illness:     88 yr    	Patient presents to the ED for vomiting and poor appetite.       Patient awake and alert but appears tired.  History obtained from son.      Patient was with home attendant yesterday when she had 2 episodes of vomiting.      After vomiting patient had refused transport to hospital    .  After home attendant left, patient slept and when she woke up stated she wanted to go to hospital.      At present she denies active nausea or abdominal pain, just says she does not feel well and has a headache, but then says "I always have a headache."  She denies dizziness or vertigo.  She denies chest pain or dyspnea.     Son reports poor appetite x1 week,  has barely eaten for the last week.   No cough noted at home.      She gets frequent UTIs.     on says that he has been unable to get her to take her home medications for months now.      At baseline she is bed and wheelchair bound, unable to walk due to deconditioning over time.        Review of Systems:  Review of Systems: REVIEW OF SYSTEMS:  GEN: no fever,    no chills  RESP: no SOB,   no cough  CVS: no chest pain,   no palpitations  GI: no abdominal pain,   no nausea,   no vomiting,   no constipation,   no diarrhea  : no dysuria,   no frequency  NEURO: no headache,   no dizziness  PSYCH: no depression,   not anxious  Derm : no rash      Allergies and Intolerances:        Allergies:  	No Known Allergies:     Home Medications:   * Patient Currently Takes Medications as of 15-Apr-2021 06:01 documented in Structured Notes  · 	polyethylene glycol 3350 oral powder for reconstitution: 17 gram(s) orally once a day  · 	senna oral tablet: 2 tab(s) orally once a day (at bedtime)  · 	aspirin 81 mg oral tablet: 1 tab(s) orally once a day  · 	simvastatin 20 mg oral tablet: 1 tab(s) orally once a day (at bedtime)  · 	amLODIPine 10 mg oral tablet: 1 tab(s) orally once a day    .    Patient History:    Tobacco Screening:  · Core Measure Site	Yes  · Has the patient used tobacco in the past 30 days?	No"  <End of quote from H&P>      ADDITIONAL HISTORY FROM ADMISSION H&P OF 9/29/20:  "History of Present Illness:   86 y/o female PMH MI CAD s/p CABG x 4, HTN, HLD and AAA, presents with one week history of nausea and now two days of vomiting , patient was recently discharged from  hospital after being treated for ARF and  bacteremia and sepsis 2/2 left 8.8 midureteral calculus and post obstructive ARF last month, patient underwent Cystourethroscopy with insertion of stent and UTI with Klebsiella pneumoniae, was sent home on oral antibiotics and they finished about two weeks ago."      ADDITIONAL HISTORY FROM PATIENT:     When asked about her droopy L eyelid she responds that it has been present for a "long time."  When asked if she walks on her own, she responds "no;" as to how long that has been she says "couple months, maybe a year, I don't know" [the exam finding of her feet being in rigid plantar flexion indicates they have been so for chronically; it is essentially impossible to walk on one's own in such a case].         EXAMINATION    Awake, alert.  Medium thin build.  Mild psychomotor retardation.  Responds that she is in a hospital, lives with her son, and that it is Tuesday (it's Thursday) April 14 or 15.  Soft spoken.  Grossly normal comprehension, expression, articulation, prosody.  Names all five fingers correctly.  Crosses the midline on verbal instruction (brief delay to select the correct hand and finger).  When asked to mimic examiner's double crossover posture she correctly overlays her arms but touches her shoulders with her hands instead of touching the facial targets with specific fingers.      Pupils 2mm (hard to tell if reactive).  Impaired adduction, elevation, depression of L eye.  Partial L ptosis; she can voluntarily partially elevate the upper L eyelid.  Otherwise normal facial movements; tongue protrudes in the midline.      No gross UE drift.  UE motor strength commensurate with age, sedentary life style, and habitus (senile asthenia).  LE weakness disproportionate to UE weakness, bit consistent with being (presumably) chronically bed bound.  She offers only minimal resistance when extending at the hips.  Wiggles toes weakly.  Feet are in rigid full plantar flexion.   No grossly evident lateralized or focal weakness, but neither can be ruled out in someone as weak as she is.      P and LT sensation grossly intact; no extinction on DSS.    Reflex                           Right    Left   Comment    Biceps                             1+      1+  Triceps                            0?       1+  Patellar                            0        0  Gastroc                    Not testable - frozen in plantar flexion  Plantar                         not evaluable    Poor foot hygiene; hypertrophic nails.       FOR COMPARISON - per Dr. Charles's neurologic examination 9/29/20 (partial quote):    "Pupils round and reactive to light and accommodation.  Extraocular movements full.  Visual fields full (no homonymous hemianopsia).  Visual acuity wnl.  Facial symmetry intact.  Tongue midline.  Motor Functions:  Moves all extremities.        arm 3/5 legs weakNESS CARLY FOOT DROP  WASTING OF TIBIALIS ANTERIOR  WEAKNESS PLANTER FLEXION 3/5 "    SELECTED TEST RESULTS    Hgb A1c 5.9%  B12/folate 1375/14.5 Oct 2020; 1882/11.6 in 2018  TPI neg Oct 2020  25 OH vit D 39.1 Oct 2020  TSH  5.09 now, 3.99 Oct 2020, 3.96 in 2018.       FT4 1.5; tot T3 88 in Oct 2020     History per Admission H&P    <Start of quote from H&P>  "Reason for Admission: FTT  History of Present Illness:     88 yr    	Patient presents to the ED for vomiting and poor appetite.       Patient awake and alert but appears tired.  History obtained from son.      Patient was with home attendant yesterday when she had 2 episodes of vomiting.      After vomiting patient had refused transport to hospital    .  After home attendant left, patient slept and when she woke up stated she wanted to go to hospital.      At present she denies active nausea or abdominal pain, just says she does not feel well and has a headache, but then says "I always have a headache."  She denies dizziness or vertigo.  She denies chest pain or dyspnea.     Son reports poor appetite x1 week,  has barely eaten for the last week.   No cough noted at home.      She gets frequent UTIs.     on says that he has been unable to get her to take her home medications for months now.      At baseline she is bed and wheelchair bound, unable to walk due to deconditioning over time.        Review of Systems:  Review of Systems: REVIEW OF SYSTEMS:  GEN: no fever,    no chills  RESP: no SOB,   no cough  CVS: no chest pain,   no palpitations  GI: no abdominal pain,   no nausea,   no vomiting,   no constipation,   no diarrhea  : no dysuria,   no frequency  NEURO: no headache,   no dizziness  PSYCH: no depression,   not anxious  Derm : no rash      Allergies and Intolerances:        Allergies:  	No Known Allergies:     Home Medications:   * Patient Currently Takes Medications as of 15-Apr-2021 06:01 documented in Structured Notes  · 	polyethylene glycol 3350 oral powder for reconstitution: 17 gram(s) orally once a day  · 	senna oral tablet: 2 tab(s) orally once a day (at bedtime)  · 	aspirin 81 mg oral tablet: 1 tab(s) orally once a day  · 	simvastatin 20 mg oral tablet: 1 tab(s) orally once a day (at bedtime)  · 	amLODIPine 10 mg oral tablet: 1 tab(s) orally once a day    .    Patient History:    Tobacco Screening:  · Core Measure Site	Yes  · Has the patient used tobacco in the past 30 days?	No"  <End of quote from H&P>      ADDITIONAL HISTORY FROM ADMISSION H&P OF 9/29/20:  "History of Present Illness:   86 y/o female PMH MI CAD s/p CABG x 4, HTN, HLD and AAA, presents with one week history of nausea and now two days of vomiting , patient was recently discharged from  hospital after being treated for ARF and  bacteremia and sepsis 2/2 left 8.8 midureteral calculus and post obstructive ARF last month, patient underwent Cystourethroscopy with insertion of stent and UTI with Klebsiella pneumoniae, was sent home on oral antibiotics and they finished about two weeks ago."      ADDITIONAL HISTORY FROM PATIENT:     When asked about her droopy L eyelid she responds that it has been present for a "long time."  When asked if she walks on her own, she responds "no;" as to how long that has been she says "couple months, maybe a year, I don't know" [the exam finding of her feet being in rigid plantar flexion indicates they have been so for chronically; it is essentially impossible to walk on one's own in such a case].         EXAMINATION    Awake, alert.  Medium thin build.  Mild psychomotor retardation.  Responds that she is in a hospital, lives with her son, and that it is Tuesday (it's Thursday) April 14 or 15.  Soft spoken.  Grossly normal comprehension, expression, articulation, prosody.  Names all five fingers correctly.  Crosses the midline on verbal instruction (brief delay to select the correct hand and finger).  When asked to mimic examiner's double crossover posture she correctly overlays her arms but touches her shoulders with her hands instead of touching the facial targets with specific fingers.      Pupils 2mm (hard to tell if reactive).  Impaired adduction, elevation, depression of L eye.  Partial L ptosis; she can voluntarily partially elevate the upper L eyelid.  Otherwise normal facial movements; tongue protrudes in the midline.      No gross UE drift.  UE motor strength commensurate with age, sedentary life style, and habitus (senile asthenia).  LE weakness disproportionate to UE weakness, bit consistent with being (presumably) chronically bed bound.  She offers only minimal resistance when extending at the hips.  Wiggles toes weakly.  Feet are in rigid full plantar flexion.   No grossly evident lateralized or focal weakness, but neither can be ruled out in someone as weak as she is.      P and LT sensation grossly intact; no extinction on DSS.    Reflex                           Right    Left   Comment    Biceps                             1+      1+  Triceps                            0?       1+  Patellar                            0        0  Gastroc                    Not testable - frozen in plantar flexion  Plantar                         not evaluable    Poor foot hygiene; hypertrophic nails.       FOR COMPARISON - per Dr. Charles's neurologic examination 9/29/20 (partial quote):    "Pupils round and reactive to light and accommodation.  Extraocular movements full.  Visual fields full (no homonymous hemianopsia).  Visual acuity wnl.  Facial symmetry intact.  Tongue midline.  Motor Functions:  Moves all extremities.        arm 3/5 legs weakNESS CARLY FOOT DROP  WASTING OF TIBIALIS ANTERIOR  WEAKNESS PLANTER FLEXION 3/5 "    SELECTED TEST RESULTS    Hgb A1c 5.9%  B12/folate 1375/14.5 Oct 2020; 1882/11.6 in 2018  TPI neg Oct 2020  25 OH vit D 39.1 Oct 2020  TSH  5.09 now, 3.99 Oct 2020, 3.96 in 2018.       FT4 1.5; tot T3 88 in Oct 2020      Per radiology report of non-con head CT 4/15/21:    "COMPARISON EXAMINATION:  CT dated 9/30/2020.    FINDINGS:    There are small vessel ischemic changes in both hemispheres with atrophy. Also there is an area of possible subacute or chronic infarction in the left parietal cortex and subcortical white matter. No hematoma is noted.    There are hyperdense abnormalities in the cavernous carotid and medial temporal region bilaterally larger on the left. On the left, the lesion measures 2.5 cm in diameter. On the right the lesion measures 1.8 cm in diameter. Location and appearance suggests cavernous carotid artery aneurysms. Incidentally, a meningioma cannot be excluded on the left.    These were present, in retrospect, on the prior CT with no interval change.    No subdural or epidural hematoma is noted.    Ventricles are midline with ex vacuo enlargement.    No acute posterior fossa abnormality noted.    IMPRESSION:    1)  chronic ischemic changes in both hemispheres with atrophy. A recent and/or subacute left parietal infarct cannot be totally excluded. There is no hemorrhagic lesion.  2)  of note are large hyperdense lesions in the cavernous carotid and medial temporal region bilaterally, larger on the left. See above. The appearance suggests bilateral cavernous carotid artery aneurysms."       Per radiology report of subsequent CTAs of head and neck:    "AORTIC ARCH atherosclerotic changes are noted of the aortic arch. There is intimal thickening and plaque both calcified and noncalcified.  COMMON CAROTID ARTERIES: Bilaterally patent  RIGHT BIFURCATION: There is extensive calcified plaque in the bulb and proximal internal carotid artery but with only mild narrowing of the proximal internal carotid at the origin.  LEFT BIFURCATION: Calcified plaque is noted. There is narrowing of the external carotid origins with greater extent than the internal which is only mildly narrowed.  INTERNAL CAROTID ARTERIES: Bilaterally patent with severe tortuosity. Both internals meet in the midline in course retropharyngeal.  VERTEBRALS bilaterally patent with mild left-sided dominance  MISCELLANEOUS:  Chronic degenerative changes noted in the cervical spine      BRAIN   a dedicated brain CT report was submitted separately.    Cavernous carotid artery aneurysms are confirmed.    On the left, the cavernous carotid artery aneurysm extensively thrombosed. The aneurysm measures 2.9 x 2.1 cm, but the aneurysm is approximately 70-80% thrombosed. There is a residual central lumen medially, with the thrombosed component noted inferiorly and laterally. The thrombosed aneurysm corresponds to the hyperdense mass in the medial temporal region.    The right cavernous carotid artery aneurysm measures 1.6 x 1.5 cm. There is no visible thrombus.    Both supraclinoid carotid arteries are bilaterally patent without filling defects.    Both anterior cerebral arteries are widely patent.    Both middle cerebral arteries are widely patent.    Vertebrobasilar system is patent with severe tortuosity. There are atherosclerotic changes. 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    Posterior cerebral arteries are patent.    Venous sinuses are patent.      IMPRESSION:      1. The study confirms bilateral cavernous carotid artery aneurysms.  2. The left cavernous carotid artery aneurysm measures approximately 3 cm, but is extensively thrombosed. A residual patent lumen is noted medially, and the aneurysm appears to be approximate 75% thrombosed laterally and inferiorly. See above.  3. The right cavernous carotid artery aneurysm is not thrombosed and measures 1.5 x 1.6 cm.  4. 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 This measures approximately mm.  5. No large vessel occlusion or significant stenosis noted intracranially.  6. Atherosclerotic changes of both carotid bifurcations right greater than left but without significant stenosis. Severe tortuosity of both internal carotid arteries which course retropharyngeal."

## 2021-04-15 NOTE — CONSULT NOTE ADULT - SUBJECTIVE AND OBJECTIVE BOX
full consult dictated    Plan: Pt with h/o htn and elevated cholesterol. - admitted with n/v and decreased appetite.  Py reportedly had an episode of coffee ground emesis in ER. Pt is not anemic and stools tested neg for OB.  Pt denies any n/v; will advance diet and continue IV protonix. CBC in AM

## 2021-04-15 NOTE — ED PROVIDER NOTE - CLINICAL SUMMARY MEDICAL DECISION MAKING FREE TEXT BOX
Patient with vomiting and poor appetite at home.  VSS.  Pending labs, CXR, RVP, CT imaging, likely admission for failure to thrive.  Patient signed out to incoming physician Dr. Berry.  All decisions regarding the progression of care will be made at their discretion.

## 2021-04-15 NOTE — ED ADULT NURSE NOTE - ED STAT RN HANDOFF DETAILS
Report endorsed to oncoming RN. Safety checks completed this shift. Safety rounds completed. Fall & skin precautions in place. Any issues endorsed to oncoming RN for follow up. on cardiac monitor at bedside. awaiting IVF

## 2021-04-15 NOTE — ED PROVIDER NOTE - OBJECTIVE STATEMENT
Pertinent PMH/PSH/FHx/SHx and Review of Systems contained within:  Patient presents to the ED for vomiting and poor appetite.  Patient awake and alert but appears tired.  History obtained from son.  Patient was with home attendant yesterday when she had 2 episodes of vomiting.  After vomiting patient had refused transport to hospital.  After home attendant left, patient slept and when she woke up stated she wanted to go to hospital.  At present she denies active nausea or abdominal pain, just says she does not feel well and has a headache, but then says "I always have a headache."  She denies dizziness or vertigo.  She denies chest pain or dyspnea. Son reports poor appetite x1 week, patient did not eat after vomiting today, has barely eaten for the last week.   No cough noted at home.  She gets frequent UTIs.  Son says that he has been unable to get her to take her home medications for months now.  At baseline she is bed and wheelchair bound, unable to walk due to deconditioning over time.     ROS: No fever/chills, No photophobia/eye pain/changes in vision, No ear pain/sore throat/dysphagia, No chest pain/palpitations, no SOB/cough/wheeze/stridor, No active abdominal pain, No active N/V/D/melena, no dysuria/frequency/discharge, No neck/back pain, no rash, no changes in neurological status/function.

## 2021-04-15 NOTE — ED PROVIDER NOTE - PROGRESS NOTE DETAILS
Pt was seen and treated by Dr. Amos c/o failure to thrive not eating since yesterday. breath sounds equal but + rales right side, abd is soft nontender to palp. repeat bp 182/100 hr 76 pox 96 %. CT head/abd/pelvis are pending. Pt just vomited coffee ground vomitus 100 cc stool brown sample is sent. Pt has not had coffee ground vomiting now. Dr. Matthew is notified and admit pt.

## 2021-04-15 NOTE — H&P ADULT - HISTORY OF PRESENT ILLNESS
88 yr    	Patient presents to the ED for vomiting and poor appetite.       Patient awake and alert but appears tired.  History obtained from son.      Patient was with home attendant yesterday when she had 2 episodes of vomiting.      After vomiting patient had refused transport to hospital    .  After home attendant left, patient slept and when she woke up stated she wanted to go to hospital.      At present she denies active nausea or abdominal pain, just says she does not feel well and has a headache, but then says "I always have a headache."  She denies dizziness or vertigo.  She denies chest pain or dyspnea.     Son reports poor appetite x1 week,  has barely eaten for the last week.   No cough noted at home.      She gets frequent UTIs.     on says that he has been unable to get her to take her home medications for months now.      At baseline she is bed and wheelchair bound, unable to walk due to deconditioning over time.

## 2021-04-15 NOTE — ED ADULT NURSE REASSESSMENT NOTE - NS ED NURSE REASSESS COMMENT FT1
Patient vomited coffee-ground emesis (200 mL). NG tube insertion attempted by RN and MD Berry , but unable to insert due to coiling of tube. Patient is now in [place] to prevent aspiration, medicated for nausea with Zofran and Protonix.

## 2021-04-16 NOTE — PROGRESS NOTE ADULT - SUBJECTIVE AND OBJECTIVE BOX
Patient: MITCHEL MULLINS [id number] 88y Female                            Hospitalist Attending Note    No complaints.  No vomiting.    No weakness / fatigue.       ____________________PHYSICAL EXAM:  GENERAL:  NAD Alert and Oriented x 3   HEENT: NCAT  PERRL  CARDIOVASCULAR:  S1, S2  LUNGS: CTAB  ABDOMEN:  soft, (-) tenderness, (-) distension, (+) bowel sounds, (-) guarding, (-) rebound (-) rigidity  EXTREMITIES:  no cyanosis / clubbing / edema.   NEURO: L ptosis.    ____________________     VITALS:  Vital Signs Last 24 Hrs  T(C): 36.4 (2021 10:03), Max: 36.9 (15 Apr 2021 17:19)  T(F): 97.6 (2021 10:03), Max: 98.4 (15 Apr 2021 17:19)  HR: 69 (2021 10:03) (66 - 77)  BP: 110/64 (2021 10:03) (110/64 - 161/91)  BP(mean): --  RR: 16 (2021 10:03) (16 - 18)  SpO2: 93% (2021 10:03) (93% - 100%) Daily Height in cm: 162.56 (15 Apr 2021 15:00)    Daily Weight in k.4 (2021 05:07)  CAPILLARY BLOOD GLUCOSE        I&O's Summary    15 Apr 2021 07:01  -  2021 07:00  --------------------------------------------------------  IN: 540 mL / OUT: 0 mL / NET: 540 mL        HISTORY:  PAST MEDICAL & SURGICAL HISTORY:  Benign essential HTN    HLD (hyperlipidemia)    Myocardial infarction    AAA (abdominal aortic aneurysm)    CAD (coronary artery disease)    S/P CABG x 4    Allergies    No Known Allergies    Intolerances       LABS:                        12.4   10.02 )-----------( 132      ( 2021 07:39 )             38.9       Culture - Blood (collected 15 Apr 2021 11:04)  Source: .Blood Blood-Peripheral  Preliminary Report (2021 12:01):    No growth to date.    Culture - Blood (collected 15 Apr 2021 11:04)  Source: .Blood Blood-Peripheral  Preliminary Report (2021 12:01):    No growth to date.    04-    142  |  108  |  23  ----------------------------<  106<H>  3.0<L>   |  24  |  0.99    Ca    7.5<L>      2021 07:39  Mg     2.2     04-15    TPro  9.4<H>  /  Alb  4.0  /  TBili  0.8  /  DBili  x   /  AST  41<H>  /  ALT  17  /  AlkPhos  91  04-15    PT/INR - ( 15 Apr 2021 07:13 )   PT: 13.1 sec;   INR: 1.14 ratio         PTT - ( 15 Apr 2021 07:13 )  PTT:21.5 sec  LIVER FUNCTIONS - ( 15 Apr 2021 07:13 )  Alb: 4.0 g/dL / Pro: 9.4 gm/dL / ALK PHOS: 91 U/L / ALT: 17 U/L / AST: 41 U/L / GGT: x           Urinalysis Basic - ( 15 Apr 2021 08:57 )    Color: Yellow / Appearance: Clear / S.020 / pH: x  Gluc: x / Ketone: Trace  / Bili: Negative / Urobili: Negative mg/dL   Blood: x / Protein: 500 mg/dL / Nitrite: Negative   Leuk Esterase: Negative / RBC: 6-10 /HPF / WBC 3-5   Sq Epi: x / Non Sq Epi: Few / Bacteria: Few      CARDIAC MARKERS ( 15 Apr 2021 07:13 )  .029 ng/mL / x     / x     / x     / x          Culture - Blood (collected 15 Apr 2021 11:04)  Source: .Blood Blood-Peripheral  Preliminary Report (2021 12:01):    No growth to date.    Culture - Blood (collected 15 Apr 2021 11:04)  Source: .Blood Blood-Peripheral  Preliminary Report (2021 12:01):    No growth to date.          MEDICATIONS:  MEDICATIONS  (STANDING):  amLODIPine   Tablet 10 milliGRAM(s) Oral daily  aspirin  chewable 81 milliGRAM(s) Oral daily  heparin   Injectable 5000 Unit(s) SubCutaneous every 12 hours  levothyroxine 25 MICROGram(s) Oral daily  mupirocin 2% Ointment 1 Application(s) Topical two times a day  pantoprazole   Suspension 40 milliGRAM(s) Oral daily  polyethylene glycol 3350 17 Gram(s) Oral daily  potassium chloride   Powder 40 milliEquivalent(s) Oral every 4 hours  senna 2 Tablet(s) Oral at bedtime  simvastatin 20 milliGRAM(s) Oral at bedtime  sodium chloride 0.9%. 1000 milliLiter(s) (75 mL/Hr) IV Continuous <Continuous>    MEDICATIONS  (PRN):

## 2021-04-16 NOTE — DIETITIAN INITIAL EVALUATION ADULT. - PERTINENT MEDS FT
MEDICATIONS  (STANDING):  amLODIPine   Tablet 10 milliGRAM(s) Oral daily  aspirin  chewable 81 milliGRAM(s) Oral daily  heparin   Injectable 5000 Unit(s) SubCutaneous every 12 hours  levothyroxine 25 MICROGram(s) Oral daily  mupirocin 2% Ointment 1 Application(s) Topical two times a day  pantoprazole   Suspension 40 milliGRAM(s) Oral daily  polyethylene glycol 3350 17 Gram(s) Oral daily  potassium chloride   Powder 40 milliEquivalent(s) Oral every 4 hours  senna 2 Tablet(s) Oral at bedtime  simvastatin 20 milliGRAM(s) Oral at bedtime  sodium chloride 0.9%. 1000 milliLiter(s) (75 mL/Hr) IV Continuous <Continuous>    MEDICATIONS  (PRN):

## 2021-04-16 NOTE — PROGRESS NOTE ADULT - ASSESSMENT
87yo F PMH AAA repair in 2012, CAD, MI, s/p CABG x 4, in 2007, HTN, HLD, s/p PPM, bedbound x 3 years, admitted for coffee ground emesis and ? slurred speech.  CT head showed chronic ischemic changes in both hemispheres with atrophy.  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  B/l carotid artery aneurysms.  CT     # Dysarthria, L ptosis - Pt reports facial weakness is chronic.  Neurology input appreciated.  CT neck.  Pt unable to have MRI.  Continue ASA, Statin.    # Vomiting, Coffee Ground Emesis - Hgb now 12.4.  Monitor H/H.  Seen by GI.  Continue PPI.  Advance diet. Monitor CBC.   # Subcentimeter Renal Densities - seen on CT.  Discussed outpatient f/u with urology.  # B/l Cavernous Carotid Artery Aneurysms - seen by neuro.  Appear to be thrombosed.  No surgical indication at present.  Outpatient neuro followup.    # CAD - continue ASA, Statin.   # Essential HTN - Monitor BP.  Continue oral antihypertensives.  # Acquired Hypothyroidism - continue Synthroid.  # Functional Quadriplegia - supportive care.   # Constipation - continue laxatives.   # Moderate Protein Calorie Malnutrition - continue supplements per nutrition.  # DVT Prophylaxis - Lower extremity intermittent compression devices.

## 2021-04-16 NOTE — PROGRESS NOTE ADULT - SUBJECTIVE AND OBJECTIVE BOX
Pt stable - no n/v; no bleeding      MEDICATIONS  (STANDING):  amLODIPine   Tablet 10 milliGRAM(s) Oral daily  aspirin  chewable 81 milliGRAM(s) Oral daily  heparin   Injectable 5000 Unit(s) SubCutaneous every 12 hours  levothyroxine 25 MICROGram(s) Oral daily  mupirocin 2% Ointment 1 Application(s) Topical two times a day  pantoprazole  Injectable 40 milliGRAM(s) IV Push daily  polyethylene glycol 3350 17 Gram(s) Oral daily  potassium chloride   Powder 40 milliEquivalent(s) Oral every 4 hours  senna 2 Tablet(s) Oral at bedtime  simvastatin 20 milliGRAM(s) Oral at bedtime  sodium chloride 0.9%. 1000 milliLiter(s) (75 mL/Hr) IV Continuous <Continuous>    MEDICATIONS  (PRN):      Allergies    No Known Allergies    Intolerances        Vital Signs Last 24 Hrs  T(C): 36.3 (16 Apr 2021 05:07), Max: 36.9 (15 Apr 2021 17:19)  T(F): 97.4 (16 Apr 2021 05:07), Max: 98.4 (15 Apr 2021 17:19)  HR: 69 (16 Apr 2021 05:07) (66 - 84)  BP: 130/77 (16 Apr 2021 05:07) (130/77 - 169/87)  BP(mean): --  RR: 18 (16 Apr 2021 05:07) (14 - 18)  SpO2: 100% (16 Apr 2021 05:07) (96% - 100%)    PHYSICAL EXAM:  General: NAD.  CVS: S1, S2  Chest: air entry bilaterally present  Abd: BS present, soft, non-tender      LABS:                        12.4   10.02 )-----------( 132      ( 16 Apr 2021 07:39 )             38.9     04-16    142  |  108  |  23  ----------------------------<  106<H>  3.0<L>   |  24  |  0.99    Ca    7.5<L>      16 Apr 2021 07:39  Mg     2.2     04-15    TPro  9.4<H>  /  Alb  4.0  /  TBili  0.8  /  DBili  x   /  AST  41<H>  /  ALT  17  /  AlkPhos  91  04-15    PT/INR - ( 15 Apr 2021 07:13 )   PT: 13.1 sec;   INR: 1.14 ratio         PTT - ( 15 Apr 2021 07:13 )  PTT:21.5 sec      H/H stable  change protonix to PO  advance diet as tolerated

## 2021-04-16 NOTE — DIETITIAN INITIAL EVALUATION ADULT. - OTHER INFO
Pt adm w/FTT, per chart pt has poor appetite x 1 week PTA. Met w/pt at bedside, pt seems slightly confused & forgetful at time of visit. Pt reports decreased appetite and intake since PTA. Unable to any specific reason for same. States she usually eats 2-3 meals/day prepared by her son or HHA. Pt reports eating regular foods, no specific diet followed. States she usually takes Ensure shake x 1/day. PTA pt had 2 episodes of vomiting. Currently, pt denies N/V or diarrhea/constipation. Pt denies difficulty chewing/swallowing. Pt seems unsure of UBW, states, "around 150-155#". Pt receptive to offer of PO supplement. Encouraged pt to gradually increase PO intake, as able. Diet education not warranted at present due to cognitive limitations.

## 2021-04-17 NOTE — CHART NOTE - NSCHARTNOTEFT_GEN_A_CORE
Repeat Hgb 9.1  Since 4/15 - 4/17 Hgb 15.7 -> 14.5 -> 12.4 -> 9.5 -> 9.1  No occult bleeding noted.  Will stop ASA, Heparin subcut.  NPO.  Serial H/H - next hgb 9pm.  Possible need for transfusion d/w pt, fidelina Grossman at [phone], in agreement.

## 2021-04-17 NOTE — PROGRESS NOTE ADULT - SUBJECTIVE AND OBJECTIVE BOX
Patient: MITCHEL MULLINS [id number] 88y Female                            Hospitalist Attending Note    No complaints.  No vomiting.    No weakness / fatigue.   Drop in h/h noted.     ____________________PHYSICAL EXAM:  GENERAL:  NAD Alert and Oriented x 3   HEENT: NCAT  PERRL  CARDIOVASCULAR:  S1, S2  LUNGS: CTAB  ABDOMEN:  soft, (-) tenderness, (-) distension, (+) bowel sounds, (-) guarding, (-) rebound (-) rigidity  EXTREMITIES:  no cyanosis / clubbing / edema.   NEURO: L ptosis.    ____________________    VITALS:  Vital Signs Last 24 Hrs  T(C): 36.4 (2021 10:50), Max: 37.1 (2021 04:57)  T(F): 97.5 (2021 10:50), Max: 98.7 (2021 04:57)  HR: 74 (2021 10:50) (68 - 74)  BP: 108/62 (2021 10:50) (108/62 - 130/68)  BP(mean): --  RR: 17 (2021 10:50) (17 - 18)  SpO2: 95% (2021 10:50) (95% - 97%) Daily     Daily Weight in k.1 (2021 04:57)  CAPILLARY BLOOD GLUCOSE        I&O's Summary    2021 07:  -  2021 07:00  --------------------------------------------------------  IN: 2580 mL / OUT: 551 mL / NET: 2029 mL    2021 07:01  -  2021 14:13  --------------------------------------------------------  IN: 237 mL / OUT: 0 mL / NET: 237 mL        LABS:                        9.5    7.66  )-----------( 121      ( 2021 07:16 )             30.2     04-17    141  |  110<H>  |  24<H>  ----------------------------<  75  3.3<L>   |  25  |  0.95    Ca    7.5<L>      2021 07:16                  Culture - Blood (collected 15 Apr 2021 11:04)  Source: .Blood Blood-Peripheral  Preliminary Report (2021 12:01):    No growth to date.    Culture - Blood (collected 15 Apr 2021 11:04)  Source: .Blood Blood-Peripheral  Gram Stain (2021 11:40):    Growth in aerobic bottle: Gram Positive Cocci in Clusters  Final Report (2021 11:40):    Growth in aerobic bottle: Staphylococcus epidermidis    Coag Negative Staphylococcus    Single set isolate, possible contaminant. Contact    Microbiology if susceptibility testing clinically    indicated.    ***Blood Panel PCR results on this specimen are available    approximately 3 hours after the Gram stain result.***    Gram stain, PCR, and/or culture results may not always    correspond due to difference in methodologies.    ************************************************************    This PCR assay was performed by multiplex PCR. This    Assay tests for 66 bacterial and resistance gene targets.    Please refer to the [facility] test directory    at [email or web address] for details.  Organism: Blood Culture PCR (2021 11:40)  Organism: Blood Culture PCR (2021 11:40)    Culture - Urine (collected 15 Apr 2021 11:02)  Source: .Urine Clean Catch (Midstream)  Final Report (2021 16:49):    >=3 organisms. Probable collection contamination.        MEDICATIONS:  acetaminophen   Tablet .. 650 milliGRAM(s) Oral every 6 hours PRN  amLODIPine   Tablet 10 milliGRAM(s) Oral daily  ampicillin/sulbactam  IVPB 3 Gram(s) IV Intermittent every 6 hours  ampicillin/sulbactam  IVPB      aspirin  chewable 81 milliGRAM(s) Oral daily  heparin   Injectable 5000 Unit(s) SubCutaneous every 12 hours  levothyroxine 25 MICROGram(s) Oral daily  mupirocin 2% Ointment 1 Application(s) Topical two times a day  pantoprazole   Suspension 40 milliGRAM(s) Oral daily  polyethylene glycol 3350 17 Gram(s) Oral daily  potassium chloride    Tablet ER 40 milliEquivalent(s) Oral every 4 hours  senna 2 Tablet(s) Oral at bedtime  simvastatin 20 milliGRAM(s) Oral at bedtime  sodium chloride 0.9%. 1000 milliLiter(s) IV Continuous <Continuous>

## 2021-04-17 NOTE — PROGRESS NOTE ADULT - ASSESSMENT
89yo F PMH AAA repair in 2012, CAD, MI, s/p CABG x 4, in 2007, HTN, HLD, s/p PPM, bedbound x 3 years, admitted for coffee ground emesis and ? slurred speech.  CT head showed chronic ischemic changes in both hemispheres with atrophy.  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  B/l carotid artery aneurysms.  CT Abd / Pelvis showed No bowel obstruction. Colonic diverticulosis without CT evidence of acute diverticulitis. Rectum distended by stool.        # Vomiting, Coffee Ground Emesis - Hgb this am 9.5.  No s/s of acute bleeding.  Repeat H/H.  GI has been following.  Continue PPI.  Advance diet. Monitor CBC.   # Positive Blood cultures - Started on antibiotics as one of 4/15 blood cultures were positive, but now growing staph epidermidis.  No s/s of active infection.  Will stop antibiotics and observe.  Repeat Bcx.  LIkely contaminant.   # Dysarthria, L ptosis - Pt reports facial weakness is chronic.  Neurology input appreciated.  CT neck.  Pt unable to have MRI.  Continue ASA, Statin.    # Subcentimeter Renal Densities - seen on CT.  Discussed outpatient f/u with urology.  # B/l Cavernous Carotid Artery Aneurysms - seen by neuro.  Appear to be thrombosed.  No surgical indication at present.  Outpatient neuro followup.    # CAD - continue ASA, Statin.   # Essential HTN - Monitor BP.  Continue oral antihypertensives.  # Acquired Hypothyroidism - continue Synthroid.  # Functional Quadriplegia - supportive care.   # Constipation - continue laxatives.   # Moderate Protein Calorie Malnutrition - continue supplements per nutrition.  # DVT Prophylaxis - Lower extremity intermittent compression devices.

## 2021-04-17 NOTE — PROGRESS NOTE ADULT - SUBJECTIVE AND OBJECTIVE BOX
Pt feeling better but H/H continues downward trend  No n/v  Hgb 9.1    MEDICATIONS  (STANDING):  amLODIPine   Tablet 10 milliGRAM(s) Oral daily  levothyroxine 25 MICROGram(s) Oral daily  mupirocin 2% Ointment 1 Application(s) Topical two times a day  pantoprazole  Injectable 40 milliGRAM(s) IV Push every 12 hours  polyethylene glycol 3350 17 Gram(s) Oral daily  potassium chloride    Tablet ER 40 milliEquivalent(s) Oral every 4 hours  senna 2 Tablet(s) Oral at bedtime  simvastatin 20 milliGRAM(s) Oral at bedtime    MEDICATIONS  (PRN):  acetaminophen   Tablet .. 650 milliGRAM(s) Oral every 6 hours PRN Temp greater or equal to 38C (100.4F), Mild Pain (1 - 3)      Allergies    No Known Allergies    Intolerances        Vital Signs Last 24 Hrs  T(C): 36.7 (17 Apr 2021 16:29), Max: 37.1 (17 Apr 2021 04:57)  T(F): 98 (17 Apr 2021 16:29), Max: 98.7 (17 Apr 2021 04:57)  HR: 65 (17 Apr 2021 16:29) (65 - 74)  BP: 108/70 (17 Apr 2021 16:29) (108/62 - 130/68)  BP(mean): --  RR: 18 (17 Apr 2021 16:29) (17 - 18)  SpO2: 98% (17 Apr 2021 16:29) (95% - 98%)    PHYSICAL EXAM:  General: NAD.  CVS: S1, S2  Chest: air entry bilaterally present  Abd: BS present, soft, non-tender      LABS:                        9.1    x     )-----------( x        ( 17 Apr 2021 15:28 )             27.8     04-17    141  |  110<H>  |  24<H>  ----------------------------<  75  3.3<L>   |  25  |  0.95    Ca    7.5<L>      17 Apr 2021 07:16        ASA and heparin d/c'd  continue protonix  CBC in AM

## 2021-04-18 NOTE — PROGRESS NOTE ADULT - SUBJECTIVE AND OBJECTIVE BOX
Patient: MITCHEL MULLINS [id number] 88y Female                            Hospitalist Attending Note    No complaints.  No vomiting.    No weakness / fatigue.   No bleeding reported.     ____________________PHYSICAL EXAM:  GENERAL:  NAD Alert and Oriented x 3   HEENT: NCAT  PERRL  CARDIOVASCULAR:  S1, S2  LUNGS: CTAB  ABDOMEN:  soft, (-) tenderness, (-) distension, (+) bowel sounds, (-) guarding, (-) rebound (-) rigidity  EXTREMITIES:  no cyanosis / clubbing / edema.   NEURO: L ptosis.    ____________________    VITALS:  Vital Signs Last 24 Hrs  T(C): 36.6 (2021 11:18), Max: 36.8 (2021 23:54)  T(F): 97.9 (2021 11:18), Max: 98.2 (2021 23:54)  HR: 76 (2021 11:18) (65 - 81)  BP: 117/68 (2021 11:18) (108/70 - 122/74)  BP(mean): --  RR: 18 (2021 11:18) (18 - 18)  SpO2: 97% (2021 11:18) (97% - 99%) Daily     Daily Weight in k (2021 04:44)  CAPILLARY BLOOD GLUCOSE        I&O's Summary    2021 07:01  -  2021 07:00  --------------------------------------------------------  IN: 1777 mL / OUT: 600 mL / NET: 1177 mL        LABS:                        8.9    8.30  )-----------( 134      ( 2021 07:58 )             28.8     04-    141  |  110<H>  |  24<H>  ----------------------------<  75  3.3<L>   |  25  |  0.95    Ca    7.5<L>      2021 07:16                    MEDICATIONS:  acetaminophen   Tablet .. 650 milliGRAM(s) Oral every 6 hours PRN  amLODIPine   Tablet 10 milliGRAM(s) Oral daily  levothyroxine 25 MICROGram(s) Oral daily  mupirocin 2% Ointment 1 Application(s) Topical two times a day  pantoprazole  Injectable 40 milliGRAM(s) IV Push every 12 hours  polyethylene glycol 3350 17 Gram(s) Oral daily  senna 2 Tablet(s) Oral at bedtime  simvastatin 20 milliGRAM(s) Oral at bedtime

## 2021-04-18 NOTE — PROGRESS NOTE ADULT - ASSESSMENT
87yo F PMH AAA repair in 2012, CAD, MI, s/p CABG x 4, in 2007, HTN, HLD, s/p PPM, bedbound x 3 years, admitted for coffee ground emesis and ? slurred speech.  CT head showed chronic ischemic changes in both hemispheres with atrophy.  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  B/l carotid artery aneurysms.  CT Abd / Pelvis showed No bowel obstruction. Colonic diverticulosis without CT evidence of acute diverticulitis. Rectum distended by stool.        # Acute Anemia - h/o Coffee Ground Emesis - Hgb appears to be stabilizing.  BUN not significantly changed.  Recent CT A/P with contrast not suggestive of RP bleed.  Stool OB negative, however pt constipated.  Monitor H/H.  Laxatives.  D/w GI.  Holding ASA, Heparin.  If repeat Hgb stable, will advance diet.   # Positive Blood cultures - Started on antibiotics as one of 4/15 blood cultures were positive, but now growing staph epidermidis.  No s/s of active infection.  Will stop antibiotics and observe.  Repeat Bcx.  LIkely contaminant.   # Dysarthria, L ptosis - Pt reports facial weakness is chronic.  Neurology input appreciated.  CT neck.  Pt unable to have MRI.  Continue ASA, Statin.    # Subcentimeter Renal Densities - seen on CT.  Discussed outpatient f/u with urology.  # B/l Cavernous Carotid Artery Aneurysms - seen by neuro.  Appear to be thrombosed.  No surgical indication at present.  Outpatient neuro followup.    # CAD - continue ASA, Statin.   # Essential HTN - Monitor BP.  Continue oral antihypertensives.  # Acquired Hypothyroidism - continue Synthroid.  # Functional Quadriplegia - supportive care.   # Constipation - continue laxatives.   # Moderate Protein Calorie Malnutrition - continue supplements per nutrition.  # DVT Prophylaxis - Lower extremity intermittent compression devices.

## 2021-04-18 NOTE — PROGRESS NOTE ADULT - SUBJECTIVE AND OBJECTIVE BOX
No n/v  no bleeding  H/H stable      MEDICATIONS  (STANDING):  amLODIPine   Tablet 10 milliGRAM(s) Oral daily  levothyroxine 25 MICROGram(s) Oral daily  mupirocin 2% Ointment 1 Application(s) Topical two times a day  pantoprazole  Injectable 40 milliGRAM(s) IV Push every 12 hours  polyethylene glycol 3350 17 Gram(s) Oral daily  senna 2 Tablet(s) Oral at bedtime  simvastatin 20 milliGRAM(s) Oral at bedtime  sodium chloride 0.45% with potassium chloride 20 mEq/L 1000 milliLiter(s) (75 mL/Hr) IV Continuous <Continuous>    MEDICATIONS  (PRN):  acetaminophen   Tablet .. 650 milliGRAM(s) Oral every 6 hours PRN Temp greater or equal to 38C (100.4F), Mild Pain (1 - 3)      Allergies    No Known Allergies    Intolerances        Vital Signs Last 24 Hrs  T(C): 36.9 (18 Apr 2021 16:09), Max: 36.9 (18 Apr 2021 16:09)  T(F): 98.5 (18 Apr 2021 16:09), Max: 98.5 (18 Apr 2021 16:09)  HR: 82 (18 Apr 2021 16:09) (70 - 82)  BP: 106/67 (18 Apr 2021 16:09) (106/67 - 122/74)  BP(mean): --  RR: 17 (18 Apr 2021 16:09) (17 - 18)  SpO2: 97% (18 Apr 2021 16:09) (97% - 99%)    PHYSICAL EXAM:  General: NAD.  CVS: S1, S2  Chest: air entry bilaterally present  Abd: BS present, soft, non-tender      LABS:                        9.2    x     )-----------( x        ( 18 Apr 2021 17:41 )             28.6     04-17    141  |  110<H>  |  24<H>  ----------------------------<  75  3.3<L>   |  25  |  0.95    Ca    7.5<L>      17 Apr 2021 07:16        will discuss EGD with pts family  follow CBC  IV protonix

## 2021-04-19 NOTE — PROGRESS NOTE ADULT - ASSESSMENT
87yo F PMH AAA repair in 2012, CAD, MI, s/p CABG x 4, in 2007, HTN, HLD, s/p PPM, bedbound x 3 years, admitted for coffee ground emesis and ? slurred speech.  CT head showed chronic ischemic changes in both hemispheres with atrophy.  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  B/l carotid artery aneurysms.  CT Abd / Pelvis showed No bowel obstruction. Colonic diverticulosis without CT evidence of acute diverticulitis. Rectum distended by stool.        # Acute Anemia - with Coffee Ground Emesis on admission - Hgb now appears to be stable.  BUN not significantly changed.  Recent CT A/P with contrast not suggestive of RP bleed.  Stool OB negative.  Monitor H/H.  Laxatives.  Advance diet.  Holding ASA, Heparin.  EGD per GI.   # Positive Blood cultures - Growing staph epidermidis.  Repeat BCx negative.  Off antibiotics.    # Dysarthria, L ptosis - Pt reports facial weakness is chronic.  Neurology input appreciated.  CT neck.  Pt unable to have MRI.  Continue ASA, Statin.    # Subcentimeter Renal Densities - seen on CT.  Discussed outpatient f/u with urology.  # B/l Cavernous Carotid Artery Aneurysms - seen by neuro.  Appear to be thrombosed.  No surgical indication at present.  Outpatient neuro followup.    # CAD - continue ASA, Statin.   # Essential HTN - Monitor BP.  Continue oral antihypertensives.  # Acquired Hypothyroidism - continue Synthroid.  # Functional Quadriplegia - supportive care.   # Constipation - continue laxatives.   # Moderate Protein Calorie Malnutrition - continue supplements per nutrition.  # DVT Prophylaxis - Lower extremity intermittent compression devices.

## 2021-04-19 NOTE — PROGRESS NOTE ADULT - SUBJECTIVE AND OBJECTIVE BOX
Patient: MITCHEL MULLINS [id number] 88y Female                            Hospitalist Attending Note    RN reports pt with large watery dark BM, stool OB negative.    No overt bleeding.  No Abdominal pain, nausea, vomiting, diarrhea, nor constipation.   No weakness / fatigue.     ____________________PHYSICAL EXAM:  GENERAL:  NAD Alert and Oriented x 3   HEENT: NCAT  PERRL  CARDIOVASCULAR:  S1, S2  LUNGS: CTAB  ABDOMEN:  soft, (-) tenderness, (-) distension, (+) bowel sounds, (-) guarding, (-) rebound (-) rigidity  EXTREMITIES:  no cyanosis / clubbing / edema.   NEURO: L ptosis.    ____________________    VITALS:  Vital Signs Last 24 Hrs  T(C): 36.8 (2021 10:26), Max: 36.9 (2021 16:09)  T(F): 98.3 (2021 10:26), Max: 98.5 (2021 16:09)  HR: 85 (2021 10:26) (74 - 85)  BP: 133/70 (2021 10:26) (106/67 - 133/70)  BP(mean): --  RR: 18 (2021 10:26) (17 - 19)  SpO2: 96% (2021 10:26) (96% - 98%) Daily     Daily Weight in k.8 (2021 04:37)  CAPILLARY BLOOD GLUCOSE        I&O's Summary    2021 07:01  -  2021 07:00  --------------------------------------------------------  IN: 0 mL / OUT: 1000 mL / NET: -1000 mL        LABS:                        9.5    11.81 )-----------( 164      ( 2021 07:52 )             30.0     04-19    139  |  107  |  16  ----------------------------<  57<L>  4.0   |  23  |  0.73    Ca    7.5<L>      2021 07:52                  Culture - Blood (collected 2021 14:56)  Source: .Blood Blood  Preliminary Report (2021 15:01):    No growth to date.    Culture - Blood (collected 2021 14:56)  Source: .Blood Blood  Preliminary Report (2021 15:01):    No growth to date.        MEDICATIONS:  acetaminophen   Tablet .. 650 milliGRAM(s) Oral every 6 hours PRN  amLODIPine   Tablet 10 milliGRAM(s) Oral daily  levothyroxine 25 MICROGram(s) Oral daily  mupirocin 2% Ointment 1 Application(s) Topical two times a day  pantoprazole  Injectable 40 milliGRAM(s) IV Push every 12 hours  polyethylene glycol 3350 17 Gram(s) Oral two times a day  senna 2 Tablet(s) Oral at bedtime  simvastatin 20 milliGRAM(s) Oral at bedtime  sodium chloride 0.45% with potassium chloride 20 mEq/L 1000 milliLiter(s) IV Continuous <Continuous>     Negative

## 2021-04-19 NOTE — PROGRESS NOTE ADULT - SUBJECTIVE AND OBJECTIVE BOX
No bleeding   stool neg for OB      MEDICATIONS  (STANDING):  amLODIPine   Tablet 10 milliGRAM(s) Oral daily  levothyroxine 25 MICROGram(s) Oral daily  mupirocin 2% Ointment 1 Application(s) Topical two times a day  pantoprazole  Injectable 40 milliGRAM(s) IV Push every 12 hours  polyethylene glycol 3350 17 Gram(s) Oral two times a day  senna 2 Tablet(s) Oral at bedtime  simvastatin 20 milliGRAM(s) Oral at bedtime  sodium chloride 0.45% with potassium chloride 20 mEq/L 1000 milliLiter(s) (75 mL/Hr) IV Continuous <Continuous>    MEDICATIONS  (PRN):  acetaminophen   Tablet .. 650 milliGRAM(s) Oral every 6 hours PRN Temp greater or equal to 38C (100.4F), Mild Pain (1 - 3)      Allergies    No Known Allergies    Intolerances        Vital Signs Last 24 Hrs  T(C): 36.8 (19 Apr 2021 10:26), Max: 36.9 (18 Apr 2021 16:09)  T(F): 98.3 (19 Apr 2021 10:26), Max: 98.5 (18 Apr 2021 16:09)  HR: 85 (19 Apr 2021 10:26) (74 - 85)  BP: 133/70 (19 Apr 2021 10:26) (106/67 - 133/70)  BP(mean): --  RR: 18 (19 Apr 2021 10:26) (17 - 19)  SpO2: 96% (19 Apr 2021 10:26) (96% - 98%)    PHYSICAL EXAM:  General: NAD.  CVS: S1, S2  Chest: air entry bilaterally present  Abd: BS present, soft, non-tender      LABS:                        9.5    11.81 )-----------( 164      ( 19 Apr 2021 07:52 )             30.0     04-19    139  |  107  |  16  ----------------------------<  57<L>  4.0   |  23  |  0.73    Ca    7.5<L>      19 Apr 2021 07:52          change protonix to PO  advance diet slowly

## 2021-04-20 NOTE — PROGRESS NOTE ADULT - SUBJECTIVE AND OBJECTIVE BOX
Patient: MITCHEL MULLINS [id number] 88y Female                            Hospitalist Attending Note    NO bleeding noted.   Pt denies complaints.  No Abdominal pain, nausea, vomiting, diarrhea, nor constipation.   No weakness / fatigue.     ____________________PHYSICAL EXAM:  GENERAL:  NAD Alert and Oriented x 3   HEENT: NCAT  PERRL  CARDIOVASCULAR:  S1, S2  LUNGS: CTAB  ABDOMEN:  soft, (-) tenderness, (-) distension, (+) bowel sounds, (-) guarding, (-) rebound (-) rigidity  EXTREMITIES:  no cyanosis / clubbing / edema.   NEURO: L ptosis.    ____________________    VITALS:  Vital Signs Last 24 Hrs  T(C): 36.8 (2021 10:18), Max: 37.1 (2021 23:44)  T(F): 98.3 (2021 10:18), Max: 98.8 (2021 23:44)  HR: 80 (2021 10:18) (75 - 82)  BP: 138/76 (2021 10:18) (110/69 - 138/76)  BP(mean): --  RR: 18 (2021 10:18) (18 - 18)  SpO2: 97% (2021 10:18) (97% - 99%) Daily     Daily Weight in k.2 (2021 04:26)  CAPILLARY BLOOD GLUCOSE      POCT Blood Glucose.: 183 mg/dL (2021 17:33)    I&O's Summary    2021 07:01  -  2021 07:00  --------------------------------------------------------  IN: 1280 mL / OUT: 500 mL / NET: 780 mL        LABS:                        8.1    12.46 )-----------( 183      ( 2021 06:34 )             24.7     04-19    139  |  107  |  16  ----------------------------<  57<L>  4.0   |  23  |  0.73    Ca    7.5<L>      2021 07:52                  Culture - Blood (collected 2021 14:56)  Source: .Blood Blood  Preliminary Report (2021 15:01):    No growth to date.    Culture - Blood (collected 2021 14:56)  Source: .Blood Blood  Preliminary Report (2021 15:01):    No growth to date.        MEDICATIONS:  acetaminophen   Tablet .. 650 milliGRAM(s) Oral every 6 hours PRN  amLODIPine   Tablet 10 milliGRAM(s) Oral daily  levothyroxine 25 MICROGram(s) Oral daily  pantoprazole   Suspension 40 milliGRAM(s) Oral daily  polyethylene glycol 3350 17 Gram(s) Oral two times a day  senna 2 Tablet(s) Oral at bedtime  simvastatin 20 milliGRAM(s) Oral at bedtime  sodium chloride 0.45% with potassium chloride 20 mEq/L 1000 milliLiter(s) IV Continuous <Continuous>

## 2021-04-20 NOTE — PROGRESS NOTE ADULT - SUBJECTIVE AND OBJECTIVE BOX
Pt stable but concerned about drop in H/H  Stools neg for OB  Case discussed with Dr Padron - agree with CT Abd and transfusion      MEDICATIONS  (STANDING):  amLODIPine   Tablet 10 milliGRAM(s) Oral daily  levothyroxine 25 MICROGram(s) Oral daily  pantoprazole   Suspension 40 milliGRAM(s) Oral daily  polyethylene glycol 3350 17 Gram(s) Oral two times a day  senna 2 Tablet(s) Oral at bedtime  simvastatin 20 milliGRAM(s) Oral at bedtime  sodium chloride 0.45% with potassium chloride 20 mEq/L 1000 milliLiter(s) (75 mL/Hr) IV Continuous <Continuous>    MEDICATIONS  (PRN):  acetaminophen   Tablet .. 650 milliGRAM(s) Oral every 6 hours PRN Temp greater or equal to 38C (100.4F), Mild Pain (1 - 3)      Allergies    No Known Allergies    Intolerances        Vital Signs Last 24 Hrs  T(C): 36.9 (20 Apr 2021 04:26), Max: 37.1 (19 Apr 2021 23:44)  T(F): 98.5 (20 Apr 2021 04:26), Max: 98.8 (19 Apr 2021 23:44)  HR: 76 (20 Apr 2021 04:26) (75 - 82)  BP: 117/66 (20 Apr 2021 04:26) (110/69 - 119/62)  BP(mean): --  RR: 18 (20 Apr 2021 04:26) (18 - 18)  SpO2: 99% (20 Apr 2021 04:26) (98% - 99%)    PHYSICAL EXAM:  General: NAD.  CVS: S1, S2  Chest: air entry bilaterally present  Abd: BS present, soft, non-tender      LABS:                        8.1    12.46 )-----------( 183      ( 20 Apr 2021 06:34 )             24.7     04-19    139  |  107  |  16  ----------------------------<  57<L>  4.0   |  23  |  0.73    Ca    7.5<L>      19 Apr 2021 07:52      will review CT when available  follow labs  check stools again for OB

## 2021-04-20 NOTE — PROGRESS NOTE ADULT - ASSESSMENT
89yo F PMH AAA repair in 2012, CAD, MI, s/p CABG x 4, in 2007, HTN, HLD, s/p PPM, bedbound x 3 years, admitted for coffee ground emesis and ? slurred speech.  CT head showed chronic ischemic changes in both hemispheres with atrophy.  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  B/l carotid artery aneurysms.  CT Abd / Pelvis showed No bowel obstruction. Colonic diverticulosis without CT evidence of acute diverticulitis. Rectum distended by stool.        # Acute Anemia - with Coffee Ground Emesis on admission - Hgb dropping.  BUN not significantly changed.  Recent CT A/P with contrast not suggestive of RP bleed.  Stool OB negative.  Unclear etiology for drop in H/H.  Repeat labs, LDH, Haptoglobin to r/o hemolysis.  CT A/P with contrast to r/o RP bleed.  Continue clears.  Hold ASA, Heparin.  GI workup d/w Dr. Puga.  Will hold off for now as pt without clear GI source of bleeding.  Transfuse pRBCs.   # Positive Blood cultures - Growing staph epidermidis.  Repeat BCx negative.  Off antibiotics.    # Dysarthria, L ptosis - Pt reports facial weakness is chronic.  Neurology input appreciated.  CT neck.  Pt unable to have MRI.  Continue Statin.    # Subcentimeter Renal Densities - seen on CT.  Discussed outpatient f/u with urology.  # B/l Cavernous Carotid Artery Aneurysms - seen by neuro.  Appear to be thrombosed.  No surgical indication at present.  Outpatient neuro followup.    # CAD - continue Statin.   # Essential HTN - Monitor BP.  Continue oral antihypertensives.  # Acquired Hypothyroidism - continue Synthroid.  # Functional Quadriplegia - supportive care.   # Constipation - continue laxatives.   # Moderate Protein Calorie Malnutrition - continue supplements per nutrition.  # DVT Prophylaxis - Lower extremity intermittent compression devices.

## 2021-04-21 NOTE — CONSULT NOTE ADULT - SUBJECTIVE AND OBJECTIVE BOX
Reason for Consultation: Anemia    HPI: Patient is a 88y Female seen on consultatioin for the evaluation and management of Anemia    87yo F PMH AAA repair in 2012, CAD, MI, s/p CABG x 4, in 2007, HTN, HLD, s/p PPM, bedbound x 3 years, admitted for coffee ground emesis and ? slurred speech.  CT head showed chronic ischemic changes in both hemispheres with atrophy.  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  B/l carotid artery aneurysms.  CT Abd / Pelvis showed No bowel obstruction. Colonic diverticulosis without CT evidence of acute diverticulitis. Rectum distended by stool.      Hematology consult called for Anemia, patient unaware of previous blood work    PAST MEDICAL & SURGICAL HISTORY:  Benign essential HTN    HLD (hyperlipidemia)    Myocardial infarction    AAA (abdominal aortic aneurysm)    CAD (coronary artery disease)    S/P CABG x 4      MEDICATIONS  (STANDING):  amLODIPine   Tablet 10 milliGRAM(s) Oral daily  levothyroxine 100 MICROGram(s) Oral daily  pantoprazole   Suspension 40 milliGRAM(s) Oral daily  polyethylene glycol 3350 17 Gram(s) Oral two times a day  senna 2 Tablet(s) Oral at bedtime  simvastatin 20 milliGRAM(s) Oral at bedtime  sodium chloride 0.45% with potassium chloride 20 mEq/L 1000 milliLiter(s) (75 mL/Hr) IV Continuous <Continuous>  sodium phosphate IVPB 30 milliMole(s) IV Intermittent once    MEDICATIONS  (PRN):  acetaminophen   Tablet .. 650 milliGRAM(s) Oral every 6 hours PRN Temp greater or equal to 38C (100.4F), Mild Pain (1 - 3)      Allergies    No Known Allergies    Intolerances        SOCIAL HISTORY:    Smoking Status: no  Alcohol: no  Occupation: no        Vital Signs Last 24 Hrs  T(C): 37.1 (21 Apr 2021 04:50), Max: 37.3 (20 Apr 2021 16:11)  T(F): 98.8 (21 Apr 2021 04:50), Max: 99.2 (20 Apr 2021 16:11)  HR: 74 (21 Apr 2021 04:50) (74 - 84)  BP: 152/80 (21 Apr 2021 04:50) (152/80 - 159/83)  BP(mean): --  RR: 16 (21 Apr 2021 04:50) (16 - 17)  SpO2: 99% (21 Apr 2021 04:50) (97% - 99%)    PHYSICAL EXAM:    general - AAO x 3  HEENT - No Icterus  CVS - RRR  RS - AE B/L  Abd - soft, NT  Ext - Pulses +        LABS:                        10.0   13.80 )-----------( 210      ( 21 Apr 2021 05:58 )             29.8     04-21    137  |  104  |  10  ----------------------------<  74  3.9   |  25  |  0.64    Ca    8.0<L>      21 Apr 2021 05:58  Phos  1.4     04-20  Mg     1.7     04-20    TPro  6.5  /  Alb  2.7<L>  /  TBili  2.5<H>  /  DBili  x   /  AST  34  /  ALT  14  /  AlkPhos  65  04-20    PT/INR - ( 20 Apr 2021 23:14 )   PT: 12.8 sec;   INR: 1.11 ratio               Culture - Blood (collected 17 Apr 2021 14:56)  Source: .Blood Blood  Preliminary Report (18 Apr 2021 15:01):    No growth to date.    Culture - Blood (collected 17 Apr 2021 14:56)  Source: .Blood Blood  Preliminary Report (18 Apr 2021 15:01):    No growth to date.    Culture - Blood (collected 15 Apr 2021 11:04)  Source: .Blood Blood-Peripheral  Final Report (20 Apr 2021 12:00):    No Growth Final    Culture - Blood (collected 15 Apr 2021 11:04)  Source: .Blood Blood-Peripheral  Gram Stain (17 Apr 2021 11:40):    Growth in aerobic bottle: Gram Positive Cocci in Clusters  Final Report (17 Apr 2021 11:40):    Growth in aerobic bottle: Staphylococcus epidermidis    Coag Negative Staphylococcus    Single set isolate, possible contaminant. Contact    Microbiology if susceptibility testing clinically    indicated.    ***Blood Panel PCR results on this specimen are available    approximately 3 hours after the Gram stain result.***    Gram stain, PCR, and/or culture results may not always    correspond due to difference in methodologies.    ************************************************************    This PCR assay was performed by multiplex PCR. This    Assay tests for 66 bacterial and resistance gene targets.    Please refer to the [facility] test directory    at [email or web address] for details.  Organism: Blood Culture PCR (17 Apr 2021 11:40)  Organism: Blood Culture PCR (17 Apr 2021 11:40)    Culture - Urine (collected 15 Apr 2021 11:02)  Source: .Urine Clean Catch (Midstream)  Final Report (16 Apr 2021 16:49):    >=3 organisms. Probable collection contamination.        RADIOLOGY & ADDITIONAL STUDIES:

## 2021-04-21 NOTE — PROCEDURE NOTE - ADDITIONAL PROCEDURE DETAILS
Patient seen at bedside for midline catheter placement.  Consent obtained from patient after risks/benefits/alternatives explained.   Upper extremity prepped and draped in usual sterile fashion. Time out performed with RN. 4Fr 15 cm single lumen midline catheter placed using ultrasound guided seldinger technique, R basilic vein. Flushes well, with good venous return. Patient tolerated procedure well without complication and was left in no acute distress. Upper arm circumference noted to be 38cm

## 2021-04-21 NOTE — PROGRESS NOTE ADULT - SUBJECTIVE AND OBJECTIVE BOX
Patient: MITCHEL MULLINS [id number] 88y Female                            Hospitalist Attending Note    Pt c/o generalized weakness.  No bleeding noted.    No Abdominal pain, nausea, vomiting, diarrhea, nor constipation.   Still with watery BMs, dark stool per RN.    ____________________PHYSICAL EXAM:  GENERAL:  NAD Alert and Oriented x 3   HEENT: NCAT  PERRL  CARDIOVASCULAR:  S1, S2  LUNGS: CTAB  ABDOMEN:  soft, (-) tenderness, (-) distension, (+) bowel sounds, (-) guarding, (-) rebound (-) rigidity  EXTREMITIES:  no cyanosis / clubbing / edema.   NEURO: L ptosis.    ____________________    VITALS:  Vital Signs Last 24 Hrs  T(C): 37 (2021 10:30), Max: 37.3 (2021 16:11)  T(F): 98.6 (2021 10:30), Max: 99.2 (2021 16:11)  HR: 86 (2021 10:30) (74 - 86)  BP: 139/84 (2021 10:30) (139/84 - 159/83)  BP(mean): --  RR: 16 (2021 10:30) (16 - 17)  SpO2: 97% (2021 10:30) (97% - 99%) Daily     Daily Weight in k (2021 04:50)  CAPILLARY BLOOD GLUCOSE        I&O's Summary    2021 07:01  -  2021 07:00  --------------------------------------------------------  IN: 1574 mL / OUT: 800 mL / NET: 774 mL        LABS:                        10.0   13.80 )-----------( 210      ( 2021 05:58 )             29.8     -    137  |  104  |  10  ----------------------------<  74  3.9   |  25  |  0.64    Ca    8.0<L>      2021 05:58  Phos  1.4     04-20  Mg     1.7     04-20    TPro  6.5  /  Alb  2.7<L>  /  TBili  2.5<H>  /  DBili  x   /  AST  34  /  ALT  14  /  AlkPhos  65  04-20    PT/INR - ( 2021 23:14 )   PT: 12.8 sec;   INR: 1.11 ratio           LIVER FUNCTIONS - ( 2021 23:14 )  Alb: 2.7 g/dL / Pro: 6.5 gm/dL / ALK PHOS: 65 U/L / ALT: 14 U/L / AST: 34 U/L / GGT: x                     MEDICATIONS:  acetaminophen   Tablet .. 650 milliGRAM(s) Oral every 6 hours PRN  amLODIPine   Tablet 10 milliGRAM(s) Oral daily  levothyroxine 100 MICROGram(s) Oral daily  pantoprazole   Suspension 40 milliGRAM(s) Oral daily  polyethylene glycol 3350 17 Gram(s) Oral two times a day  senna 2 Tablet(s) Oral at bedtime  simvastatin 20 milliGRAM(s) Oral at bedtime  sodium chloride 0.45% with potassium chloride 20 mEq/L 1000 milliLiter(s) IV Continuous <Continuous>  sodium phosphate IVPB 30 milliMole(s) IV Intermittent once

## 2021-04-21 NOTE — PROGRESS NOTE ADULT - SUBJECTIVE AND OBJECTIVE BOX
Appreciate hematology consult  anemia w/u in progress  await repeat stools for OB  s/p 1u prbc's -- H/H stable      MEDICATIONS  (STANDING):  amLODIPine   Tablet 10 milliGRAM(s) Oral daily  levothyroxine 100 MICROGram(s) Oral daily  pantoprazole   Suspension 40 milliGRAM(s) Oral daily  polyethylene glycol 3350 17 Gram(s) Oral two times a day  senna 2 Tablet(s) Oral at bedtime  simvastatin 20 milliGRAM(s) Oral at bedtime  sodium chloride 0.45% with potassium chloride 20 mEq/L 1000 milliLiter(s) (75 mL/Hr) IV Continuous <Continuous>  sodium phosphate IVPB 30 milliMole(s) IV Intermittent once    MEDICATIONS  (PRN):  acetaminophen   Tablet .. 650 milliGRAM(s) Oral every 6 hours PRN Temp greater or equal to 38C (100.4F), Mild Pain (1 - 3)      Allergies    No Known Allergies    Intolerances        Vital Signs Last 24 Hrs  T(C): 37.1 (21 Apr 2021 04:50), Max: 37.3 (20 Apr 2021 16:11)  T(F): 98.8 (21 Apr 2021 04:50), Max: 99.2 (20 Apr 2021 16:11)  HR: 74 (21 Apr 2021 04:50) (74 - 84)  BP: 152/80 (21 Apr 2021 04:50) (152/80 - 159/83)  BP(mean): --  RR: 16 (21 Apr 2021 04:50) (16 - 17)  SpO2: 99% (21 Apr 2021 04:50) (97% - 99%)    PHYSICAL EXAM:  General: NAD.  CVS: S1, S2  Chest: air entry bilaterally present  Abd: BS present, soft, non-tender      LABS:                        10.0   13.80 )-----------( 210      ( 21 Apr 2021 05:58 )             29.8     04-21    137  |  104  |  10  ----------------------------<  74  3.9   |  25  |  0.64    Ca    8.0<L>      21 Apr 2021 05:58  Phos  1.4     04-20  Mg     1.7     04-20    TPro  6.5  /  Alb  2.7<L>  /  TBili  2.5<H>  /  DBili  x   /  AST  34  /  ALT  14  /  AlkPhos  65  04-20    PT/INR - ( 20 Apr 2021 23:14 )   PT: 12.8 sec;   INR: 1.11 ratio         Follow labs  await repeat stool for OB

## 2021-04-21 NOTE — PROGRESS NOTE ADULT - ASSESSMENT
87yo F PMH AAA repair in 2012, CAD, MI, s/p CABG x 4, in 2007, HTN, HLD, s/p PPM, bedbound x 3 years, admitted for coffee ground emesis and ? slurred speech.  CT head showed chronic ischemic changes in both hemispheres with atrophy.  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  B/l carotid artery aneurysms.  CT Abd / Pelvis showed No bowel obstruction. Colonic diverticulosis without CT evidence of acute diverticulitis. Rectum distended by stool.        # Acute Anemia - with Coffee Ground Emesis on admission - Hgb dropping.  BUN not significantly changed.  Recent CT A/P with contrast not suggestive of RP bleed.  Stool OB negative.  Unclear etiology for drop in H/H.  Repeat labs, LDH, Haptoglobin to r/o hemolysis.  CT A/P with contrast not suggestive r/o RP bleed.  Continue clears.  Hold ASA, Heparin.  GI workup d/w Dr. Puga.  Will hold off for now as pt without clear GI source of bleeding.  Transfused pRBCs. H/H better.  Hematology input appreciated.    # Positive Blood cultures - Growing staph epidermidis.  Repeat BCx negative.  Off antibiotics.    # Dysarthria, L ptosis - Pt reports facial weakness is chronic.  Neurology input appreciated.  CT neck.  Pt unable to have MRI.  Continue Statin.    # Subcentimeter Renal Densities - seen on CT.  Discussed outpatient f/u with urology.  # B/l Cavernous Carotid Artery Aneurysms - seen by neuro.  Appear to be thrombosed.  No surgical indication at present.  Outpatient neuro followup.    # CAD - continue Statin.   # Essential HTN - Monitor BP.  Continue oral antihypertensives.  # Acquired Hypothyroidism - continue Synthroid.  # Functional Quadriplegia - supportive care.   # Constipation - now with frequent BMs.  D/c laxatives.   # Moderate Protein Calorie Malnutrition - continue supplements per nutrition.  # DVT Prophylaxis - Lower extremity intermittent compression devices.    Plan of care d/w fidelina Grossman at [phone]

## 2021-04-22 NOTE — PROGRESS NOTE ADULT - SUBJECTIVE AND OBJECTIVE BOX
lying in bed    Vital Signs Last 24 Hrs  T(C): 36.8 (22 Apr 2021 05:00), Max: 37.6 (21 Apr 2021 17:04)  T(F): 98.2 (22 Apr 2021 05:00), Max: 99.6 (21 Apr 2021 17:04)  HR: 83 (22 Apr 2021 05:00) (83 - 86)  BP: 161/79 (22 Apr 2021 05:00) (136/73 - 161/79)  BP(mean): --  RR: 20 (22 Apr 2021 05:00) (16 - 20)  SpO2: 95% (22 Apr 2021 05:00) (95% - 97%)    PHYSICAL EXAM:    general - AAO x 3  HEENT - No Icterus  CVS - RRR  RS - AE B/L  Abd - soft, NT  Ext - Pulses +        LABS:                        10.3   14.53 )-----------( 279      ( 22 Apr 2021 06:39 )             31.5     04-21    137  |  104  |  10  ----------------------------<  74  3.9   |  25  |  0.64    Ca    8.0<L>      21 Apr 2021 05:58  Phos  1.4     04-20  Mg     1.7     04-20    TPro  6.8  /  Alb  2.6<L>  /  TBili  1.8<H>  /  DBili  .78<H>  /  AST  30  /  ALT  15  /  AlkPhos  76  04-22    PT/INR - ( 20 Apr 2021 23:14 )   PT: 12.8 sec;   INR: 1.11 ratio               Culture - Blood (collected 17 Apr 2021 14:56)  Source: .Blood Blood  Preliminary Report (18 Apr 2021 15:01):    No growth to date.    Culture - Blood (collected 17 Apr 2021 14:56)  Source: .Blood Blood  Preliminary Report (18 Apr 2021 15:01):    No growth to date.    Culture - Blood (collected 15 Apr 2021 11:04)  Source: .Blood Blood-Peripheral  Final Report (20 Apr 2021 12:00):    No Growth Final    Culture - Blood (collected 15 Apr 2021 11:04)  Source: .Blood Blood-Peripheral  Gram Stain (17 Apr 2021 11:40):    Growth in aerobic bottle: Gram Positive Cocci in Clusters  Final Report (17 Apr 2021 11:40):    Growth in aerobic bottle: Staphylococcus epidermidis    Coag Negative Staphylococcus    Single set isolate, possible contaminant. Contact    Microbiology if susceptibility testing clinically    indicated.    ***Blood Panel PCR results on this specimen are available    approximately 3 hours after the Gram stain result.***    Gram stain, PCR, and/or culture results may not always    correspond due to difference in methodologies.    ************************************************************    This PCR assay was performed by multiplex PCR. This    Assay tests for 66 bacterial and resistance gene targets.    Please refer to the [facility] test directory    at [email or web address] for details.  Organism: Blood Culture PCR (17 Apr 2021 11:40)  Organism: Blood Culture PCR (17 Apr 2021 11:40)    Culture - Urine (collected 15 Apr 2021 11:02)  Source: .Urine Clean Catch (Midstream)  Final Report (16 Apr 2021 16:49):    >=3 organisms. Probable collection contamination.        RADIOLOGY & ADDITIONAL STUDIES:

## 2021-04-22 NOTE — PROGRESS NOTE ADULT - SUBJECTIVE AND OBJECTIVE BOX
Pt stable   wants to advance diet      MEDICATIONS  (STANDING):  amLODIPine   Tablet 10 milliGRAM(s) Oral daily  chlorhexidine 2% Cloths 1 Application(s) Topical daily  levothyroxine 100 MICROGram(s) Oral daily  nystatin Powder 1 Application(s) Topical every 8 hours  pantoprazole   Suspension 40 milliGRAM(s) Oral daily  simvastatin 20 milliGRAM(s) Oral at bedtime  sodium chloride 0.45% with potassium chloride 20 mEq/L 1000 milliLiter(s) (75 mL/Hr) IV Continuous <Continuous>    MEDICATIONS  (PRN):  acetaminophen   Tablet .. 650 milliGRAM(s) Oral every 6 hours PRN Temp greater or equal to 38C (100.4F), Mild Pain (1 - 3)      Allergies    No Known Allergies    Intolerances        Vital Signs Last 24 Hrs  T(C): 36.8 (22 Apr 2021 10:43), Max: 37.6 (21 Apr 2021 17:04)  T(F): 98.3 (22 Apr 2021 10:43), Max: 99.6 (21 Apr 2021 17:04)  HR: 90 (22 Apr 2021 10:43) (83 - 90)  BP: 134/69 (22 Apr 2021 10:43) (134/69 - 161/79)  BP(mean): --  RR: 19 (22 Apr 2021 10:43) (17 - 20)  SpO2: 94% (22 Apr 2021 10:43) (94% - 97%)    PHYSICAL EXAM:  General: NAD.  CVS: S1, S2  Chest: air entry bilaterally present  Abd: BS present, soft, non-tender      LABS:                        10.3   14.53 )-----------( 279      ( 22 Apr 2021 06:39 )             31.5     04-22    136  |  102  |  8   ----------------------------<  87  3.5   |  26  |  0.62    Ca    8.3<L>      22 Apr 2021 10:46  Phos  2.0     04-22  Mg     1.7     04-20    TPro  6.8  /  Alb  2.6<L>  /  TBili  1.8<H>  /  DBili  .78<H>  /  AST  30  /  ALT  15  /  AlkPhos  76  04-22    PT/INR - ( 20 Apr 2021 23:14 )   PT: 12.8 sec;   INR: 1.11 ratio           continue protonix  advance diet

## 2021-04-22 NOTE — PROGRESS NOTE ADULT - ASSESSMENT
89yo F PMH AAA repair in 2012, CAD, MI, s/p CABG x 4, in 2007, HTN, HLD, s/p PPM, bedbound x 3 years, admitted for coffee ground emesis and ? slurred speech.  CT head showed chronic ischemic changes in both hemispheres with atrophy.  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  B/l carotid artery aneurysms.  CT Abd / Pelvis showed No bowel obstruction. Colonic diverticulosis without CT evidence of acute diverticulitis. Rectum distended by stool.        # Acute Anemia - with Coffee Ground Emesis on admission - Hgb dropping.  BUN not significantly changed.  Recent CT A/P with contrast not suggestive of RP bleed.  Stool OB negative.  Unclear etiology for drop in H/H.  Repeat labs, LDH, Haptoglobin not suggestive of hemolysis.  CT A/P with contrast not suggestive r/o RP bleed.  Continue clears.  Hold ASA, Heparin.  GI workup d/w Dr. Puga.  Will hold off for now as pt without clear GI source of bleeding.  Transfused pRBCs. H/H better.  Hematology input appreciated.   # Leukocytosis - no clear s/s of infection.  Check UA, CXR, Procalcitonin.   # Positive Blood cultures - Growing staph epidermidis.  Repeat BCx negative.  Off antibiotics.    # Dysarthria, L ptosis - Pt reports facial weakness is chronic.  Neurology input appreciated.  CT neck.  Pt unable to have MRI.  Continue Statin.    # Subcentimeter Renal Densities - seen on CT.  Discussed outpatient f/u with urology.  # B/l Cavernous Carotid Artery Aneurysms - seen by neuro.  Appear to be thrombosed.  No surgical indication at present.  Outpatient neuro followup.    # CAD - continue Statin.   # Essential HTN - Monitor BP.  Continue oral antihypertensives.  # Acquired Hypothyroidism - continue Synthroid.  # Functional Quadriplegia - supportive care.   # Constipation - now with frequent BMs.  D/c laxatives.   # Moderate Protein Calorie Malnutrition - continue supplements per nutrition.  # DVT Prophylaxis - Lower extremity intermittent compression devices.    Plan of care d/w fidelina Grossman at [phone]

## 2021-04-22 NOTE — PROGRESS NOTE ADULT - SUBJECTIVE AND OBJECTIVE BOX
Patient: MITCHEL MULLINS [id number] 88y Female                            Hospitalist Attending Note    Pt c/o generalized weakness.  No bleeding noted.    No Abdominal pain, nausea, vomiting, diarrhea, nor constipation.     ____________________PHYSICAL EXAM:  GENERAL:  NAD Arousable, Oriented x 3   HEENT: NCAT  PERRL  CARDIOVASCULAR:  S1, S2  LUNGS: CTAB  ABDOMEN:  soft, (-) tenderness, (-) distension, (+) bowel sounds, (-) guarding, (-) rebound (-) rigidity  EXTREMITIES:  no cyanosis / clubbing / edema.   NEURO: L ptosis.    ____________________    VITALS:  Vital Signs Last 24 Hrs  T(C): 36.8 (2021 10:43), Max: 37.6 (2021 17:04)  T(F): 98.3 (2021 10:43), Max: 99.6 (2021 17:04)  HR: 90 (2021 10:43) (83 - 90)  BP: 134/69 (2021 10:43) (134/69 - 161/79)  BP(mean): --  RR: 19 (2021 10:43) (17 - 20)  SpO2: 94% (2021 10:43) (94% - 97%) Daily     Daily Weight in k (2021 05:00)  CAPILLARY BLOOD GLUCOSE        I&O's Summary    2021 07:  -  2021 07:00  --------------------------------------------------------  IN: 800 mL / OUT: 0 mL / NET: 800 mL    2021 07:  -  2021 16:46  --------------------------------------------------------  IN: 120 mL / OUT: 100 mL / NET: 20 mL        LABS:                        10.3   14.53 )-----------( 279      ( 2021 06:39 )             31.5         136  |  102  |  8   ----------------------------<  87  3.5   |  26  |  0.62    Ca    8.3<L>      2021 10:46  Phos  2.0       Mg     1.7         TPro  6.8  /  Alb  2.6<L>  /  TBili  1.8<H>  /  DBili  .78<H>  /  AST  30  /  ALT  15  /  AlkPhos  76      PT/INR - ( 2021 23:14 )   PT: 12.8 sec;   INR: 1.11 ratio           LIVER FUNCTIONS - ( 2021 06:39 )  Alb: 2.6 g/dL / Pro: 6.8 gm/dL / ALK PHOS: 76 U/L / ALT: 15 U/L / AST: 30 U/L / GGT: x           Urinalysis Basic - ( 2021 16:19 )    Color: Yellow / Appearance: Slightly Turbid / S.010 / pH: x  Gluc: x / Ketone: Moderate  / Bili: Negative / Urobili: Negative mg/dL   Blood: x / Protein: 30 mg/dL / Nitrite: Negative   Leuk Esterase: Small / RBC: x / WBC x   Sq Epi: x / Non Sq Epi: x / Bacteria: x              MEDICATIONS:  acetaminophen   Tablet .. 650 milliGRAM(s) Oral every 6 hours PRN  amLODIPine   Tablet 10 milliGRAM(s) Oral daily  chlorhexidine 2% Cloths 1 Application(s) Topical daily  levothyroxine 100 MICROGram(s) Oral daily  nystatin Powder 1 Application(s) Topical every 8 hours  pantoprazole   Suspension 40 milliGRAM(s) Oral daily  simvastatin 20 milliGRAM(s) Oral at bedtime  sodium chloride 0.45% with potassium chloride 20 mEq/L 1000 milliLiter(s) IV Continuous <Continuous>

## 2021-04-22 NOTE — CHART NOTE - NSCHARTNOTEFT_GEN_A_CORE
Nutrition follow-up note-    Pt adm w/FTT. PMHx includes- CAD, AAA, MI, HLD, HTN, s/p CABG. Pt had depressed appetite x 1 week PTA. Pt was made NPO (4/17) due to decreasing HGb, per chart stools negative for OB, s/p 1 U PRBC. Per chart CT abdomen/pelvis--> no bowel obstruction/acute diverticulitis. Pt had been receiving laxative due to reported constipation. Laxative D/C'd due to frequent BM's. Pt was advanced to Clear fluids (4/19), however, remained w/poor PO intake, was consuming mostly water, states does not like Clears and wants diet advanced. Diet advanced today to Mechanical Soft. Will suggest to add PO supplement Ensure as pt is used to drinking same. Would not restrict diet any further to promote adequate PO intake. No N/V reported. Per RN no BM's yet today (day shift). Pt s/p midline catheter placement (4/21).     Factors impacting intake: [ ] none [ ] nausea  [ ] vomiting [ ] diarrhea [ ] constipation  [ ]chewing problems [ ] swallowing issues  [x ] other: Confused, refusing clear fluids.    Diet Prescription: Diet, Mechanical Soft (04-22-21 @ 13:52)- diet advanced post lunch today. Pt had been on Clear Fluids diet.     Intake: poor on Clear fluids, pt refusing most fluids, drinking water.     Current Weight: (4/22) 78 kg, (4/21) 84 kg, (4/20) 81 kg, (4/17) 83.1 kg, (4/16) 73.4 kg, (4/15) 73 kg. Questionable accuracy of daily weights.   % Weight Change: Unable to accurately assess weight change as noted big discrepancies in daily weights.     Physical appearance: 2+ edema L & R legs.  Nutrition focused physical exam conducted:  Subcutaneous fat loss: [moderate ] Orbital fat pads region, [mild ]Buccal fat region, [moderate ]Triceps region,  [unable- pt lying down] Ribs region.  Muscle wasting: [severe ]Temples region, [moderate ]Clavicle region, [moderate ]Shoulder region, [moderate ]Scapula region, [moderate ]Interosseous region, [unable- due to edema]Calf region     Pertinent Medications: MEDICATIONS  (STANDING):  amLODIPine   Tablet 10 milliGRAM(s) Oral daily  chlorhexidine 2% Cloths 1 Application(s) Topical daily  levothyroxine 100 MICROGram(s) Oral daily  nystatin Powder 1 Application(s) Topical every 8 hours  pantoprazole   Suspension 40 milliGRAM(s) Oral daily  simvastatin 20 milliGRAM(s) Oral at bedtime  sodium chloride 0.45% with potassium chloride 20 mEq/L 1000 milliLiter(s) (75 mL/Hr) IV Continuous <Continuous>    MEDICATIONS  (PRN):  acetaminophen   Tablet .. 650 milliGRAM(s) Oral every 6 hours PRN Temp greater or equal to 38C (100.4F), Mild Pain (1 - 3)    Pertinent Labs: 04-22 Na136 mmol/L Glu 87 mg/dL K+ 3.5 mmol/L Cr  0.62 mg/dL BUN 8 mg/dL 04-22 Phos 2.0 mg/dL<L> 04-22 Alb 2.6 g/dL<L>      Skin: no pressure ulcers.    Estimated Needs:   [x ] no change since previous assessment (4/16)  [ ] recalculated:     Previous Nutrition Diagnosis:   Malnutrition Moderate malnutrition in the context of chronic illness.     Etiology inadequate energy, protein intake in the presence of cardiac disease.     Signs/Symptoms physical signs of mild to moderate fat loss & moderate to severe muscle wasting noted.     Goal/Expected Outcome Pt will consume >/=75% nutritional needs via meals and PO supplement (GOAL not met).    Nutrition Diagnosis is [x ] ongoing  [ ] resolved [ ] not applicable     New Nutrition Diagnosis: [x ] not applicable      Interventions:   Recommend:  [x] Continue current diet (Mechanical Soft) as Rx'd and as tolerated.  [ ] Change Diet To:  [x ] Nutrition Supplement: Ensure Enlive x 2/day (provides 700 kcal, 40 g protein).  [ ] Nutrition Support  [ ] Other:     Monitoring and Evaluation:   [x ] PO intake [ x ] Tolerance to diet prescription [ x ] weights [ x ] labs[ x ] follow up per protocol  [ ] other:

## 2021-04-23 NOTE — PROGRESS NOTE ADULT - SUBJECTIVE AND OBJECTIVE BOX
Pt stable - no changes  no bleeding; slight drop in Hgb    MEDICATIONS  (STANDING):  amLODIPine   Tablet 10 milliGRAM(s) Oral daily  chlorhexidine 2% Cloths 1 Application(s) Topical daily  ferrous    sulfate 325 milliGRAM(s) Oral daily  levothyroxine 100 MICROGram(s) Oral daily  nystatin Powder 1 Application(s) Topical every 8 hours  pantoprazole   Suspension 40 milliGRAM(s) Oral daily  potassium phosphate / sodium phosphate Tablet (K-PHOS No. 2) 1 Tablet(s) Oral four times a day with meals  simvastatin 20 milliGRAM(s) Oral at bedtime  sodium chloride 0.45% with potassium chloride 20 mEq/L 1000 milliLiter(s) (75 mL/Hr) IV Continuous <Continuous>    MEDICATIONS  (PRN):  acetaminophen   Tablet .. 650 milliGRAM(s) Oral every 6 hours PRN Temp greater or equal to 38C (100.4F), Mild Pain (1 - 3)      Allergies    No Known Allergies    Intolerances        Vital Signs Last 24 Hrs  T(C): 36.8 (23 Apr 2021 11:14), Max: 36.9 (22 Apr 2021 17:46)  T(F): 98.3 (23 Apr 2021 11:14), Max: 98.4 (22 Apr 2021 17:46)  HR: 87 (23 Apr 2021 11:14) (83 - 88)  BP: 118/66 (23 Apr 2021 11:14) (118/66 - 147/87)  BP(mean): --  RR: 19 (23 Apr 2021 11:14) (18 - 19)  SpO2: 96% (23 Apr 2021 11:14) (96% - 97%)    PHYSICAL EXAM:  General: NAD.  CVS: S1, S2  Chest: air entry bilaterally present  Abd: BS present, soft, non-tender      LABS:                        9.9    15.16 )-----------( 289      ( 23 Apr 2021 07:28 )             31.2     04-23    136  |  102  |  10  ----------------------------<  66<L>  3.5   |  26  |  0.64    Ca    8.1<L>      23 Apr 2021 07:28  Phos  2.0     04-22    TPro  6.4  /  Alb  x   /  TBili  x   /  DBili  x   /  AST  x   /  ALT  x   /  AlkPhos  x   04-22        follow [facility]  same meds  as per PCP

## 2021-04-23 NOTE — PROGRESS NOTE ADULT - ASSESSMENT
87yo F PMH AAA repair in 2012, CAD, MI, s/p CABG x 4, in 2007, HTN, HLD, s/p PPM, bedbound x 3 years, admitted for coffee ground emesis and ? slurred speech.  CT head showed chronic ischemic changes in both hemispheres with atrophy.  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  B/l carotid artery aneurysms.  CT Abd / Pelvis showed No bowel obstruction. Colonic diverticulosis without CT evidence of acute diverticulitis. Rectum distended by stool.        # Acute Anemia - with Coffee Ground Emesis on admission - Hgb dropping.  BUN not significantly changed.  Recent CT A/P with contrast not suggestive of RP bleed.  Stool OB negative.  Unclear etiology for drop in H/H.  Repeat labs, LDH, Haptoglobin not suggestive of hemolysis.  CT A/P with contrast not suggestive r/o RP bleed.  Continue clears.  Hold ASA, Heparin.  GI workup d/w Dr. Puga.  Conservative mgt as pt without clear GI source of bleeding.  s/p transfusion.  H/H stable.  Hematology input appreciated.     # Leukocytosis - WBC is persistently rising.  No fever, but CXR and CT Chest are suggestive of bibasilar infiltrates.  Started on short course of Levaquin for pneumonia.    # Positive Blood cultures - Growing staph epidermidis.  Repeat BCx negative.  Off antibiotics.    # Dysarthria, L ptosis - Pt reports facial weakness is chronic.  Neurology input appreciated.  CT neck.  Pt unable to have MRI.  Continue Statin.    # Subcentimeter Renal Densities - seen on CT.  Discussed outpatient f/u with urology.  # B/l Cavernous Carotid Artery Aneurysms - seen by neuro.  Appear to be thrombosed.  No surgical indication at present.  Outpatient neuro followup.    # CAD - continue Statin.   # Essential HTN - Monitor BP.  Continue oral antihypertensives.  # Acquired Hypothyroidism - continue Synthroid.  # Functional Quadriplegia - supportive care.   # Constipation - now with frequent BMs.  D/c laxatives.   # Moderate Protein Calorie Malnutrition - continue supplements per nutrition.  # DVT Prophylaxis - Lower extremity intermittent compression devices.    Plan of care d/w fidelina Grossman at [phone] on 4/22.

## 2021-04-24 NOTE — PROGRESS NOTE ADULT - SUBJECTIVE AND OBJECTIVE BOX
Patient is a 88y old  Female who presents with a chief complaint of FTT (2021 11:07)      INTERVAL HPI/OVERNIGHT EVENTS:    States her appetite is weak; c/o L temple headache (per RN, she has this complaint daily)    REVIEW OF SYSTEMS:   Remaining ROS negative    MEDICATIONS  (STANDING):  amLODIPine   Tablet 10 milliGRAM(s) Oral daily  chlorhexidine 2% Cloths 1 Application(s) Topical daily  ferrous    sulfate 325 milliGRAM(s) Oral daily  levoFLOXacin IVPB 250 milliGRAM(s) IV Intermittent every 24 hours  levoFLOXacin IVPB      levothyroxine 100 MICROGram(s) Oral daily  nystatin Powder 1 Application(s) Topical every 8 hours  pantoprazole   Suspension 40 milliGRAM(s) Oral daily  simvastatin 20 milliGRAM(s) Oral at bedtime  sodium chloride 0.45% with potassium chloride 20 mEq/L 1000 milliLiter(s) (75 mL/Hr) IV Continuous <Continuous>    MEDICATIONS  (PRN):  acetaminophen   Tablet .. 650 milliGRAM(s) Oral every 6 hours PRN Temp greater or equal to 38C (100.4F), Mild Pain (1 - 3)      Allergies    No Known Allergies    Intolerances        Vital Signs Last 24 Hrs  T(C): 36.8 (2021 10:22), Max: 37 (2021 16:37)  T(F): 98.2 (2021 10:22), Max: 98.6 (2021 16:37)  HR: 77 (2021 10:22) (77 - 87)  BP: 133/81 (2021 10:22) (131/82 - 147/83)  BP(mean): --  RR: 20 (2021 10:22) (18 - 20)  SpO2: 97% (2021 10:22) (97% - 98%)    PHYSICAL EXAM:  GENERAL: NAD  HEAD:  Atraumatic, Normocephalic  EYES: EOMI, PERRLA, conjunctiva and sclera clear  ENT: O/P Clear  NECK: Supple, No JVD  NERVOUS SYSTEM:  No focal deficits  CHEST/LUNG: Clear to percussion bilaterally; No rales, rhonchi, wheezing  HEART: Regular rate and rhythm; No murmurs, rubs, or gallops  ABDOMEN: Soft, Nontender, Nondistended; Bowel sounds present  EXTREMITIES:  2+ Peripheral Pulses, No clubbing, cyanosis, or edema  SKIN: No rashes or lesions    LABS:                        9.8    14.76 )-----------( 295      ( 2021 08:13 )             29.7     04-    136  |  102  |  10  ----------------------------<  66<L>  3.5   |  26  |  0.64    Ca    8.1<L>      2021 07:28        Urinalysis Basic - ( 2021 16:19 )    Color: Yellow / Appearance: Slightly Turbid / S.010 / pH: x  Gluc: x / Ketone: Moderate  / Bili: Negative / Urobili: Negative mg/dL   Blood: x / Protein: 30 mg/dL / Nitrite: Negative   Leuk Esterase: Small / RBC: 3-5 /HPF / WBC 6-10   Sq Epi: x / Non Sq Epi: x / Bacteria: Many      CAPILLARY BLOOD GLUCOSE          RADIOLOGY & ADDITIONAL TESTS:    Imaging Personally Reviewed:  [ ] YES  [ ] NO    Consultant(s) Notes Reviewed:  [ ] YES  [ ] NO    Care Discussed with Consultants/Other Providers [ ] YES  [ ] NO

## 2021-04-24 NOTE — PROGRESS NOTE ADULT - SUBJECTIVE AND OBJECTIVE BOX
Pt refused BM Bx  Transfused 1u prbc's on 4/20  H/H relatively stable past few days  Pt with low Fe; Fe Sat 7%      MEDICATIONS  (STANDING):  amLODIPine   Tablet 10 milliGRAM(s) Oral daily  chlorhexidine 2% Cloths 1 Application(s) Topical daily  levoFLOXacin IVPB 250 milliGRAM(s) IV Intermittent every 24 hours  levoFLOXacin IVPB      levothyroxine 100 MICROGram(s) Oral daily  nystatin Powder 1 Application(s) Topical every 8 hours  pantoprazole   Suspension 40 milliGRAM(s) Oral daily  simvastatin 20 milliGRAM(s) Oral at bedtime    MEDICATIONS  (PRN):  acetaminophen   Tablet .. 650 milliGRAM(s) Oral every 6 hours PRN Temp greater or equal to 38C (100.4F), Mild Pain (1 - 3)      Allergies    No Known Allergies    Intolerances        Vital Signs Last 24 Hrs  T(C): 36.7 (24 Apr 2021 16:37), Max: 36.9 (24 Apr 2021 00:05)  T(F): 98 (24 Apr 2021 16:37), Max: 98.4 (24 Apr 2021 00:05)  HR: 85 (24 Apr 2021 16:37) (77 - 87)  BP: 127/75 (24 Apr 2021 16:37) (127/75 - 147/83)  BP(mean): --  RR: 18 (24 Apr 2021 16:37) (18 - 20)  SpO2: 96% (24 Apr 2021 16:37) (96% - 98%)    PHYSICAL EXAM:  General: NAD.  CVS: S1, S2  Chest: air entry bilaterally present  Abd: BS present, soft, non-tender      LABS:                        9.8    14.76 )-----------( 295      ( 24 Apr 2021 08:13 )             29.7     04-23    136  |  102  |  10  ----------------------------<  66<L>  3.5   |  26  |  0.64    Ca    8.1<L>      23 Apr 2021 07:28        Continue to follow H/H  No signs of active bleeding  Pt is not a good candidate for endoscopy/colonoscopy given advanced age but if H/H drops will discuss again with family       No

## 2021-04-24 NOTE — PROGRESS NOTE ADULT - ASSESSMENT
87yo F PMH AAA repair in 2012, CAD, MI, s/p CABG x 4, in 2007, HTN, HLD, s/p PPM, bedbound x 3 years, admitted for coffee ground emesis and ? slurred speech.  CT head showed chronic ischemic changes in both hemispheres with atrophy.  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  B/l carotid artery aneurysms.  CT Abd / Pelvis showed No bowel obstruction. Colonic diverticulosis without CT evidence of acute diverticulitis. Rectum distended by stool.        # Acute Anemia - with Coffee Ground Emesis on admission   - Hgb dropping slowly; now fairly stable post s/p transfusion x 1 unit    Unclear etiology for drop in H/H.    Recent CT A/P with contrast not suggestive of RP bleed.    Stool OB negative x 2    LDH, Haptoglobin not suggestive of hemolysis.    Hematology consulted (Goyo)     Pt refused BM Bx    GI consulted (Dr. Puga) - Conservative mgt as pt without clear GI source of bleeding    Holding ASA, Heparin.    Diet advanced to mechanical soft    # Leukocytosis - WBC was persistently rising; now trending back down    No fever, but CXR and CT Chest are suggestive of bibasilar infiltrates.    Started on short course of Levaquin for pneumonia.      ? Pneumonia    - On Levaquin, 4/23 - 4/29    WBC's coming back down    # Positive Blood cultures - Growing staph epidermidis.    Repeat BCx negative.  Off antibiotics.      # Dysarthria, L ptosis - Pt reports facial weakness is chronic.    Neurology input (Lluvia) appreciated.    Pt unable to have MRI.    CT of head shows possible L parietal subacute infarct    Continue Statin.      # Subcentimeter Renal Densities - seen on CT.    Discussed outpatient f/u with urology.    # B/l Cavernous Carotid Artery Aneurysms   - seen by neuro.    Appear to be thrombosed.    No surgical indication at present.    Outpatient neuro followup.      # CAD - continue Statin.     # Essential HTN - Monitor BP.    Continue oral antihypertensives.    # Acquired Hypothyroidism - continue Synthroid.    TSH somewhat elevated- 5.09, so Synthroid dose decreased    # Functional Quadriplegia - supportive care.     # Constipation - now with frequent BMs.    D/c'd laxatives.     # Moderate Protein Calorie Malnutrition - continue supplements per nutrition.    # DVT Prophylaxis   - Lower extremity intermittent compression devices.    Disp: Home soon; if pt has home health aide, will need to be reinstated  Plan of care d/w son Chauncey at [phone] on 4/22.     Called 4/24; no answer. left message.     87yo F PMH AAA repair in 2012, CAD, MI, s/p CABG x 4, in 2007, HTN, HLD, s/p PPM, bedbound x 3 years, admitted for coffee ground emesis and ? slurred speech.  CT head showed chronic ischemic changes in both hemispheres with atrophy.  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  B/l carotid artery aneurysms.  CT Abd / Pelvis showed No bowel obstruction. Colonic diverticulosis without CT evidence of acute diverticulitis. Rectum distended by stool.        # Acute Anemia - with Coffee Ground Emesis on admission   - Hgb dropping slowly; now fairly stable post s/p transfusion x 1 unit    Unclear etiology for drop in H/H.    Recent CT A/P with contrast not suggestive of RP bleed.    Stool OB negative x 2    LDH, Haptoglobin not suggestive of hemolysis.    Hematology consulted (Goyo)     Pt refused BM Bx    GI consulted (Dr. Puga) - Conservative mgt as pt without clear GI source of bleeding    Holding ASA, Heparin.    Diet advanced to mechanical soft    # Leukocytosis - WBC was persistently rising; now trending back down    No fever, but CXR and CT Chest are suggestive of bibasilar infiltrates.    Started on short course of Levaquin for pneumonia.      ? Pneumonia    - On Levaquin, 4/23 - 4/29    WBC's coming back down    # Positive Blood cultures - Growing staph epidermidis.    Repeat BCx negative.  Off antibiotics.      # Dysarthria, L ptosis - Pt reports facial weakness is chronic.    Neurology input (Lluvia) appreciated.    Pt unable to have MRI.    CT of head shows possible L parietal subacute infarct    Continue Statin.      # Subcentimeter Renal Densities - seen on CT.    Discussed outpatient f/u with urology.    # B/l Cavernous Carotid Artery Aneurysms   - seen by neuro.    Appear to be thrombosed.    No surgical indication at present.    Outpatient neuro followup.      # CAD - continue Statin.     # Essential HTN - Monitor BP.    Continue oral antihypertensives.    # Acquired Hypothyroidism - continue Synthroid.    TSH somewhat elevated- 5.09, so Synthroid dose increased slightly    # Functional Quadriplegia - supportive care.     # Constipation - now with frequent BMs.    D/c'd laxatives.     # Moderate Protein Calorie Malnutrition - continue supplements per nutrition.    # DVT Prophylaxis   - Lower extremity intermittent compression devices.    Disp: Home soon; if pt has home health aide, will need to be reinstated  Plan of care d/w son Chauncey at [phone] on 4/22.     Called 4/24; no answer. left message.

## 2021-04-24 NOTE — PROGRESS NOTE ADULT - SUBJECTIVE AND OBJECTIVE BOX
lying in bed    Vital Signs Last 24 Hrs  T(C): 36.8 (2021 04:52), Max: 37 (2021 16:37)  T(F): 98.2 (2021 04:52), Max: 98.6 (2021 16:37)  HR: 87 (2021 04:52) (85 - 87)  BP: 131/82 (2021 04:52) (118/66 - 147/83)  BP(mean): --  RR: 18 (2021 04:52) (18 - 19)  SpO2: 97% (2021 04:52) (96% - 98%)    PHYSICAL EXAM:    general - weak looking +  HEENT - No Icterus  CVS - RRR  RS - AE B/L  Abd - soft, NT  Ext - Pulses +        LABS:                        9.8    14.76 )-----------( 295      ( 2021 08:13 )             29.7     04-23    136  |  102  |  10  ----------------------------<  66<L>  3.5   |  26  |  0.64    Ca    8.1<L>      2021 07:28        Urinalysis Basic - ( 2021 16:19 )    Color: Yellow / Appearance: Slightly Turbid / S.010 / pH: x  Gluc: x / Ketone: Moderate  / Bili: Negative / Urobili: Negative mg/dL   Blood: x / Protein: 30 mg/dL / Nitrite: Negative   Leuk Esterase: Small / RBC: 3-5 /HPF / WBC 6-10   Sq Epi: x / Non Sq Epi: x / Bacteria: Many        Culture - Blood (collected 2021 14:56)  Source: .Blood Blood  Final Report (2021 15:01):    No Growth Final    Culture - Blood (collected 2021 14:56)  Source: .Blood Blood  Final Report (2021 15:01):    No Growth Final    Culture - Blood (collected 15 Apr 2021 11:04)  Source: .Blood Blood-Peripheral  Final Report (2021 12:00):    No Growth Final    Culture - Blood (collected 15 Apr 2021 11:04)  Source: .Blood Blood-Peripheral  Gram Stain (2021 11:40):    Growth in aerobic bottle: Gram Positive Cocci in Clusters  Final Report (2021 11:40):    Growth in aerobic bottle: Staphylococcus epidermidis    Coag Negative Staphylococcus    Single set isolate, possible contaminant. Contact    Microbiology if susceptibility testing clinically    indicated.    ***Blood Panel PCR results on this specimen are available    approximately 3 hours after the Gram stain result.***    Gram stain, PCR, and/or culture results may not always    correspond due to difference in methodologies.    ************************************************************    This PCR assay was performed by multiplex PCR. This    Assay tests for 66 bacterial and resistance gene targets.    Please refer to the [facility] [employer] test directory    at [email or web address] for details.  Organism: Blood Culture PCR (2021 11:40)  Organism: Blood Culture PCR (2021 11:40)    Culture - Urine (collected 15 Apr 2021 11:02)  Source: .Urine Clean Catch (Midstream)  Final Report (2021 16:49):    >=3 organisms. Probable collection contamination.        RADIOLOGY & ADDITIONAL STUDIES:

## 2021-04-25 NOTE — PROGRESS NOTE ADULT - SUBJECTIVE AND OBJECTIVE BOX
lying in bed    Vital Signs Last 24 Hrs  T(C): 36.7 (25 Apr 2021 10:39), Max: 37.3 (24 Apr 2021 23:30)  T(F): 98.1 (25 Apr 2021 10:39), Max: 99.2 (24 Apr 2021 23:30)  HR: 85 (25 Apr 2021 10:39) (80 - 85)  BP: 112/71 (25 Apr 2021 10:39) (112/71 - 132/71)  BP(mean): --  RR: 18 (25 Apr 2021 10:39) (18 - 20)  SpO2: 97% (25 Apr 2021 10:39) (92% - 97%)    PHYSICAL EXAM:    general - AAO x 3  HEENT - No Icterus  CVS - RRR  RS - AE B/L  Abd - soft, NT  Ext - Pulses +        LABS:                        10.1   14.76 )-----------( 339      ( 25 Apr 2021 06:38 )             31.5     04-25    137  |  101  |  11  ----------------------------<  82  3.4<L>   |  26  |  0.65    Ca    8.7      25 Apr 2021 06:38  Phos  3.0     04-25  Mg     1.6     04-25    TPro  6.9  /  Alb  2.5<L>  /  TBili  1.1  /  DBili  x   /  AST  31  /  ALT  15  /  AlkPhos  68  04-25          Culture - Blood (collected 17 Apr 2021 14:56)  Source: .Blood Blood  Final Report (22 Apr 2021 15:01):    No Growth Final    Culture - Blood (collected 17 Apr 2021 14:56)  Source: .Blood Blood  Final Report (22 Apr 2021 15:01):    No Growth Final        RADIOLOGY & ADDITIONAL STUDIES:

## 2021-04-25 NOTE — PROGRESS NOTE ADULT - ASSESSMENT
87yo F PMH AAA repair in 2012, CAD, MI, s/p CABG x 4, in 2007, HTN, HLD, s/p PPM, bedbound x 3 years, admitted for coffee ground emesis and ? slurred speech.  CT head showed chronic ischemic changes in both hemispheres with atrophy.  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  B/l carotid artery aneurysms.  CT Abd / Pelvis showed No bowel obstruction. Colonic diverticulosis without CT evidence of acute diverticulitis. Rectum distended by stool.        # Acute Anemia - with Coffee Ground Emesis on admission   - Hgb dropping initially; now fairly stable post s/p transfusion x 1 unit    Unclear etiology for drop in H/H.    Recent CT A/P with contrast not suggestive of RP bleed.    Stool OB negative x 2    LDH, Haptoglobin not suggestive of hemolysis.    Hematology consulted (Goyo)     Pt refused BM Bx    GI consulted (Dr. Puga) - Conservative mgt as pt without clear GI source of bleeding    Holding ASA, Heparin.    Diet advanced to mechanical soft    # Leukocytosis - WBC was persistently rising; now trending back down    No fever, but CXR and CT Chest are suggestive of bibasilar infiltrates.    Started on short course of Levaquin for pneumonia.      ? Pneumonia    - On Levaquin, 4/23 - 4/29    WBC's coming back down    # Positive Blood cultures - Growing staph epidermidis - likely contaminant    Repeat BCx negative.  Off antibiotics.      # Dysarthria, L ptosis - Pt reports facial weakness is chronic.    Neurology input (Lluvia) appreciated.    Pt unable to have MRI.    CT of head shows possible L parietal subacute infarct    Continue Statin.      # Subcentimeter Renal Densities - seen on CT.    Discussed outpatient f/u with urology.    # B/l Cavernous Carotid Artery Aneurysms   - seen by neuro.    Appear to be thrombosed.    No surgical indication at present.    Outpatient neuro followup.      # CAD - continue Statin.     # Essential HTN - Monitor BP.    Continue oral antihypertensives.    # Acquired Hypothyroidism - continue Synthroid.    TSH somewhat elevated- 5.09, so Synthroid dose increased slightly    # Functional Quadriplegia - supportive care.     # Constipation - now with frequent BMs.    D/c'd laxatives.     # Moderate Protein Calorie Malnutrition - continue supplements per nutrition.    # DVT Prophylaxis   - Lower extremity intermittent compression devices.    Disp: Home soon; if pt has home health aide, will need to be reinstated  Plan of care d/w son Chauncey at [phone] on 4/22, 4/25    Called 4/24; no answer. left message.

## 2021-04-25 NOTE — PROGRESS NOTE ADULT - SUBJECTIVE AND OBJECTIVE BOX
No changes - stable  H/H stable  on antibx      MEDICATIONS  (STANDING):  amLODIPine   Tablet 10 milliGRAM(s) Oral daily  chlorhexidine 2% Cloths 1 Application(s) Topical daily  levoFLOXacin IVPB 250 milliGRAM(s) IV Intermittent every 24 hours  levoFLOXacin IVPB      levothyroxine 100 MICROGram(s) Oral daily  nystatin Powder 1 Application(s) Topical every 8 hours  pantoprazole   Suspension 40 milliGRAM(s) Oral daily  potassium chloride    Tablet ER 20 milliEquivalent(s) Oral two times a day  simvastatin 20 milliGRAM(s) Oral at bedtime    MEDICATIONS  (PRN):  acetaminophen   Tablet .. 650 milliGRAM(s) Oral every 6 hours PRN Temp greater or equal to 38C (100.4F), Mild Pain (1 - 3)      Allergies    No Known Allergies    Intolerances        Vital Signs Last 24 Hrs  T(C): 37 (25 Apr 2021 16:06), Max: 37.3 (24 Apr 2021 23:30)  T(F): 98.6 (25 Apr 2021 16:06), Max: 99.2 (24 Apr 2021 23:30)  HR: 82 (25 Apr 2021 16:06) (80 - 85)  BP: 114/76 (25 Apr 2021 16:06) (112/71 - 132/71)  BP(mean): --  RR: 18 (25 Apr 2021 16:06) (18 - 20)  SpO2: 98% (25 Apr 2021 16:06) (92% - 98%)    PHYSICAL EXAM:  General: NAD.  CVS: S1, S2  Chest: air entry bilaterally present  Abd: BS present, soft, non-tender      LABS:                        10.1   14.76 )-----------( 339      ( 25 Apr 2021 06:38 )             31.5     04-25    137  |  101  |  11  ----------------------------<  82  3.4<L>   |  26  |  0.65    Ca    8.7      25 Apr 2021 06:38  Phos  3.0     04-25  Mg     1.6     04-25    TPro  6.9  /  Alb  2.5<L>  /  TBili  1.1  /  DBili  x   /  AST  31  /  ALT  15  /  AlkPhos  68  04-25        follow labs

## 2021-04-25 NOTE — PROGRESS NOTE ADULT - SUBJECTIVE AND OBJECTIVE BOX
Patient is a 88y old  Female who presents with a chief complaint of FTT (25 Apr 2021 11:58)      INTERVAL HPI/OVERNIGHT EVENTS:    No specific complaints.     REVIEW OF SYSTEMS:   Remaining ROS negative    MEDICATIONS  (STANDING):  amLODIPine   Tablet 10 milliGRAM(s) Oral daily  chlorhexidine 2% Cloths 1 Application(s) Topical daily  levoFLOXacin IVPB 250 milliGRAM(s) IV Intermittent every 24 hours  levoFLOXacin IVPB      levothyroxine 100 MICROGram(s) Oral daily  nystatin Powder 1 Application(s) Topical every 8 hours  pantoprazole   Suspension 40 milliGRAM(s) Oral daily  potassium chloride    Tablet ER 20 milliEquivalent(s) Oral two times a day  simvastatin 20 milliGRAM(s) Oral at bedtime    MEDICATIONS  (PRN):  acetaminophen   Tablet .. 650 milliGRAM(s) Oral every 6 hours PRN Temp greater or equal to 38C (100.4F), Mild Pain (1 - 3)      Allergies    No Known Allergies    Intolerances        Vital Signs Last 24 Hrs  T(C): 37 (25 Apr 2021 16:06), Max: 37.3 (24 Apr 2021 23:30)  T(F): 98.6 (25 Apr 2021 16:06), Max: 99.2 (24 Apr 2021 23:30)  HR: 82 (25 Apr 2021 16:06) (80 - 85)  BP: 114/76 (25 Apr 2021 16:06) (112/71 - 132/71)  BP(mean): --  RR: 18 (25 Apr 2021 16:06) (18 - 20)  SpO2: 98% (25 Apr 2021 16:06) (92% - 98%)    PHYSICAL EXAM:  GENERAL: NAD  HEAD:  Atraumatic, Normocephalic  EYES: EOMI, PERRLA, conjunctiva and sclera clear  ENT: O/P Clear  NECK: Supple, No JVD  NERVOUS SYSTEM:  No focal deficits  CHEST/LUNG: Clear to percussion bilaterally; No rales, rhonchi, wheezing  HEART: Regular rate and rhythm; No murmurs, rubs, or gallops  ABDOMEN: Soft, Nontender, Nondistended; Bowel sounds present  EXTREMITIES:  2+ Peripheral Pulses, No clubbing, cyanosis, or edema  SKIN: No rashes or lesions    LABS:                        10.1   14.76 )-----------( 339      ( 25 Apr 2021 06:38 )             31.5     04-25    137  |  101  |  11  ----------------------------<  82  3.4<L>   |  26  |  0.65    Ca    8.7      25 Apr 2021 06:38  Phos  3.0     04-25  Mg     1.6     04-25    TPro  6.9  /  Alb  2.5<L>  /  TBili  1.1  /  DBili  x   /  AST  31  /  ALT  15  /  AlkPhos  68  04-25        CAPILLARY BLOOD GLUCOSE          RADIOLOGY & ADDITIONAL TESTS:    Imaging Personally Reviewed:  [ ] YES  [ ] NO    Consultant(s) Notes Reviewed:  [ ] YES  [ ] NO    Care Discussed with Consultants/Other Providers [ ] YES  [ ] NO

## 2021-04-26 NOTE — PROGRESS NOTE ADULT - SUBJECTIVE AND OBJECTIVE BOX
lying in bed    Vital Signs Last 24 Hrs  T(C): 36.8 (26 Apr 2021 04:37), Max: 37.1 (26 Apr 2021 00:30)  T(F): 98.2 (26 Apr 2021 04:37), Max: 98.7 (26 Apr 2021 00:30)  HR: 86 (26 Apr 2021 04:37) (70 - 86)  BP: 137/85 (26 Apr 2021 04:37) (112/71 - 138/80)  BP(mean): --  RR: 18 (26 Apr 2021 04:37) (18 - 18)  SpO2: 95% (26 Apr 2021 04:37) (95% - 99%)    PHYSICAL EXAM:    general - weak looking +  HEENT - No Icterus  CVS - RRR  RS - AE B/L  Abd - soft, NT  Ext - Pulses +        LABS:                        10.5   14.53 )-----------( 363      ( 26 Apr 2021 07:23 )             32.4     04-26    137  |  101  |  14  ----------------------------<  90  3.4<L>   |  27  |  0.76    Ca    8.8      26 Apr 2021 07:23  Phos  3.0     04-25  Mg     1.6     04-25    TPro  6.9  /  Alb  2.5<L>  /  TBili  1.1  /  DBili  x   /  AST  31  /  ALT  15  /  AlkPhos  68  04-25          Culture - Blood (collected 17 Apr 2021 14:56)  Source: .Blood Blood  Final Report (22 Apr 2021 15:01):    No Growth Final    Culture - Blood (collected 17 Apr 2021 14:56)  Source: .Blood Blood  Final Report (22 Apr 2021 15:01):    No Growth Final        RADIOLOGY & ADDITIONAL STUDIES:

## 2021-04-26 NOTE — PROGRESS NOTE ADULT - SUBJECTIVE AND OBJECTIVE BOX
Still with mildly elevated WBC  H/H stable  no bleeding  on antibx    MEDICATIONS  (STANDING):  amLODIPine   Tablet 10 milliGRAM(s) Oral daily  chlorhexidine 2% Cloths 1 Application(s) Topical daily  levoFLOXacin IVPB 250 milliGRAM(s) IV Intermittent every 24 hours  levoFLOXacin IVPB      levothyroxine 100 MICROGram(s) Oral daily  nystatin Powder 1 Application(s) Topical every 8 hours  pantoprazole   Suspension 40 milliGRAM(s) Oral daily  potassium chloride    Tablet ER 20 milliEquivalent(s) Oral two times a day  simvastatin 20 milliGRAM(s) Oral at bedtime    MEDICATIONS  (PRN):  acetaminophen   Tablet .. 650 milliGRAM(s) Oral every 6 hours PRN Temp greater or equal to 38C (100.4F), Mild Pain (1 - 3)      Allergies    No Known Allergies    Intolerances        Vital Signs Last 24 Hrs  T(C): 36.8 (26 Apr 2021 04:37), Max: 37.1 (26 Apr 2021 00:30)  T(F): 98.2 (26 Apr 2021 04:37), Max: 98.7 (26 Apr 2021 00:30)  HR: 86 (26 Apr 2021 04:37) (70 - 86)  BP: 137/85 (26 Apr 2021 04:37) (114/76 - 138/80)  BP(mean): --  RR: 18 (26 Apr 2021 04:37) (18 - 18)  SpO2: 95% (26 Apr 2021 04:37) (95% - 99%)    PHYSICAL EXAM:  General: NAD.  CVS: S1, S2  Chest: air entry bilaterally present  Abd: BS present, soft, non-tender      LABS:                        10.5   14.53 )-----------( 363      ( 26 Apr 2021 07:23 )             32.4     04-26    137  |  101  |  14  ----------------------------<  90  3.4<L>   |  27  |  0.76    Ca    8.8      26 Apr 2021 07:23  Phos  3.0     04-25  Mg     1.6     04-25    TPro  6.9  /  Alb  2.5<L>  /  TBili  1.1  /  DBili  x   /  AST  31  /  ALT  15  /  AlkPhos  68  04-25        follow labs   presently stable from GI standpoint

## 2021-04-26 NOTE — PROGRESS NOTE ADULT - ASSESSMENT
87yo F PMH AAA repair in 2012, CAD, MI, s/p CABG x 4, in 2007, HTN, HLD, s/p PPM, bedbound x 3 years, admitted for coffee ground emesis and ? slurred speech.  CT head showed chronic ischemic changes in both hemispheres with atrophy.  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  B/l carotid artery aneurysms.  CT Abd / Pelvis showed No bowel obstruction. Colonic diverticulosis without CT evidence of acute diverticulitis. Rectum distended by stool.        # Acute Anemia - with Coffee Ground Emesis on admission   - Hgb dropping initially; now fairly stable post s/p transfusion x 1 unit    Unclear etiology for drop in H/H.    Recent CT A/P with contrast not suggestive of RP bleed.    Stool OB negative x 2    LDH, Haptoglobin not suggestive of hemolysis.    Hematology consulted (Goyo)     Pt refused BM Bx    GI consulted (Dr. Puga) - Conservative mgt as pt without clear GI source of bleeding    Holding ASA, Heparin.    Diet advanced to mechanical soft    # Leukocytosis - WBC was persistently rising; now trending back down    No fever, but CXR and CT Chest are suggestive of bibasilar infiltrates.    Started on short course of Levaquin for pneumonia.      ? Pneumonia    - On Levaquin, 4/23 - 4/29    WBC's coming back down very slowly    # Positive Blood cultures - 1 grew staph epidermidis - likely contaminant    Repeat BCx negative.  Off antibiotics.      # Dysarthria, L ptosis - Pt reports facial weakness is chronic.    Neurology input (Lluvia) appreciated.    Pt unable to have MRI.    CT of head shows possible L parietal subacute infarct    Continue Statin.      # Subcentimeter Renal Densities - seen on CT.    Discussed outpatient f/u with urology.    # B/l Cavernous Carotid Artery Aneurysms   - seen by neuro.    Appear to be thrombosed.    No surgical indication at present.    Outpatient neuro followup.      # CAD - continue Statin.     # Essential HTN - Monitor BP.    Continue oral antihypertensives.    # Acquired Hypothyroidism - continue Synthroid.    TSH somewhat elevated- 5.09, so Synthroid dose increased slightly    # Functional Quadriplegia - supportive care.     # Constipation - now with frequent BMs.    D/c'd laxatives.     # Moderate Protein Calorie Malnutrition - continue supplements per nutrition.    # DVT Prophylaxis   - Lower extremity intermittent compression devices.    Disp: Home soon; has 7-day a week aide who will need to be reinstated.   Plan of care d/w fidelina Grossman at [phone] on 4/22, 4/25    Called 4/24; no answer. left message.    Discussed again 4/26 at bedside.

## 2021-04-26 NOTE — PROGRESS NOTE ADULT - SUBJECTIVE AND OBJECTIVE BOX
Patient is a 88y old  Female who presents with a chief complaint of FTT (26 Apr 2021 11:02)      INTERVAL HPI/OVERNIGHT EVENTS:    Appetite still somewhat poor. States she's just not interested in eating. Per son, this has been a problem for the last 3 weeks, but        she normally has a good appetite. Pt states she'd like to go home.     REVIEW OF SYSTEMS:   Remaining ROS negative    MEDICATIONS  (STANDING):  amLODIPine   Tablet 10 milliGRAM(s) Oral daily  chlorhexidine 2% Cloths 1 Application(s) Topical daily  levoFLOXacin IVPB 250 milliGRAM(s) IV Intermittent every 24 hours  levoFLOXacin IVPB      levothyroxine 100 MICROGram(s) Oral daily  nystatin Powder 1 Application(s) Topical every 8 hours  pantoprazole   Suspension 40 milliGRAM(s) Oral daily  simvastatin 20 milliGRAM(s) Oral at bedtime    MEDICATIONS  (PRN):  acetaminophen   Tablet .. 650 milliGRAM(s) Oral every 6 hours PRN Temp greater or equal to 38C (100.4F), Mild Pain (1 - 3)      Allergies    No Known Allergies    Intolerances        Vital Signs Last 24 Hrs  T(C): 36.8 (26 Apr 2021 16:35), Max: 37.1 (26 Apr 2021 00:30)  T(F): 98.2 (26 Apr 2021 16:35), Max: 98.7 (26 Apr 2021 00:30)  HR: 89 (26 Apr 2021 16:35) (70 - 89)  BP: 117/75 (26 Apr 2021 16:35) (117/75 - 138/80)  BP(mean): --  RR: 18 (26 Apr 2021 16:35) (16 - 18)  SpO2: 93% (26 Apr 2021 16:35) (93% - 99%)    PHYSICAL EXAM:  GENERAL: NAD  HEAD:  Atraumatic, Normocephalic  EYES: EOMI, PERRLA, conjunctiva and sclera clear  ENT: O/P Clear  NECK: Supple, No JVD  NERVOUS SYSTEM:  No focal deficits  CHEST/LUNG: Clear to percussion bilaterally; No rales, rhonchi, wheezing  HEART: Regular rate and rhythm; No murmurs, rubs, or gallops  ABDOMEN: Soft, Nontender, Nondistended; Bowel sounds present  EXTREMITIES:  2+ Peripheral Pulses, No clubbing, cyanosis, or edema  SKIN: No rashes or lesions    LABS:                        10.5   14.53 )-----------( 363      ( 26 Apr 2021 07:23 )             32.4     04-26    137  |  101  |  14  ----------------------------<  90  3.4<L>   |  27  |  0.76    Ca    8.8      26 Apr 2021 07:23  Phos  3.0     04-25  Mg     1.6     04-25    TPro  6.9  /  Alb  2.5<L>  /  TBili  1.1  /  DBili  x   /  AST  31  /  ALT  15  /  AlkPhos  68  04-25        CAPILLARY BLOOD GLUCOSE          RADIOLOGY & ADDITIONAL TESTS:    Imaging Personally Reviewed:  [ ] YES  [ ] NO    Consultant(s) Notes Reviewed:  [ ] YES  [ ] NO    Care Discussed with Consultants/Other Providers [ ] YES  [ ] NO

## 2021-04-27 NOTE — PROGRESS NOTE ADULT - SUBJECTIVE AND OBJECTIVE BOX
No bleeding, but pt remains weak  H/H stable    MEDICATIONS  (STANDING):  amLODIPine   Tablet 10 milliGRAM(s) Oral daily  chlorhexidine 2% Cloths 1 Application(s) Topical daily  levoFLOXacin  Tablet 250 milliGRAM(s) Oral every 24 hours  levothyroxine 100 MICROGram(s) Oral daily  nystatin Powder 1 Application(s) Topical every 8 hours  pantoprazole   Suspension 40 milliGRAM(s) Oral daily  simvastatin 20 milliGRAM(s) Oral at bedtime    MEDICATIONS  (PRN):  acetaminophen   Tablet .. 650 milliGRAM(s) Oral every 6 hours PRN Temp greater or equal to 38C (100.4F), Mild Pain (1 - 3)      Allergies    No Known Allergies    Intolerances        Vital Signs Last 24 Hrs  T(C): 36.3 (27 Apr 2021 11:05), Max: 36.9 (27 Apr 2021 00:18)  T(F): 97.4 (27 Apr 2021 11:05), Max: 98.5 (27 Apr 2021 00:18)  HR: 84 (27 Apr 2021 11:05) (84 - 89)  BP: 128/77 (27 Apr 2021 11:05) (117/75 - 145/85)  BP(mean): --  RR: 16 (27 Apr 2021 11:05) (16 - 18)  SpO2: 94% (27 Apr 2021 11:05) (92% - 94%)    PHYSICAL EXAM:  General: NAD.  CVS: S1, S2  Chest: air entry bilaterally present  Abd: BS present, soft, non-tender      LABS:                        10.6   13.15 )-----------( 346      ( 27 Apr 2021 06:42 )             33.6     04-27    140  |  104  |  15  ----------------------------<  84  4.2   |  27  |  0.66    Ca    8.8      27 Apr 2021 06:42  Phos  2.7     04-27  Mg     1.9     04-27        follow labs  encourage PO diet

## 2021-04-27 NOTE — PROGRESS NOTE ADULT - SUBJECTIVE AND OBJECTIVE BOX
Patient is a 88y old  Female who presents with a chief complaint of FTT (27 Apr 2021 11:35)      INTERVAL HPI/OVERNIGHT EVENTS:    Doing a little better w/ PO intake.     REVIEW OF SYSTEMS:   Remaining ROS negative    MEDICATIONS  (STANDING):  amLODIPine   Tablet 10 milliGRAM(s) Oral daily  chlorhexidine 2% Cloths 1 Application(s) Topical daily  levoFLOXacin  Tablet 250 milliGRAM(s) Oral every 24 hours  levothyroxine 100 MICROGram(s) Oral daily  nystatin Powder 1 Application(s) Topical every 8 hours  pantoprazole   Suspension 40 milliGRAM(s) Oral daily  simvastatin 20 milliGRAM(s) Oral at bedtime    MEDICATIONS  (PRN):  acetaminophen   Tablet .. 650 milliGRAM(s) Oral every 6 hours PRN Temp greater or equal to 38C (100.4F), Mild Pain (1 - 3)      Allergies    No Known Allergies    Intolerances        Vital Signs Last 24 Hrs  T(C): 36.3 (27 Apr 2021 11:05), Max: 36.9 (27 Apr 2021 00:18)  T(F): 97.4 (27 Apr 2021 11:05), Max: 98.5 (27 Apr 2021 00:18)  HR: 84 (27 Apr 2021 11:05) (84 - 89)  BP: 128/77 (27 Apr 2021 11:05) (117/75 - 145/85)  BP(mean): --  RR: 16 (27 Apr 2021 11:05) (16 - 18)  SpO2: 94% (27 Apr 2021 11:05) (92% - 94%)    PHYSICAL EXAM:  GENERAL: NAD  HEAD:  Atraumatic, Normocephalic  EYES: EOMI, PERRLA, conjunctiva and sclera clear  ENT: O/P Clear  NECK: Supple, No JVD  NERVOUS SYSTEM:  No focal deficits  CHEST/LUNG: Clear to percussion bilaterally; No rales, rhonchi, wheezing  HEART: Regular rate and rhythm; No murmurs, rubs, or gallops  ABDOMEN: Soft, Nontender, Nondistended; Bowel sounds present  EXTREMITIES:  2+ Peripheral Pulses, No clubbing, cyanosis, or edema  SKIN: No rashes or lesions    LABS:                        10.6   13.15 )-----------( 346      ( 27 Apr 2021 06:42 )             33.6     04-27    140  |  104  |  15  ----------------------------<  84  4.2   |  27  |  0.66    Ca    8.8      27 Apr 2021 06:42  Phos  2.7     04-27  Mg     1.9     04-27          CAPILLARY BLOOD GLUCOSE          RADIOLOGY & ADDITIONAL TESTS:    Imaging Personally Reviewed:  [ ] YES  [ ] NO    Consultant(s) Notes Reviewed:  [ ] YES  [ ] NO    Care Discussed with Consultants/Other Providers [ ] YES  [ ] NO Patient is a 88y old  Female who presents with a chief complaint of FTT (27 Apr 2021 11:35)      INTERVAL HPI/OVERNIGHT EVENTS:    Doing a little better w/ PO intake.     REVIEW OF SYSTEMS:   Remaining ROS negative    MEDICATIONS  (STANDING):  amLODIPine   Tablet 10 milliGRAM(s) Oral daily  chlorhexidine 2% Cloths 1 Application(s) Topical daily  levoFLOXacin  Tablet 250 milliGRAM(s) Oral every 24 hours  levothyroxine 100 MICROGram(s) Oral daily  nystatin Powder 1 Application(s) Topical every 8 hours  pantoprazole   Suspension 40 milliGRAM(s) Oral daily  simvastatin 20 milliGRAM(s) Oral at bedtime    MEDICATIONS  (PRN):  acetaminophen   Tablet .. 650 milliGRAM(s) Oral every 6 hours PRN Temp greater or equal to 38C (100.4F), Mild Pain (1 - 3)      Allergies    No Known Allergies    Intolerances        Vital Signs Last 24 Hrs  T(C): 36.3 (27 Apr 2021 11:05), Max: 36.9 (27 Apr 2021 00:18)  T(F): 97.4 (27 Apr 2021 11:05), Max: 98.5 (27 Apr 2021 00:18)  HR: 84 (27 Apr 2021 11:05) (84 - 89)  BP: 128/77 (27 Apr 2021 11:05) (117/75 - 145/85)  BP(mean): --  RR: 16 (27 Apr 2021 11:05) (16 - 18)  SpO2: 94% (27 Apr 2021 11:05) (92% - 94%)    PHYSICAL EXAM:  GENERAL: NAD; Siting up; eating lunch.  HEAD:  Atraumatic, Normocephalic  EYES: EOMI, PERRLA, conjunctiva and sclera clear  ENT: O/P Clear  NECK: Supple, No JVD  NERVOUS SYSTEM:  No focal deficits  CHEST/LUNG: Clear to percussion bilaterally; No rales, rhonchi, wheezing  HEART: Regular rate and rhythm; No murmurs, rubs, or gallops  ABDOMEN: Soft, Nontender, Nondistended; Bowel sounds present  EXTREMITIES:  2+ Peripheral Pulses, No clubbing, cyanosis, or edema  SKIN: No rashes or lesions    LABS:                        10.6   13.15 )-----------( 346      ( 27 Apr 2021 06:42 )             33.6     04-27    140  |  104  |  15  ----------------------------<  84  4.2   |  27  |  0.66    Ca    8.8      27 Apr 2021 06:42  Phos  2.7     04-27  Mg     1.9     04-27          CAPILLARY BLOOD GLUCOSE          RADIOLOGY & ADDITIONAL TESTS:    Imaging Personally Reviewed:  [ ] YES  [ ] NO    Consultant(s) Notes Reviewed:  [ ] YES  [ ] NO    Care Discussed with Consultants/Other Providers [ ] YES  [ ] NO

## 2021-04-27 NOTE — PROGRESS NOTE ADULT - SUBJECTIVE AND OBJECTIVE BOX
lying in bed, h/H stable    Vital Signs Last 24 Hrs  T(C): 36.9 (27 Apr 2021 05:26), Max: 36.9 (27 Apr 2021 00:18)  T(F): 98.4 (27 Apr 2021 05:26), Max: 98.5 (27 Apr 2021 00:18)  HR: 86 (27 Apr 2021 05:26) (86 - 89)  BP: 127/82 (27 Apr 2021 05:26) (117/75 - 145/85)  BP(mean): --  RR: 18 (27 Apr 2021 05:26) (16 - 18)  SpO2: 94% (27 Apr 2021 05:26) (92% - 94%)    PHYSICAL EXAM:    general - AAO x 3  HEENT - No Icterus  CVS - RRR  RS - AE B/L  Abd - soft, NT  Ext - Pulses +        LABS:                        10.6   13.15 )-----------( 346      ( 27 Apr 2021 06:42 )             33.6     04-27    140  |  104  |  15  ----------------------------<  84  4.2   |  27  |  0.66    Ca    8.8      27 Apr 2021 06:42  Phos  2.7     04-27  Mg     1.9     04-27            Culture - Blood (collected 17 Apr 2021 14:56)  Source: .Blood Blood  Final Report (22 Apr 2021 15:01):    No Growth Final    Culture - Blood (collected 17 Apr 2021 14:56)  Source: .Blood Blood  Final Report (22 Apr 2021 15:01):    No Growth Final        RADIOLOGY & ADDITIONAL STUDIES:

## 2021-04-27 NOTE — PROGRESS NOTE ADULT - ASSESSMENT
87yo F PMH AAA repair in 2012, CAD, MI, s/p CABG x 4, in 2007, HTN, HLD, s/p PPM, bedbound x 3 years, admitted for coffee ground emesis and ? slurred speech.  CT head showed chronic ischemic changes in both hemispheres with atrophy.  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  B/l carotid artery aneurysms.  CT Abd / Pelvis showed No bowel obstruction. Colonic diverticulosis without CT evidence of acute diverticulitis. Rectum distended by stool.        # Acute Anemia - with Coffee Ground Emesis on admission   - Hgb dropping initially; now fairly stable post s/p transfusion x 1 unit    Unclear etiology for drop in H/H.    Recent CT A/P with contrast not suggestive of RP bleed.    Stool OB negative x 2    LDH, Haptoglobin not suggestive of hemolysis.    Hematology consulted (Goyo)     Pt refused BM Bx    GI consulted (Dr. Puga) - Conservative mgt as pt without clear GI source of bleeding    Holding ASA, Heparin.    Diet advanced to mechanical soft    # Leukocytosis - WBC was persistently rising; now trending back down    No fever, but CXR and CT Chest are suggestive of bibasilar infiltrates.    Started on short course of Levaquin for pneumonia.      ? Pneumonia    - On Levaquin, 4/23 - 4/29    WBC's coming back down very slowly    # Positive Blood cultures - 1 grew staph epidermidis - likely contaminant    Repeat BCx negative.  Off antibiotics.      # Dysarthria, L ptosis - Pt reports facial weakness is chronic.    Neurology input (Lluvia) appreciated.    Pt unable to have MRI.    CT of head shows possible L parietal subacute infarct    Continue Statin.      # Subcentimeter Renal Densities - seen on CT.    Discussed outpatient f/u with urology.    # B/l Cavernous Carotid Artery Aneurysms   - seen by neuro.    Appear to be thrombosed.    No surgical indication at present.    Outpatient neuro followup.      # CAD - continue Statin.     # Essential HTN - Monitor BP.    Continue oral antihypertensives.    # Acquired Hypothyroidism - continue Synthroid.    TSH somewhat elevated- 5.09, so Synthroid dose increased slightly    # Functional Quadriplegia - supportive care.     # Constipation - now with frequent BMs.    D/c'd laxatives.     # Moderate Protein Calorie Malnutrition - continue supplements per nutrition.    # DVT Prophylaxis   - Lower extremity intermittent compression devices.    Disp: Home soon; has 7-day a week aide who will need to be reinstated.   Plan of care d/w fidelina Grossman at [phone] on 4/22, 4/25    Called 4/24; no answer. left message.    Discussed again 4/26 at bedside.     89yo F PMH AAA repair in 2012, CAD, MI, s/p CABG x 4, in 2007, HTN, HLD, s/p PPM, bedbound x 3 years, admitted for coffee ground emesis and ? slurred speech.  CT head showed chronic ischemic changes in both hemispheres with atrophy.  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  B/l carotid artery aneurysms.  CT Abd / Pelvis showed No bowel obstruction. Colonic diverticulosis without CT evidence of acute diverticulitis. Rectum distended by stool.        # Acute Anemia - with Coffee Ground Emesis on admission   - Hgb dropping initially; now fairly stable post s/p transfusion x 1 unit    Unclear etiology for drop in H/H, possibly from fluid resuscitation    Recent CT A/P with contrast not suggestive of RP bleed.    Stool occult blood negative x 2    LDH, Haptoglobin not suggestive of hemolysis.    Hematology consulted (Goyo)     Pt refused BM Bx    GI consulted (Dr. Puga) - Conservative mgt as pt without clear GI source of bleeding    Holding ASA, Heparin.    Diet advanced to mechanical soft    # Leukocytosis - WBC was persistently rising; now trending back down (slowly)    No fever, but CXR and CT Chest are suggestive of bibasilar infiltrates.    Started on short course of Levaquin for pneumonia - last dose on 4/28    ? Pneumonia    - On Levaquin, 4/23 - 4/29    WBC's coming back down very slowly    # Positive Blood cultures - 1 grew staph epidermidis - likely contaminant    Repeat BCx negative.  Off antibiotics.      # Dysarthria, L ptosis - Pt reports facial weakness is chronic.    Neurology input (Lluvia) appreciated.    Pt unable to have MRI.    CT of head shows possible L parietal subacute infarct    Continue Statin.      # Subcentimeter Renal Densities - seen on CT.    Discussed outpatient f/u with urology.    # B/l Cavernous Carotid Artery Aneurysms   - seen by neuro.    Appear to be thrombosed.    No surgical indication at present.    Outpatient neuro followup.      # CAD - continue Statin.     # Essential HTN - Monitor BP.    Continue oral antihypertensives.    # Acquired Hypothyroidism - continue Synthroid.    TSH somewhat elevated- 5.09, so Synthroid dose increased slightly    # Functional Quadriplegia - supportive care.     # Constipation - now with frequent BMs.    D/c'd laxatives.     # Moderate Protein Calorie Malnutrition - continue supplements per nutrition.    # DVT Prophylaxis   - Lower extremity intermittent compression devices.    Disp: Home soon; has 7-day a week aide who will need to be reinstated, 4/28?  Plan of care d/w son Chauncey at [phone] on 4/22, 4/25, 4/27 by phone    Called 4/24; no answer. left message.    Discussed 4/26 at bedside.

## 2021-04-28 NOTE — PROGRESS NOTE ADULT - SUBJECTIVE AND OBJECTIVE BOX
lying in bed    Vital Signs Last 24 Hrs  T(C): 36.8 (28 Apr 2021 04:50), Max: 37 (27 Apr 2021 16:48)  T(F): 98.2 (28 Apr 2021 04:50), Max: 98.6 (27 Apr 2021 16:48)  HR: 78 (28 Apr 2021 04:50) (78 - 84)  BP: 120/74 (28 Apr 2021 04:50) (120/74 - 141/83)  BP(mean): --  RR: 18 (28 Apr 2021 04:50) (16 - 18)  SpO2: 97% (28 Apr 2021 04:50) (92% - 97%)    PHYSICAL EXAM:    general - weak Looking +  HEENT - No Icterus  CVS - RRR  RS - AE B/L  Abd - soft, NT  Ext - Pulses +        LABS:                        10.5   12.90 )-----------( 340      ( 28 Apr 2021 07:25 )             33.7     04-27    140  |  104  |  15  ----------------------------<  84  4.2   |  27  |  0.66    Ca    8.8      27 Apr 2021 06:42  Phos  2.7     04-27  Mg     1.9     04-27              RADIOLOGY & ADDITIONAL STUDIES:

## 2021-04-28 NOTE — PROGRESS NOTE ADULT - SUBJECTIVE AND OBJECTIVE BOX
Patient: MITCHEL MULLINS [id number] 88y Female                            Hospitalist Attending Note    Taking some po.  Denies cough / fever / chills.  No bleeding.       ____________________PHYSICAL EXAM:  GENERAL:  NAD [place], oriented to person, place.   HEENT: NCAT  CARDIOVASCULAR:  S1, S2  LUNGS: CTAB  ABDOMEN:  soft, (-) tenderness, (-) distension, (+) bowel sounds, (-) guarding, (-) rebound (-) rigidity  EXTREMITIES:  no cyanosis / clubbing / edema.   ____________________     VITALS:  Vital Signs Last 24 Hrs  T(C): 36.8 (2021 16:27), Max: 37.2 (2021 11:29)  T(F): 98.2 (2021 16:27), Max: 98.9 (2021 11:29)  HR: 83 (2021 16:27) (78 - 84)  BP: 113/75 (2021 16:27) (113/75 - 138/68)  BP(mean): --  RR: 18 (2021 16:27) (18 - 18)  SpO2: 92% (2021 16:27) (92% - 97%) Daily     Daily Weight in k (2021 04:50)  CAPILLARY BLOOD GLUCOSE        I&O's Summary    2021 07:  -  2021 07:00  --------------------------------------------------------  IN: 340 mL / OUT: 625 mL / NET: -285 mL    2021 07:  -  2021 17:47  --------------------------------------------------------  IN: 240 mL / OUT: 0 mL / NET: 240 mL        HISTORY:  PAST MEDICAL & SURGICAL HISTORY:  Benign essential HTN    HLD (hyperlipidemia)    Myocardial infarction    AAA (abdominal aortic aneurysm)    CAD (coronary artery disease)    S/P CABG x 4    Allergies    No Known Allergies    Intolerances       LABS:                        10.5   12.90 )-----------( 340      ( 2021 07:25 )             33.7     -    140  |  104  |  15  ----------------------------<  84  4.2   |  27  |  0.66    Ca    8.8      2021 06:42  Phos  2.7     -  Mg     1.9                         MEDICATIONS:  MEDICATIONS  (STANDING):  amLODIPine   Tablet 10 milliGRAM(s) Oral daily  chlorhexidine 2% Cloths 1 Application(s) Topical daily  levoFLOXacin  Tablet 250 milliGRAM(s) Oral every 24 hours  levothyroxine 100 MICROGram(s) Oral daily  nystatin Powder 1 Application(s) Topical every 8 hours  pantoprazole   Suspension 40 milliGRAM(s) Oral daily  simvastatin 20 milliGRAM(s) Oral at bedtime    MEDICATIONS  (PRN):  acetaminophen   Tablet .. 650 milliGRAM(s) Oral every 6 hours PRN Temp greater or equal to 38C (100.4F), Mild Pain (1 - 3)

## 2021-04-28 NOTE — PROGRESS NOTE ADULT - ASSESSMENT
89yo F PMH AAA repair in 2012, CAD, MI, s/p CABG x 4, in 2007, HTN, HLD, s/p PPM, bedbound x 3 years, admitted for coffee ground emesis and ? slurred speech.  CT head showed chronic ischemic changes in both hemispheres with atrophy.  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  B/l carotid artery aneurysms.  CT Abd / Pelvis showed No bowel obstruction. Colonic diverticulosis without CT evidence of acute diverticulitis. Rectum distended by stool.  Seen by GI, recommended conservative GI management at present.  Elevated WBC noted, CT chest suggestive of b/l infiltrates.  Improving on IV Abx.     # Acute Anemia - with Coffee Ground Emesis on admission   - Hgb dropping initially; now fairly stable post s/p transfusion x 1 unit    Unclear etiology for drop in H/H, possibly from fluid resuscitation    Recent CT A/P with contrast not suggestive of RP bleed.    Stool occult blood negative x 2    LDH, Haptoglobin not suggestive of hemolysis.    Hematology consulted (Goyo)     Pt refused BM Bx    GI consulted (Dr. Puga) - Conservative mgt as pt without clear GI source of bleeding    Holding ASA, Heparin.    Diet advanced to mechanical soft    # Leukocytosis, Probable pneumonia - WBC was persistently rising; now trending back down (slowly)   No fever, but CXR and CT Chest are suggestive of bibasilar infiltrates.   Started on short course of Levaquin for pneumonia - last dose on 4/29  # Positive Blood cultures - 1 grew staph epidermidis - likely contaminant   Repeat BCx negative.  Off antibiotics.    # Dysarthria, L ptosis - Pt reports facial weakness is chronic.   Neurology input (Lluvia) appreciated.   Pt unable to have MRI.   CT of head shows possible L parietal subacute infarct    Continue Statin.    # Subcentimeter Renal Densities - seen on CT.   Discussed outpatient f/u with urology.  # B/l Cavernous Carotid Artery Aneurysms  - seen by neuro.   Appear to be thrombosed.   No surgical indication at present.    Outpatient neuro followup.    # CAD - continue Statin.    # Essential HTN - Monitor BP.  Continue oral antihypertensives.  # Acquired Hypothyroidism - continue Synthroid.   TSH somewhat elevated- 5.09, so Synthroid dose increased slightly  # Functional Quadriplegia - supportive care.   # Constipation - now with frequent BMs.  # Moderate Protein Calorie Malnutrition - continue supplements per nutrition.  # DVT Prophylaxis  - Lower extremity intermittent compression devices.    son Chauncey was contacted as recently as 4/27 at [phone].  Planning d/c home.

## 2021-04-28 NOTE — PROGRESS NOTE ADULT - SUBJECTIVE AND OBJECTIVE BOX
Pt stable - no bleeding      MEDICATIONS  (STANDING):  amLODIPine   Tablet 10 milliGRAM(s) Oral daily  chlorhexidine 2% Cloths 1 Application(s) Topical daily  levoFLOXacin  Tablet 250 milliGRAM(s) Oral every 24 hours  levothyroxine 100 MICROGram(s) Oral daily  nystatin Powder 1 Application(s) Topical every 8 hours  pantoprazole   Suspension 40 milliGRAM(s) Oral daily  simvastatin 20 milliGRAM(s) Oral at bedtime    MEDICATIONS  (PRN):  acetaminophen   Tablet .. 650 milliGRAM(s) Oral every 6 hours PRN Temp greater or equal to 38C (100.4F), Mild Pain (1 - 3)      Allergies    No Known Allergies    Intolerances        Vital Signs Last 24 Hrs  T(C): 36.8 (28 Apr 2021 04:50), Max: 37 (27 Apr 2021 16:48)  T(F): 98.2 (28 Apr 2021 04:50), Max: 98.6 (27 Apr 2021 16:48)  HR: 78 (28 Apr 2021 04:50) (78 - 84)  BP: 120/74 (28 Apr 2021 04:50) (120/74 - 141/83)  BP(mean): --  RR: 18 (28 Apr 2021 04:50) (16 - 18)  SpO2: 97% (28 Apr 2021 04:50) (92% - 97%)    PHYSICAL EXAM:  General: NAD.  CVS: S1, S2  Chest: air entry bilaterally present  Abd: BS present, soft, non-tender      LABS:                        10.5   12.90 )-----------( 340      ( 28 Apr 2021 07:25 )             33.7     04-27    140  |  104  |  15  ----------------------------<  84  4.2   |  27  |  0.66    Ca    8.8      27 Apr 2021 06:42  Phos  2.7     04-27  Mg     1.9     04-27        Pt does not want any intervention  please re-consult GI if needed

## 2021-04-29 NOTE — BH CONSULTATION LIAISON ASSESSMENT NOTE - CURRENT MEDICATION
MEDICATIONS  (STANDING):  amLODIPine   Tablet 10 milliGRAM(s) Oral daily  chlorhexidine 2% Cloths 1 Application(s) Topical daily  levothyroxine 100 MICROGram(s) Oral daily  nystatin Powder 1 Application(s) Topical every 8 hours  pantoprazole   Suspension 40 milliGRAM(s) Oral daily  simvastatin 20 milliGRAM(s) Oral at bedtime    MEDICATIONS  (PRN):  acetaminophen   Tablet .. 650 milliGRAM(s) Oral every 6 hours PRN Temp greater or equal to 38C (100.4F), Mild Pain (1 - 3)

## 2021-04-29 NOTE — BH CONSULTATION LIAISON ASSESSMENT NOTE - NSBHCONSULTRECOMMENDOTHER_PSY_A_CORE FT
trying Remeron 7.5mg PO qhs for sleep, appetite (though it will not substantially increase oral intake as anorexia in this case is physiological secondary to overall decline in health)

## 2021-04-29 NOTE — PROGRESS NOTE ADULT - ASSESSMENT
89yo F PMH AAA repair in 2012, CAD, MI, s/p CABG x 4, in 2007, HTN, HLD, s/p PPM, bedbound x 3 years, admitted for coffee ground emesis and ? slurred speech.  CT head showed chronic ischemic changes in both hemispheres with atrophy.  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  B/l carotid artery aneurysms.  CT Abd / Pelvis showed No bowel obstruction. Colonic diverticulosis without CT evidence of acute diverticulitis. Rectum distended by stool.  Seen by GI, recommended conservative GI management at present.  Elevated WBC noted, CT chest suggestive of b/l infiltrates.  Improving on IV Abx.     # Acute Anemia - with Coffee Ground Emesis on admission   - Hgb dropping initially; now fairly stable post s/p transfusion x 1 unit    Unclear etiology for drop in H/H, possibly from fluid resuscitation    Recent CT A/P with contrast not suggestive of RP bleed.    Stool occult blood negative x 2    LDH, Haptoglobin not suggestive of hemolysis.    Hematology consulted (Goyo)     Pt refused BM Bx    GI consulted (Dr. Puga) - Conservative mgt as pt without clear GI source of bleeding    Holding ASA, Heparin.    Diet advanced to mechanical soft    # Leukocytosis, Probable pneumonia - WBC was persistently rising; now trending back down (slowly)   No fever, but CXR and CT Chest are suggestive of bibasilar infiltrates.   Started on short course of Levaquin for pneumonia - last dose on 4/29  # Positive Blood cultures - 1 grew staph epidermidis - likely contaminant   Repeat BCx negative.  Off antibiotics.    # Dysarthria, L ptosis - Pt reports facial weakness is chronic.   Neurology input (Lluvia) appreciated.   Pt unable to have MRI.   CT of head shows possible L parietal subacute infarct    Continue Statin.    # Subcentimeter Renal Densities - seen on CT.   Discussed outpatient f/u with urology.  # B/l Cavernous Carotid Artery Aneurysms  - seen by neuro.   Appear to be thrombosed.   No surgical indication at present.    Outpatient neuro followup.    # CAD - continue Statin.    # Essential HTN - Monitor BP.  Continue oral antihypertensives.  # Acquired Hypothyroidism - continue Synthroid.   TSH somewhat elevated- 5.09, so Synthroid dose increased slightly  # Functional Quadriplegia - supportive care.   # Constipation - now with frequent BMs.  # Moderate Protein Calorie Malnutrition - continue supplements per nutrition.  # DVT Prophylaxis  - Lower extremity intermittent compression devices.    son Chauncey was contacted as recently as 4/27 at [phone].  Planning d/c home.  87yo F PMH AAA repair in 2012, CAD, MI, s/p CABG x 4, in 2007, HTN, HLD, s/p PPM, bedbound x 3 years, admitted for coffee ground emesis and slurred speech.  CT head showed chronic ischemic changes in both hemispheres with atrophy.  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  B/l carotid artery aneurysms.  CT Abd / Pelvis showed No bowel obstruction. Colonic diverticulosis without CT evidence of acute diverticulitis. Rectum distended by stool.  Seen by GI, recommended conservative GI management at present.  Elevated WBC noted, CT chest suggestive of b/l infiltrates.  Improving on IV Abx.      # Acute Anemia - with Coffee Ground Emesis on admission.  Hgb dropping initially; now fairly stable post s/p transfusion x 1 unit.  Recent CT A/P with contrast not suggestive of RP bleed.  Stool occult blood negative x 2. LDH, Haptoglobin not suggestive of hemolysis.  Hematology consulted (Goyo)  Pt refused BM Bx.  GI consulted (Dr. Puga) - Conservative mgt as pt without clear GI source of bleeding.  Diet advanced to mechanical soft.  H/H appears to be stable.   # Leukocytosis, Probable pneumonia - WBC was persistently rising; now trending back down (slowly)   No fever, but CXR and CT Chest are suggestive of bibasilar infiltrates.   Started on short course of Levaquin for pneumonia - last dose on 4/29  # Failure to Thrive - son reports for several months prior to arrival, pt had very poor po intake.  She has expressed to him a desire to be "left alone".  Does not appear to be depressed.  Psychiatry evaluation requested.   # Positive Blood cultures - 1 grew staph epidermidis - likely contaminant   Repeat BCx negative.  Off antibiotics.    # Dysarthria, L ptosis - Pt reports facial weakness is chronic.   Neurology input (Lluvia) appreciated.   Pt unable to have MRI.   CT of head shows possible L parietal subacute infarct    Continue Statin.    # Subcentimeter Renal Densities - seen on CT.   Discussed outpatient f/u with urology.  # B/l Cavernous Carotid Artery Aneurysms  - seen by neuro.   Appear to be thrombosed.   No surgical indication at present.    Outpatient neuro followup.    # CAD - continue Statin.    # Essential HTN - Monitor BP.  Continue oral antihypertensives.  # Acquired Hypothyroidism - continue Synthroid.   TSH somewhat elevated- 5.09, so Synthroid dose increased slightly  # Functional Quadriplegia - supportive care.   # Constipation - now with frequent BMs.  # Moderate Protein Calorie Malnutrition - continue supplements per nutrition.  # DVT Prophylaxis  - Lower extremity intermittent compression devices.    I d/w fidelina Grossman at [phone].  He agrees with plan of care.   Receptive to palliative care input, appropriateness of Hospice involvement, acknowledging pt's age and poor baseline condition.

## 2021-04-29 NOTE — BH CONSULTATION LIAISON ASSESSMENT NOTE - HPI (INCLUDE ILLNESS QUALITY, SEVERITY, DURATION, TIMING, CONTEXT, MODIFYING FACTORS, ASSOCIATED SIGNS AND SYMPTOMS)
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 Patient BIB EMS from home for poor appetite x1 week, patient did not eat after vomiting today, has barely eaten for the last week. + Possible recent or subacute L parietal infarct; cannot confirm with MRI as she has a pacemaker.      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 Patient BIB EMS from home for poor appetite x1 week, patient did not eat after vomiting today, has barely eaten for the last week. + Possible recent or subacute L parietal infarct; cannot confirm with MRI as she has a pacemaker.      EXAM: calm, cooperative, engages with some effort, + low energy state; + neurovegetative symptoms. patient states that she does not have much of an appetite for "a while now," denies difficulty chewing and swallowing; reports some loss of taste sensation. Patient says her favorite meal is steak and potatoes and she would eat some if she had it in front of her. + has early satiety. She denies any subjective sense of sadness, depression and says 'I will love the world." Her wish at this time is to be home. She is not sure why she was brought to the hospital and admitted. + reports sleep difficulties (falling and staying asleep). Denies any active or passive suicidal or homicidal ideation. Names protective factors (latisha; God; family; hope for future).

## 2021-04-29 NOTE — BH CONSULTATION LIAISON ASSESSMENT NOTE - NSBHCHARTREVIEWINVESTIGATE_PSY_A_CORE FT
CT head small vessel ischemic changes in both hemispheres with atrophy. Also there is an area of possible subacute or chronic infarction in the left parietal cortex and subcortical white matter. No hematoma is noted. + hyperdense abnormalities in the cavernous carotid and medial temporal region bilaterally larger on the left. On the left, the lesion measures 2.5 cm in diameter. On the right the lesion measures 1.8 cm in diameter. Location and appearance suggests cavernous carotid artery aneurysms. Incidentally, a meningioma cannot be excluded on the left.

## 2021-04-29 NOTE — GOALS OF CARE CONVERSATION - ADVANCED CARE PLANNING - CONVERSATION DETAILS
Goals of care - I had a lengthy conversation with pt and son Jose Alberto.    We discussed the comorbid conditions, hospital care, and prognosis.  We also discussed advanced directives - made aware of limited prognosis if patient were to be intubated or resuscitated, in consideration of age and comorbid conditions.  They agreed to further discuss before deciding.      Total time spent on patient care discussion = 20 minutes.

## 2021-04-29 NOTE — CHART NOTE - NSCHARTNOTEFT_GEN_A_CORE
Nutrition follow-up note-    Pt adm w/FTT. PMHx includes- CAD, AAA, MI, HLD, HTN, s/p CABG. Spoke to RN at bedside today who reports pt's PO intake remains poor, pt has been refusing meals and only takes a few sips of Ensure. No N/V reported. Pt has loose BM's. Spoke to pt who states, "I think I eat enough". Pt agrees to try health shake. Encouraged pt to gradually increase PO intake. Attempted to obtain food preferences, however, none provided.     Factors impacting intake: [ ] none [ ] nausea  [ ] vomiting [ ] diarrhea [ ] constipation  [ ]chewing problems [ ] swallowing issues  [x ] other: poor appetite, has been refusing meals.    Diet Prescription: Diet, Mechanical Soft:   Supplement Feeding Modality:  Oral  Ensure Enlive Cans or Servings Per Day:  1       Frequency:  Two Times a day (04-22-21 @ 16:05)    Intake: poor PO intake noted, 0-25% intake of meals, takes few sips of Ensure Enlive.    Current Weight: (4/29) 77.4 kg, (4/28) 78 kg, (4/27) 84 kg, (4/15 adm) 73 kg- questionable accuracy of daily weights.  % Weight Change: unable to accurately assess true wt change due to noted big discrepancies in daily weights.    Physical appearance: 1+ edema L & R legs, noted fluctuating edema throughout LOS. Partly, weight change likely edema-related.  Nutrition focused physical exam conducted:  Subcutaneous fat loss: [moderate ] Orbital fat pads region, [moderate ]Buccal fat region, [moderate ]Triceps region,  [unable- pt lying down] Ribs region.  Muscle wasting: [severe ]Temples region, [moderate ]Clavicle region, [moderate ]Shoulder region, [moderate ]Scapula region, [moderate ]Interosseous region, [unable- due to edema]Calf region     Pertinent Medications: MEDICATIONS  (STANDING):  amLODIPine   Tablet 10 milliGRAM(s) Oral daily  chlorhexidine 2% Cloths 1 Application(s) Topical daily  levoFLOXacin  Tablet 250 milliGRAM(s) Oral every 24 hours  levothyroxine 100 MICROGram(s) Oral daily  nystatin Powder 1 Application(s) Topical every 8 hours  pantoprazole   Suspension 40 milliGRAM(s) Oral daily  simvastatin 20 milliGRAM(s) Oral at bedtime    MEDICATIONS  (PRN):  acetaminophen   Tablet .. 650 milliGRAM(s) Oral every 6 hours PRN Temp greater or equal to 38C (100.4F), Mild Pain (1 - 3)    Pertinent Labs: 04-27 Na140 mmol/L Glu 84 mg/dL K+ 4.2 mmol/L Cr  0.66 mg/dL BUN 15 mg/dL 04-27 Phos 2.7 mg/dL 04-25 Alb 2.5 g/dL<L>      Skin: no pressure ulcers.    Estimated Needs:   [x ] no change since previous assessment (4/16)  [ ] recalculated:     Previous Nutrition Diagnosis:   Malnutrition Moderate malnutrition in the context of chronic illness.     Etiology inadequate energy, protein intake in the presence of cardiac disease.     Signs/Symptoms physical signs of mild to moderate fat loss & moderate to severe muscle wasting noted.     Goal/Expected Outcome Pt will consume >/=75% nutritional needs via meals and PO supplement (GOAL not met).    Nutrition Diagnosis is [ ] ongoing  [ ] resolved [x ] not applicable     New Nutrition Diagnosis: (4/29/21)  Severe malnutrition in the context of acute illness  Etiology inadequate energy, protein intake in the presence of Failure To Thrive in adult,   Signs/Symptoms pt w/<50% nutritional intake x >1 week and physical signs of moderate fat loss & moderate to severe muscle wasting noted.   Goal/Expected Outcome Pt will consume 50-75% nutritional needs via meals and PO supplement.    Interventions:   Recommend:  [x] Continue current diet as Rx'd.  [ ] Change Diet To:  [x ] Nutrition Supplement: Continue current PO supplement as Rx'd.  [ ] Nutrition Support  [x ] Other: Will provide Vital health shake x 1 (520 kcal + 22 gm protein) as a trial, as agreed by pt.    Monitoring and Evaluation:   [x ] PO intake [ x ] Tolerance to diet prescription [ x ] weights [ x ] labs[ x ] follow up per protocol  [ ] other:

## 2021-04-29 NOTE — BH CONSULTATION LIAISON ASSESSMENT NOTE - OTHER
unable to tolerate an MMSE at this time  lower end of fair  varying  "okay"  subdued, solemn, looks weak/low energy

## 2021-04-29 NOTE — DIETITIAN NUTRITION RISK NOTIFICATION - TREATMENT: THE FOLLOWING DIET HAS BEEN RECOMMENDED
Diet, Mechanical Soft:   Low Sodium  Supplement Feeding Modality:  Oral  Ensure Enlive Cans or Servings Per Day:  1       Frequency:  Two Times a day (04-16-21 @ 13:40) [Pending Verification By Attending]  Diet, Mechanical Soft (04-15-21 @ 12:14) [Active]      
Diet, Mechanical Soft:   Supplement Feeding Modality:  Oral  Ensure Enlive Cans or Servings Per Day:  1       Frequency:  Two Times a day (04-22-21 @ 16:05) [Active]

## 2021-04-29 NOTE — BH CONSULTATION LIAISON ASSESSMENT NOTE - RISK ASSESSMENT
low risk for intentional, planned out and executed harm to self or others given advanced age, physical frailty and cognitive deficits. Protective factors include female gender, no formal psychiatric history, no mood sxs; no suicidality, supportive family.

## 2021-04-29 NOTE — PROGRESS NOTE ADULT - SUBJECTIVE AND OBJECTIVE BOX
weak looking +    Vital Signs Last 24 Hrs  T(C): 36.7 (29 Apr 2021 04:51), Max: 37.2 (28 Apr 2021 11:29)  T(F): 98 (29 Apr 2021 04:51), Max: 98.9 (28 Apr 2021 11:29)  HR: 80 (29 Apr 2021 04:51) (80 - 84)  BP: 139/82 (29 Apr 2021 04:51) (113/75 - 139/82)  BP(mean): --  RR: 18 (29 Apr 2021 04:51) (18 - 18)  SpO2: 96% (29 Apr 2021 06:10) (92% - 97%)    PHYSICAL EXAM:    general - weak looking +  HEENT - No Icterus  CVS - RRR  RS - AE B/L  Abd - soft, NT  Ext - Pulses +        LABS:                        10.5   12.90 )-----------( 340      ( 28 Apr 2021 07:25 )             33.7                   RADIOLOGY & ADDITIONAL STUDIES:

## 2021-04-29 NOTE — BH CONSULTATION LIAISON ASSESSMENT NOTE - SUMMARY
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  B/l carotid artery aneurysms) in addition to multiple medical issues, as well as Functional paraplegia and failure to thrive / chronic poor appetite x 2 years which is a manifestation of progressing overall decline, deterioration of health.

## 2021-04-29 NOTE — PROGRESS NOTE ADULT - SUBJECTIVE AND OBJECTIVE BOX
Patient: MITCHEL MULLINS [id number] 88y Female                            Hospitalist Attending Note    Seen with RN.  Drank some of her Glucerna.  Had moderate dark BM.  Denies specific complaints.     ____________________PHYSICAL EXAM:  GENERAL:  NAD [place], oriented to person, place.   HEENT: NCAT  CARDIOVASCULAR:  S1, S2  LUNGS: CTAB  ABDOMEN:  soft, (-) tenderness, (-) distension, (+) bowel sounds, (-) guarding, (-) rebound (-) rigidity  EXTREMITIES:  no cyanosis / clubbing / edema.   ____________________    VITALS:  Vital Signs Last 24 Hrs  T(C): 37 (2021 11:16), Max: 37 (2021 11:16)  T(F): 98.6 (2021 11:16), Max: 98.6 (2021 11:16)  HR: 82 (2021 11:16) (80 - 83)  BP: 120/76 (2021 11:16) (113/75 - 139/82)  BP(mean): --  RR: 16 (2021 11:16) (16 - 18)  SpO2: 98% (2021 11:16) (92% - 98%) Daily     Daily Weight in k.4 (2021 04:51)  CAPILLARY BLOOD GLUCOSE        I&O's Summary    2021 07:  -  2021 07:00  --------------------------------------------------------  IN: 480 mL / OUT: 400 mL / NET: 80 mL    2021 07:  -  2021 12:49  --------------------------------------------------------  IN: 180 mL / OUT: 0 mL / NET: 180 mL        LABS:                        10.5   12.90 )-----------( 340      ( 2021 07:25 )             33.7                         MEDICATIONS:  acetaminophen   Tablet .. 650 milliGRAM(s) Oral every 6 hours PRN  amLODIPine   Tablet 10 milliGRAM(s) Oral daily  chlorhexidine 2% Cloths 1 Application(s) Topical daily  levothyroxine 100 MICROGram(s) Oral daily  nystatin Powder 1 Application(s) Topical every 8 hours  pantoprazole   Suspension 40 milliGRAM(s) Oral daily  simvastatin 20 milliGRAM(s) Oral at bedtime

## 2021-04-29 NOTE — BH CONSULTATION LIAISON ASSESSMENT NOTE - NSBHCHARTREVIEWVS_PSY_A_CORE FT
Vital Signs Last 24 Hrs  T(C): 37 (29 Apr 2021 11:16), Max: 37 (29 Apr 2021 11:16)  T(F): 98.6 (29 Apr 2021 11:16), Max: 98.6 (29 Apr 2021 11:16)  HR: 82 (29 Apr 2021 11:16) (80 - 83)  BP: 120/76 (29 Apr 2021 11:16) (113/75 - 139/82)  BP(mean): --  RR: 16 (29 Apr 2021 11:16) (16 - 18)  SpO2: 98% (29 Apr 2021 11:16) (92% - 98%)

## 2021-04-29 NOTE — BH CONSULTATION LIAISON ASSESSMENT NOTE - NSBHROSSTATEMENT_PSY_A_CORE
Left message that we are out of the flu vaccine, cancelling Sept. 15 appointment. Call back end of week to see if we got more in.  
.

## 2021-04-29 NOTE — DIETITIAN NUTRITION RISK NOTIFICATION - MALNUTRITION EVALUATION AS DEMONSTRATED BY (ADULTS > 20 YEARS OF AGE)
Inadequate energy intake.../Loss of subcutaneous fat.../Loss of muscle...
Loss of subcutaneous fat.../Loss of muscle...

## 2021-04-29 NOTE — BH CONSULTATION LIAISON ASSESSMENT NOTE - NSSUICPROTFACT_PSY_ALL_CORE
Responsibility to children, family, or others/Identifies reasons for living/Supportive social network of family or friends/Fear of death or the actual act of killing self/Cultural, spiritual and/or moral attitudes against suicide/Religion beliefs

## 2021-04-29 NOTE — DIETITIAN NUTRITION RISK NOTIFICATION - MUSCLE MASS (LOSS OF MUSCLE)
Temples.../Clavicles.../Shoulders.../Scapula.../Dorsal hand...
Temples.../Clavicles.../Shoulders.../Scapula.../Calf.../Dorsal hand...

## 2021-04-30 NOTE — CONSULT NOTE ADULT - ASSESSMENT
88 year old female with PMH AAA repair, HTN, HLD, CAD, bedbound for past 3 years admitted with poor intake and episodes of vomiting found to have stool impaction, anemia, ptosis. Pt's medical course significant for poor PO intake and frequent headaches. Palliative Care consulted for assistance with GOC

## 2021-04-30 NOTE — PROGRESS NOTE ADULT - ASSESSMENT
89yo F PMH AAA repair in 2012, CAD, MI, s/p CABG x 4, in 2007, HTN, HLD, s/p PPM, bedbound x 3 years, admitted for coffee ground emesis and slurred speech.  CT head showed chronic ischemic changes in both hemispheres with atrophy.  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  B/l carotid artery aneurysms.  CT Abd / Pelvis showed No bowel obstruction. Colonic diverticulosis without CT evidence of acute diverticulitis. Rectum distended by stool.  Seen by GI, recommended conservative GI management at present.  Elevated WBC noted, CT chest suggestive of b/l infiltrates.  Improving on IV Abx.      # Failure to Thrive - son reports for several months prior to arrival, pt had very poor po intake.  She has expressed to him a desire to be "left alone".  Does not appear to be depressed.  Psychiatry evaluation appreciated.  I discussed with son.  He agrees with conservative management, is considering pt's return home with hospice.      # Acute Anemia - with Coffee Ground Emesis on admission.  Hgb dropping initially; now fairly stable post s/p transfusion x 1 unit.  Recent CT A/P with contrast not suggestive of RP bleed.  Stool occult blood negative x 2. LDH, Haptoglobin not suggestive of hemolysis.  Hematology consulted (Goyo)  Pt refused BM Bx.  GI consulted (Dr. Puga) - Conservative mgt as pt without clear GI source of bleeding.  Diet advanced to mechanical soft.  H/H appears to be stable.     # Leukocytosis, Probable pneumonia - WBC was persistently rising; now trending back down (slowly)   No fever, but CXR and CT Chest are suggestive of bibasilar infiltrates.   Completed antibiotics.   # Positive Blood cultures - 1 grew staph epidermidis - likely contaminant   Repeat BCx negative.  Off antibiotics.    # Dysarthria, L ptosis - Pt reports facial weakness is chronic.   Neurology input (Lluvia) appreciated.   Pt unable to have MRI.   CT of head shows possible L parietal subacute infarct    Continue Statin.    # Subcentimeter Renal Densities - seen on CT.   Discussed outpatient f/u with urology.  # B/l Cavernous Carotid Artery Aneurysms  - seen by neuro.   Appear to be thrombosed.   No surgical indication at present.    Outpatient neuro followup.    # CAD - continue Statin.    # Essential HTN - Monitor BP.  Continue oral antihypertensives.  # Acquired Hypothyroidism - continue Synthroid.   TSH somewhat elevated- 5.09, so Synthroid dose increased slightly  # Functional Quadriplegia - supportive care.   # Constipation - now with frequent BMs.  # Moderate Protein Calorie Malnutrition - continue supplements per nutrition.  # DVT Prophylaxis  - Lower extremity intermittent compression devices.    I d/w fidelina Grossman at [phone].  He agrees with plan of care of discharge home with hospice / homecare resources.  DNR / DNI.    89yo F PMH AAA repair in 2012, CAD, MI, s/p CABG x 4, in 2007, HTN, HLD, s/p PPM, bedbound x 3 years, admitted for coffee ground emesis and slurred speech.  CT head showed chronic ischemic changes in both hemispheres with atrophy.  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  B/l carotid artery aneurysms.  CT Abd / Pelvis showed No bowel obstruction. Colonic diverticulosis without CT evidence of acute diverticulitis. Rectum distended by stool.  Seen by GI, recommended conservative GI management at present.  Elevated WBC noted, CT chest suggestive of b/l infiltrates.  Improving on IV Abx.      # Failure to Thrive - son reports for several months prior to arrival, pt had very poor po intake.  She has expressed to him a desire to be "left alone".  Does not appear to be depressed.  Psychiatry evaluation appreciated.  I discussed with son.  He agrees with conservative management, is considering pt's return home with hospice.  Does not wish for consideration of PEG placement, in consideration of pt's age and wishes.    # Acute Anemia - with Coffee Ground Emesis on admission.  Hgb dropping initially; now fairly stable post s/p transfusion x 1 unit.  Recent CT A/P with contrast not suggestive of RP bleed.  Stool occult blood negative x 2. LDH, Haptoglobin not suggestive of hemolysis.  Hematology consulted (Goyo)  Pt refused BM Bx.  GI consulted (Dr. Puga) - Conservative mgt as pt without clear GI source of bleeding.  Diet advanced to mechanical soft.  H/H appears to be stable.   # Leukocytosis, Probable pneumonia - WBC was persistently rising; now trending back down (slowly)   No fever, but CXR and CT Chest are suggestive of bibasilar infiltrates.   Completed antibiotics.   # Positive Blood cultures - 1 grew staph epidermidis - likely contaminant   Repeat BCx negative.  Off antibiotics.    # Dysarthria, L ptosis - Pt reports facial weakness is chronic.   Neurology input (Lluvia) appreciated.   Pt unable to have MRI.   CT of head shows possible L parietal subacute infarct    Continue Statin.    # Subcentimeter Renal Densities - seen on CT.   Discussed outpatient f/u with urology.  # B/l Cavernous Carotid Artery Aneurysms  - seen by neuro.   Appear to be thrombosed.   No surgical indication at present.    Outpatient neuro followup.    # CAD - continue Statin.    # Essential HTN - Monitor BP.  Continue oral antihypertensives.  # Acquired Hypothyroidism - continue Synthroid.   TSH somewhat elevated- 5.09, so Synthroid dose increased slightly  # Functional Quadriplegia - supportive care.   # Constipation - now with frequent BMs.  # Moderate Protein Calorie Malnutrition - continue supplements per nutrition.  # DVT Prophylaxis  - Lower extremity intermittent compression devices.    I d/w son Chauncey at [phone].  He agrees with plan of care of discharge home with hospice / homecare resources.  DNR / DNI.

## 2021-04-30 NOTE — CONSULT NOTE ADULT - PROBLEM SELECTOR RECOMMENDATION 5
multifactorial -Fe deficiency, malnutrition, iatrogenic freq blood draws   conservative management  s/p 1 unit PRBC  FOBT negative

## 2021-04-30 NOTE — CONSULT NOTE ADULT - CONVERSATION DETAILS
attempted to speak to patient about her wishes- she defers to her son Chauncey, I spoke with Chauncey via phone at . We discussed his mother's current medical condition and her reluctance to eat or participate in care. 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 Chauncey shared that his mother has been essentially bedbound for the last 3 years and she even recently told him that she's "Ready to go in peace" and that she would not want life support measures or resuscitation should she start to die.  He asked that a DNR be placed on chart, I reviewed with him the MOLST form and filled it out with RT as witness.

## 2021-04-30 NOTE — CONSULT NOTE ADULT - PROBLEM SELECTOR RECOMMENDATION 9
- Anemia work-up - sent  - watch H/H and transfuse as necessary  - GI f/u  - stool occult blood
pt with decreased function as of recently not eating much   refusing to eat

## 2021-04-30 NOTE — PROGRESS NOTE ADULT - SUBJECTIVE AND OBJECTIVE BOX
Patient: MITCHEL MULLINS [id number] 88y Female                            Hospitalist Attending Note    No complaints.  Still with very poor po intake per RN - takes some liquids, no solids.      ____________________PHYSICAL EXAM:  GENERAL:  NAD [place], oriented to person, place.   HEENT: NCAT  CARDIOVASCULAR:  S1, S2  LUNGS: CTAB  ABDOMEN:  soft, (-) tenderness, (-) distension, (+) bowel sounds, (-) guarding, (-) rebound (-) rigidity  EXTREMITIES:  no cyanosis / clubbing / edema.   ____________________    VITALS:  Vital Signs Last 24 Hrs  T(C): 37 (2021 11:23), Max: 37.1 (2021 04:44)  T(F): 98.6 (2021 11:23), Max: 98.8 (2021 04:44)  HR: 78 (2021 11:23) (78 - 84)  BP: 127/70 (2021 11:23) (127/70 - 141/81)  BP(mean): --  RR: 18 (2021 11:23) (18 - 18)  SpO2: 96% (2021 11:23) (96% - 97%) Daily     Daily Weight in k.4 (2021 04:44)  CAPILLARY BLOOD GLUCOSE      POCT Blood Glucose.: 92 mg/dL (2021 15:38)    I&O's Summary    2021 07:01  -  2021 07:00  --------------------------------------------------------  IN: 660 mL / OUT: 1000 mL / NET: -340 mL        LABS:                        10.7   12.91 )-----------( 339      ( 2021 06:57 )             33.3     04    140  |  104  |  17  ----------------------------<  94  3.3<L>   |  29  |  0.72    Ca    8.9      2021 06:57                    MEDICATIONS:  acetaminophen   Tablet .. 650 milliGRAM(s) Oral every 6 hours PRN  amLODIPine   Tablet 10 milliGRAM(s) Oral daily  chlorhexidine 2% Cloths 1 Application(s) Topical daily  levothyroxine 100 MICROGram(s) Oral daily  mirtazapine 7.5 milliGRAM(s) Oral at bedtime  nystatin Powder 1 Application(s) Topical every 8 hours  pantoprazole   Suspension 40 milliGRAM(s) Oral daily  polyethylene glycol 3350 17 Gram(s) Oral daily  potassium chloride   Powder 40 milliEquivalent(s) Oral every 4 hours  simvastatin 20 milliGRAM(s) Oral at bedtime

## 2021-04-30 NOTE — CONSULT NOTE ADULT - TIME BILLING
evaluation of pt, review of records, collaboration with primary attending, bedside RN, and family  25 minutes of entire encounter spent discussing GOC with son via phone

## 2021-04-30 NOTE — CONSULT NOTE ADULT - PROBLEM SELECTOR RECOMMENDATION 7
attempted to speak with patient a few times this am but she defers to son. Spoke with Chauncey Nemo regarding his mother and plan of care. see above GOC discussion. Will request home hospice evaluation.

## 2021-04-30 NOTE — BH CONSULTATION LIAISON PROGRESS NOTE - OTHER
subdued, solemn, looks weak/low energy  unable to tolerate an MMSE at this time  "okay"  lower end of fair  varying

## 2021-04-30 NOTE — PROGRESS NOTE ADULT - SUBJECTIVE AND OBJECTIVE BOX
lying in bed    Vital Signs Last 24 Hrs  T(C): 37.1 (30 Apr 2021 04:44), Max: 37.1 (30 Apr 2021 04:44)  T(F): 98.8 (30 Apr 2021 04:44), Max: 98.8 (30 Apr 2021 04:44)  HR: 79 (30 Apr 2021 04:44) (79 - 84)  BP: 141/81 (30 Apr 2021 04:44) (120/76 - 141/81)  BP(mean): --  RR: 18 (30 Apr 2021 04:44) (16 - 18)  SpO2: 97% (30 Apr 2021 04:44) (97% - 98%)    PHYSICAL EXAM:    general - weak looking +  HEENT - No Icterus  CVS - RRR  RS - AE B/L  Abd - soft, NT  Ext - Pulses +        LABS:                        10.7   12.91 )-----------( 339      ( 30 Apr 2021 06:57 )             33.3     04-30    140  |  104  |  17  ----------------------------<  94  3.3<L>   |  29  |  0.72    Ca    8.9      30 Apr 2021 06:57              RADIOLOGY & ADDITIONAL STUDIES:

## 2021-04-30 NOTE — CONSULT NOTE ADULT - PROBLEM SELECTOR RECOMMENDATION 2
had presented with some vomiting which resolved   pt only drinking fluids not taking much food, has no interest in eating but physically is able to  remeron being tried

## 2021-04-30 NOTE — CONSULT NOTE ADULT - SUBJECTIVE AND OBJECTIVE BOX
Encounter details (provider/department) have been updated by [facility] staff.   Of note, HF details may not display.     As of this time CDM: Does populate and displays    Updated: Provider    Of note, CDM will not display. PCP not live with [facility].    Will resend refill request encounter to  Centralized Refill Staff Pool.     [facility]     Note composed:11:18 AM 09/14/2020           HPI:    88 yr    	Patient presents to the ED for vomiting and poor appetite.       Patient awake and alert but appears tired.  History obtained from son.      Patient was with home attendant yesterday when she had 2 episodes of vomiting.      After vomiting patient had refused transport to hospital    .  After home attendant left, patient slept and when she woke up stated she wanted to go to hospital.      At present she denies active nausea or abdominal pain, just says she does not feel well and has a headache, but then says "I always have a headache."  She denies dizziness or vertigo.  She denies chest pain or dyspnea.     Son reports poor appetite x1 week,  has barely eaten for the last week.   No cough noted at home.      She gets frequent UTIs.     on says that he has been unable to get her to take her home medications for months now.      At baseline she is bed and wheelchair bound, unable to walk due to deconditioning over time.    (15 Apr 2021 11:50)    PERTINENT PM/SXH:   Benign essential HTN    HLD (hyperlipidemia)    S/P CABG x 4    Myocardial infarction    AAA (abdominal aortic aneurysm)    CAD (coronary artery disease)      S/P CABG x 4      FAMILY HISTORY:      SOCIAL HISTORY:   Significant other/partner: Yes [ ]  No [x ] Children:  Yes [ x]  No [ ] Gnosticism/Spirituality: Mu-ism  Substance hx: Yes[x ]  No [ ]   Tobacco hx:  Yes [ ] No [x ]   Alcohol hx: Yes [ ] No [ x]   Home Opioid hx:  Yes [ ] No [ ]  [ ] I-Stop Reference No:[id number]  Living Situation: [x ]Home  [ ]Long term care  [ ]Rehab [ ]Other    ADVANCE DIRECTIVES:    DNR  MOLST  Yes [ ] No [x ]  Living Will  Yes [ ]  No [ x]     [ ] Health Care Proxy(s)  [x ] Surrogate(s)  [ ] Guardian           Name(s): Phone Number(s): fidelina Grossman /[phone]    BASELINE (I)ADL(s) (prior to admission):   [place]: [ ]Total  [ ] Moderate [ x]Dependent    Allergies    No Known Allergies    Intolerances    MEDICATIONS  (STANDING):  amLODIPine   Tablet 10 milliGRAM(s) Oral daily  chlorhexidine 2% Cloths 1 Application(s) Topical daily  levothyroxine 100 MICROGram(s) Oral daily  mirtazapine 7.5 milliGRAM(s) Oral at bedtime  nystatin Powder 1 Application(s) Topical every 8 hours  pantoprazole   Suspension 40 milliGRAM(s) Oral daily  potassium chloride   Powder 40 milliEquivalent(s) Oral every 4 hours  simvastatin 20 milliGRAM(s) Oral at bedtime    MEDICATIONS  (PRN):  acetaminophen   Tablet .. 650 milliGRAM(s) Oral every 6 hours PRN Temp greater or equal to 38C (100.4F), Mild Pain (1 - 3)    PRESENT SYMPTOMS: [ ]Unable to obtain due to poor mentation   Source if other than patient:  [ ]Family   [ ]Team     Pain (Impact on QOL):  " I have a headache"  Location - head  Severity -    pt can't answer    Minimal acceptable level (0-10 scale): pt can't answer  Quality: throb  Onset: everyday  Duration: can't quantify               Aggravating factors -can't answer  Relieving factors - "aspirin"   Radiation -none    PAIN AD Score:     [email or web address] (press ctrl +  left click to view)    Dyspnea:  Yes [ ] No [x ] - [ ]Mild [ ]Moderate [ ]Severe  Anxiety:    Yes [ ] No [x ] - [ ]Mild [ ]Moderate [ ]Severe  Fatigue:    Yes [ ] No [ x] - [ ]Mild [ ]Moderate [ ]Severe  Nausea:    Yes [ ] No [ x] - [ ]Mild [ ]Moderate [ ]Severe                         Loss of appetite: Yes [x ] No [ ] - [ ]Mild [ ]Moderate [ ]Severe             Constipation:  Yes [ ] No [x ] - [ ]Mild [ ]Moderate [ ]Severe  Grief: Yes [ ] No [x ]     Other Symptoms:  [ x]All other review of systems negative     Karnofsky Performance Score/Palliative Performance Status Version 2:         20-30%    http://palliative.info/resource_material/[email or web address].pdf    PHYSICAL EXAM:  Vital Signs Last 24 Hrs  T(C): 37 (30 Apr 2021 11:23), Max: 37.1 (30 Apr 2021 04:44)  T(F): 98.6 (30 Apr 2021 11:23), Max: 98.8 (30 Apr 2021 04:44)  HR: 78 (30 Apr 2021 11:23) (78 - 84)  BP: 127/70 (30 Apr 2021 11:23) (127/70 - 141/81)  BP(mean): --  RR: 18 (30 Apr 2021 11:23) (18 - 18)  SpO2: 96% (30 Apr 2021 11:23) (96% - 97%) I&O's Summary    29 Apr 2021 07:01  -  30 Apr 2021 07:00  --------------------------------------------------------  IN: 660 mL / OUT: 1000 mL / NET: -340 mL        GENERAL:  [x ]Alert  [ x]Oriented x  3 [ ]Lethargic  [ ]Cachexia  [ ]Unarousable  [x ]Verbal  [ ]Non-Verbal  Behavioral:   [ ] Anxiety  [ ] Delirium [ ] Agitation [ ] Other  HEENT:  [ ]Normal   [ x]Dry mouth   [ ]ET Tube/Trach  [ ]Oral lesions  PULMONARY:   x[ ]Clear  [ ]Tachypnea  [ ]Audible excessive secretions   [ ]Rhonchi        [ ]Right [ ]Left [ ]Bilateral  [ ]Crackles        [ ]Right [ ]Left [ ]Bilateral  [ ]Wheezing     [ ]Right [ ]Left [ ]Bilateral  CARDIOVASCULAR:    [xRegular [ ]Irregular [ ]Tachy  [ ]Hemant [ ]Murmur [ ]Other  GASTROINTESTINAL:  [ x]Soft  [ ]Distended   [ x]+BS  [x ]Non tender [ ]Tender  [ ]PEG [ ]OGT/ NGT  Last BM: 4/28    GENITOURINARY:  [ ]Normal [ x] Incontinent   [ ]Oliguria/Anuria   [ ]Sin  MUSCULOSKELETAL:   [ ]Normal   [ ]Weakness  [ x]Bed/Wheelchair bound [ ]Edema  NEUROLOGIC:   [ ]No focal deficits  [x ] Cognitive impairment  [ ] Dysphagia [ ]Dysarthria [ ] Paresis [ ]Other   SKIN: matted hair, long overgrown nails virgilio on feet  [ ]Normal   [ ]Pressure ulcer(s)  [x ]Rash- IAD in perineum     LABS:                        10.7   12.91 )-----------( 339      ( 30 Apr 2021 06:57 )             33.3   04-30    140  |  104  |  17  ----------------------------<  94  3.3<L>   |  29  |  0.72    Ca    8.9      30 Apr 2021 06:57          RADIOLOGY & ADDITIONAL STUDIES:  < from: CT Chest No Cont (04.23.21 @ 11:48) >    EXAM:  CT CHEST                            PROCEDURE DATE:  04/23/2021          INTERPRETATION:  CLINICAL INFORMATION: Abnormal chest x-ray finding    COMPARISON: None.    CONTRAST/COMPLICATIONS:  IV Contrast: None  Oral Contrast: None  Complications: None    PROCEDURE:  CT of the Chest was performed.  Sagittal and coronal reformats were performed.    FINDINGS:    LUNGS AND AIRWAYS: Patent central airways.  Bilateral lower lobe opacities.  PLEURA: Trace right pleural effusion. Small left pleural effusion.  MEDIASTINUM AND JOSE ROBERTO: No lymphadenopathy.  VESSELS: Atherosclerotic calcifications of thoracic aorta and coronary arteries.  HEART: Mildly enlarged. No pericardial effusion.  CHEST WALL AND LOWER NECK: Status post sternotomy. Left upper anterior chest wall cardiac device with leads to the heart.  VISUALIZED UPPER ABDOMEN: Hepatic hypodensities, possibly cysts. Partially imaged abdominal aortic aneurysm status post endovascular repair. Colonic diverticulosis.  BONES: Degenerative changes.    IMPRESSION:  Bilateral lower lobe opacities, which may represent pneumonia and/or atelectasis.  Trace right and small left pleural effusions.              ANGÉLICA RAY MD; Attending Radiologist  This document has been electronically signed. Apr23 2021 11:59AM    < end of copied text >    < from: CT Abdomen and Pelvis w/ Oral Cont and w/ IV Cont (04.20.21 @ 14:09) >    EXAM:  CT ABDOMEN AND PELVIS OC IC                            PROCEDURE DATE:  04/20/2021          INTERPRETATION:  CLINICAL INFORMATION: Suspect retroperitoneal bleed. Constipation area and vomiting and decreased appetite    COMPARISON: 4/15/2021.    CONTRAST/COMPLICATIONS:  IV Contrast: Omnipaque 350  90 cc administered   10 cc discarded  Oral Contrast: NONE  Complications: None reported at time of study completion    PROCEDURE:  CT of the Abdomen and Pelvis was performed.  Sagittal and coronal reformats were performed. Patient was unable to raise the arms above the head for the study.    FINDINGS:  LOWER pacemaker. Small left pleural effusion. Coronary artery calcification. Linear atelectasis versus infiltrate at the lung bases    LIVER:Cysts in the right and left lobes again seen  BILE DUCTS: Normal caliber.  GALLBLADDER: Within normal limits.  SPLEEN: Within normal limits.  PANCREAS: Within normal limits.  ADRENALS: Within normal limits.  KIDNEYS/URETERS: Left renal pelvic calculus measures 6 mm without evidence of obstruction.    BLADDER: Right lateral bladder diverticula.  REPRODUCTIVE ORGANS: Uterus and adnexa within normal limits.    BOWEL: No bowel obstruction. Appendix is normal.. The sigmoid colon which had previously been stool-filled now appears to be fluid-filled and again distended extending to the upper abdomen. Hernia  PERITONEUM: No ascites.  VESSELS: Status post endovascular repair of abdominal aortic aneurysm with flow seen within the graft..  RETROPERITONEUM/LYMPH NODES: No lymphadenopathy.  ABDOMINAL WALL: Surgical clips again seen in the groin bilaterally.  BONES: Sternal wires are seen. There is osteopenia and degenerative change of the spine. There is levoscoliosis of the lumbar spine    IMPRESSION: There is again marked distention of the sigmoid colon with fluid now seen in the rectosigmoid colon where there was previously stool. There is no evidence of volvulus or bowel obstruction. Linear atelectasis or infiltrates at the lung bases.  Nonobstructing left renal calculus  Small left pleural effusion.  Pacemaker  Multiple hepatic cysts  Hiatal hernia  Status post endovascular repair of abdominal aortic aneurysm            LANDY BARRY MD; Attending Radiologist  This document has been electronically signed. Apr 20 2021  3:10PM    < end of copied text >    PROTEIN CALORIE MALNUTRITION PRESENT: [x ] Yes [ ] No  [x ] PPSV2 < or = to 30% [ x] significant weight loss  [ x] poor nutritional intake [x ] catabolic state [ ] anasarca     Albumin, Serum: 2.5 g/dL (04-25-21 @ 06:38)      REFERRALS:   [ ]Chaplaincy  [ ] Hospice  [ ]Child Life  [x ]Social Work  [ x]Case management [ ]Holistic Therapy   Goals of Care Discussion Document: MOHIT Padron (04-29-21 @ 13:02)  Goals of Care Conversation:   Advance Directives:  · Caregiver:  yes  · Name  Jose Alberto Chester  · Phone Number  [phone]    Conversation Discussion:  · Conversation  Diagnosis; Prognosis  · Conversation Details  Goals of care - I had a lengthy conversation with pt and son Jose Alberto.    We discussed the comorbid conditions, hospital care, and prognosis.  We also discussed advanced directives - made aware of limited prognosis if patient were to be intubated or resuscitated, in consideration of age and comorbid conditions.  They agreed to further discuss before deciding.      Total time spent on patient care discussion = 20 minutes.      Electronic Signatures:  Jermaine Padron)  (Signed 29-Apr-2021 13:05)  	Authored: Goals of Care Conversation      Last Updated: 29-Apr-2021 13:05 by Jermaine Padron)     HPI:    88 yr Patient presents to the ED for vomiting and poor appetite.       Patient awake and alert but appears tired.  History obtained from son.      Patient was with home attendant yesterday when she had 2 episodes of vomiting.      After vomiting patient had refused transport to hospital      After home attendant left, patient slept and when she woke up stated she wanted to go to hospital.      At present she denies active nausea or abdominal pain, just says she does not feel well and has a headache, but then says "I always have a headache."  She denies dizziness or vertigo.  She denies chest pain or dyspnea.     Son reports poor appetite x1 week,  has barely eaten for the last week.   No cough noted at home.      She gets frequent UTIs.     on says that he has been unable to get her to take her home medications for months now.      At baseline she is bed and wheelchair bound, unable to walk due to deconditioning over time.    (15 Apr 2021 11:50)    PERTINENT PM/SXH:   Benign essential HTN    HLD (hyperlipidemia)    S/P CABG x 4    Myocardial infarction    AAA (abdominal aortic aneurysm)    CAD (coronary artery disease)      S/P CABG x 4      FAMILY HISTORY:      SOCIAL HISTORY:   Significant other/partner: Yes [ ]  No [x ] Children:  Yes [ x]  No [ ] Pentecostalism/Spirituality: Pentecostalism  Substance hx: Yes[x ]  No [ ]   Tobacco hx:  Yes [ ] No [x ]   Alcohol hx: Yes [ ] No [ x]   Home Opioid hx:  Yes [ ] No [ ]  [ ] I-Stop Reference No:[id number]  Living Situation: [x ]Home  [ ]Long term care  [ ]Rehab [ ]Other    ADVANCE DIRECTIVES:    DNR  MOLST  Yes [ ] No [x ]  Living Will  Yes [ ]  No [ x]     [ ] Health Care Proxy(s)  [x ] Surrogate(s)  [ ] Guardian           Name(s): Phone Number(s): fidelina Grossman /[phone]    BASELINE (I)ADL(s) (prior to admission):   [place]: [ ]Total  [ ] Moderate [ x]Dependent    Allergies    No Known Allergies    Intolerances    MEDICATIONS  (STANDING):  amLODIPine   Tablet 10 milliGRAM(s) Oral daily  chlorhexidine 2% Cloths 1 Application(s) Topical daily  levothyroxine 100 MICROGram(s) Oral daily  mirtazapine 7.5 milliGRAM(s) Oral at bedtime  nystatin Powder 1 Application(s) Topical every 8 hours  pantoprazole   Suspension 40 milliGRAM(s) Oral daily  potassium chloride   Powder 40 milliEquivalent(s) Oral every 4 hours  simvastatin 20 milliGRAM(s) Oral at bedtime    MEDICATIONS  (PRN):  acetaminophen   Tablet .. 650 milliGRAM(s) Oral every 6 hours PRN Temp greater or equal to 38C (100.4F), Mild Pain (1 - 3)    PRESENT SYMPTOMS: [ ]Unable to obtain due to poor mentation   Source if other than patient:  [ ]Family   [ ]Team     Pain (Impact on QOL):  " I have a headache"  Location - head  Severity -    pt can't answer    Minimal acceptable level (0-10 scale): pt can't answer  Quality: throb  Onset: everyday  Duration: can't quantify               Aggravating factors -can't answer  Relieving factors - "aspirin"   Radiation -none    PAIN AD Score:     [email or web address]/[email or web address].pdf (press ctrl +  left click to view)    Dyspnea:  Yes [ ] No [x ] - [ ]Mild [ ]Moderate [ ]Severe  Anxiety:    Yes [ ] No [x ] - [ ]Mild [ ]Moderate [ ]Severe  Fatigue:    Yes [ ] No [ x] - [ ]Mild [ ]Moderate [ ]Severe  Nausea:    Yes [ ] No [ x] - [ ]Mild [ ]Moderate [ ]Severe                         Loss of appetite: Yes [x ] No [ ] - [ ]Mild [ ]Moderate [ ]Severe             Constipation:  Yes [ ] No [x ] - [ ]Mild [ ]Moderate [ ]Severe  Grief: Yes [ ] No [x ]     Other Symptoms:  [ x]All other review of systems negative     Karnofsky Performance Score/Palliative Performance Status Version 2:         20-30%    http://palliative.info/resource_material/PPSv2.pdf    PHYSICAL EXAM:  Vital Signs Last 24 Hrs  T(C): 37 (30 Apr 2021 11:23), Max: 37.1 (30 Apr 2021 04:44)  T(F): 98.6 (30 Apr 2021 11:23), Max: 98.8 (30 Apr 2021 04:44)  HR: 78 (30 Apr 2021 11:23) (78 - 84)  BP: 127/70 (30 Apr 2021 11:23) (127/70 - 141/81)  BP(mean): --  RR: 18 (30 Apr 2021 11:23) (18 - 18)  SpO2: 96% (30 Apr 2021 11:23) (96% - 97%) I&O's Summary    29 Apr 2021 07:01  -  30 Apr 2021 07:00  --------------------------------------------------------  IN: 660 mL / OUT: 1000 mL / NET: -340 mL        GENERAL:  [x ]Alert  [ x]Oriented x  3 [ ]Lethargic  [ ]Cachexia  [ ]Unarousable  [x ]Verbal  [ ]Non-Verbal  Behavioral:   [ ] Anxiety  [ ] Delirium [ ] Agitation [ ] Other  HEENT:  [ ]Normal   [ x]Dry mouth   [ ]ET Tube/Trach  [ ]Oral lesions  PULMONARY:   x[ ]Clear  [ ]Tachypnea  [ ]Audible excessive secretions   [ ]Rhonchi        [ ]Right [ ]Left [ ]Bilateral  [ ]Crackles        [ ]Right [ ]Left [ ]Bilateral  [ ]Wheezing     [ ]Right [ ]Left [ ]Bilateral  CARDIOVASCULAR:    [xRegular [ ]Irregular [ ]Tachy  [ ]Hemant [ ]Murmur [ ]Other  GASTROINTESTINAL:  [ x]Soft  [ ]Distended   [ x]+BS  [x ]Non tender [ ]Tender  [ ]PEG [ ]OGT/ NGT  Last BM: 4/28    GENITOURINARY:  [ ]Normal [ x] Incontinent   [ ]Oliguria/Anuria   [ ]Sin  MUSCULOSKELETAL:   [ ]Normal   [ ]Weakness  [ x]Bed/Wheelchair bound [ ]Edema  NEUROLOGIC:   [ ]No focal deficits  [x ] Cognitive impairment  [ ] Dysphagia [ ]Dysarthria [ ] Paresis [ ]Other   SKIN: matted hair, long overgrown nails virgilio on feet  [ ]Normal   [ ]Pressure ulcer(s)  [x ]Rash- IAD in perineum     LABS:                        10.7   12.91 )-----------( 339      ( 30 Apr 2021 06:57 )             33.3   04-30    140  |  104  |  17  ----------------------------<  94  3.3<L>   |  29  |  0.72    Ca    8.9      30 Apr 2021 06:57          RADIOLOGY & ADDITIONAL STUDIES:  < from: CT Chest No Cont (04.23.21 @ 11:48) >    EXAM:  CT CHEST                            PROCEDURE DATE:  04/23/2021          INTERPRETATION:  CLINICAL INFORMATION: Abnormal chest x-ray finding    COMPARISON: None.    CONTRAST/COMPLICATIONS:  IV Contrast: None  Oral Contrast: None  Complications: None    PROCEDURE:  CT of the Chest was performed.  Sagittal and coronal reformats were performed.    FINDINGS:    LUNGS AND AIRWAYS: Patent central airways.  Bilateral lower lobe opacities.  PLEURA: Trace right pleural effusion. Small left pleural effusion.  MEDIASTINUM AND JOSE ROBERTO: No lymphadenopathy.  VESSELS: Atherosclerotic calcifications of thoracic aorta and coronary arteries.  HEART: Mildly enlarged. No pericardial effusion.  CHEST WALL AND LOWER NECK: Status post sternotomy. Left upper anterior chest wall cardiac device with leads to the heart.  VISUALIZED UPPER ABDOMEN: Hepatic hypodensities, possibly cysts. Partially imaged abdominal aortic aneurysm status post endovascular repair. Colonic diverticulosis.  BONES: Degenerative changes.    IMPRESSION:  Bilateral lower lobe opacities, which may represent pneumonia and/or atelectasis.  Trace right and small left pleural effusions.              ANGÉLICA RAY MD; Attending Radiologist  This document has been electronically signed. Apr23 2021 11:59AM    < end of copied text >    < from: CT Abdomen and Pelvis w/ Oral Cont and w/ IV Cont (04.20.21 @ 14:09) >    EXAM:  CT ABDOMEN AND PELVIS OC IC                            PROCEDURE DATE:  04/20/2021          INTERPRETATION:  CLINICAL INFORMATION: Suspect retroperitoneal bleed. Constipation area and vomiting and decreased appetite    COMPARISON: 4/15/2021.    CONTRAST/COMPLICATIONS:  IV Contrast: Omnipaque 350  90 cc administered   10 cc discarded  Oral Contrast: NONE  Complications: None reported at time of study completion    PROCEDURE:  CT of the Abdomen and Pelvis was performed.  Sagittal and coronal reformats were performed. Patient was unable to raise the arms above the head for the study.    FINDINGS:  LOWER pacemaker. Small left pleural effusion. Coronary artery calcification. Linear atelectasis versus infiltrate at the lung bases    LIVER:Cysts in the right and left lobes again seen  BILE DUCTS: Normal caliber.  GALLBLADDER: Within normal limits.  SPLEEN: Within normal limits.  PANCREAS: Within normal limits.  ADRENALS: Within normal limits.  KIDNEYS/URETERS: Left renal pelvic calculus measures 6 mm without evidence of obstruction.    BLADDER: Right lateral bladder diverticula.  REPRODUCTIVE ORGANS: Uterus and adnexa within normal limits.    BOWEL: No bowel obstruction. Appendix is normal.. The sigmoid colon which had previously been stool-filled now appears to be fluid-filled and again distended extending to the upper abdomen. Hernia  PERITONEUM: No ascites.  VESSELS: Status post endovascular repair of abdominal aortic aneurysm with flow seen within the graft..  RETROPERITONEUM/LYMPH NODES: No lymphadenopathy.  ABDOMINAL WALL: Surgical clips again seen in the groin bilaterally.  BONES: Sternal wires are seen. There is osteopenia and degenerative change of the spine. There is levoscoliosis of the lumbar spine    IMPRESSION: There is again marked distention of the sigmoid colon with fluid now seen in the rectosigmoid colon where there was previously stool. There is no evidence of volvulus or bowel obstruction. Linear atelectasis or infiltrates at the lung bases.  Nonobstructing left renal calculus  Small left pleural effusion.  Pacemaker  Multiple hepatic cysts  Hiatal hernia  Status post endovascular repair of abdominal aortic aneurysm            LANDY BARRY MD; Attending Radiologist  This document has been electronically signed. Apr 20 2021  3:10PM    < end of copied text >    PROTEIN CALORIE MALNUTRITION PRESENT: [x ] Yes [ ] No  [x ] PPSV2 < or = to 30% [ x] significant weight loss  [ x] poor nutritional intake [x ] catabolic state [ ] anasarca     Albumin, Serum: 2.5 g/dL (04-25-21 @ 06:38)      REFERRALS:   [ ]Chaplaincy  [ ] Hospice  [ ]Child Life  [x ]Social Work  [ x]Case management [ ]Holistic Therapy   Goals of Care Discussion Document: MOHIT Padron (04-29-21 @ 13:02)  Goals of Care Conversation:   Advance Directives:  · Caregiver:  yes  · Name  Jose Alberto Chester  · Phone Number  [phone]    Conversation Discussion:  · Conversation  Diagnosis; Prognosis  · Conversation Details  Goals of care - I had a lengthy conversation with pt and son Jose Alberto.    We discussed the comorbid conditions, hospital care, and prognosis.  We also discussed advanced directives - made aware of limited prognosis if patient were to be intubated or resuscitated, in consideration of age and comorbid conditions.  They agreed to further discuss before deciding.      Total time spent on patient care discussion = 20 minutes.      Electronic Signatures:  Jermaine Padron)  (Signed 29-Apr-2021 13:05)  	Authored: Goals of Care Conversation      Last Updated: 29-Apr-2021 13:05 by Jermaine Padron)

## 2021-05-01 NOTE — PROGRESS NOTE ADULT - ASSESSMENT
89yo F PMH AAA repair in 2012, CAD, MI, s/p CABG x 4, in 2007, HTN, HLD, s/p PPM, bedbound x 3 years, admitted for coffee ground emesis and slurred speech.  CT head showed chronic ischemic changes in both hemispheres with atrophy.  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  B/l carotid artery aneurysms.  CT Abd / Pelvis showed No bowel obstruction. Colonic diverticulosis without CT evidence of acute diverticulitis. Rectum distended by stool.  Seen by GI, recommended conservative GI management at present.  Elevated WBC noted, CT chest suggestive of b/l infiltrates.  Improving on IV Abx.  She has had poor po intake, which son states has been ongoing even before hospitalization.  Seen by psychiatry.  Options including PEG were offered, however, pt and son wish for conservative management, in consideration of pt's age, condition and wishes.      # Failure to Thrive - son reports for several months prior to arrival, pt had very poor po intake.  She has expressed to him a desire to be "left alone".  Does not appear to be depressed.  Psychiatry evaluation appreciated.  I discussed with son.  He agrees with conservative management, is considering pt's return home with hospice.  Does not wish for consideration of PEG placement, in consideration of pt's age and wishes.  Hospice evaluation.    # Acute Anemia - with Coffee Ground Emesis on admission.  Hgb dropping initially; now fairly stable post s/p transfusion x 1 unit.  Recent CT A/P with contrast not suggestive of RP bleed.  Stool occult blood negative x 2. LDH, Haptoglobin not suggestive of hemolysis.  Hematology consulted (Goyo)  Pt refused BM Bx.  GI consulted (Dr. Puga) - Conservative mgt as pt without clear GI source of bleeding.  Diet advanced to mechanical soft.  H/H appears to be stable.   # Leukocytosis, Probable pneumonia - WBC was persistently rising; now trending back down (slowly)   No fever, but CXR and CT Chest are suggestive of bibasilar infiltrates.   Completed antibiotics.   # Positive Blood cultures - 1 grew staph epidermidis - likely contaminant   Repeat BCx negative.  Off antibiotics.    # Dysarthria, L ptosis - Pt reports facial weakness is chronic.   Neurology input (Lluvia) appreciated.   Pt unable to have MRI.   CT of head shows possible L parietal subacute infarct    Continue Statin.    # Subcentimeter Renal Densities - seen on CT.   Discussed outpatient f/u with urology.  # B/l Cavernous Carotid Artery Aneurysms  - seen by neuro.   Appear to be thrombosed.   No surgical indication at present.    Outpatient neuro followup.    # CAD - continue Statin.    # Essential HTN - Monitor BP.  Continue oral antihypertensives.  # Acquired Hypothyroidism - continue Synthroid.   TSH somewhat elevated- 5.09, so Synthroid dose increased slightly  # Functional Quadriplegia - supportive care.   # Constipation - now with frequent BMs.  # Moderate Protein Calorie Malnutrition - continue supplements per nutrition.  # DVT Prophylaxis  - Lower extremity intermittent compression devices.    I d/w son Chauncey at [phone].  He agrees with plan of care of discharge home with hospice / homecare resources.  DNR / DNI.

## 2021-05-01 NOTE — PROGRESS NOTE ADULT - SUBJECTIVE AND OBJECTIVE BOX
lying in bed    Vital Signs Last 24 Hrs  T(C): 36.5 (01 May 2021 04:37), Max: 37.1 (30 Apr 2021 17:00)  T(F): 97.7 (01 May 2021 04:37), Max: 98.8 (30 Apr 2021 17:00)  HR: 80 (01 May 2021 04:37) (78 - 80)  BP: 135/82 (01 May 2021 04:37) (128/81 - 142/80)  BP(mean): --  RR: 18 (01 May 2021 04:37) (18 - 18)  SpO2: 97% (01 May 2021 04:37) (96% - 98%)    PHYSICAL EXAM:    general - weak looking +  HEENT - No Icterus  CVS - RRR  RS - AE B/L  Abd - soft, NT  Ext - Pulses +        LABS:                        10.7   12.91 )-----------( 339      ( 30 Apr 2021 06:57 )             33.3     04-30    140  |  104  |  17  ----------------------------<  94  3.3<L>   |  29  |  0.72    Ca    8.9      30 Apr 2021 06:57              RADIOLOGY & ADDITIONAL STUDIES:

## 2021-05-01 NOTE — PROGRESS NOTE ADULT - SUBJECTIVE AND OBJECTIVE BOX
Patient: MITCHEL MULLINS [id number] 88y Female                            Hospitalist Attending Note    No complaints.  Poor po intake.    ____________________PHYSICAL EXAM:  GENERAL:  NAD [place], oriented to person, place.   HEENT: NCAT  CARDIOVASCULAR:  S1, S2  LUNGS: CTAB  ABDOMEN:  soft, (-) tenderness, (-) distension, (+) bowel sounds, (-) guarding, (-) rebound (-) rigidity  EXTREMITIES:  no cyanosis / clubbing / edema.   ____________________      VITALS:  Vital Signs Last 24 Hrs  T(C): 36.3 (01 May 2021 11:20), Max: 37.1 (2021 17:00)  T(F): 97.4 (01 May 2021 11:20), Max: 98.8 (2021 17:00)  HR: 83 (01 May 2021 11:20) (78 - 83)  BP: 126/77 (01 May 2021 11:20) (126/77 - 142/80)  BP(mean): --  RR: 18 (01 May 2021 11:20) (18 - 18)  SpO2: 96% (01 May 2021 11:20) (96% - 98%) Daily     Daily Weight in k.6 (01 May 2021 04:37)  CAPILLARY BLOOD GLUCOSE        I&O's Summary    2021 07:01  -  01 May 2021 07:00  --------------------------------------------------------  IN: 600 mL / OUT: 300 mL / NET: 300 mL        LABS:                        10.7   12.91 )-----------( 339      ( 2021 06:57 )             33.3     04-30    140  |  104  |  17  ----------------------------<  94  3.3<L>   |  29  |  0.72    Ca    8.9      2021 06:57                    MEDICATIONS:  acetaminophen   Tablet .. 650 milliGRAM(s) Oral every 6 hours PRN  amLODIPine   Tablet 10 milliGRAM(s) Oral daily  chlorhexidine 2% Cloths 1 Application(s) Topical daily  levothyroxine 100 MICROGram(s) Oral daily  mirtazapine 7.5 milliGRAM(s) Oral at bedtime  nystatin Powder 1 Application(s) Topical every 8 hours  pantoprazole   Suspension 40 milliGRAM(s) Oral daily  polyethylene glycol 3350 17 Gram(s) Oral daily  simvastatin 20 milliGRAM(s) Oral at bedtime

## 2021-05-02 NOTE — PROGRESS NOTE ADULT - SUBJECTIVE AND OBJECTIVE BOX
Patient: MITCHEL MULLINS [id number] 88y Female                            Hospitalist Attending Note    No complaints.  Still poor po intake.    ____________________PHYSICAL EXAM:  GENERAL:  NAD [place], oriented to person, place.   HEENT: NCAT  CARDIOVASCULAR:  S1, S2  LUNGS: CTAB  ABDOMEN:  soft, (-) tenderness, (-) distension, (+) bowel sounds, (-) guarding, (-) rebound (-) rigidity  EXTREMITIES:  no cyanosis / clubbing / edema.   ____________________      VITALS:  Vital Signs Last 24 Hrs  T(C): 36.8 (02 May 2021 12:16), Max: 36.8 (01 May 2021 17:20)  T(F): 98.3 (02 May 2021 12:16), Max: 98.3 (01 May 2021 17:20)  HR: 83 (02 May 2021 12:16) (79 - 83)  BP: 125/78 (02 May 2021 12:16) (112/77 - 125/78)  BP(mean): --  RR: 16 (02 May 2021 12:16) (16 - 18)  SpO2: 99% (02 May 2021 12:16) (93% - 99%) Daily     Daily Weight in k.8 (02 May 2021 04:37)  CAPILLARY BLOOD GLUCOSE        I&O's Summary    01 May 2021 07:01  -  02 May 2021 07:00  --------------------------------------------------------  IN: 960 mL / OUT: 750 mL / NET: 210 mL        LABS:                        10.8   10.86 )-----------( 336      ( 02 May 2021 07:27 )             33.9     05-02    139  |  105  |  23  ----------------------------<  92  3.7   |  28  |  0.98    Ca    9.3      02 May 2021 07:27    TPro  7.3  /  Alb  3.0<L>  /  TBili  0.8  /  DBili  x   /  AST  32  /  ALT  14  /  AlkPhos  60  05-02      LIVER FUNCTIONS - ( 02 May 2021 07:27 )  Alb: 3.0 g/dL / Pro: 7.3 gm/dL / ALK PHOS: 60 U/L / ALT: 14 U/L / AST: 32 U/L / GGT: x                     MEDICATIONS:  acetaminophen   Tablet .. 650 milliGRAM(s) Oral every 6 hours PRN  amLODIPine   Tablet 10 milliGRAM(s) Oral daily  chlorhexidine 2% Cloths 1 Application(s) Topical daily  levothyroxine 100 MICROGram(s) Oral daily  mirtazapine 7.5 milliGRAM(s) Oral at bedtime  nystatin Powder 1 Application(s) Topical every 8 hours  pantoprazole   Suspension 40 milliGRAM(s) Oral daily  polyethylene glycol 3350 17 Gram(s) Oral daily  simvastatin 20 milliGRAM(s) Oral at bedtime

## 2021-05-02 NOTE — PROGRESS NOTE ADULT - ASSESSMENT
89yo F PMH AAA repair in 2012, CAD, MI, s/p CABG x 4, in 2007, HTN, HLD, s/p PPM, bedbound x 3 years, admitted for coffee ground emesis and slurred speech.  CT head showed chronic ischemic changes in both hemispheres with atrophy.  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  B/l carotid artery aneurysms.  CT Abd / Pelvis showed No bowel obstruction. Colonic diverticulosis without CT evidence of acute diverticulitis. Rectum distended by stool.  Seen by GI, recommended conservative GI management at present.  Elevated WBC noted, CT chest suggestive of b/l infiltrates.  Improving on IV Abx.  She has had poor po intake, which son states has been ongoing even before hospitalization.  Seen by psychiatry.  Options including PEG were offered, however, pt and son wish for conservative management, in consideration of pt's age, condition and wishes.      # Failure to Thrive - son reports for several months prior to arrival, pt had very poor po intake.  She has expressed to him a desire to be "left alone".  Does not appear to be depressed.  Psychiatry evaluation appreciated.  I discussed with son.  He agrees with conservative management, is considering pt's return home with hospice.  Does not wish for consideration of PEG placement, in consideration of pt's age and wishes.  Hospice evaluation.    # Acute Anemia - with Coffee Ground Emesis on admission.  Hgb dropping initially; now fairly stable post s/p transfusion x 1 unit.  Recent CT A/P with contrast not suggestive of RP bleed.  Stool occult blood negative x 2. LDH, Haptoglobin not suggestive of hemolysis.  Hematology consulted (Goyo)  Pt refused BM Bx.  GI consulted (Dr. Puga) - Conservative mgt as pt without clear GI source of bleeding.  Diet advanced to mechanical soft.  H/H appears to be stable.   # Leukocytosis, Probable pneumonia - WBC was persistently rising; now trending back down (slowly)   No fever, but CXR and CT Chest are suggestive of bibasilar infiltrates.   Completed antibiotics.   # Positive Blood cultures - 1 grew staph epidermidis - likely contaminant   Repeat BCx negative.  Off antibiotics.    # Dysarthria, L ptosis - Pt reports facial weakness is chronic.   Neurology input (Lluvia) appreciated.   Pt unable to have MRI.   CT of head shows possible L parietal subacute infarct    Continue Statin.    # Subcentimeter Renal Densities - seen on CT.   Discussed outpatient f/u with urology.  # B/l Cavernous Carotid Artery Aneurysms  - seen by neuro.   Appear to be thrombosed.   No surgical indication at present.    Outpatient neuro followup.    # CAD - continue Statin.    # Essential HTN - Monitor BP.  Continue oral antihypertensives.  # Acquired Hypothyroidism - continue Synthroid.   TSH somewhat elevated- 5.09, so Synthroid dose increased slightly  # Functional Quadriplegia - supportive care.   # Constipation - now with frequent BMs.  # Moderate Protein Calorie Malnutrition - continue supplements per nutrition.  # DVT Prophylaxis  - Lower extremity intermittent compression devices.    I d/w son Chauncey at [phone].  He agrees with plan of care of discharge home with hospice / homecare resources.  DNR / DNI.

## 2021-05-02 NOTE — PROGRESS NOTE ADULT - SUBJECTIVE AND OBJECTIVE BOX
lying in bed, weak looking +    Vital Signs Last 24 Hrs  T(C): 36.8 (02 May 2021 04:37), Max: 36.8 (01 May 2021 17:20)  T(F): 98.2 (02 May 2021 04:37), Max: 98.3 (01 May 2021 17:20)  HR: 83 (02 May 2021 04:37) (79 - 83)  BP: 122/80 (02 May 2021 04:37) (112/77 - 126/77)  BP(mean): --  RR: 18 (02 May 2021 04:37) (17 - 18)  SpO2: 99% (02 May 2021 04:37) (93% - 99%)    PHYSICAL EXAM:    general - weak looking   HEENT - No Icterus  CVS - RRR  RS - AE B/L  Abd - soft, NT  Ext - Pulses +        LABS:                        10.8   10.86 )-----------( 336      ( 02 May 2021 07:27 )             33.9     05-02    139  |  105  |  23  ----------------------------<  92  3.7   |  28  |  0.98    Ca    9.3      02 May 2021 07:27    TPro  7.3  /  Alb  3.0<L>  /  TBili  0.8  /  DBili  x   /  AST  32  /  ALT  14  /  AlkPhos  60  05-02            RADIOLOGY & ADDITIONAL STUDIES:

## 2021-05-03 NOTE — PROGRESS NOTE ADULT - SUBJECTIVE AND OBJECTIVE BOX
Patient: MITCHEL MULLINS [id number] 88y Female                            Hospitalist Attending Note    No complaints.  Still poor po intake.    ____________________PHYSICAL EXAM:  GENERAL:  NAD [place]ble, oriented to person, place.   HEENT: NCAT  CARDIOVASCULAR:  S1, S2  LUNGS: CTAB  ABDOMEN:  soft, (-) tenderness, (-) distension, (+) bowel sounds, (-) guarding, (-) rebound (-) rigidity  EXTREMITIES:  no cyanosis / clubbing / edema.   ____________________      VITALS:  Vital Signs Last 24 Hrs  T(C): 36.8 (02 May 2021 12:16), Max: 36.8 (01 May 2021 17:20)  T(F): 98.3 (02 May 2021 12:16), Max: 98.3 (01 May 2021 17:20)  HR: 83 (02 May 2021 12:16) (79 - 83)  BP: 125/78 (02 May 2021 12:16) (112/77 - 125/78)  BP(mean): --  RR: 16 (02 May 2021 12:16) (16 - 18)  SpO2: 99% (02 May 2021 12:16) (93% - 99%) Daily     Daily Weight in k.8 (02 May 2021 04:37)  CAPILLARY BLOOD GLUCOSE        I&O's Summary    01 May 2021 07:01  -  02 May 2021 07:00  --------------------------------------------------------  IN: 960 mL / OUT: 750 mL / NET: 210 mL        LABS:                        10.8   10.86 )-----------( 336      ( 02 May 2021 07:27 )             33.9     05-02    139  |  105  |  23  ----------------------------<  92  3.7   |  28  |  0.98    Ca    9.3      02 May 2021 07:27    TPro  7.3  /  Alb  3.0<L>  /  TBili  0.8  /  DBili  x   /  AST  32  /  ALT  14  /  AlkPhos  60  05-02      LIVER FUNCTIONS - ( 02 May 2021 07:27 )  Alb: 3.0 g/dL / Pro: 7.3 gm/dL / ALK PHOS: 60 U/L / ALT: 14 U/L / AST: 32 U/L / GGT: x                     MEDICATIONS:  acetaminophen   Tablet .. 650 milliGRAM(s) Oral every 6 hours PRN  amLODIPine   Tablet 10 milliGRAM(s) Oral daily  chlorhexidine 2% Cloths 1 Application(s) Topical daily  levothyroxine 100 MICROGram(s) Oral daily  mirtazapine 7.5 milliGRAM(s) Oral at bedtime  nystatin Powder 1 Application(s) Topical every 8 hours  pantoprazole   Suspension 40 milliGRAM(s) Oral daily  polyethylene glycol 3350 17 Gram(s) Oral daily  simvastatin 20 milliGRAM(s) Oral at bedtime

## 2021-05-03 NOTE — PROGRESS NOTE ADULT - SUBJECTIVE AND OBJECTIVE BOX
INTERVAL HPI/OVERNIGHT EVENTS:    Code Status: DNR/I  Allergies    No Known Allergies    Intolerances    MEDICATIONS  (STANDING):  amLODIPine   Tablet 10 milliGRAM(s) Oral daily  chlorhexidine 2% Cloths 1 Application(s) Topical daily  levothyroxine 100 MICROGram(s) Oral daily  mirtazapine 7.5 milliGRAM(s) Oral at bedtime  nystatin Powder 1 Application(s) Topical every 8 hours  pantoprazole   Suspension 40 milliGRAM(s) Oral daily  polyethylene glycol 3350 17 Gram(s) Oral daily  simvastatin 20 milliGRAM(s) Oral at bedtime    MEDICATIONS  (PRN):  acetaminophen   Tablet .. 650 milliGRAM(s) Oral every 6 hours PRN Temp greater or equal to 38C (100.4F), Mild Pain (1 - 3)      PRESENT SYMPTOMS: [ ]Unable to obtain due to poor mentation   Source if other than patient:  [ ]Family   [ ]Team     Pain (Impact on QOL):    Location:  Severity:  Minimal acceptable level (0-10 scale):       Quality:       Onset:  Duration:  Aggravating factors:  Relieving Factors  Radiation:    Dyspnea:  Yes [ ] No [ ] - [ ]Mild [ ]Moderate [ ]Severe  Anxiety:    Yes [ ] No [ ] - [ ]Mild [ ]Moderate [ ]Severe  Fatigue:    Yes [ ] No [ ] - [ ]Mild [ ]Moderate [ ]Severe  Nausea:    Yes [ ] No [ ] - [ ]Mild [ ]Moderate [ ]Severe                         Loss of appetite: Yes [ ] No [ ] - [ ]Mild [ ]Moderate [ ]Severe             Constipation:  Yes [ ] No [ ] - [ ]Mild [ ]Moderate [ ]Severe  Grief: Yes [ ] No [ ]     PAIN AD Score:	  [email or web address] (Ctrl + left click to view)    Other Symptoms:  [ ]All other review of systems negative     Karnofsky Performance Score/Palliative Performance Status Version 2:     20-30    %    http://palliative.info/resource_material/[email or web address].pdf    PHYSICAL EXAM:  Vital Signs Last 24 Hrs  T(C): 36.7 (03 May 2021 04:23), Max: 36.9 (02 May 2021 23:27)  T(F): 98 (03 May 2021 04:23), Max: 98.4 (02 May 2021 23:27)  HR: 79 (03 May 2021 05:27) (79 - 92)  BP: 128/71 (03 May 2021 05:27) (90/61 - 128/71)  BP(mean): --  RR: 18 (03 May 2021 05:27) (16 - 18)  SpO2: 99% (03 May 2021 05:27) (97% - 99%) I&O's Summary    02 May 2021 07:01  -  03 May 2021 07:00  --------------------------------------------------------  IN: 236 mL / OUT: 950 mL / NET: -714 mL         GENERAL:  [ ]Alert  [ ]Oriented x   [x ]Lethargic  [ ]Cachexia  [ ]Unarousable  [x ]Verbal  [ ]Non-Verbal  Behavioral:   [ ] Anxiety  [ ] Delirium [ ] Agitation [ ] Other  HEENT:  [ ]Normal   [x ]Dry mouth   [ ]ET Tube/Trach  [ ]Oral lesions  PULMONARY:   [ x]Clear [ ]Tachypnea  [ ]Audible excessive secretions   [ ]Rhonchi        [ ]Right [ ]Left [ ]Bilateral  [ ]Crackles        [ ]Right [ ]Left [ ]Bilateral  [ ]Wheezing     [ ]Right [ ]Left [ ]Bilateral  CARDIOVASCULAR:    [x ]Regular [ ]Irregular [ ]Tachy  [ ]Hemant [ ]Murmur [ ]Other  GASTROINTESTINAL:  [x ]Soft  [ ]Distended   [ ]+BS  [ ]Non tender [x ]Tender  to palp in RLQ [ ]PEG [ ]OGT/ NGT   Last BM: 4/28     GENITOURINARY:  [ ]Normal [x ] Incontinent   [ ]Oliguria/Anuria   [ ]Sin  MUSCULOSKELETAL:   [ ]Normal   [ ]Weakness  [x ]Bed/Wheelchair bound [ ]Edema  NEUROLOGIC:   [ ]No focal deficits  [ x] Cognitive impairment  [ ] Dysphagia [ ]Dysarthria [ ] Paresis [ ]Other   SKIN: overgrown nails on hands and feet most notable on L big toe  [x]Normal   [ ]Pressure ulcer(s)  [ ]Rash    CRITICAL CARE:  [ ] Shock Present  [ ]Septic [ ]Cardiogenic [ ]Neurologic [ ]Hypovolemic  [ ]  Vasopressors [ ]  Inotropes   [ ] Respiratory failure present  [ ] Acute  [ ] Chronic [ ] Hypoxic  [ ] Hypercarbic [ ] Other  [ ] Other organ failure     LABS:                        10.8   10.86 )-----------( 336      ( 02 May 2021 07:27 )             33.9   05-02    139  |  105  |  23  ----------------------------<  92  3.7   |  28  |  0.98    Ca    9.3      02 May 2021 07:27    TPro  7.3  /  Alb  3.0<L>  /  TBili  0.8  /  DBili  x   /  AST  32  /  ALT  14  /  AlkPhos  60  05-02        RADIOLOGY & ADDITIONAL STUDIES:    Protein Calorie Malnutrition Present: [ x] yes [ ] no  [x ] PPSV2 < or = 30%  [ x] significant weight loss [x ] poor nutritional intake [ ] anasarca [ ] catabolic state Albumin, Serum: 3.0 g/dL (05-02-21 @ 07:27)      REFERRALS:   [ ]Chaplaincy  [x ] Hospice  [ ]Child Life  [ x]Social Work  [x ]Case management [ ]Holistic Therapy   Goals of Care Document: MOHIT Padron (04-29-21 @ 13:02)  Goals of Care Conversation:   Advance Directives:  · Caregiver:  yes  · Name  Jose Alberto Chester  · Phone Number  [phone]    Conversation Discussion:  · Conversation  Diagnosis; Prognosis  · Conversation Details  Goals of care - I had a lengthy conversation with pt and son Jose Alberto.    We discussed the comorbid conditions, hospital care, and prognosis.  We also discussed advanced directives - made aware of limited prognosis if patient were to be intubated or resuscitated, in consideration of age and comorbid conditions.  They agreed to further discuss before deciding.      Total time spent on patient care discussion = 20 minutes.      Electronic Signatures:  Jermaine Padron)  (Signed 29-Apr-2021 13:05)  	Authored: Goals of Care Conversation      Last Updated: 29-Apr-2021 13:05 by Jermaine Padron)

## 2021-05-03 NOTE — PROGRESS NOTE ADULT - PROBLEM SELECTOR PLAN 5
multifactorial -Fe deficiency, malnutrition, iatrogenic freq blood draws   conservative management  s/p 1 unit PRBC- counts stable since   FOBT negative.

## 2021-05-03 NOTE — PROGRESS NOTE ADULT - ASSESSMENT
87yo F PMH AAA repair in 2012, CAD, MI, s/p CABG x 4, in 2007, HTN, HLD, s/p PPM, bedbound x 3 years, admitted for coffee ground emesis and slurred speech.  CT head showed chronic ischemic changes in both hemispheres with atrophy.  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  B/l carotid artery aneurysms.  CT Abd / Pelvis showed No bowel obstruction. Colonic diverticulosis without CT evidence of acute diverticulitis. Rectum distended by stool.  Seen by GI, recommended conservative GI management at present.  Elevated WBC noted, CT chest suggestive of b/l infiltrates.  Improving on IV Abx.  She has had poor po intake, which son states has been ongoing even before hospitalization.  Seen by psychiatry.  Options including PEG were offered, however, pt and son wish for conservative management, in consideration of pt's age, condition and wishes.      # Failure to Thrive # Moderate Protein Calorie Malnutrition - son reports for several months prior to arrival, pt had very poor po intake.  She has expressed to him a desire to be "left alone".  Does not appear to be depressed.  Psychiatry evaluation appreciated.  I discussed with son.  He agrees with conservative management, is considering pt's return home with hospice.  Does not wish for consideration of PEG placement, in consideration of pt's age and wishes.  Declined by hospice.  Plan d/c home with homecare.    # Acute Anemia - with Coffee Ground Emesis on admission.  Hgb dropping initially; now fairly stable post s/p transfusion x 1 unit.  Recent CT A/P with contrast not suggestive of RP bleed.  Stool occult blood negative x 2. LDH, Haptoglobin not suggestive of hemolysis.  Hematology consulted (Goyo)  Pt refused BM Bx.  GI consulted (Dr. Puga) - Conservative mgt as pt without clear GI source of bleeding.  Diet advanced to mechanical soft.  H/H appears to be stable.   # Leukocytosis, Pneumonia - WBC nearly normalized.  No fever, but CXR and CT Chest are suggestive of bibasilar infiltrates.   Completed antibiotics.  SaO2 93% on RA.    # Positive Blood cultures - 1 grew staph epidermidis - likely contaminant   Repeat BCx negative.  Off antibiotics.    # Dysarthria, L ptosis - Pt reports facial weakness is chronic.   Neurology input (Lluvia) appreciated.   Pt unable to have MRI.   CT of head shows possible L parietal subacute infarct    Continue Statin.    # Subcentimeter Renal Densities - seen on CT.   Discussed outpatient f/u with urology.  # B/l Cavernous Carotid Artery Aneurysms  - seen by neuro.   Appear to be thrombosed.   No surgical indication at present.    Outpatient neuro followup.    # CAD - continue Statin.    # Essential HTN - Monitor BP.  Continue oral antihypertensives.  # Acquired Hypothyroidism - continue Synthroid.   TSH somewhat elevated- 5.09, so Synthroid dose increased slightly  # Functional Quadriplegia - supportive care.   # Constipation - resolved  # DVT Prophylaxis  - Lower extremity intermittent compression devices.    I d/w son Chauncey at [phone] .  Plan d/c home with homecare in am.  He agrees that he wishes for home to be treated conservatively at this point.    DNR / DNI.

## 2021-05-03 NOTE — HOSPICE CARE NOTE - CONVESATION DETAILS
Monday 5/3: [facility]: Patient was not approved for hospice service at this time. JOSE ANGEL Sykes made aware. Alice Dueñas RN.

## 2021-05-03 NOTE — PROGRESS NOTE ADULT - PROBLEM SELECTOR PLAN 7
spent time discussing care goals with pt's son Chauncey on Friday. He acknowledges his mother's decline overall and wants to keep her comfortable and happy as best as able knowing her time left is limited. There is home care already in place but he is interested in learning more about hospice as well. [facility] referral requested.

## 2021-05-03 NOTE — PROGRESS NOTE ADULT - SUBJECTIVE AND OBJECTIVE BOX
lying in bed    Vital Signs Last 24 Hrs  T(C): 36.7 (03 May 2021 04:23), Max: 36.9 (02 May 2021 23:27)  T(F): 98 (03 May 2021 04:23), Max: 98.4 (02 May 2021 23:27)  HR: 79 (03 May 2021 05:27) (79 - 92)  BP: 128/71 (03 May 2021 05:27) (90/61 - 128/71)  BP(mean): --  RR: 18 (03 May 2021 05:27) (16 - 18)  SpO2: 99% (03 May 2021 05:27) (97% - 99%)    PHYSICAL EXAM:    general - weak looking +  HEENT - No Icterus  CVS - RRR  RS - AE B/L  Abd - soft, NT  Ext - Pulses +        LABS:                        10.8   10.86 )-----------( 336      ( 02 May 2021 07:27 )             33.9     05-02    139  |  105  |  23  ----------------------------<  92  3.7   |  28  |  0.98    Ca    9.3      02 May 2021 07:27    TPro  7.3  /  Alb  3.0<L>  /  TBili  0.8  /  DBili  x   /  AST  32  /  ALT  14  /  AlkPhos  60  05-02            RADIOLOGY & ADDITIONAL STUDIES:

## 2021-05-03 NOTE — BH CONSULTATION LIAISON PROGRESS NOTE - OTHER
subdued, solemn, looks weak/low energy  unable to tolerate an MMSE at this time  "okay"  varying  lower end of fair

## 2021-05-04 NOTE — BH CONSULTATION LIAISON PROGRESS NOTE - NSICDXBHTERTIARYDX_PSY_ALL_CORE
R/O Adult failure to thrive   R62.7  

## 2021-05-04 NOTE — DISCHARGE NOTE PROVIDER - NSDCCPCAREPLAN_GEN_ALL_CORE_FT
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

## 2021-05-04 NOTE — BH CONSULTATION LIAISON PROGRESS NOTE - OTHER
varying  unable to tolerate an MMSE at this time  lower end of fair  "okay"  subdued, solemn, looks weak/low energy

## 2021-05-04 NOTE — BH CONSULTATION LIAISON PROGRESS NOTE - NSBHMSESPABN_PSY_A_CORE
Soft volume/Slowed rate/Decreased productivity/Increased latency

## 2021-05-04 NOTE — BH CONSULTATION LIAISON PROGRESS NOTE - NSBHCONSFOLLOWNEEDS_PSY_ALL_CORE
No psychiatric contraindications to discharge

## 2021-05-04 NOTE — DISCHARGE NOTE PROVIDER - PROVIDER TOKENS
PROVIDER:[TOKEN:[[id number]]],PROVIDER:[TOKEN:[[id number]]],PROVIDER:[TOKEN:[[id number]]]

## 2021-05-04 NOTE — BH CONSULTATION LIAISON PROGRESS NOTE - NSBHCONSULTFOLLOW_PSY_ALL_CORE
No, psychiatric follow up needed - call with questions...
No, psychiatric follow up needed - call with questions...
Yes...

## 2021-05-04 NOTE — PROGRESS NOTE ADULT - NUTRITIONAL ASSESSMENT
This patient has been assessed with a concern for Malnutrition and has been determined to have a diagnosis/diagnoses of Moderate protein-calorie malnutrition.    This patient is being managed with:   Diet Mechanical Soft-  Supplement Feeding Modality:  Oral  Ensure Enlive Cans or Servings Per Day:  1       Frequency:  Two Times a day  Entered: Apr 22 2021  4:05PM    
This patient has been assessed with a concern for Malnutrition and has been determined to have a diagnosis/diagnoses of Severe protein-calorie malnutrition.    This patient is being managed with:   Diet Mechanical Soft-  Supplement Feeding Modality:  Oral  Ensure Enlive Cans or Servings Per Day:  1       Frequency:  Two Times a day  Entered: Apr 22 2021  4:05PM    
This patient has been assessed with a concern for Malnutrition and has been determined to have a diagnosis/diagnoses of Moderate protein-calorie malnutrition.    This patient is being managed with:   Diet Mechanical Soft-  Supplement Feeding Modality:  Oral  Ensure Enlive Cans or Servings Per Day:  1       Frequency:  Two Times a day  Entered: Apr 22 2021  4:05PM    
This patient has been assessed with a concern for Malnutrition and has been determined to have a diagnosis/diagnoses of Severe protein-calorie malnutrition.    This patient is being managed with:   Diet Mechanical Soft-  Supplement Feeding Modality:  Oral  Ensure Enlive Cans or Servings Per Day:  1       Frequency:  Two Times a day  Entered: Apr 22 2021  4:05PM    
This patient has been assessed with a concern for Malnutrition and has been determined to have a diagnosis/diagnoses of Moderate protein-calorie malnutrition.    This patient is being managed with:   Diet Clear Liquid-  Entered: Apr 19 2021  9:27AM    
This patient has been assessed with a concern for Malnutrition and has been determined to have a diagnosis/diagnoses of Moderate protein-calorie malnutrition.    This patient is being managed with:   Diet Mechanical Soft-  Low Sodium  Supplement Feeding Modality:  Oral  Ensure Enlive Cans or Servings Per Day:  1       Frequency:  Two Times a day  Entered: Apr 16 2021  1:40PM    
This patient has been assessed with a concern for Malnutrition and has been determined to have a diagnosis/diagnoses of Moderate protein-calorie malnutrition.    This patient is being managed with:   Diet Clear Liquid-  Entered: Apr 19 2021  9:27AM    
This patient has been assessed with a concern for Malnutrition and has been determined to have a diagnosis/diagnoses of Moderate protein-calorie malnutrition.    This patient is being managed with:   Diet Mechanical Soft-  Low Sodium  Supplement Feeding Modality:  Oral  Ensure Enlive Cans or Servings Per Day:  1       Frequency:  Two Times a day  Entered: Apr 16 2021  1:40PM    
This patient has been assessed with a concern for Malnutrition and has been determined to have a diagnosis/diagnoses of Severe protein-calorie malnutrition.    This patient is being managed with:   Diet Mechanical Soft-  Supplement Feeding Modality:  Oral  Ensure Enlive Cans or Servings Per Day:  1       Frequency:  Two Times a day  Entered: Apr 22 2021  4:05PM    
This patient has been assessed with a concern for Malnutrition and has been determined to have a diagnosis/diagnoses of Moderate protein-calorie malnutrition.    This patient is being managed with:   Diet Clear Liquid-  Entered: Apr 19 2021  9:27AM    
This patient has been assessed with a concern for Malnutrition and has been determined to have a diagnosis/diagnoses of Moderate protein-calorie malnutrition.    This patient is being managed with:   Diet NPO-  Entered: Apr 18 2021  8:40AM      This patient has been assessed with a concern for Malnutrition and has been determined to have a diagnosis/diagnoses of Moderate protein-calorie malnutrition.    This patient is being managed with:   Diet NPO-  Entered: Apr 18 2021  8:40AM    
This patient has been assessed with a concern for Malnutrition and has been determined to have a diagnosis/diagnoses of Severe protein-calorie malnutrition.    This patient is being managed with:   Diet Mechanical Soft-  Supplement Feeding Modality:  Oral  Ensure Enlive Cans or Servings Per Day:  1       Frequency:  Two Times a day  Entered: Apr 22 2021  4:05PM    

## 2021-05-04 NOTE — DISCHARGE NOTE PROVIDER - NSDCFUADDINST_GEN_ALL_CORE_FT
It is important to see your primary physician as well as the physicians noted below within the next week to perform a comprehensive medical review.  Call their offices for an appointment as soon as you leave the hospital.  You will also need to see them for renewal of your medications.  If you do not have a primary physician, contact the [facility] Physician Referral Service at [phone].  To obtain your results, you can access the [facility] Patient Portal at [email or web address].  Your medical issues appear to be stable at this time, but if your symptoms recur or worsen, contact your physicians and/or return to the hospital if necessary.  If you encounter any issues or questions with your medication, call your physicians before stopping the medication.  Do not drive.  Limit your diet to 2 grams of sodium daily.

## 2021-05-04 NOTE — PROGRESS NOTE ADULT - ASSESSMENT
89yo F PMH AAA repair in 2012, CAD, MI, s/p CABG x 4, in 2007, HTN, HLD, s/p PPM, bedbound x 3 years, admitted for coffee ground emesis and slurred speech.  CT head showed chronic ischemic changes in both hemispheres with atrophy.  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  B/l carotid artery aneurysms.  CT Abd / Pelvis showed No bowel obstruction. Colonic diverticulosis without CT evidence of acute diverticulitis. Rectum distended by stool.  Seen by GI, recommended conservative GI management at present.  Elevated WBC noted, CT chest suggestive of b/l infiltrates.  Improving on IV Abx.  She has had poor po intake, which son states has been ongoing even before hospitalization.  Seen by psychiatry.  Options including PEG were offered, however, pt and son wish for conservative management, in consideration of pt's age, condition and wishes.  Son agreed with home hospice, however, patient was not accepted.  Stable for d/c with homecare.  Son is aware of her likely deterioration with poor po intake, but wished to respect her wishes for return home.      # Failure to Thrive # Moderate Protein Calorie Malnutrition - son reports for several months prior to arrival, pt had very poor po intake.  She has expressed to him a desire to be "left alone".  Does not appear to be depressed.  Psychiatry evaluation appreciated.  I discussed with son.  He agrees with conservative management, is considering pt's return home with hospice.  Does not wish for consideration of PEG placement, in consideration of pt's age and wishes.  Declined by hospice.  Plan d/c home with homecare.    # Acute Anemia - with Coffee Ground Emesis on admission.  Hgb dropping initially; now fairly stable post s/p transfusion x 1 unit.  Recent CT A/P with contrast not suggestive of RP bleed.  Stool occult blood negative x 2. LDH, Haptoglobin not suggestive of hemolysis.  Hematology consulted (Goyo)  Pt refused BM Bx.  GI consulted (Dr. Puga) - Conservative mgt as pt without clear GI source of bleeding.  Diet advanced to mechanical soft.  H/H appears to be stable.   # Leukocytosis, Pneumonia - WBC nearly normalized.  No fever, but CXR and CT Chest are suggestive of bibasilar infiltrates.   Completed antibiotics.  SaO2 93% on RA.    # Positive Blood cultures - 1 grew staph epidermidis - likely contaminant   Repeat BCx negative.  Off antibiotics.    # Dysarthria, L ptosis - Pt reports facial weakness is chronic.   Neurology input (Lluvia) appreciated.   Pt unable to have MRI.   CT of head shows possible L parietal subacute infarct    Continue Statin.  Not started on ASA due to acute anemia.    # Subcentimeter Renal Densities - seen on CT.   Discussed outpatient f/u with urology.  # B/l Cavernous Carotid Artery Aneurysms  - seen by neuro.   Appear to be thrombosed.   No surgical indication at present.    Outpatient neuro followup.    # CAD - continue Statin.    # Essential HTN - Monitor BP.  Continue oral antihypertensives.  # Acquired Hypothyroidism - continue Synthroid.   TSH somewhat elevated- 5.09, so Synthroid dose increased slightly  # Functional Quadriplegia - supportive care.   # Constipation - resolved  # DVT Prophylaxis  - Lower extremity intermittent compression devices.    Plans for discharge d/w fidelina Grossman at [phone] on 5/3.    DNR / DNI.

## 2021-05-04 NOTE — BH CONSULTATION LIAISON PROGRESS NOTE - NSCDBILL_PSY_A_CORE
[id number] - Inpatient, low complexity
[id number] - Inpatient, low complexity
[id number] - Inpatient, low complexity

## 2021-05-04 NOTE — DISCHARGE NOTE PROVIDER - HOSPITAL COURSE
87yo F PMH AAA repair in 2012, CAD, MI, s/p CABG x 4, in 2007, HTN, HLD, s/p PPM, bedbound x 3 years, admitted for coffee ground emesis and slurred speech.  CT head showed chronic ischemic changes in both hemispheres with atrophy.  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  B/l carotid artery aneurysms.  CT Abd / Pelvis showed No bowel obstruction. Colonic diverticulosis without CT evidence of acute diverticulitis. Rectum distended by stool.  Seen by GI, recommended conservative GI management at present.  Elevated WBC noted, CT chest suggestive of b/l infiltrates.  Improving on IV Abx.  She has had poor po intake, which son states has been ongoing even before hospitalization.  Seen by psychiatry.  Options including PEG were offered, however, pt and son wish for conservative management, in consideration of pt's age, condition and wishes.  Son agreed with home hospice, however, patient was not accepted.  Stable for d/c with homecare.  Son is aware of her likely deterioration with poor po intake, but wished to respect her wishes for return home.      # Failure to Thrive # Moderate Protein Calorie Malnutrition - son reports for several months prior to arrival, pt had very poor po intake.  She has expressed to him a desire to be "left alone".  Does not appear to be depressed.  Psychiatry evaluation appreciated.  I discussed with son.  He agrees with conservative management, is considering pt's return home with hospice.  Does not wish for consideration of PEG placement, in consideration of pt's age and wishes.  Declined by hospice.  Plan d/c home with homecare.    # Acute Anemia - with Coffee Ground Emesis on admission.  Hgb dropping initially; now fairly stable post s/p transfusion x 1 unit.  Recent CT A/P with contrast not suggestive of RP bleed.  Stool occult blood negative x 2. LDH, Haptoglobin not suggestive of hemolysis.  Hematology consulted (Goyo)  Pt refused BM Bx.  GI consulted (Dr. Puga) - Conservative mgt as pt without clear GI source of bleeding.  Diet advanced to mechanical soft.  H/H appears to be stable.   # Leukocytosis, Pneumonia - WBC nearly normalized.  No fever, but CXR and CT Chest are suggestive of bibasilar infiltrates.   Completed antibiotics.  SaO2 93% on RA.    # Positive Blood cultures - 1 grew staph epidermidis - likely contaminant   Repeat BCx negative.  Off antibiotics.    # Dysarthria, L ptosis - Pt reports facial weakness is chronic.   Neurology input (Lluvia) appreciated.   Pt unable to have MRI.   CT of head shows possible L parietal subacute infarct    Continue Statin.  Not started on ASA due to acute anemia.    # Subcentimeter Renal Densities - seen on CT.   Discussed outpatient f/u with urology.  # B/l Cavernous Carotid Artery Aneurysms  - seen by neuro.   Appear to be thrombosed.   No surgical indication at present.    Outpatient neuro followup.    # CAD - continue Statin.    # Essential HTN - Monitor BP.  Continue oral antihypertensives.  # Acquired Hypothyroidism - continue Synthroid.   TSH somewhat elevated- 5.09, so Synthroid dose increased slightly  # Functional Quadriplegia - supportive care.   # Constipation - resolved  # DVT Prophylaxis  - Lower extremity intermittent compression devices.    Plans for discharge d/w fidelina Grossman at [phone] on 5/3.    DNR / DNI.

## 2021-05-04 NOTE — BH CONSULTATION LIAISON PROGRESS NOTE - NSBHMSESPEECH_PSY_A_CORE
Abnormal as indicated, otherwise normal...

## 2021-05-04 NOTE — BH CONSULTATION LIAISON PROGRESS NOTE - NSBHFUPINTERVALHXFT_PSY_A_CORE
Patient is now DNR and her son has opted for home hospice; referral made. Patient has been dozing off throughout the day; has not been agitated or restless. Has not needed any PRNs. Patient denies any subjective distress, pain, sadness and usually has no complaints. Denies hunger, cravings. 
Patient is now DNR and was declined acceptance  to home hospice by [facility]. Patient set to be discharged home tomorrow with reinstated aide services from [facility]. Otherwise no significant interval events. Same clinical presentation - Patient dozes off throughout the day; has not been agitated or restless. Has not needed any PRNs. Patient denies any subjective distress, pain, sadness and usually has no complaints. Denies hunger, cravings. 
Patient started on the Remeron 7.5mg PO qhs last night; no manifestation of adverse side effects. No restlessness or psychomotor agitation. Same psychiatric clinical presentation - XAM: calm, cooperative, engages with some effort, + low energy state; + neurovegetative symptoms. patient states that she does not have much of an appetite for "a while now," denies difficulty chewing and swallowing; reports some loss of taste sensation. Patient says her favorite meal is steak and potatoes and she would eat some if she had it in front of her. + has early satiety. She denies any subjective sense of sadness, depression and says 'I will love the world." Her wish at this time is to be home. She is not sure why she was brought to the hospital and admitted. + reports sleep difficulties (falling and staying asleep). Denies any active or passive suicidal or homicidal ideation. Names protective factors (latisha; God; family; hope for future).

## 2021-05-04 NOTE — PROGRESS NOTE ADULT - PROVIDER SPECIALTY LIST ADULT
Gastroenterology
Hospitalist
Gastroenterology
Heme/Onc
Hospitalist
Gastroenterology
Gastroenterology
Heme/Onc
Heme/Onc
Hospitalist
Gastroenterology
Gastroenterology
Heme/Onc
Heme/Onc
Hospitalist
Gastroenterology
Heme/Onc
Heme/Onc
Hospitalist
Hospitalist
Heme/Onc
Hospitalist
Heme/Onc
Heme/Onc
Palliative Care

## 2021-05-04 NOTE — PROGRESS NOTE ADULT - PROBLEM SELECTOR PLAN 1
- H/H stable  - watch blood counts  - GI f/u
- for Home hospice  - will sign off, thank you  - H/H stable
watch H/H and transfuse as necessary  - refused BM Biopsy  - GI f/u  - stool occult blood.   - H/H getting better
-  H/H stable  - GI f/u  - DVT prophylaxsis  - for Home Hospice
- watch H/H and transfuse as necessary  - refused BM Biopsy  - GI f/u  - stool occult blood.
- H/H improving   - watch blood counts  - GI f/u  - stool occult blood
- H/H stable  - no further bleeding noticed  - awaiting home hospice
- H/H stable  - Gi f/u  - supportive care
- H/H stable  - Gi f/u  - DVT prophylaxsis
- H/H stable  - no further bleeding noticed  - awaiting home hospice
- H/H stable  - watch blood counts  - GI f/u.
- Anemia work-up - sent  - watch H/H and transfuse as necessary  - GI f/u  - stool occult blood.
pt with decreased function as of recently not eating much   refusing to eat at times, does drink fluids given to her including juices and supplements

## 2021-05-04 NOTE — CHART NOTE - NSCHARTNOTEFT_GEN_A_CORE
Pt admitted c FTT; family reports poor PO intake x several months PTA; pt reports that she wants to be left alone; per [facility] DNI/R, no tube feeding, limited medical intervention. CT revealed chronic ischemic changes in both hemispheres c atrophy + carotid artery aneurysm.  Family requests home hospice; evaluation pending. PMHx includes DARRYL AAA repair (2012), MI CABG, PPM, HLD, HTN. bedbound x 3 years.  Calorie count indicates pt continues c poor PO intake; confirmed by RN. Pt takes a few sips of supplement; may eat some soup or hot cereal, juice & that's all despite encouragement.     Factors impacting intake: [ ] none [ ] nausea  [ ] vomiting [ ] diarrhea [ ] constipation  [ ]chewing problems [ ] swallowing issues  [X] other: confusion, lethargy, persistent lack of appetite    Diet Prescription: Diet, Mechanical Soft:   Supplement Feeding Modality:  Oral  Ensure Enlive Cans or Servings Per Day:  1       Frequency:  Two Times a day (04-22-21 @ 16:05)    Intake:  0-25% some items as above; drinking some of supplement    Current Weight:   71.1 kg (5/4); admission wt 73 kg (4/15)  % Weight Change:  2.6% wt loss x 19 days    1+ edema noted b/l legs; at admission no edema present    Pertinent Medications: MEDICATIONS  (STANDING):  amLODIPine   Tablet 10 milliGRAM(s) Oral daily  chlorhexidine 2% Cloths 1 Application(s) Topical daily  levothyroxine 100 MICROGram(s) Oral daily  mirtazapine 7.5 milliGRAM(s) Oral at bedtime  nystatin Powder 1 Application(s) Topical every 8 hours  pantoprazole   Suspension 40 milliGRAM(s) Oral daily  polyethylene glycol 3350 17 Gram(s) Oral daily  simvastatin 20 milliGRAM(s) Oral at bedtime    MEDICATIONS  (PRN):  acetaminophen   Tablet .. 650 milliGRAM(s) Oral every 6 hours PRN Temp greater or equal to 38C (100.4F), Mild Pain (1 - 3)  bisacodyl Suppository 10 milliGRAM(s) Rectal daily PRN Constipation    Pertinent Labs: 05-02 Na139 mmol/L Glu 92 mg/dL K+ 3.7 mmol/L Cr  0.98 mg/dL BUN 23 mg/dL 05-02 Alb 3.0 g/dL<L>    Skin:    no pressure ulcers    Estimated Needs:   [X ] no change since previous assessment  (4/16)  [ ] recalculated:     Previous Nutrition Diagnosis:   [x ] Moderate malnutrition in the context of chronic illness  Etiology:  Inadequate energy/protein in the presence of extensive cardiac hx  Signs & Symptoms:  physical findings of moderate fat depletion & muscle wasting as noted on 4/29 chart note    Previous GOAL:  pt will consume >/= 75% nutritional needs via meals & PO supplement  NEW GOAL:  Provide nutrition/hydration as medically appropriate & within pt's/family's wishes for tx    Nutrition Diagnosis is [x ] ongoing  [ ] resolved [ ] not applicable     New Nutrition Diagnosis: [x ] not applicable    Interventions:   continue current diet rx as noted  Recommend  [ ] Change Diet To:  [ ] Nutrition Supplement  [ ] Nutrition Support  [ ] Other:     Monitoring and Evaluation:   [X ] PO intake [ x ] Tolerance to diet prescription [ x ] weights [ x ] labs[ x ] follow up per protocol  [ ] other:

## 2021-05-04 NOTE — BH CONSULTATION LIAISON PROGRESS NOTE - NSBHCONSULTRECOMMENDOTHER_PSY_A_CORE FT
trying Remeron 7.5mg PO qhs for sleep, appetite (though it will not substantially increase oral intake as anorexia in this case is physiological secondary to overall decline in health)   - Patient is not in distress or pain at this time and feels ok subjectively; she does not feel starved / hungry; no mood component and still reports some hopefulness and interest in life. Ergo, the best remedy at this time is to leave Patient alone and let her enjoy whatever time she has left (ie. for her to be home around family). 
trying Remeron 7.5mg PO qhs for sleep, appetite (though it will not substantially increase oral intake as anorexia in this case is physiological secondary to overall decline in health). Given degree of sedation, low energy state throughout the day, opting not to further increase Remeron given its sedating properties   - Patient is not in distress or pain at this time and feels ok subjectively; she does not feel starved / hungry; no mood component and still reports some hopefulness and interest in life. Ergo, the best remedy at this time is to leave Patient alone and let her enjoy whatever time she has left (ie. for her to be home around family).   - agree with Palliative care/comfort, ultimate hospice care for this Patient (even though she was declined at this time)   - if daytime sedation continues, can discontinue Remeron 7.5mg Po qhs 
trying Remeron 7.5mg PO qhs for sleep, appetite (though it will not substantially increase oral intake as anorexia in this case is physiological secondary to overall decline in health). Given degree of sedation, low energy state throughout the day, opting not to further increase Remeron given its sedating properties   - Patient is not in distress or pain at this time and feels ok subjectively; she does not feel starved / hungry; no mood component and still reports some hopefulness and interest in life. Ergo, the best remedy at this time is to leave Patient alone and let her enjoy whatever time she has left (ie. for her to be home around family).   - agree with Palliative care/comfort, hospice care for this Patient

## 2021-05-04 NOTE — DISCHARGE NOTE PROVIDER - CARE PROVIDER_API CALL
Yung Nance  HEMATOLOGY  [address] [facility], [address]  [place] [address]  Phone: [phone]  Fax: [phone]  Follow Up Time:     Jason Puga)  Internal Medicine  [address] [facility], [address]  [place] [address]  Phone: [phone]  Fax: [phone]  Follow Up Time:     Filomena Teixeira  NEUROLOGY - GENERAL  [address] [place] [address]  Phone: [phone]  Fax: [phone]  Follow Up Time:

## 2021-05-04 NOTE — DISCHARGE NOTE PROVIDER - DETAILS OF MALNUTRITION DIAGNOSIS/DIAGNOSES
This patient has been assessed with a concern for Malnutrition and was treated during this hospitalization for the following Nutrition diagnosis/diagnoses:     -  04/29/2021: Severe protein-calorie malnutrition   -  04/16/2021: Moderate protein-calorie malnutrition

## 2021-05-04 NOTE — PROGRESS NOTE ADULT - SUBJECTIVE AND OBJECTIVE BOX
lying in bed, for home hospice    Vital Signs Last 24 Hrs  T(C): 36.7 (04 May 2021 04:27), Max: 36.8 (03 May 2021 16:12)  T(F): 98 (04 May 2021 04:27), Max: 98.2 (03 May 2021 16:12)  HR: 97 (04 May 2021 04:27) (71 - 97)  BP: 118/74 (04 May 2021 04:27) (117/70 - 118/74)  BP(mean): --  RR: 18 (04 May 2021 04:27) (16 - 18)  SpO2: 97% (04 May 2021 04:27) (92% - 97%)    PHYSICAL EXAM:    general - weak looking +  HEENT - No Icterus  CVS - RRR  RS - AE B/L  Abd - soft, NT  Ext - Pulses +

## 2021-05-04 NOTE — DISCHARGE NOTE PROVIDER - NSDCMRMEDTOKEN_GEN_ALL_CORE_FT
acetaminophen 325 mg oral tablet: 2 tab(s) orally every 6 hours, As needed, Temp greater or equal to 38C (100.4F), Mild Pain (1 - 3)  amLODIPine 10 mg oral tablet: 1 tab(s) orally once a day  levothyroxine 100 mcg (0.1 mg) oral tablet: 1 tab(s) orally once a day  mirtazapine 7.5 mg oral tablet: 1 tab(s) orally once a day (at bedtime)  senna oral tablet: 2 tab(s) orally once a day (at bedtime), As Needed  simvastatin 20 mg oral tablet: 1 tab(s) orally once a day (at bedtime)   No

## 2021-05-04 NOTE — BH CONSULTATION LIAISON PROGRESS NOTE - CURRENT MEDICATION
MEDICATIONS  (STANDING):  amLODIPine   Tablet 10 milliGRAM(s) Oral daily  chlorhexidine 2% Cloths 1 Application(s) Topical daily  levothyroxine 100 MICROGram(s) Oral daily  mirtazapine 7.5 milliGRAM(s) Oral at bedtime  nystatin Powder 1 Application(s) Topical every 8 hours  pantoprazole   Suspension 40 milliGRAM(s) Oral daily  polyethylene glycol 3350 17 Gram(s) Oral daily  simvastatin 20 milliGRAM(s) Oral at bedtime    MEDICATIONS  (PRN):  acetaminophen   Tablet .. 650 milliGRAM(s) Oral every 6 hours PRN Temp greater or equal to 38C (100.4F), Mild Pain (1 - 3)  bisacodyl Suppository 10 milliGRAM(s) Rectal daily PRN Constipation  
MEDICATIONS  (STANDING):  amLODIPine   Tablet 10 milliGRAM(s) Oral daily  chlorhexidine 2% Cloths 1 Application(s) Topical daily  levothyroxine 100 MICROGram(s) Oral daily  mirtazapine 7.5 milliGRAM(s) Oral at bedtime  nystatin Powder 1 Application(s) Topical every 8 hours  pantoprazole   Suspension 40 milliGRAM(s) Oral daily  polyethylene glycol 3350 17 Gram(s) Oral daily  simvastatin 20 milliGRAM(s) Oral at bedtime    MEDICATIONS  (PRN):  acetaminophen   Tablet .. 650 milliGRAM(s) Oral every 6 hours PRN Temp greater or equal to 38C (100.4F), Mild Pain (1 - 3)  bisacodyl Suppository 10 milliGRAM(s) Rectal daily PRN Constipation  
MEDICATIONS  (STANDING):  amLODIPine   Tablet 10 milliGRAM(s) Oral daily  chlorhexidine 2% Cloths 1 Application(s) Topical daily  levothyroxine 100 MICROGram(s) Oral daily  mirtazapine 7.5 milliGRAM(s) Oral at bedtime  nystatin Powder 1 Application(s) Topical every 8 hours  pantoprazole   Suspension 40 milliGRAM(s) Oral daily  potassium chloride   Powder 40 milliEquivalent(s) Oral every 4 hours  simvastatin 20 milliGRAM(s) Oral at bedtime    MEDICATIONS  (PRN):  acetaminophen   Tablet .. 650 milliGRAM(s) Oral every 6 hours PRN Temp greater or equal to 38C (100.4F), Mild Pain (1 - 3)

## 2021-05-04 NOTE — BH CONSULTATION LIAISON PROGRESS NOTE - NSBHCHARTREVIEWVS_PSY_A_CORE FT
Vital Signs Last 24 Hrs  T(C): 37 (30 Apr 2021 11:23), Max: 37.1 (30 Apr 2021 04:44)  T(F): 98.6 (30 Apr 2021 11:23), Max: 98.8 (30 Apr 2021 04:44)  HR: 78 (30 Apr 2021 11:23) (78 - 84)  BP: 127/70 (30 Apr 2021 11:23) (127/70 - 141/81)  BP(mean): --  RR: 18 (30 Apr 2021 11:23) (18 - 18)  SpO2: 96% (30 Apr 2021 11:23) (96% - 97%)
Vital Signs Last 24 Hrs  T(C): 36.7 (03 May 2021 09:54), Max: 36.9 (02 May 2021 23:27)  T(F): 98.1 (03 May 2021 09:54), Max: 98.4 (02 May 2021 23:27)  HR: 80 (03 May 2021 12:09) (69 - 92)  BP: 106/65 (03 May 2021 09:54) (90/61 - 128/71)  BP(mean): --  RR: 16 (03 May 2021 12:09) (16 - 18)  SpO2: 92% (03 May 2021 12:09) (92% - 99%)
Vital Signs Last 24 Hrs  T(C): 36.7 (04 May 2021 04:27), Max: 36.8 (03 May 2021 16:12)  T(F): 98 (04 May 2021 04:27), Max: 98.2 (03 May 2021 16:12)  HR: 97 (04 May 2021 04:27) (71 - 97)  BP: 118/74 (04 May 2021 04:27) (117/70 - 118/74)  BP(mean): --  RR: 18 (04 May 2021 04:27) (17 - 18)  SpO2: 97% (04 May 2021 04:27) (93% - 97%)

## 2021-05-04 NOTE — PROGRESS NOTE ADULT - REASON FOR ADMISSION
FTT

## 2021-05-04 NOTE — PROGRESS NOTE ADULT - SUBJECTIVE AND OBJECTIVE BOX
Patient: MITCHEL MULLINS [id number] 88y Female                            Hospitalist Attending Note    No complaints.  Still poor po intake.    ____________________PHYSICAL EXAM:  GENERAL:  NAD [place], oriented to person, place.   HEENT: NCAT  CARDIOVASCULAR:  S1, S2  LUNGS: CTAB  ABDOMEN:  soft, (-) tenderness, (-) distension, (+) bowel sounds, (-) guarding, (-) rebound (-) rigidity  EXTREMITIES:  no cyanosis / clubbing / edema.   ____________________    VITALS:  Vital Signs Last 24 Hrs  T(C): 36.7 (04 May 2021 04:27), Max: 36.8 (03 May 2021 16:12)  T(F): 98 (04 May 2021 04:27), Max: 98.2 (03 May 2021 16:12)  HR: 97 (04 May 2021 04:27) (71 - 97)  BP: 118/74 (04 May 2021 04:27) (117/70 - 118/74)  BP(mean): --  RR: 18 (04 May 2021 04:27) (17 - 18)  SpO2: 97% (04 May 2021 04:27) (93% - 97%) Daily     Daily Weight in k.1 (04 May 2021 04:27)  CAPILLARY BLOOD GLUCOSE        I&O's Summary    03 May 2021 07:01  -  04 May 2021 07:00  --------------------------------------------------------  IN: 0 mL / OUT: 700 mL / NET: -700 mL        LABS:                        MEDICATIONS:  acetaminophen   Tablet .. 650 milliGRAM(s) Oral every 6 hours PRN  amLODIPine   Tablet 10 milliGRAM(s) Oral daily  bisacodyl Suppository 10 milliGRAM(s) Rectal daily PRN  chlorhexidine 2% Cloths 1 Application(s) Topical daily  levothyroxine 100 MICROGram(s) Oral daily  mirtazapine 7.5 milliGRAM(s) Oral at bedtime  nystatin Powder 1 Application(s) Topical every 8 hours  pantoprazole   Suspension 40 milliGRAM(s) Oral daily  polyethylene glycol 3350 17 Gram(s) Oral daily  simvastatin 20 milliGRAM(s) Oral at bedtime

## 2021-05-04 NOTE — PROGRESS NOTE ADULT - NSICDXPILOT_GEN_ALL_CORE
[place]

## 2021-05-04 NOTE — BH CONSULTATION LIAISON PROGRESS NOTE - NSBHCHARTREVIEWLAB_PSY_A_CORE FT
no new labs 
05-02    139  |  105  |  23  ----------------------------<  92  3.7   |  28  |  0.98    Ca    9.3      02 May 2021 07:27    TPro  7.3  /  Alb  3.0<L>  /  TBili  0.8  /  DBili  x   /  AST  32  /  ALT  14  /  AlkPhos  60  05-02  
04-30    140  |  104  |  17  ----------------------------<  94  3.3<L>   |  29  |  0.72    Ca    8.9      30 Apr 2021 06:57

## 2021-05-04 NOTE — BH CONSULTATION LIAISON PROGRESS NOTE - NSBHHPIREASONCLOTHER_PSY_A_CORE FT
failure to thrive, poor PO intake
poor appetite , failure to thrive 
failure to thrive, poor PO intake

## 2021-05-04 NOTE — PROGRESS NOTE ADULT - PROBLEM SELECTOR PROBLEM 1
Anemia
GI bleed
Anemia
Failure to thrive in adult

## 2021-05-05 NOTE — DISCHARGE NOTE NURSING/CASE MANAGEMENT/SOCIAL WORK - PATIENT PORTAL LINK FT
You can access the FollowMyHealth Patient Portal offered by [facility] by registering at the following website: [email or web address]. By joining [facility]’s FollowMyHealth portal, you will also be able to view your health information using other applications (apps) compatible with our system.

## 2021-05-20 NOTE — ED PROVIDER NOTE - CLINICAL SUMMARY MEDICAL DECISION MAKING FREE TEXT BOX
Patient with headache oriented x 3 and moving all extremities.  One episode of vomiting prior to arrival.  Will get CTH r/o ICH.  Vomiting also possibly an presentation of ACS will check EKG, Labs, CXR and Re-eval.  Will give medication for nausea and pain.

## 2021-05-20 NOTE — PATIENT PROFILE ADULT - CAREGIVER ADDRESS
Mail order faxed in request for medication refill. Medication(s) set up as pending orders from medication list.    Mail order request - verified benefits: [facility]            Preferred contact number#    Mobile:    Mobile [phone]         Pt now has to use a new mail order pharmacy, [facility].    Please review and advise.    pt unable to provide information

## 2021-05-20 NOTE — ED PROVIDER NOTE - PROGRESS NOTE DETAILS
CT scan called and told about Stat CT scan Cardiology paged if Cardiology paged.  Dr. Banerjee states that if hospitalist needs an official consult after seeing the patient have the hospitalist call him back. Patient with mild epistaxis after covid swab

## 2021-05-20 NOTE — ED ADULT TRIAGE NOTE - CHIEF COMPLAINT QUOTE
pt complaining of headache with single episode of vomiting this am. aide noticed symptoms at 8am.  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 fs

## 2021-05-20 NOTE — H&P ADULT - NSHPPHYSICALEXAM_GEN_ALL_CORE
PHYSICAL EXAMINATION:  Vital Signs Last 24 Hrs  T(C): 36.8 (20 May 2021 10:13), Max: 36.8 (20 May 2021 10:13)  T(F): 98.3 (20 May 2021 10:13), Max: 98.3 (20 May 2021 10:13)  HR: 87 (20 May 2021 10:13) (87 - 87)  BP: 131/88 (20 May 2021 10:13) (131/88 - 131/88)  BP(mean): --  RR: 18 (20 May 2021 10:13) (18 - 18)  SpO2: 96% (20 May 2021 10:13) (96% - 96%)  CAPILLARY BLOOD GLUCOSE      POCT Blood Glucose.: 130 mg/dL (20 May 2021 10:21)        GENERAL: NAD, well-groomed,  HEAD:  atraumatic, normocephalic   c/c left ptosis  EYES: sclera anicteric  ENMT: mucous membranes moist  NECK: supple, No JVD  CHEST/LUNG: clear to auscultation bilaterally;    no      rales   ,   no rhonchi,   HEART: normal S1, S2  ABDOMEN: BS+, soft, ND, NT , distended  EXTREMITIES:   trace  edema    b/l LEs/ hyperkeratotic   skin  NEURO: awake, ,     moves all extremities  SKIN: no     rash   unkempt toe nails

## 2021-05-20 NOTE — ED PROVIDER NOTE - CARE PLAN
Principal Discharge DX:	NSTEMI (non-ST elevated myocardial infarction)  Secondary Diagnosis:	Hypokalemia  Secondary Diagnosis:	Headache

## 2021-05-20 NOTE — H&P ADULT - NSHPLABSRESULTS_GEN_ALL_CORE
LABS:                        12.3   7.85  )-----------( 201      ( 20 May 2021 11:51 )             38.6     05-20    139  |  101  |  19  ----------------------------<  112<H>  2.7<LL>   |  26  |  0.92    Ca    9.1      20 May 2021 11:51    TPro  8.4<H>  /  Alb  3.5  /  TBili  0.7  /  DBili  x   /  AST  29  /  ALT  14  /  AlkPhos  78  05-20      CARDIAC MARKERS ( 20 May 2021 11:51 )  .073 ng/mL / x     / 55 U/L / x     / x            Lactate, Blood: 1.6 mmol/L (05-20 @ 11:51)          Thyroid Stimulating Hormone, Serum: 4.140 uIU/mL (04-20 @ 23:14)

## 2021-05-20 NOTE — H&P ADULT - ASSESSMENT
88yr f,             PMH AAA repair in 2012, CAD, MI, s/p CABG x 4, in 2007, HTN, HLD, s/p PPM,          bedbound x 3 years,   s/p  coffee ground emesis and slurred speech. on prior visit,       CT head showed chronic ischemic changes in both hemispheres with atrophy.  recent and/or subacute left parietal infarct.  B/l carotid artery aneurysms.  CT Abd / Pelvis showed No  sbo / seen by gi    Seen by GI, recommended conservative rxCT chest suggestive of b/l infiltrates.   s/p IV Abx.      Options including PEG were offered, however, pt and son wish for conservative management, in consideration of pt's age, condition and wishes.  Son agreed with home hospice, however, patient was not accepted. and  was   d/c   with homecare, on recent  visit      Son is aware of her  deterioration with poor po intake, but wished to respect her wishes for return home.     .  Declined by hospice. and hence, was  d/c home with homecare.      on last visit,  Coffee Ground Emesis, woth  prbc  given,   Heme   dr Nance,   Pt refused BM Bx.  GI Dr. Juan A Grossman at [phone]     pt  is  DNR / DNI.      now  admitted with weakness, decreased   po intake   and headaches   since  resolved    pt states,  she   is not sure why she is here   denies  cp. sob. abd  pain, fevers      *   FTT           iv fluids            pt had refused  peg on prior  visit   *    Hypokalemia            follow rpt bmp   *    mild  Troponin elevation, not from  an MI             with normal cpk           ekg. paced           pt    has  no cp/sob   *      c/c  L ptosis              seen by  neuro  dr Cotton, on last visit               Pt unable to have MRI.   CT of head shows possible L parietal subacute infarct,             on statin/  asa was not given, due to gi bleed. anemia     *    B/l Cavernous Carotid Artery Aneurysms              per  neuro., on prior visit , no intervention    Appear to be thrombosed.   No surgical indication at present.         *     CAD ,/ PPM,/ AAA. s/p aortobifemoral   graft,               on  statin.     *     HTN ,  on norvasc   *     Hypothyroidism , on  Synthroid    *     Functional Quadriplegia - supportive care.             pt ha s been bed  bound for  3  years         Will need  assistance  from [facility]  for  disposition,  a s son is unable  to take  care of pt, hence  pt  has returned to [facility]         son called, unable to reach   fidelina Grossman at [phone]    pt is  DNR / DNI,  from last visit        88yr f,             PMH AAA repair in 2012, CAD, MI, s/p CABG x 4, in 2007, HTN, HLD, s/p PPM,          bedbound x 3 years,   s/p  coffee ground emesis and slurred speech. on prior visit,       CT head showed chronic ischemic changes in both hemispheres with atrophy.  recent and/or subacute left parietal infarct.  B/l carotid artery aneurysms.  CT Abd / Pelvis showed No  sbo / seen by gi    Seen by GI, recommended conservative rxCT chest suggestive of b/l infiltrates.   s/p IV Abx.      Options including PEG were offered, however, pt and son wish for conservative management, in consideration of pt's age, condition and wishes.  Son agreed with home hospice, however, patient was not accepted. and  was   d/c   with homecare, on recent  visit      Son is aware of her  deterioration with poor po intake, but wished to respect her wishes for return home.     .  Declined by hospice. and hence, was  d/c home with homecare.      on last visit,  Coffee Ground Emesis, woth  prbc  given,   Heme   dr Nance,   Pt refused BM Bx.  GI Dr. Juan A Grossman at [phone]     pt  is  DNR / DNI.      now  admitted with weakness, decreased   po intake   and headaches   since  resolved    pt states,  she   is not sure why she is here   denies  cp. sob. abd  pain, fevers      *   FTT           iv fluids            pt had refused  peg on prior  visit   *    Hypokalemia            follow rpt bmp   *    mild  Troponin elevation, not from  an MI         card on call,  was  called by [facility]  dr.  stated,  eval was  not indicated             with normal cpk           ekg. paced           pt    has  no cp/sob   *      c/c  L ptosis              seen by  neuro  dr street, on last visit               Pt unable to have MRI.   CT of head shows possible L parietal subacute infarct,             on statin/  asa was not given, due to gi bleed. anemia     *    B/l Cavernous Carotid Artery Aneurysms              per  neuro., on prior visit , no intervention    Appear to be thrombosed.   No surgical indication at present.           and. given pt;s bed bound sttaus, pt is not an ideal candidate  for surgical  intervention         *     CAD ,/ PPM,/ AAA. s/p aortobifemoral   graft,               on  statin.     *     HTN ,  on norvasc   *     Hypothyroidism , on  Synthroid   *       Nutrition team . requested    *     Functional Quadriplegia - supportive care.             pt ha s been bed  bound for  3  years         Will need  assistance  from [facility]  for  disposition,  a s son is unable  to take  care of pt, hence  pt  has returned to [facility]         son called, unable to reach   son Chauncey at [phone]    pt is  DNR / DNI,  from last visit

## 2021-05-20 NOTE — H&P ADULT - HISTORY OF PRESENT ILLNESS
89yo F           PMH AAA repair in 2012, CAD, MI, s/p CABG x 4, in 2007, HTN, HLD, s/p PPM,          bedbound x 3 years,   s/p  coffee ground emesis and slurred speech. on prior visit,       CT head showed chronic ischemic changes in both hemispheres with atrophy.  recent and/or subacute left parietal infarct.  B/l carotid artery aneurysms.  CT Abd / Pelvis showed No  sbo / seen by gi    Seen by GI, recommended conservative rxCT chest suggestive of b/l infiltrates.   s/p IV Abx.      Options including PEG were offered, however, pt and son wish for conservative management, in consideration of pt's age, condition and wishes.  Son agreed with home hospice, however, patient was not accepted. and  was   d/c   with homecare, on recent  visit      Son is aware of her  deterioration with poor po intake, but wished to respect her wishes for return home.     .  Declined by hospice. and hence, was  d/c home with homecare.      on last visit,  Coffee Ground Emesis, woth  prbc  given,   Heme   dr Nance,   Pt refused BM Bx.  GI Dr. Juan A Grossman at [phone]   DNR / DNI.       now  admitted with weakness, decreased   po intake   and headaches   since  resolved    pt states,  she   is not sure why she is here   denies  cp. sob.abd  pain, fevers

## 2021-05-20 NOTE — ED PROVIDER NOTE - OBJECTIVE STATEMENT
This patient is an 88 year old woman hx of HTN, CAD and HLD who presents to the ER c/o headache.  Patient was recently discharged 15 days ago after she had slurred speech and GI bleed.  As per EMS patient developed a headache around 8 am.  She then had an episode of vomiting.  Patient c/o headache that she describes as "bad" not giving a pain scale.  She denies chest pain and SOB.

## 2021-05-21 NOTE — CONSULT NOTE ADULT - ASSESSMENT
89 yo female bedbound, with PMH of AAA repair, CAD, CABG x 4, HTN, HLD, PPM, bilateral carotid aneurysm, was recently admitted with coffee ground emesis, was also treated for possible pneumonia with Levaquin, failure to thrive. The pt was not accepted to the hospice on her last admission.   The pt was brought to the hospital with weakness, decreased appetite, headache. The pt is fatigued, able to state her name and birthday, knows she is in the hospital now, the pt is unsure why she was brought to the hospital, thinks she vomited at home.   The pt is afebrile since her admission, no leukocytosis, Cr and LFTs stable, COVID 19 negative, CT brain noted new inflammatory changes in left middle ear and mastoid suggestive of acute otomastoiditis. The pt states that she is hard of hearing in her left ear, she is unsure when her hearing in her left ear started to decline, thinks it is recent, denies ear pain, however, left ear exam was tender, unable to visualize tympanic membrane due to cerumen.   I will treat the pt with a course of po Augmentin, monitor pt's symptoms - pain and hearing, consider ENT evaluation if possible if no improvement.       Suggestions:  - start Augmenting po   - monitor pt's hearing and pain  - consider ENT evaluation  - monitor pt's WBC and temperatures    Msg sent to Dr. Doty  Discussed with Dr. Serrano

## 2021-05-21 NOTE — DIETITIAN INITIAL EVALUATION ADULT. - PERTINENT LABORATORY DATA
05-21 Na141 mmol/L Glu 90 mg/dL K+ 2.8 mmol/L<LL> Cr  0.82 mg/dL BUN 22 mg/dL 05-20 Alb 3.5 g/dL05-20 ALT 14 U/L AST 29 U/L Alkaline Phosphatase 78 U/L

## 2021-05-21 NOTE — DIETITIAN INITIAL EVALUATION ADULT. - OTHER INFO
Pt adm w/ weakness, decreased p.o. intake & headaches (resolved) since adm. She is not sure why she is here. Pt w/ extensive medical hx & pt's son opted for conservative mgt on prior adm. Son agreed for home hospice but pt was not accepted & was discharged home w/ home care on last visit. Pt w/ coffee ground emesis Prbc given, refused BM bx. DNR / DNI. Pt has been bedbound for 3 years. Denies N/V/D/C/Chewing/Swallowing issues, No food allergies. She lives w/ her son whom does the food shopping / cooking. She / son refused a PEG on last visit. Consumed 25% breakfast meal. Pt adm w/ weakness, decreased p.o. intake & headaches (resolved) since adm. She is not sure why she is here. Pt w/ extensive medical hx & pt's son opted for conservative mgt on prior adm. Son agreed for home hospice but pt was not accepted & was discharged home w/ home care on last visit. Pt w/ coffee ground emesis Prbc given, refused BM bx. DNR / DNI. Pt has been bedbound for 3 years. Denies N/V/D/C/Chewing/Swallowing issues, No food allergies. She lives w/ her son whom does the food shopping / cooking. She / son refused a PEG on last visit. Consumed 25% breakfast meal. Pt is Alert / confused.

## 2021-05-21 NOTE — DIETITIAN INITIAL EVALUATION ADULT. - ETIOLOGY
Increased energy/protein needs; inadequate energy/protein intake related to FTT in Adult ; Cardiac Hx

## 2021-05-21 NOTE — CONSULT NOTE ADULT - SUBJECTIVE AND OBJECTIVE BOX
Patient is a 88y old  Female who presents with a chief complaint of weakness/ headaches (21 May 2021 13:25)      HPI:    	  87yo F           PMH AAA repair in , CAD, MI, s/p CABG x 4, in , HTN, HLD, s/p PPM,          bedbound x 3 years,   s/p  coffee ground emesis and slurred speech. on prior visit,       CT head showed chronic ischemic changes in both hemispheres with atrophy.  recent and/or subacute left parietal infarct.  B/l carotid artery aneurysms.  CT Abd / Pelvis showed No  sbo / seen by gi    Seen by GI, recommended conservative rxCT chest suggestive of b/l infiltrates.   s/p IV Abx.      Options including PEG were offered, however, pt and son wish for conservative management, in consideration of pt's age, condition and wishes.  Son agreed with home hospice, however, patient was not accepted. and  was   d/c   with homecare, on recent  visit      Son is aware of her  deterioration with poor po intake, but wished to respect her wishes for return home.     .  Declined by hospice. and hence, was  d/c home with homecare.      on last visit,  Coffee Ground Emesis, woth  prbc  given,   Heme   dr Nance,   Pt refused BM Bx.  GI Dr. Juan A Grossman at [phone]   DNR / DNI.       now  admitted with weakness, decreased   po intake   and headaches   since  resolved    pt states,  she   is not sure why she is here   denies  cp. sob.abd  pain, fevers   (20 May 2021 14:08)    ED HPI reviewed  Patient previously known to our group. 89 yo female from home, lives with her son, bedbound, with PMH of AAA repair, CAD, CABG x 4, HTN, HLD, PPM, bilateral carotid aneurysm, was recently admitted with coffee ground emesis, was also treated for possible pneumonia with Levaquin, failure to thrive. The pt was not accepted to the hospice on her last admission.   The pt was brought to the hospital with weakness, decreased appetite, headache. The pt is fatigued, able to state her name and birthday, knows she is in the hospital now, the pt is unsure why she was brought to the hospital, thinks she vomited at home.   The pt is afebrile since her admission, no leukocytosis, Cr and LFTs stable, COVID 19 negative, CT brain noted new inflammatory changes in left middle ear and mastoid suggestive of acute otomastoiditis. The pt states that she is hard of hearing in her left ear, she is unsure when her hearing in her left ear started to decline, thinks it is recent, denies ear pain, states that she no longer has headache. The pt denies any fevers or chills at home, no shortness of breath or chest pains, no abdominal pain, no diarrhea, no dysuria or flank pains.       PMH/PSH--  CAD (coronary artery disease)    AAA (abdominal aortic aneurysm)    Myocardial infarction    S/P CABG x 4    HLD (hyperlipidemia)    Benign essential HTN    S/P CABG x 4    ID consulted for workup and antibiotic management     PAST MEDICAL & SURGICAL HISTORY:  Benign essential HTN    HLD (hyperlipidemia)    Myocardial infarction    AAA (abdominal aortic aneurysm)    CAD (coronary artery disease)    S/P CABG x 4        Allergies  No Known Allergies        ANTIMICROBIALS:      MEDICATIONS  (STANDING):        OTHER MEDS: MEDICATIONS  (STANDING):  acetaminophen   Tablet .. 650 every 6 hours PRN  amLODIPine   Tablet 10 daily  bisacodyl 5 at bedtime  heparin   Injectable 5000 every 12 hours  levothyroxine 100 daily  mirtazapine 7.5 at bedtime  ondansetron Injectable 4 every 6 hours PRN  senna 2 at bedtime  simvastatin 20 at bedtime      SOCIAL HISTORY:  non-smoker  denies ETOH abuse  admits to smoking marijuana        Family/Marital History--  Parents , old age  Not relevant to presentation      REVIEW OF SYSTEMS  [  ] ROS unobtainable because:    [  ] All other systems negative except as noted below:	    Constitutional:  [ ] fever [ ] chills  [ ] weight loss  [ ] weakness + fatigue   Skin:  [ ] rash [ ] phlebitis	  Eyes: [ ] icterus [ ] pain  [ ] discharge	  ENMT: [ ] sore throat  [ ] thrush [ ] ulcers [ ] exudates + hard of hearing in left ear, denies ringing in her ears   Respiratory: [ ] dyspnea [ ] hemoptysis [ ] cough [ ] sputum	  Cardiovascular:  [ ] chest pain [ ] palpitations [ ] edema	  Gastrointestinal:  [ ] nausea [x ]? vomiting [ ] diarrhea [ ] constipation [ ] pain	  Genitourinary:  [ ] dysuria [ ] frequency [ ] hematuria [ ] discharge [ ] flank pain  [ ] incontinence  Musculoskeletal:  [ ] myalgias [ ] arthralgias [ ] arthritis  [ ] back pain  Neurological:  [ ] headache [ ] seizures  [ ] confusion/altered mental status  Psychiatric:  [ ] anxiety [ ] depression	  Hematology/Lymphatics:  [ ] lymphadenopathy  Endocrine:  [ ] adrenal [ ] thyroid  Allergic/Immunologic:	 [ ] transplant [ ] seasonal    Vital Signs Last 24 Hrs  T(F): 97.2 (21 @ 10:57), Max: 98.3 (21 @ 10:13)    Vital Signs Last 24 Hrs  HR: 66 (21 @ 10:57) (66 - 86)  BP: 124/79 (21 @ 10:57) (107/56 - 137/93)  RR: 18 (21 @ 10:57)  SpO2: 94% (21 @ 10:57) (93% - 97%)  Wt(kg): --    PHYSICAL EXAM:  Constitutional: non-toxic, no distress, on RA  HEAD/EYES: anicteric, no conjunctival injection  ENT:  supple, no thrush, poor dentition, missing teeth, unable to assess pt's mouth fully due to non-compliance, sinuses not tender. On otoscopy - significant amount of cerumen bilaterally, right ear exam uneventful, left ear - no noted inflammation, no discharge, not tender upon palpation around the ear, tympanic membrane obscured by cerumen, + tenderness with otoscopy.   Cardiovascular:   normal S1, S2, no murmur, + trace edema of bilateral lower extremities, implanted cardiac device   Respiratory:  clear BS bilaterally, no wheezes, no rales  GI:  soft, non-tender, not distended, normal bowel sounds  :  no dwyer, no CVA tenderness  Musculoskeletal:  no synovitis, moves all four extremities, + bilateral foot drop  Neurologic: awake and alert, normal strength, no focal findings  Skin:  no rash, no erythema, no phlebitis  Heme/Onc: no lymphadenopathy   Psychiatric:  awake, alert, appropriate mood    WBC Count: 8.42 K/uL ( @ 06:20)  WBC Count: 9.18 K/uL ( @ 22:57)  WBC Count: 7.85 K/uL ( @ 11:51)                            9.4    8.42  )-----------( 170      ( 21 May 2021 06:20 )             29.0           141  |  104  |  22  ----------------------------<  90  2.8<LL>   |  25  |  0.82    Ca    8.8      21 May 2021 06:20  Mg     1.8         TPro  8.4<H>  /  Alb  3.5  /  TBili  0.7  /  DBili  x   /  AST  29  /  ALT  14  /  AlkPhos  78        Creatinine Trend: 0.82<--, 0.85<--, 0.96<--, 0.92<--, 0.98<--, 0.72<--        MICROBIOLOGY:    v    Flu With COVID-19 By PARUL (21 @ 13:43)    SARS-CoV-2 Result: NotDetec    Influenza A Result: NotDetec: EUA/IVD    Influenza B Result: NotDetec: EUA/IVD    RADIOLOGY:  < from: CT Head No Cont (21 @ 11:04) >  EXAM:  CT BRAIN                            PROCEDURE DATE:  2021          INTERPRETATION:  INDICATION:  Severe headache  TECHNIQUE:  A non contrast 2.5 or 3 mm axial CT study of the brain was performed from skull base to vertex. Coronal and sagittal reformations were generated from the axial data.  COMPARISON EXAMINATION:  CT 4/15/2021    FINDINGS:    HEMISPHERES:  Chronic ischemic changes are again noted in the white matter of both hemispheres with volume loss. A small old left the parietal infarct is again noted. No acute infarct or hemorrhagic lesion is noted.  VENTRICLES:  Midline with ex vacuo enlargement  POSTERIOR FOSSA:  The brain stem and cerebellum are unremarkable.  No CP angle lesion noted.  EXTRACEREBRAL SPACES:  No subdural or epidural collections are noted.  SKULL BASE AND CALVARIUM:  Appears intact.  No fracture or destructive lesion is identified.  SINUSES AND MASTOIDS:  Left mastoid and middle ear opacification is noted. Sinuses are clear. This was not present onprior CT.  MISCELLANEOUS:  Again identified a large cavernous carotid artery aneurysm sac, unchanged in size as compared to the prior CT study of 4/15/2021. The left carotid artery aneurysm was shown to be thrombosed on the prior study.    Follow-up MR imaging may be considered for further assessment, if clinically warranted.    IMPRESSION:    1)  large bilateral cavernous carotid artery aneurysms again noted that, unchanged in appearance. Chronic small vessel ischemic changes in both hemisphereswith volume loss. No acute territorial infarct or intraparenchymal hemorrhage.  2)  inflammatory changes noted in the left middle ear and mastoid, not visible on prior study suggesting acute otomastoiditis. Further workup and follow-up recommended.      VICKY LOVETT MD; Attending Radiologist  This document has been electronically signed. May 20 2021 11:16AM    < end of copied text >    < from: CT Angio Abdomen and Pelvis w/ IV Cont (21 @ 18:02) >  EXAM:  CT ANGIO ABD PELV (W)AW IC                          EXAM:  CT ANGIO CHEST (W)AW IC                            PROCEDURE DATE:  2021          INTERPRETATION:  CLINICAL INFORMATION: STEMI and history of abdominal aortic aneurysm    COMPARISON: CT abdomen 2021 and CT chest 2021    CONTRAST/COMPLICATIONS:  IV Contrast: Omnipaque 350  90 cc administered   10 cc discarded  Oral Contrast: NONE  Complications: None reported at time of study completion    PROCEDURE:  CT Angiography of the Chest, Abdomen and Pelvis.  Precontrast imaging was performed through the chest followed by arterial phase imaging of the chest, abdomen and pelvis.  Sagittal and coronal reformats were performed as well as 3D (MIP) reconstructions.    FINDINGS:  CHEST:  LUNGS AND LARGE AIRWAYS: Patent central airways. There is mild emphysema. There is linear atelectasis or scarring at the right lung base, improved from the prior study  PLEURA: No pleural effusion.  VESSELS: Atherosclerotic calcification of the aorta and coronary arteries. There is ulcerated plaque in the aortic arch. There is milder ulcerated plaque in the descending thoracic aorta. There is peripheral thrombus again seen in the distal thoracic and upper abdominal aorta.  HEART: Heart size is normal. No pericardial effusion. There is evidence of CABG surgery. Pacemaker enters from the left  MEDIASTINUM AND JOSE ROBERTO: No lymphadenopathy.  CHEST WALL AND LOWER NECK: Mildly heterogeneous thyroid gland.    ABDOMEN AND PELVIS:  LIVER: Cysts in the right and left lobe. Largest is a lobulated cyst in the posterior right lobe.  BILE DUCTS: Normal caliber.  GALLBLADDER: Within normal limits.  SPLEEN: Within normal limits.  PANCREAS: Within normal limits.  ADRENALS: Within normal limits.  KIDNEYS/URETERS: Within normal limits.    BLADDER: Right lateral bladder diverticulum is again noted. Deviated to the left by the distended colon  REPRODUCTIVE ORGANS: Hysterectomy.    BOWEL: No bowel obstruction. Appendix is normal.. Again seen is interposition of colon between the diaphragm and the liver. The sigmoid: Is significantly distended and tortuous extending to the mid abdomen  PERITONEUM: No ascites.  VESSELS: Atherosclerotic calcification of the aorta. Status post endovascular repair of abdominal aortic aneurysm with limbs extending into the common iliac arteries bilaterally. The graft appears patent with flow. The aneurysm sac measures up to 4.6 cm which is similar to the prior study. There is a patent superior mesenteric artery with a proximal stenosis and poststenotic mild dilatation. There is also stenosis in the proximal celiac axis  RETROPERITONEUM/LYMPH NODES: No lymphadenopathy.  ABDOMINAL WALL: Within normal limits.  BONES: Degenerative changes.    IMPRESSION:  Status post CABG.  Pacemaker  Linear atelectasis or scar at the right base improved from the prior study  Multiple hepatic cysts  Status post endovascular repair of abdominal aortic aneurysm which demonstrates flow and stable size of aneurysm sac  Redemonstration of tortuous prominent sigmoid colon similar to the prior study.    LANDY BARRY MD; Attending Radiologist  This document has been electronically signed. May 20 2021  6:45PM    < end of copied text >      < from: Xray Chest 1 View- PORTABLE-Routine (Xray Chest 1 View- PORTABLE-Routine .) (21 @ 09:37) >  EXAM:  XR CHEST PORTABLE ROUTINE 1V                            PROCEDURE DATE:  2021          INTERPRETATION:  Portable chest radiograph    CLINICAL INFORMATION: Dyspnea, shortness of breath.    TECHNIQUE:  Portable  AP view of the chest was obtained.    COMPARISON: 2021 chest available for review.    FINDINGS:    The lungs show residual LEFT retrocardiac airspace consolidation and/or effusion.. Remaining visualized lung parenchyma clear. No pneumothorax.    The  heart is enlarged intransverse diameter. No hilar mass.  Cardiac device wire leads are within right atrium and right ventricle.  Status post coronary artery bypass graft procedure.   Visualized osseous structures are intact.      IMPRESSION:   Residual LEFT basilar retrocardiac airspace consolidation and/or effusion..      DARWIN CRONIN MD; Attending Radiologist  This document has been electronically signed. 2021  2:43PM    < end of copied text >

## 2021-05-21 NOTE — DIETITIAN INITIAL EVALUATION ADULT. - PERTINENT MEDS FT
MEDICATIONS  (STANDING):  amLODIPine   Tablet 10 milliGRAM(s) Oral daily  bisacodyl 5 milliGRAM(s) Oral at bedtime  heparin   Injectable 5000 Unit(s) SubCutaneous every 12 hours  lactated ringers. 1000 milliLiter(s) (50 mL/Hr) IV Continuous <Continuous>  levothyroxine 100 MICROGram(s) Oral daily  mirtazapine 7.5 milliGRAM(s) Oral at bedtime  oxymetazoline 0.05% Nasal Spray 1 Spray(s) Left Nostril once  senna 2 Tablet(s) Oral at bedtime  simvastatin 20 milliGRAM(s) Oral at bedtime    MEDICATIONS  (PRN):  acetaminophen   Tablet .. 650 milliGRAM(s) Oral every 6 hours PRN Mild Pain (1 - 3)  ondansetron Injectable 4 milliGRAM(s) IV Push every 6 hours PRN Nausea and/or Vomiting

## 2021-05-21 NOTE — DIETITIAN INITIAL EVALUATION ADULT. - TIME FRAME
Lab Facility: 3 Notification Instructions: Patient will be notified of biopsy results. However, patient instructed to call the office if not contacted within 2 weeks. Additional Anesthesia Volume In Cc (Will Not Render If 0): 0 Size Of Lesion In Cm: 0.5 Post-Care Instructions: I reviewed with the patient in detail post-care instructions. Patient is to keep the biopsy site dry overnight, and then apply bacitracin twice daily until healed. Patient may apply hydrogen peroxide soaks to remove any crusting. Cryotherapy Text: The wound bed was treated with cryotherapy after the biopsy was performed. Anesthesia Type: 1% lidocaine with epinephrine Destruction After The Procedure: No Biopsy Type: H and E Consent: Written consent was obtained and risks were reviewed including but not limited to scarring, infection, bleeding, scabbing, incomplete removal, nerve damage and allergy to anesthesia. Wound Care: Petrolatum Depth Of Biopsy: dermis 6 weeks Electrodesiccation Text: The wound bed was treated with electrodesiccation after the biopsy was performed. Dressing: bandage Silver Nitrate Text: The wound bed was treated with silver nitrate after the biopsy was performed. Electrodesiccation And Curettage Text: The wound bed was treated with electrodesiccation and curettage after the biopsy was performed. Type Of Destruction Used: Curettage Curettage Text: The wound bed was treated with curettage after the biopsy was performed. Lab: 6 Detail Level: Detailed Billing Type: Third-Party Bill Was A Bandage Applied: Yes Hemostasis: Aluminum Chloride Biopsy Method: Dermablade Size Of Lesion In Cm: 0.1 Size Of Lesion In Cm: 0.6

## 2021-05-24 NOTE — DISCHARGE NOTE PROVIDER - HOSPITAL COURSE
88yr f,             PMH AAA repair in 2012, CAD, MI, s/p CABG x 4, in 2007, HTN, HLD, s/p PPM,          bedbound x 3 years,   s/p  coffee ground emesis and slurred speech. on prior visit,       CT head showed chronic ischemic changes in both hemispheres with atrophy.  recent and/or subacute left parietal infarct.  B/l carotid artery aneurysms.  CT Abd / Pelvis showed No  sbo / seen by gi    Seen by GI, recommended conservative rxCT chest suggestive of b/l infiltrates.   s/p IV Abx.      Options including PEG were offered, however, pt and son wish for conservative management, in consideration of pt's age, condition and wishes.  Son agreed with home hospice, however, patient was not accepted. and  was   d/c   with homecare, on recent  visit      Son is aware of her  deterioration with poor po intake, but wished to respect her wishes for return home.     .  Declined by hospice. and hence, was  d/c home with homecare.      on last visit,  Coffee Ground Emesis, woth  prbc  given,   Heme   dr Nance,   Pt refused BM Bx.  GI Dr. Juan A Grossman at [phone]     pt  is  DNR / DNI.    D/w son about taking mother home, he is in agreement but unable to take her today. Will be available tmrw morning. home hospice eval was placed       now  admitted with weakness, decreased   po intake   and headaches    denies  cp. sob. abd  pain, fevers    Acute estevan mastoditis on CT  -no pain on exam, headache improved, id on board-augmentin, give debrox for cerumen of ear       *   FTT            iv fluids            pt had refused  peg on prior  visit   *    Hypokalemia            follow rpt bmp, replace   *    mild  Troponin elevation, not from  an MI         card on call,  was  called by er  dr.  stated,  eval was  not indicated             with normal cpk           ekg. paced           pt    has  no cp/sob   *      c/c  L ptosis              seen by  neuro  dr street, on last visit               Pt unable to have MRI.   CT of head shows possible L parietal subacute infarct,             on statin/  asa was not given, due to gi bleed. anemia     *    B/l Cavernous Carotid Artery Aneurysms              per  neuro., on prior visit , no intervention    Appear to be thrombosed.   No surgical indication at present.           and. given pt;s bed bound sttaus, pt is not an ideal candidate  for surgical  intervention         *     CAD ,/ PPM,/ AAA. s/p aortobifemoral   graft,               on  statin.     *     HTN ,  on norvasc   *     Hypothyroidism , on  Synthroid   *       Nutrition team . requested    *     Functional Quadriplegia - supportive care.             pt ha s been bed  bound for  3  years         Will need  assistance  from care cortn  for  disposition,  a s son is unable  to take  care of pt, hence  [place]  [facility] returned [facility]         son called, unable to reach   son Chauncey at [phone]    pt is  DNR / DNI,  from last visit    r f,  PMH AAA repair in 2012, CAD, MI, s/p CABG x 4, in 2007, HTN, HLD, s/p PPM,          bedbound x 3 years,   s/p  coffee ground emesis and slurred speech. on prior visit,       CT head showed chronic ischemic changes in both hemispheres with atrophy.  recent and/or subacute left parietal infarct.  B/l carotid artery aneurysms.  CT Abd / Pelvis showed No  sbo / seen by gi    Seen by GI, recommended conservative rxCT chest suggestive of b/l infiltrates.   s/p IV Abx.      Options including PEG were offered, however, pt and son wish for conservative management, in consideration of pt's age, condition and wishes.  Son agreed with home hospice, however, patient was not accepted. and  was   d/c   with homecare, on recent  visit      Son is aware of her  deterioration with poor po intake, but wished to respect her wishes for return home.     .  Declined by hospice. and hence, was  d/c home with homecare.      on last visit,  Coffee Ground Emesis, woth  prbc  given,   Heme   dr Nance,   Pt refused BM Bx.  GI Dr. Juan A Grossman at [phone]     pt  is  DNR / DNI.    D/w son about taking mother home, he is in agreement but unable to take her today. Will be available tmrw morning. home hospice eval was placed       now  admitted with weakness, decreased   po intake   and headaches    denies  cp. sob. abd  pain, fevers    Acute estevan mastoditis on CT  -no pain on exam, headache improved, id on board-augmentin, give debrox for cerumen of ear       *   FTT            iv fluids            pt had refused  peg on prior  visit   *    Hypokalemia            follow rpt bmp, replace   *    mild  Troponin elevation, not from  an MI         card on call,  was  called by er  .  stated,  eval was  not indicated             with normal cpk           ekg. paced           pt    has  no cp/sob   *      c/c  L ptosis              seen by  neuro  dr street, on last visit               Pt unable to have MRI.   CT of head shows possible L parietal subacute infarct,             on statin/  asa was not given, due to gi bleed. anemia     *    B/l Cavernous Carotid Artery Aneurysms              per  neuro., on prior visit , no intervention    Appear to be thrombosed.   No surgical indication at present.           and. given pt;s bed bound sttaus, pt is not an ideal candidate  for surgical  intervention         *     CAD ,/ PPM,/ AAA. s/p aortobifemoral   graft,               on  statin.     *     HTN ,  on norvasc   *     Hypothyroidism , on  Synthroid   *       Nutrition team . requested    *     Functional Quadriplegia - supportive care.             pt ha s been bed  bound for  3  years         Will need  assistance  from care cortn  for  disposition,  a s son is unable  to take  care of pt, hence  pt  has returned to [facility]         son called, unable to reach   son Chauncey at [phone]    pt is  DNR / DNI,  from last visit      PMH AAA repair in 2012, CAD, MI, s/p CABG x 4, in 2007, HTN, HLD, s/p PPM,          bedbound x 3 years,   s/p  coffee ground emesis and slurred speech. on prior visit,       CT head showed chronic ischemic changes in both hemispheres with atrophy.  recent and/or subacute left parietal infarct.  B/l carotid artery aneurysms.  CT Abd / Pelvis showed No  sbo / seen by gi    Seen by GI, recommended conservative rxCT chest suggestive of b/l infiltrates.   s/p IV Abx.      Options including PEG were offered, however, pt and son wish for conservative management, in consideration of pt's age, condition and wishes.  Son agreed with home hospice, however, patient was not accepted. and  was   d/c   with homecare, on recent  visit      Son is aware of her  deterioration with poor po intake, but wished to respect her wishes for return home.     .  Declined by hospice. and hence, was  d/c home with homecare.      on last visit,  Coffee Ground Emesis, woth  prbc  given,   Heme   dr Nance,   Pt refused BM Bx.  GI Dr. Juan A Grossman at [phone]     pt  is  DNR / DNI.    D/w son about taking mother home, he is in agreement but unable to take her today. Will be available tmrw morning. home hospice eval was placed       now  admitted with weakness, decreased   po intake   and headaches    denies  cp. sob. abd  pain, fevers    Acute estevan mastoditis on CT  -no pain on exam, headache improved, id on board-augmentin, give debrox for cerumen of ear       *   FTT            iv fluids            pt had refused  peg on prior  visit   *    Hypokalemia            follow rpt bmp, replace   *    mild  Troponin elevation, not from  an MI         card on call,  was  called by er  dr.  stated,  eval was  not indicated             with normal cpk           ekg. paced           pt    has  no cp/sob   *      c/c  L ptosis              seen by  neuro  dr street, on last visit               Pt unable to have MRI.   CT of head shows possible L parietal subacute infarct,             on statin/  asa was not given, due to gi bleed. anemia     *    B/l Cavernous Carotid Artery Aneurysms              per  neuro., on prior visit , no intervention    Appear to be thrombosed.   No surgical indication at present.           and. given pt;s bed bound sttaus, pt is not an ideal candidate  for surgical  intervention         *     CAD ,/ PPM,/ AAA. s/p aortobifemoral   graft,               on  statin.     *     HTN ,  on norvasc   *     Hypothyroidism , on  Synthroid   *       Nutrition team . requested    *     Functional Quadriplegia - supportive care.             pt ha s been bed  bound for  3  years         Will need  assistance  from care cortn  for  disposition,  a s son is unable  to take  care of pt, hence  pt  has returned to [facility]         son called, unable to reach   son Chauncey at [phone]    pt is  DNR / DNI,  from last visit      PMH AAA repair in 2012, CAD, MI, s/p CABG x 4, in 2007, HTN, HLD, s/p PPM,          bedbound x 3 years,   s/p  coffee ground emesis and slurred speech. on prior visit,       CT head showed chronic ischemic changes in both hemispheres with atrophy.  recent and/or subacute left parietal infarct.  B/l carotid artery aneurysms.  CT Abd / Pelvis showed No  sbo / seen by gi    Seen by GI, recommended conservative rxCT chest suggestive of b/l infiltrates.   s/p IV Abx.      Options including PEG were offered, however, pt and son wish for conservative management, in consideration of pt's age, condition and wishes.  Son agreed with home hospice, however, patient was not accepted. and  was   d/c   with homecare, on recent  visit      Son is aware of her  deterioration with poor po intake, but wished to respect her wishes for return home.     .  Declined by hospice. and hence, was  d/c home with homecare.      on last visit,  Coffee Ground Emesis, woth  prbc  given,   Heme   dr Nance,   Pt refused BM Bx.  GI Dr. Juan A Grossman at [phone]     pt  is  DNR / DNI.    D/w son about taking mother home, he is in agreement but unable to take her today. Will be available tmrw morning. home hospice eval was placed       now  admitted with weakness, decreased   po intake   and headaches    denies  cp. sob. abd  pain, fevers    Acute estevan mastoditis on CT  -no pain on exam, headache improved, id on board-augmentin, give debrox for cerumen of ear       *   FTT             iv fluids            pt had refused  peg on prior  visit   *    Hypokalemia            follow rpt bmp, replace   *    mild  Troponin elevation, not from  an MI         card on call,  was  called by er  dr.  stated,  eval was  not indicated             with normal cpk           ekg. paced           pt    has  no cp/sob   *      c/c  L ptosis              seen by  neuro  dr street, on last visit               Pt unable to have MRI.   CT of head shows possible L parietal subacute infarct,             on statin/  asa was not given, due to gi bleed. anemia     *    B/l Cavernous Carotid Artery Aneurysms              per  neuro., on prior visit , no intervention    Appear to be thrombosed.   No surgical indication at present.           and. given pt;s bed bound sttaus, pt is not an ideal candidate  for surgical  intervention         *     CAD ,/ PPM,/ AAA. s/p aortobifemoral   graft,               on  statin.     *     HTN ,  on norvasc   *     Hypothyroidism , on  Synthroid   *       Nutrition team . requested    *     Functional Quadriplegia - supportive care.             pt ha s been bed  bound for  3  years         Will need  assistance  from care cortn  for  disposition,  a s son is unable  to take  care of pt, hence  pt  has returned to [facility]         son called, unable to reach   son Chauncey at [phone]    pt is  DNR / DNI,  from last visit      PMH AAA repair in 2012, CAD, MI, s/p CABG x 4, in 2007, HTN, HLD, s/p PPM,          bedbound x 3 years,   s/p  coffee ground emesis and slurred speech. on prior visit,       CT head showed chronic ischemic changes in both hemispheres with atrophy.  recent and/or subacute left parietal infarct.  B/l carotid artery aneurysms.  CT Abd / Pelvis showed No  sbo / seen by gi    Seen by GI, recommended conservative rxCT chest suggestive of b/l infiltrates.   s/p IV Abx.      Options including PEG were offered, however, pt and son wish for conservative management, in consideration of pt's age, condition and wishes.  Son agreed with home hospice, however, patient was not accepted. and  was   d/c   with homecare, on recent  visit      Son is aware of her  deterioration with poor po intake, but wished to respect her wishes for return home.     .  Declined by hospice. and hence, was  d/c home with homecare.      on last visit,  Coffee Ground Emesis, woth  prbc  given,   Heme   dr Nance,   Pt refused BM Bx.  GI Dr. Juan A Grossman at [phone]     pt  is  DNR / DNI.    D/w son about taking mother home, he is in agreement PT seen and examined stable for DC home w home hospice      now  admitted with weakness, decreased   po intake   and headaches    denies  cp. sob. abd  pain, fevers    Acute estevan mastoditis on CT  -no pain on exam, headache improved, id on board-augmentin, give debrox for cerumen of ear       *   FTT             iv fluids            pt had refused  peg on prior  visit   *    Hypokalemia            follow rpt bmp, replace   *    mild  Troponin elevation, not from  an MI         card on call,  was  called by er  dr.  stated,  eval was  not indicated             with normal cpk           ekg. paced           pt    has  no cp/sob   *      c/c  L ptosis              seen by  neuro  dr street, on last visit               Pt unable to have MRI.   CT of head shows possible L parietal subacute infarct,             on statin/  asa was not given, due to gi bleed. anemia     *    B/l Cavernous Carotid Artery Aneurysms              per  neuro., on prior visit , no intervention    Appear to be thrombosed.   No surgical indication at present.           and. given pt;s bed bound sttaus, pt is not an ideal candidate  for surgical  intervention         *     CAD ,/ PPM,/ AAA. s/p aortobifemoral   graft,               on  statin.     *     HTN ,  on norvasc   *     Hypothyroidism , on  Synthroid   *       Nutrition team . requested    *     Functional Quadriplegia - supportive care.             pt ha s been bed  bound for  3  years         Will need  assistance  from care cortn  for  disposition,  a s son is unable  to take  care of pt, hence  pt  has returned to [facility]         son called, unable to reach   son Chauncey at [phone]    pt is  DNR / DNI,  from last visit

## 2021-05-24 NOTE — DISCHARGE NOTE PROVIDER - NSDCCPCAREPLAN_GEN_ALL_CORE_FT
PRINCIPAL DISCHARGE DIAGNOSIS  Diagnosis: NSTEMI (non-ST elevated myocardial infarction)  Assessment and Plan of Treatment:       SECONDARY DISCHARGE DIAGNOSES  Diagnosis: Hypokalemia  Assessment and Plan of Treatment:     Diagnosis: Headache  Assessment and Plan of Treatment:      PRINCIPAL DISCHARGE DIAGNOSIS  Diagnosis: NSTEMI (non-ST elevated myocardial infarction)  Assessment and Plan of Treatment: f/u with your pmd, for home hospice referral      SECONDARY DISCHARGE DIAGNOSES  Diagnosis: Hypokalemia  Assessment and Plan of Treatment:     Diagnosis: Headache  Assessment and Plan of Treatment:

## 2021-05-24 NOTE — PROGRESS NOTE ADULT - SUBJECTIVE AND OBJECTIVE BOX
MITCHEL MULLINS  MRN-[id number]    Interval History: The pt was seen and examined, no change in her presentation, continues to complain of mild headache, denies having ear pain bilaterally, [place] on left with no improvement. The pt is afebrile, no new lab work.     PAST MEDICAL & SURGICAL HISTORY:  Benign essential HTN    HLD (hyperlipidemia)    Myocardial infarction    AAA (abdominal aortic aneurysm)    CAD (coronary artery disease)    S/P CABG x 4        ROS:    [ ] Unobtainable because:  [ ] All other systems negative    Constitutional: no fever, no chills  Head: no trauma  Eyes: no vision changes, no eye pain  ENT:  no sore throat, no rhinorrhea  Cardiovascular:  no chest pain, no palpitation  Respiratory:  no SOB, no cough  GI:  no abd pain, no vomiting, no diarrhea  urinary: no dysuria, no hematuria, no flank pain  musculoskeletal:  no joint pain, no joint swelling  skin:  no rash  neurology:  + headache, no seizure, no change in mental status  psych: no anxiety, no depression         Allergies  No Known Allergies        ANTIMICROBIALS:  amoxicillin  875 milliGRAM(s)/clavulanate 1 two times a day      OTHER MEDS:  acetaminophen   Tablet .. 650 milliGRAM(s) Oral every 6 hours PRN  amLODIPine   Tablet 10 milliGRAM(s) Oral daily  bisacodyl 5 milliGRAM(s) Oral at bedtime  heparin   Injectable 5000 Unit(s) SubCutaneous every 12 hours  lactated ringers. 1000 milliLiter(s) IV Continuous <Continuous>  levothyroxine 100 MICROGram(s) Oral daily  mirtazapine 7.5 milliGRAM(s) Oral at bedtime  ondansetron Injectable 4 milliGRAM(s) IV Push every 6 hours PRN  oxymetazoline 0.05% Nasal Spray 1 Spray(s) Left Nostril once  potassium chloride   Powder 40 milliEquivalent(s) Oral every 4 hours  senna 2 Tablet(s) Oral at bedtime  simvastatin 20 milliGRAM(s) Oral at bedtime      Vital Signs Last 24 Hrs  T(C): 36.3 (24 May 2021 11:25), Max: 36.7 (23 May 2021 17:42)  T(F): 97.3 (24 May 2021 11:25), Max: 98.1 (23 May 2021 17:42)  HR: 71 (24 May 2021 11:25) (71 - 79)  BP: 122/81 (24 May 2021 11:25) (108/72 - 146/86)  BP(mean): --  RR: 17 (24 May 2021 11:25) (16 - 18)  SpO2: 97% (24 May 2021 11:25) (93% - 97%)    Physical Exam:  Constitutional: non-toxic, no distress, on RA  HEAD/EYES: anicteric, no conjunctival injection  ENT:  supple, no thrush, poor dentition, missing teeth, unable to assess pt's mouth fully due to non-compliance, sinuses not tender. Left ear not tender upon palpation around the ear.  Cardiovascular:   normal S1, S2, no murmur, no edema of bilateral lower extremities, implanted cardiac device   Respiratory:  clear BS bilaterally, no wheezes, no rales  GI:  soft, non-tender, not distended, normal bowel sounds  :  no dwyer, no CVA tenderness  Musculoskeletal:  no synovitis, no joint swelling, + bilateral foot drop  Neurologic: awake and alert, normal strength, no focal findings  Skin:  no rash, no erythema, no phlebitis  Heme/Onc: no lymphadenopathy   Psychiatric:   appropriate mood, forgetful     WBC Count: 8.42 K/uL (05-21 @ 06:20)  WBC Count: 9.18 K/uL (05-20 @ 22:57)  WBC Count: 7.85 K/uL (05-20 @ 11:51)          05-24    142  |  106  |  12  ----------------------------<  127<H>  2.8<LL>   |  32<H>  |  0.89    Ca    8.8      24 May 2021 10:25            Creatinine Trend: 0.89<--, 0.80<--, 0.82<--, 0.85<--, 0.96<--, 0.92<--      MICROBIOLOGY:  v    SARS-CoV-2: NotDetec (15 Apr 2021 07:13)    RADIOLOGY:    
Patient is a 88y old  Female who presents with a chief complaint of weakness/ headaches (22 May 2021 15:10)    INTERVAL HPI/OVERNIGHT EVENTS:    MEDICATIONS  (STANDING):  amLODIPine   Tablet 10 milliGRAM(s) Oral daily  amoxicillin  875 milliGRAM(s)/clavulanate 1 Tablet(s) Oral two times a day  bisacodyl 5 milliGRAM(s) Oral at bedtime  heparin   Injectable 5000 Unit(s) SubCutaneous every 12 hours  lactated ringers. 1000 milliLiter(s) (50 mL/Hr) IV Continuous <Continuous>  levothyroxine 100 MICROGram(s) Oral daily  mirtazapine 7.5 milliGRAM(s) Oral at bedtime  oxymetazoline 0.05% Nasal Spray 1 Spray(s) Left Nostril once  senna 2 Tablet(s) Oral at bedtime  simvastatin 20 milliGRAM(s) Oral at bedtime    MEDICATIONS  (PRN):  acetaminophen   Tablet .. 650 milliGRAM(s) Oral every 6 hours PRN Mild Pain (1 - 3)  ondansetron Injectable 4 milliGRAM(s) IV Push every 6 hours PRN Nausea and/or Vomiting    Allergies    No Known Allergies    Intolerances      REVIEW OF SYSTEMS:  All other systems reviewed and are negative    Vital Signs Last 24 Hrs  T(C): 36.8 (23 May 2021 11:25), Max: 37.1 (22 May 2021 23:08)  T(F): 98.2 (23 May 2021 11:25), Max: 98.7 (22 May 2021 23:08)  HR: 75 (23 May 2021 11:25) (71 - 75)  BP: 140/81 (23 May 2021 11:25) (110/73 - 152/85)  BP(mean): --  RR: 18 (23 May 2021 11:25) (18 - 18)  SpO2: 93% (23 May 2021 11:25) (93% - 94%)  Daily     Daily Weight in k.2 (23 May 2021 04:41)  I&O's Summary    23 May 2021 07:01  -  23 May 2021 13:38  --------------------------------------------------------  IN: 240 mL / OUT: 0 mL / NET: 240 mL      CAPILLARY BLOOD GLUCOSE        PHYSICAL EXAM:  GENERAL: NAD,    HEAD:  Atraumatic, Normocephalic  EYES: EOMI, PERRLA, conjunctiva and sclera clear  ENMT: No tonsillar erythema, exudates, or enlargement; Moist mucous membranes, Good dentition, No lesions  NECK: Supple, No JVD, Normal thyroid  CHEST/LUNG: Clear to percussion bilaterally; No rales, rhonchi, wheezing, or rubs  HEART: Regular rate and rhythm; No murmurs, rubs, or gallops  ABDOMEN: Soft, Nontender, Nondistended; Bowel sounds present  EXTREMITIES:  2+ Peripheral Pulses, No clubbing, cyanosis, or edema  LYMPH: No lymphadenopathy noted  SKIN: No rashes or lesions      Labs          140  |  105  |  18  ----------------------------<  105<H>  3.8   |  26  |  0.80    Ca    9.0      21 May 2021 21:30                          DVT prophylaxis: > Lovenox 40mg SQ daily  > Heparin   > SCD's
Patient is a 88y old  Female who presents with a chief complaint of weakness/ headaches (23 May 2021 13:37)    INTERVAL HPI/OVERNIGHT EVENTS:    MEDICATIONS  (STANDING):  amLODIPine   Tablet 10 milliGRAM(s) Oral daily  amoxicillin  875 milliGRAM(s)/clavulanate 1 Tablet(s) Oral two times a day  bisacodyl 5 milliGRAM(s) Oral at bedtime  heparin   Injectable 5000 Unit(s) SubCutaneous every 12 hours  lactated ringers. 1000 milliLiter(s) (50 mL/Hr) IV Continuous <Continuous>  levothyroxine 100 MICROGram(s) Oral daily  mirtazapine 7.5 milliGRAM(s) Oral at bedtime  oxymetazoline 0.05% Nasal Spray 1 Spray(s) Left Nostril once  potassium chloride   Powder 40 milliEquivalent(s) Oral every 4 hours  senna 2 Tablet(s) Oral at bedtime  simvastatin 20 milliGRAM(s) Oral at bedtime    MEDICATIONS  (PRN):  acetaminophen   Tablet .. 650 milliGRAM(s) Oral every 6 hours PRN Mild Pain (1 - 3)  ondansetron Injectable 4 milliGRAM(s) IV Push every 6 hours PRN Nausea and/or Vomiting    Allergies    No Known Allergies    Intolerances      REVIEW OF SYSTEMS:  All other systems reviewed and are negative    Vital Signs Last 24 Hrs  T(C): 36.6 (24 May 2021 05:20), Max: 36.8 (23 May 2021 11:25)  T(F): 97.9 (24 May 2021 05:20), Max: 98.2 (23 May 2021 11:25)  HR: 74 (24 May 2021 05:20) (74 - 79)  BP: 132/77 (24 May 2021 05:20) (108/72 - 146/86)  BP(mean): --  RR: 18 (24 May 2021 05:20) (16 - 18)  SpO2: 93% (24 May 2021 05:20) (93% - 96%)  Daily     Daily Weight in k.4 (24 May 2021 05:20)  I&O's Summary    23 May 2021 07:01  -  24 May 2021 07:00  --------------------------------------------------------  IN: 640 mL / OUT: 0 mL / NET: 640 mL      CAPILLARY BLOOD GLUCOSE        PHYSICAL EXAM:  GENERAL: NAD,    HEAD:  Atraumatic, Normocephalic  EYES: EOMI, PERRLA, conjunctiva and sclera clear  ENMT: No tonsillar erythema, exudates, or enlargement; Moist mucous membranes, Good dentition, No lesions  NECK: Supple, No JVD, Normal thyroid  CHEST/LUNG: Clear to percussion bilaterally; No rales, rhonchi, wheezing, or rubs  HEART: Regular rate and rhythm; No murmurs, rubs, or gallops  ABDOMEN: Soft, Nontender, Nondistended; Bowel sounds present  EXTREMITIES:  2+ Peripheral Pulses, No clubbing, cyanosis, or edema  LYMPH: No lymphadenopathy noted  SKIN: No rashes or lesions      Labs          142  |  106  |  12  ----------------------------<  127<H>  2.8<LL>   |  32<H>  |  0.89    Ca    8.8      24 May 2021 10:25                          DVT prophylaxis: > Lovenox 40mg SQ daily  > Heparin   > SCD's
Patient is a 88y old  Female who presents with a chief complaint of weakness/ headaches (20 May 2021 14:08)    INTERVAL HPI/OVERNIGHT EVENTS:    MEDICATIONS  (STANDING):  amLODIPine   Tablet 10 milliGRAM(s) Oral daily  bisacodyl 5 milliGRAM(s) Oral at bedtime  heparin   Injectable 5000 Unit(s) SubCutaneous every 12 hours  lactated ringers. 1000 milliLiter(s) (50 mL/Hr) IV Continuous <Continuous>  levothyroxine 100 MICROGram(s) Oral daily  mirtazapine 7.5 milliGRAM(s) Oral at bedtime  oxymetazoline 0.05% Nasal Spray 1 Spray(s) Left Nostril once  senna 2 Tablet(s) Oral at bedtime  simvastatin 20 milliGRAM(s) Oral at bedtime    MEDICATIONS  (PRN):  acetaminophen   Tablet .. 650 milliGRAM(s) Oral every 6 hours PRN Mild Pain (1 - 3)  ondansetron Injectable 4 milliGRAM(s) IV Push every 6 hours PRN Nausea and/or Vomiting    Allergies    No Known Allergies    Intolerances      REVIEW OF SYSTEMS:  All other systems reviewed and are negative    Vital Signs Last 24 Hrs  T(C): 36.2 (21 May 2021 10:57), Max: 36.8 (20 May 2021 23:30)  T(F): 97.2 (21 May 2021 10:57), Max: 98.2 (20 May 2021 23:30)  HR: 66 (21 May 2021 10:57) (66 - 86)  BP: 124/79 (21 May 2021 10:57) (107/56 - 137/93)  BP(mean): --  RR: 18 (21 May 2021 10:57) (18 - 22)  SpO2: 94% (21 May 2021 10:57) (93% - 97%)  Daily Height in cm: 167.64 (20 May 2021 18:35)    Daily Weight in k.6 (21 May 2021 04:57)  I&O's Summary    20 May 2021 07:01  -  21 May 2021 07:00  --------------------------------------------------------  IN: 600 mL / OUT: 0 mL / NET: 600 mL      CAPILLARY BLOOD GLUCOSE        PHYSICAL EXAM:  GENERAL: NAD,    HEAD:  Atraumatic, Normocephalic  EYES: EOMI, PERRLA, conjunctiva and sclera clear  ENMT: No tonsillar erythema, exudates, or enlargement; Moist mucous membranes, Good dentition, No lesions  NECK: Supple, No JVD, Normal thyroid  CHEST/LUNG: Clear to percussion bilaterally; No rales, rhonchi, wheezing, or rubs  HEART: Regular rate and rhythm; No murmurs, rubs, or gallops  ABDOMEN: Soft, Nontender, Nondistended; Bowel sounds present  EXTREMITIES:  2+ Peripheral Pulses, No clubbing, cyanosis, or edema  LYMPH: No lymphadenopathy noted  SKIN: No rashes or lesions    Labs                          9.4    8.42  )-----------( 170      ( 21 May 2021 06:20 )             29.0     05-21    141  |  104  |  22  ----------------------------<  90  2.8<LL>   |  25  |  0.82    Ca    8.8      21 May 2021 06:20  Mg     1.8     05-20    TPro  8.4<H>  /  Alb  3.5  /  TBili  0.7  /  DBili  x   /  AST  29  /  ALT  14  /  AlkPhos  78  05-20      CARDIAC MARKERS ( 20 May 2021 11:51 )  .073 ng/mL / x     / 55 U/L / x     / x                        DVT prophylaxis: > Lovenox 40mg SQ daily  > Heparin   > SCD's
Patient is a 88y old  Female who presents with a chief complaint of weakness/ headaches (21 May 2021 14:44)    INTERVAL HPI/OVERNIGHT EVENTS:    MEDICATIONS  (STANDING):  amLODIPine   Tablet 10 milliGRAM(s) Oral daily  amoxicillin  875 milliGRAM(s)/clavulanate 1 Tablet(s) Oral two times a day  bisacodyl 5 milliGRAM(s) Oral at bedtime  heparin   Injectable 5000 Unit(s) SubCutaneous every 12 hours  lactated ringers. 1000 milliLiter(s) (50 mL/Hr) IV Continuous <Continuous>  levothyroxine 100 MICROGram(s) Oral daily  mirtazapine 7.5 milliGRAM(s) Oral at bedtime  oxymetazoline 0.05% Nasal Spray 1 Spray(s) Left Nostril once  senna 2 Tablet(s) Oral at bedtime  simvastatin 20 milliGRAM(s) Oral at bedtime    MEDICATIONS  (PRN):  acetaminophen   Tablet .. 650 milliGRAM(s) Oral every 6 hours PRN Mild Pain (1 - 3)  ondansetron Injectable 4 milliGRAM(s) IV Push every 6 hours PRN Nausea and/or Vomiting    Allergies    No Known Allergies    Intolerances      REVIEW OF SYSTEMS:  All other systems reviewed and are negative    Vital Signs Last 24 Hrs  T(C): 36.8 (22 May 2021 10:56), Max: 36.9 (22 May 2021 04:10)  T(F): 98.2 (22 May 2021 10:56), Max: 98.4 (22 May 2021 04:10)  HR: 86 (22 May 2021 10:56) (66 - 86)  BP: 124/72 (22 May 2021 10:56) (123/56 - 152/86)  BP(mean): --  RR: 17 (22 May 2021 10:56) (17 - 18)  SpO2: 96% (22 May 2021 10:56) (92% - 96%)  Daily     Daily Weight in k (22 May 2021 04:10)  I&O's Summary    CAPILLARY BLOOD GLUCOSE        PHYSICAL EXAM:  GENERAL: NAD,    HEAD:  Atraumatic, Normocephalic  EYES: EOMI, PERRLA, conjunctiva and sclera clear  ENMT: No tonsillar erythema, exudates, or enlargement; Moist mucous membranes, Good dentition, No lesions  NECK: Supple, No JVD, Normal thyroid  CHEST/LUNG: Clear to percussion bilaterally; No rales, rhonchi, wheezing, or rubs  HEART: Regular rate and rhythm; No murmurs, rubs, or gallops  ABDOMEN: Soft, Nontender, Nondistended; Bowel sounds present  EXTREMITIES:  2+ Peripheral Pulses, No clubbing, cyanosis, or edema  LYMPH: No lymphadenopathy noted  SKIN: No rashes or lesions    Labs                          9.4    8.42  )-----------( 170      ( 21 May 2021 06:20 )             29.0     -    140  |  105  |  18  ----------------------------<  105<H>  3.8   |  26  |  0.80    Ca    9.0      21 May 2021 21:30  Mg     1.8                               DVT prophylaxis: > Lovenox 40mg SQ daily  > Heparin   > SCD's

## 2021-05-24 NOTE — DISCHARGE NOTE PROVIDER - NSDCMRMEDTOKEN_GEN_ALL_CORE_FT
acetaminophen 325 mg oral tablet: 2 tab(s) orally every 6 hours, As needed, Temp greater or equal to 38C (100.4F), Mild Pain (1 - 3)  amLODIPine 10 mg oral tablet: 1 tab(s) orally once a day  levothyroxine 100 mcg (0.1 mg) oral tablet: 1 tab(s) orally once a day  mirtazapine 7.5 mg oral tablet: 1 tab(s) orally once a day (at bedtime)  senna oral tablet: 2 tab(s) orally once a day (at bedtime), As Needed  simvastatin 20 mg oral tablet: 1 tab(s) orally once a day (at bedtime)   acetaminophen 325 mg oral tablet: 2 tab(s) orally every 6 hours, As needed, Temp greater or equal to 38C (100.4F), Mild Pain (1 - 3)  amLODIPine 10 mg oral tablet: 1 tab(s) orally once a day  Augmentin 875 mg-125 mg oral tablet: 1 tab(s) orally 2 times a day   levothyroxine 100 mcg (0.1 mg) oral tablet: 1 tab(s) orally once a day  mirtazapine 7.5 mg oral tablet: 1 tab(s) orally once a day (at bedtime)  pantoprazole 40 mg oral delayed release tablet: 1 tab(s) orally once a day   senna oral tablet: 2 tab(s) orally once a day (at bedtime), As Needed  simvastatin 20 mg oral tablet: 1 tab(s) orally once a day (at bedtime)

## 2021-05-24 NOTE — DISCHARGE NOTE PROVIDER - DETAILS OF MALNUTRITION DIAGNOSIS/DIAGNOSES
This patient has been assessed with a concern for Malnutrition and was treated during this hospitalization for the following Nutrition diagnosis/diagnoses:     -  05/21/2021: Severe protein-calorie malnutrition

## 2021-05-24 NOTE — PROGRESS NOTE ADULT - ASSESSMENT
88yr f,             PMH AAA repair in 2012, CAD, MI, s/p CABG x 4, in 2007, HTN, HLD, s/p PPM,          bedbound x 3 years,   s/p  coffee ground emesis and slurred speech. on prior visit,       CT head showed chronic ischemic changes in both hemispheres with atrophy.  recent and/or subacute left parietal infarct.  B/l carotid artery aneurysms.  CT Abd / Pelvis showed No  sbo / seen by gi    Seen by GI, recommended conservative rxCT chest suggestive of b/l infiltrates.   s/p IV Abx.      Options including PEG were offered, however, pt and son wish for conservative management, in consideration of pt's age, condition and wishes.  Son agreed with home hospice, however, patient was not accepted. and  was   d/c   with homecare, on recent  visit      Son is aware of her  deterioration with poor po intake, but wished to respect her wishes for return home.     .  Declined by hospice. and hence, was  d/c home with homecare.      on last visit,  Coffee Ground Emesis, woth  prbc  given,   Heme   dr Nance,   Pt refused BM Bx.  GI Dr. Juan A Grossman at [phone]     pt  is  DNR / DNI.      now  admitted with weakness, decreased   po intake   and headaches    denies  cp. sob. abd  pain, fevers    Acute estevan mastoditis on CT  -no pain on exam, but still w headache, will have ID evaluate pt for need of abx       *   FTT           iv fluids            pt had refused  peg on prior  visit   *    Hypokalemia            follow rpt bmp, replace   *    mild  Troponin elevation, not from  an MI         card on call,  was  called by er  dr.  stated,  eval was  not indicated             with normal cpk           ekg. paced           pt    has  no cp/sob   *      c/c  L ptosis              seen by  neuro  dr street, on last visit               Pt unable to have MRI.   CT of head shows possible L parietal subacute infarct,             on statin/  asa was not given, due to gi bleed. anemia     *    B/l Cavernous Carotid Artery Aneurysms              per  neuro., on prior visit , no intervention    Appear to be thrombosed.   No surgical indication at present.           and. given pt;s bed bound sttaus, pt is not an ideal candidate  for surgical  intervention         *     CAD ,/ PPM,/ AAA. s/p aortobifemoral   graft,               on  statin.     *     HTN ,  on norvasc   *     Hypothyroidism , on  Synthroid   *       Nutrition team . requested    *     Functional Quadriplegia - supportive care.             pt ha s been bed  bound for  3  years         Will need  assistance  from care cortn  for  disposition,  a s son is unable  to take  care of pt, hence  pt  has returned to [facility]         son called, unable to reach   son Chauncey at [phone]    pt is  DNR / DNI,  from last visit     
89 yo female bedbound, with PMH of AAA repair, CAD, CABG x 4, HTN, HLD, PPM, bilateral carotid aneurysm, was recently admitted with coffee ground emesis, was also treated for possible pneumonia with Levaquin, failure to thrive. The pt was not accepted to the hospice on her last admission.   The pt was brought to the hospital with weakness, decreased appetite, headache. The pt is fatigued, able to state her name and birthday, knows she is in the hospital now, the pt is unsure why she was brought to the hospital, thinks she vomited at home.   The pt is afebrile since her admission, no leukocytosis, Cr and LFTs stable, COVID 19 negative, CT brain noted new inflammatory changes in left middle ear and mastoid suggestive of acute otomastoiditis. The pt states that she is hard of hearing in her left ear, she is unsure when her hearing in her left ear started to decline, thinks it is recent, denies ear pain, however, left ear exam was tender, unable to visualize tympanic membrane due to cerumen.   I will treat the pt with a course of po Augmentin, monitor pt's symptoms - pain and hearing, consider ENT evaluation if possible if no improvement.     5/24: no fevers, no new lab work, no complain of pain in left ear, on po Augmentin x 7 days      Suggestions:  - continue Augmenting po x 7 days total  - monitor pt's hearing and pain  - monitor pt's WBC and temperatures    Discussed with  Dr. Doty  
  88yr f,             PMH AAA repair in 2012, CAD, MI, s/p CABG x 4, in 2007, HTN, HLD, s/p PPM,          bedbound x 3 years,   s/p  coffee ground emesis and slurred speech. on prior visit,       CT head showed chronic ischemic changes in both hemispheres with atrophy.  recent and/or subacute left parietal infarct.  B/l carotid artery aneurysms.  CT Abd / Pelvis showed No  sbo / seen by gi    Seen by GI, recommended conservative rxCT chest suggestive of b/l infiltrates.   s/p IV Abx.      Options including PEG were offered, however, pt and son wish for conservative management, in consideration of pt's age, condition and wishes.  Son agreed with home hospice, however, patient was not accepted. and  was   d/c   with homecare, on recent  visit      Son is aware of her  deterioration with poor po intake, but wished to respect her wishes for return home.     .  Declined by hospice. and hence, was  d/c home with homecare.      on last visit,  Coffee Ground Emesis, woth  prbc  given,   Heme   dr Nance,   Pt refused BM Bx.  GI Dr. Juan A Grossman at [phone]     pt  is  DNR / DNI.      now  admitted with weakness, decreased   po intake   and headaches    denies  cp. sob. abd  pain, fevers    Acute estevan mastoditis on CT  -no pain on exam, headache improved, id on board-augmentin, give debrox for cerumen of ear       *   FTT           iv fluids            pt had refused  peg on prior  visit   *    Hypokalemia            follow rpt bmp, replace   *    mild  Troponin elevation, not from  an MI         card on call,  was  called by er  dr.  stated,  eval was  not indicated             with normal cpk           ekg. paced           pt    has  no cp/sob   *      c/c  L ptosis              seen by  neuro  dr street, on last visit               Pt unable to have MRI.   CT of head shows possible L parietal subacute infarct,             on statin/  asa was not given, due to gi bleed. anemia     *    B/l Cavernous Carotid Artery Aneurysms              per  neuro., on prior visit , no intervention    Appear to be thrombosed.   No surgical indication at present.           and. given pt;s bed bound sttaus, pt is not an ideal candidate  for surgical  intervention         *     CAD ,/ PPM,/ AAA. s/p aortobifemoral   graft,               on  statin.     *     HTN ,  on norvasc   *     Hypothyroidism , on  Synthroid   *       Nutrition team . requested    *     Functional Quadriplegia - supportive care.             pt ha s been bed  bound for  3  years         Will need  assistance  from care cortn  for  disposition,  a s son is unable  to take  care of pt, hence  pt  has returned to [facility]         son called, unable to reach   son Chauncey at [phone]    pt is  DNR / DNI,  from last visit     
  88yr f,             PMH AAA repair in 2012, CAD, MI, s/p CABG x 4, in 2007, HTN, HLD, s/p PPM,          bedbound x 3 years,   s/p  coffee ground emesis and slurred speech. on prior visit,       CT head showed chronic ischemic changes in both hemispheres with atrophy.  recent and/or subacute left parietal infarct.  B/l carotid artery aneurysms.  CT Abd / Pelvis showed No  sbo / seen by gi    Seen by GI, recommended conservative rxCT chest suggestive of b/l infiltrates.   s/p IV Abx.      Options including PEG were offered, however, pt and son wish for conservative management, in consideration of pt's age, condition and wishes.  Son agreed with home hospice, however, patient was not accepted. and  was   d/c   with homecare, on recent  visit      Son is aware of her  deterioration with poor po intake, but wished to respect her wishes for return home.     .  Declined by hospice. and hence, was  d/c home with homecare.      on last visit,  Coffee Ground Emesis, woth  prbc  given,   Heme   dr Nance,   Pt refused BM Bx.  GI Dr. Juan A Grossman at [phone]     pt  is  DNR / DNI.    D/w son about taking mother home, he is in agreement but unable to take her today. Will be available tmrw morning. home hospice eval was placed       now  admitted with weakness, decreased   po intake   and headaches    denies  cp. sob. abd  pain, fevers    Acute estevan mastoditis on CT  -no pain on exam, headache improved, id on board-augmentin, give debrox for cerumen of ear       *   FTT            iv fluids            pt had refused  peg on prior  visit   *    Hypokalemia            follow rpt bmp, replace   *    mild  Troponin elevation, not from  an MI         card on call,  was  called by er  dr.  stated,  eval was  not indicated             with normal cpk           ekg. paced           pt    has  no cp/sob   *      c/c  L ptosis              seen by  neuro  dr street, on last visit               Pt unable to have MRI.   CT of head shows possible L parietal subacute infarct,             on statin/  asa was not given, due to gi bleed. anemia     *    B/l Cavernous Carotid Artery Aneurysms              per  neuro., on prior visit , no intervention    Appear to be thrombosed.   No surgical indication at present.           and. given pt;s bed bound sttaus, pt is not an ideal candidate  for surgical  intervention         *     CAD ,/ PPM,/ AAA. s/p aortobifemoral   graft,               on  statin.     *     HTN ,  on norvasc   *     Hypothyroidism , on  Synthroid   *       Nutrition team . requested    *     Functional Quadriplegia - supportive care.             pt ha s been bed  bound for  3  years         Will need  assistance  from care cortn  for  disposition,  a s son is unable  to take  care of pt, hence  pt  has returned to [facility]         son called, unable to reach   son Chauncey at [phone]    pt is  DNR / DNI,  from last visit     
  88yr f,             PMH AAA repair in 2012, CAD, MI, s/p CABG x 4, in 2007, HTN, HLD, s/p PPM,          bedbound x 3 years,   s/p  coffee ground emesis and slurred speech. on prior visit,       CT head showed chronic ischemic changes in both hemispheres with atrophy.  recent and/or subacute left parietal infarct.  B/l carotid artery aneurysms.  CT Abd / Pelvis showed No  sbo / seen by gi    Seen by GI, recommended conservative rxCT chest suggestive of b/l infiltrates.   s/p IV Abx.      Options including PEG were offered, however, pt and son wish for conservative management, in consideration of pt's age, condition and wishes.  Son agreed with home hospice, however, patient was not accepted. and  was   d/c   with homecare, on recent  visit      Son is aware of her  deterioration with poor po intake, but wished to respect her wishes for return home.     .  Declined by hospice. and hence, was  d/c home with homecare.      on last visit,  Coffee Ground Emesis, woth  prbc  given,   Heme   dr Nance,   Pt refused BM Bx.  GI Dr. Juan A Grossman at [phone]     pt  is  DNR / DNI.      now  admitted with weakness, decreased   po intake   and headaches    denies  cp. sob. abd  pain, fevers    Acute estevan mastoditis on CT  -no pain on exam, headache improved, id on board-augmentin, give debrox for cerumen of ear       *   FTT            iv fluids            pt had refused  peg on prior  visit   *    Hypokalemia            follow rpt bmp, replace   *    mild  Troponin elevation, not from  an MI         card on call,  was  called by er  dr.  stated,  eval was  not indicated             with normal cpk           ekg. paced           pt    has  no cp/sob   *      c/c  L ptosis              seen by  neuro  dr street, on last visit               Pt unable to have MRI.   CT of head shows possible L parietal subacute infarct,             on statin/  asa was not given, due to gi bleed. anemia     *    B/l Cavernous Carotid Artery Aneurysms              per  neuro., on prior visit , no intervention    Appear to be thrombosed.   No surgical indication at present.           and. given pt;s bed bound sttaus, pt is not an ideal candidate  for surgical  intervention         *     CAD ,/ PPM,/ AAA. s/p aortobifemoral   graft,               on  statin.     *     HTN ,  on norvasc   *     Hypothyroidism , on  Synthroid   *       Nutrition team . requested    *     Functional Quadriplegia - supportive care.             pt ha s been bed  bound for  3  years         Will need  assistance  from care cortn  for  disposition,  a s son is unable  to take  care of pt, hence  pt  has returned to [facility]         son called, unable to reach   son Chauncey at [phone]    pt is  DNR / DNI,  from last visit

## 2021-05-24 NOTE — DISCHARGE NOTE PROVIDER - CARE PROVIDER_API CALL
your pmd,   Phone: (   )    -  Fax: (   )    -  Follow Up Time:    Joselo Serrano)  Infectious Disease; Internal Medicine  [address] [place] [address]  Phone: [phone]  Fax: ()-  Follow Up Time:

## 2021-05-28 PROBLEM — E87.6 LOW BLOOD POTASSIUM: Status: ACTIVE | Noted: 2020-01-01

## 2021-05-28 PROBLEM — H70.92 MASTOIDITIS OF LEFT SIDE: Status: ACTIVE | Noted: 2021-01-01

## 2021-05-28 PROBLEM — I10 HYPERTENSION: Status: ACTIVE | Noted: 2020-01-01

## 2021-05-28 PROBLEM — Z09 HOSPITAL DISCHARGE FOLLOW-UP: Status: RESOLVED | Noted: 2020-01-01 | Resolved: 2021-01-01

## 2021-05-28 PROBLEM — R26.2 UNABLE TO AMBULATE: Status: ACTIVE | Noted: 2020-01-01

## 2021-05-28 PROBLEM — H66.90 OTITIS MEDIA: Status: ACTIVE | Noted: 2021-01-01

## 2021-05-28 PROBLEM — E03.9 HYPOTHYROIDISM: Status: ACTIVE | Noted: 2020-01-01

## 2021-05-28 PROBLEM — R23.8 SKIN IRRITATION: Status: RESOLVED | Noted: 2020-01-01 | Resolved: 2021-01-01

## 2021-05-28 PROBLEM — I25.10 CORONARY ARTERIOSCLEROSIS: Status: ACTIVE | Noted: 2020-01-01

## 2021-06-01 PROBLEM — Z23 NEED FOR COVID-19 VACCINE: Status: ACTIVE | Noted: 2021-01-01

## 2021-06-03 PROBLEM — E87.6 HYPOKALEMIA: Status: ACTIVE | Noted: 2021-01-01

## 2021-06-11 NOTE — CURRENT MEDS
[Medication and Allergies Reconciled] : medication and allergies reconciled [High Risk Medications Reviewed and Reconciled (Beers Criteria)] : high risk medications reviewed and reconciled [Reviewed patient reported medication adherence from Comprehensive Assessment] : Reviewed patient reported medication adherence from comprehensive assessment [de-identified] : noncompliant with many meds as above

## 2021-06-11 NOTE — HEALTH RISK ASSESSMENT
[No falls in past year] : Patient reported no falls in the past year [TimeGetUpGo] : 0 [de-identified] : bedbound

## 2021-06-11 NOTE — REASON FOR VISIT
[Initial Evaluation] : an initial evaluation [Pre-Visit Preparation] : pre-visit preparation was done [FreeTextEntry1] : CVA [FreeTextEntry2] : reviewed d/c summary

## 2021-06-11 NOTE — COUNSELING
[Normal Weight - ( BMI  <25 )] : normal weight - ( BMI  <25 ) [Smoke/CO Detectors] : smoke/CO detectors [FreeTextEntry3] : needs ensure supplementation [FreeTextEntry4] : smokes marijuana

## 2021-06-11 NOTE — ASSESSMENT
[FreeTextEntry1] : GOC- DNR\par \par Patient requires the use of a standard wheelchair for  mobility.  Pt has debility  which severely limits their ability to ambulate and thus a wheelchair will greatly benefit them in activities of daily living in their home such as toileting, dressing, bathing and grooming. A cane or walker has been ruled out. She  has not expressed an unwillingness to use the wheelchair in their home, which provides adequate space for maneuvering the mobility device.\par She  has sufficient upper extremity strength and the mental capacity to safely propel a standard wheelchair.\par She has a caregiver willing and able to propel patient.\par \par Patient cannot independently make changes in body position significant enough to alleviate pressure.\par Needs SUNITA.\par \par

## 2021-06-11 NOTE — CHRONIC CARE ASSESSMENT
[PPS Score: ____] : Palliative Performance Scale (PPS) Score: [unfilled] [de-identified] : none [de-identified] : pureed.

## 2021-06-11 NOTE — HISTORY OF PRESENT ILLNESS
[FreeTextEntry2] : 87 yo with hx AAA repair in 2012, CAD, MI, s/p CABG x 4, in 2007, HTN, HLD, s/p PPM, s/p CVA, hx GI bleed\par \par Pt admitted at [facility] 5/20-5/25 for weakness,  CT head showed chronic ischemic changes in both hemispheres with atrophy. recent and/or subacute left parietal infarct.  B/l carotid artery aneurysms. \par Also treated for pneumonia, left otitis media and mastoiditis, discharged on augmentin. Has been compliant with abx.\par \par Has been eating and drinking very little. Takes about 1 1/2 ensures daily. . No clear weight loss. Has declined PEG. was given mirtazapine but not taking.  was evaluated by hospice but not accepted. \par \par Hx UGI bleed requiring transfusion earlier this month. was rx pantoprazole. asa stopped.\par CAD ,/ PPM,/ AAA. s/p aortobifemoral graft, - rx asa, statin- not taking\par HTN- rx  amlodipine 10- not taking\par Hypothyroidism - rx Synthroid 100- not taking\par bedbound x 3years. no definite dementia. Dependent for all ADL's\par poor PO x a couple of months. takes less than 1.5 ensure daily. No noticable weight loss\par Positive covid antibodies on this last admission.\par Low K in hospital\par ----------------------------------------------------\par HHA 10 hous x 7 days

## 2021-06-28 PROBLEM — R63.0 ANOREXIA: Status: ACTIVE | Noted: 2021-01-01

## 2021-06-28 PROBLEM — I67.2 CEREBRAL ATHEROSCLEROSIS: Status: ACTIVE | Noted: 2021-01-01

## 2021-06-28 NOTE — PHYSICAL EXAM
[No Acute Distress] : no acute distress [No Respiratory Distress] : no respiratory distress [Clear to Auscultation] : lungs were clear to auscultation bilaterally [Normal Rate] : heart rate was normal  [Regular Rhythm] : with a regular rhythm [Normal Bowel Sounds] : normal bowel sounds [Non Tender] : non-tender [No Joint Swelling] : no joint swelling seen [No Accessory Muscle Use] : no accessory muscle use [No Skin Lesions] : no skin lesions [de-identified] : not verbally responsive [de-identified] : no murmur, no edema [de-identified] : moves all extrmeities [de-identified] : sleeping. unable to assess

## 2021-06-28 NOTE — LETTER HEADER
[Care Plan reviewed and provided to patient/caregiver] : Care plan reviewed and provided to patient/caregiver [Care Plan reviewed every ___ weeks] : Care plan reviewed every [unfilled] weeks [Patient/Caregiver agrees to have other providers send summary of their care to this office] : Patient/caregiver agrees to have other providers send summary of their care to this office

## 2021-06-28 NOTE — REASON FOR VISIT
[Follow-Up] : a follow-up visit [Intercurrent Specialty/Sub-specialty Visits] : the patient has no intercurrent specialty/sub-specialty visits [FreeTextEntry1] : CVA

## 2021-06-28 NOTE — CURRENT MEDS
[Medication and Allergies Reconciled] : medication and allergies reconciled [High Risk Medications Reviewed and Reconciled (Beers Criteria)] : high risk medications reviewed and reconciled [Reviewed patient reported medication adherence from Comprehensive Assessment] : Reviewed patient reported medication adherence from comprehensive assessment [de-identified] : noncompliant with many meds as above

## 2021-06-28 NOTE — HEALTH RISK ASSESSMENT
[No falls in past year] : Patient reported no falls in the past year [TimeGetUpGo] : 0 [de-identified] : bedbound

## 2021-06-28 NOTE — HISTORY OF PRESENT ILLNESS
[FreeTextEntry2] : 87 yo with hx AAA repair in 2012, CAD, MI, s/p CABG x 4, in 2007, HTN, HLD, s/p PPM, s/p CVA, hx GI bleed\par \par hx chronic CVA/ atherosclerosis.  CT head showed chronic ischemic changes in both hemispheres with atrophy. s/p left parietal infarct.  B/l carotid artery aneurysms. \par \par Pt has been bedbound x3 years, dependent for all ADL's\par Has been eating and drinking very little. wont take meds\par Hx UGI bleed requiring transfusion in may. was rx pantoprazole. \par CAD ,/ PPM,/ AAA. s/p aortobifemoral graft,\par Hypothyroidism - rx Synthroid 100- not taking\par bedbound x 3years. no definite dementia. Dependent for all ADL's\par ----------------------------------------------------\par HHA 10 hous x 7 days

## 2021-06-28 NOTE — CHRONIC CARE ASSESSMENT
[PPS Score: ____] : Palliative Performance Scale (PPS) Score: [unfilled] [de-identified] : none [de-identified] : pureed.

## 2021-07-15 NOTE — PROGRESS NOTE ADULT - SUBJECTIVE AND OBJECTIVE BOX
Patient: MITCHEL MULLINS [id number] 88y Female                            Hospitalist Attending Note    c/o mild cough.   No bleeding noted.    No Abdominal pain, nausea, vomiting, diarrhea, nor constipation.     ____________________PHYSICAL EXAM:  GENERAL:  NAD Arousable, Oriented x 3   HEENT: NCAT  PERRL  CARDIOVASCULAR:  S1, S2  LUNGS: CTAB  ABDOMEN:  soft, (-) tenderness, (-) distension, (+) bowel sounds, (-) guarding, (-) rebound (-) rigidity  EXTREMITIES:  no cyanosis / clubbing / edema.   NEURO: L ptosis.    ____________________    VITALS:  Vital Signs Last 24 Hrs  T(C): 36.8 (2021 11:14), Max: 36.9 (2021 17:46)  T(F): 98.3 (2021 11:14), Max: 98.4 (2021 17:46)  HR: 87 (2021 11:14) (83 - 88)  BP: 118/66 (2021 11:14) (118/66 - 147/87)  BP(mean): --  RR: 19 (2021 11:14) (18 - 19)  SpO2: 96% (2021 11:14) (96% - 97%) Daily     Daily Weight in k.7 (2021 04:33)  CAPILLARY BLOOD GLUCOSE        I&O's Summary    2021 07:  -  2021 07:00  --------------------------------------------------------  IN: 360 mL / OUT: 250 mL / NET: 110 mL    2021 07:  -  2021 16:24  --------------------------------------------------------  IN: 45 mL / OUT: 400 mL / NET: -355 mL        LABS:                        9.9    15.16 )-----------( 289      ( 2021 07:28 )             31.2         136  |  102  |  10  ----------------------------<  66<L>  3.5   |  26  |  0.64    Ca    8.1<L>      2021 07:28  Phos  2.0         TPro  6.4  /  Alb  x   /  TBili  x   /  DBili  x   /  AST  x   /  ALT  x   /  AlkPhos  x         LIVER FUNCTIONS - ( 2021 08:30 )  Alb: x     / Pro: 6.4 g/dL / ALK PHOS: x     / ALT: x     / AST: x     / GGT: x           Urinalysis Basic - ( 2021 16:19 )    Color: Yellow / Appearance: Slightly Turbid / S.010 / pH: x  Gluc: x / Ketone: Moderate  / Bili: Negative / Urobili: Negative mg/dL   Blood: x / Protein: 30 mg/dL / Nitrite: Negative   Leuk Esterase: Small / RBC: 3-5 /HPF / WBC 6-10   Sq Epi: x / Non Sq Epi: x / Bacteria: Many              MEDICATIONS:  acetaminophen   Tablet .. 650 milliGRAM(s) Oral every 6 hours PRN  amLODIPine   Tablet 10 milliGRAM(s) Oral daily  chlorhexidine 2% Cloths 1 Application(s) Topical daily  ferrous    sulfate 325 milliGRAM(s) Oral daily  levoFLOXacin IVPB 500 milliGRAM(s) IV Intermittent once  levoFLOXacin IVPB      levothyroxine 100 MICROGram(s) Oral daily  nystatin Powder 1 Application(s) Topical every 8 hours  pantoprazole   Suspension 40 milliGRAM(s) Oral daily  potassium phosphate / sodium phosphate Tablet (K-PHOS No. 2) 1 Tablet(s) Oral four times a day with meals  simvastatin 20 milliGRAM(s) Oral at bedtime  sodium chloride 0.45% with potassium chloride 20 mEq/L 1000 milliLiter(s) IV Continuous <Continuous>     blood culture set from 7/13, growth in aerobic bottle, gram positive cocci in clusters

## 2021-07-21 ENCOUNTER — NON-APPOINTMENT (OUTPATIENT)
Age: 86
End: 2021-07-21

## 2021-08-31 NOTE — PATIENT PROFILE ADULT - NSPROGENARRIVEDFROM_GEN_A_NUR
MATERNAL FETAL MEDICINE CONSULT FOLLOW-UP    Elias Tipton   2021    REFERRING PROVIDER: Dr. Felipe Paris MD     Elias is a 33 year old   with intrauterine pregnancy at 37w0d who presents today for an MFM consult follow-up due to:  1. Gestational diabetes mellitus (GDM) in third trimester, gestational diabetes method of control unspecified    2. Obesity affecting pregnancy in third trimester    3. Previous  delivery, antepartum condition or complication        HPI: Patient is doing well and without complaints. Reports normal fetal movement. Denies vaginal bleeding, leakage of fluid or contractions. Blood sugars reviewed.  Her fasting values have been in the 70s, and her postprandial values have been normal overall to slightly elevated.  She is currently taking Lantus 34 units in the morning and 30 units in the evening.    Current Outpatient Medications   Medication Sig Dispense Refill   • acetaminophen (TYLENOL) 500 MG tablet Take 500 mg by mouth as needed for Pain.     • insulin glargine (Lantus SoloStar) 100 UNIT/ML pen-injector Prime 2 units before each dose. Inject 34 units in the morning and 32 units in the evening. 15 mL 3   • Insulin Pen Needle 32G X 4 MM Misc Use to inject insulin 2 times daily. Remove needle cover(s) to expose needle before injecting. 60 each 3   • Prenatal Vit-Fe Fumarate-FA (PRENATAL VITAMINS PO) Take 2 tablets by mouth daily.      • blood glucose test strip Contour Next Meter   USE 1 KIT FOUR TIMES DAILY     • blood glucose test strip Test blood sugar 4 times daily.Fasting and 2 hours  strip 3   • Blood Glucose Monitoring Suppl (RICHARD CONTOUR NEXT MONITOR) w/Device Kit 1 kit 4 times daily. 1 kit 1   • DISPENSE 100 each by Other route 4 times daily. 100 strip 3     No current facility-administered medications for this visit.      ?   OB History    Para Term  AB Living   3 1 1   1 1   SAB TAB Ectopic Molar Multiple Live Births   1         1      #  Outcome Date GA Lbr [place]/2nd Weight Sex Delivery Anes PTL Lv   3 Current            2 Term 11 40w0d  3.289 kg (7 lb 4 oz) M CS-LTranv  N MASSIMO      Birth Comments: CS FOR FAILED IOL 3-4CM      Complications: Failure to Progress in First Stage   1 SAB  8w0d    SPONTANEOUS         Birth Comments: no d/c     ?   ?   Review of patient's allergies indicates:   ALLERGIES:   Allergen Reactions   • Amoxicillin HIVES and Other (See Comments)   • Penicillins HIVES and Other (See Comments)        Past Medical History:   Diagnosis Date   • Gestational diabetes mellitus (GDM) affecting pregnancy, antepartum    • HPV in female        Past Surgical History:   Procedure Laterality Date   •  section, low transverse         Family History   Problem Relation Age of Onset   • Patient is unaware of any medical problems Son    • Patient is unaware of any medical problems Mother    • Patient is unaware of any medical problems Father    • Patient is unaware of any medical problems Brother       ?   Social History     Tobacco Use   • Smoking status: Never Smoker   • Smokeless tobacco: Never Used   Vaping Use   • Vaping Use: never used   Substance Use Topics   • Alcohol use: Not Currently   • Drug use: Never    ?   ?   REVIEW OF SYSTEMS:   Headaches: None   Nausea/vomiting:  None   Reports fetal movement: Yes  Abdominal pain/tenderness/cramping/contractions: none  Vaginal bleeding: none  Vaginal leaking of fluid: none    Lg pain/tenderness: none  All other systems negative unless noted in the HPI    PHYSICAL EXAM:   Visit Vitals  /58 (BP Location: RUE - Right upper extremity, Patient Position: Sitting, Cuff Size: Large Adult)   Ht 5' 4\" (1.626 m)   Wt 110 kg (242 lb 8.1 oz)   LMP 12/15/2020   BMI 41.63 kg/m²       General: A&Ox3, NAD, pleasant   Lungs: non-labored breathing  Abdomen: soft, nontender,  distended by gravid uterus  Extremities: no c/c/e  Skin: no rashes  Pelvic Exam: deferred     ULTRASOUND  RESULTS:  Ultrasound was performed today for fetal growth. A live, tejeda gestation in cephalic presentation is noted with  bpm. Fetal growth is appropriate for gestational age 3218 grams, 68%ile, [id number]. All visualized interval fetal anatomy appears normal. Amniotic fluid index is normal at 21 cm. Please see full accompanying ultrasound report for full details of examination.     NST: reactive     DISCUSSION:   1. I reviewed the normal ultrasound results as above.  Fetal growth is normal and MARK is also normal.  2. I recommend she increase her morning Lantus to 36 units in the morning and decrease her bedtime Lantus to 28 units.  Please call if any severely elevated or low blood sugars.  3. Continue twice-weekly  surveillance.  4. Recommend delivery 30 to 39 weeks gestation.  She is scheduled for delivery on .  5. SROM/Labor precautions reviewed  6. Fetal kick counts reviewed.    RECOMMENDATIONS  1. Continue twice-weekly  surveillance.  2. Recommend delivery at 38 to 39 weeks gestation.  She is scheduled for delivery in .   3. Increase Lantus to 36 units in the morning and decreased to 28 units in the evening.    Thank you for the opportunity to assist with Elias's obstetrical care. Please do not hesitate to contact me if you have any questions.    A letter regarding this visit has been sent to the referring provider.      Geeta Smith DO  2021  10:11 AM      home

## 2021-09-08 NOTE — PROGRESS NOTE ADULT - SUBJECTIVE AND OBJECTIVE BOX
ED MD HPI:  86 y/o female PMH MI CAD s/p CABG x 4, HTN, HLD and AAA, presents with one week history of nausea and now two days of vomiting , patient was recently discharged from  hospital after being treated for ARF and  bacteremia and sepsis 2/2 left 8.8 midureteral calculus and post obstructive ARF last month, patient underwent Cystourethroscopy with insertion of stent and UTI with Klebsiella pneumoniae, was sent home on oral antibiotics and they finished about two weeks ago.  Today denied any flank pain- admits to dysuria and frequency and nausea nd vomiting in ED is found to have positive UA and ARF. States she has been taking her medications regularly despite her nausea and only today since being in ED she hasn't taken her home meds   Now denied any fever or chills denied any cough or sick contacts  (29 Sep 2020 19:26)      SUBJECTIVE & OBJECTIVE: Pt seen and examined at bedside.   no overnight events.   eating well     Denies chills, N/V, dizziness, HA, cough, CP, palpitations, SOB, abdominal pain, dysuria.     PHYSICAL EXAM:  Vital Signs Last 24 Hrs  T(C): 36.5 (07 Oct 2020 11:07), Max: 36.7 (06 Oct 2020 16:32)  T(F): 97.7 (07 Oct 2020 11:07), Max: 98.1 (06 Oct 2020 16:32)  HR: 73 (07 Oct 2020 11:07) (65 - 74)  BP: 132/66 (07 Oct 2020 11:07) (132/66 - 151/84)  BP(mean): --  RR: 18 (07 Oct 2020 11:07) (18 - 18)  SpO2: 95% (07 Oct 2020 11:07) (95% - 97%)    GENERAL: NAD, speaking in full sentences   HEAD:  Atraumatic, Normocephalic  EYES: EOMI, PERRLA, conjunctiva and sclera clear  ENMT: Moist mucous membranes  NECK: Supple, No JVD  NERVOUS SYSTEM:  Alert & Oriented X3, strength b/l UE 5/5 ; strength b/l LE 0-1/5  CHEST/LUNG: CTAB  HEART: Regular rate and rhythm; No murmurs, rubs, or gallops  ABDOMEN: Soft, Nontender, Nondistended; Bowel sounds present  EXTREMITIES:  2+ Peripheral Pulses b/l, No clubbing, cyanosis, or edema b/l   b/l foot drop       MEDICATIONS  (STANDING):  amLODIPine   Tablet 10 milliGRAM(s) Oral daily  aspirin enteric coated 81 milliGRAM(s) Oral daily  heparin   Injectable 5000 Unit(s) SubCutaneous every 8 hours  piperacillin/tazobactam IVPB.. 3.375 Gram(s) IV Intermittent every 8 hours  simvastatin 20 milliGRAM(s) Oral at bedtime    MEDICATIONS  (PRN):  bisacodyl 5 milliGRAM(s) Oral every 12 hours PRN Constipation  ondansetron Injectable 4 milliGRAM(s) IV Push three times a day PRN Nausea and/or Vomiting      LABS:                 Color: Yellow / Appearance: Clear / S.015 / pH: x  Gluc: x / Ketone: Negative  / Bili: Negative / Urobili: Negative mg/dL   Blood: x / Protein: 15 mg/dL / Nitrite: Negative   Leuk Esterase: Moderate / RBC: 25-50 /HPF / WBC 6-10   Sq Epi: x / Non Sq Epi: Few / Bacteria: Few        RECENT CULTURES:  Culture - Urine (20 @ 15:00)    Specimen Source: .Urine Clean Catch (Midstream)    Culture Results:   <10,000 CFU/mL Normal Urogenital Ivon          RADIOLOGY & ADDITIONAL TESTS:  < from: Xray Chest 1 View- PORTABLE-Urgent (20 @ 13:05) >  INTERPRETATION:  History: Vomiting    Portable chest x-ray is compared to 2020.    Studies rotated. Sternal wires and mediastinal clips are again seen. The heart is not enlarged. The lungs are clear. There is osteopenia of the bony structures.    Impression: Pacemaker. CABG. No active disease.    < end of copied text >    < from: Xray Ankle 2 Views, Left (20 @ 17:48) >  INTERPRETATION:  Left ankle. Patient has local pain. 2 views.    There is an inferior calcaneal spur. The ankle is relatively free of degeneration.    No bone destruction or fracture is evident.    IMPRESSION: No acute finding.    < end of copied text >    < from: CT Lumbar Spine No Cont (10.01.20 @ 17:22) >  INTERPRETATION:  CLINICAL INFORMATION: Leg Weakness. left leg weakness. .    TECHNIQUE: Noncontrast CT scan of the lumbar spine was performed. Thin section axialimages were obtained with sagittal and coronal reformations.    COMPARISON: CT abdomen and pelvis 10/6/2018    FINDINGS:    Lumbar levoscoliosis. Grade 1 anterolisthesis at L4-L5. Vertebral body heights are within normal limits. Multilevel intervertebral disc height loss with vacuum disc phenomenon. Multilevel disc bulges. Multilevel bilateral neuroforaminal narrowing.    Stent graft repair of abdominal aortic aneurysm. Left nephroureteral stent. Left renal hemorrhagic cyst. Large amount of rectal stool with presacral fat stranding suggesting stercoral colitis.    There is no prevertebral soft tissue swelling. The paraspinal soft tissues are unremarkable.      IMPRESSION:  1.  No evidence for acute displaced fracture or malalignment.  2.  Large amount of rectal stool with presacral fat stranding suggesting stercoral colitis.    < end of copied text >    < from: CT Head No Cont (20 @ 17:54) >  INTERPRETATION:  CT head without IV contrast    CLINICAL INFORMATION: Unable to ambulate    TECHNIQUE: Contiguous axial 5 mm sections were obtained through the head. Sagittal and coronal 2-D reformatted images were also obtained.   This scan was performed using automatic exposure control (radiation dose reduction software) to obtain a diagnostic image quality scan with patient dose as low as reasonably achievable.    FINDINGS:   No previous examinations are available for review.    The brain demonstrates moderate periventricular and deep white matter ischemia. Tiny old lacunar infarction is seen in the LEFT caudate nucleus.   No acute cerebral cortical infarct is seen.  No intracranial hemorrhage is found.  No mass effect is found in the brain.    The ventricles, sulci and basal cisterns show mild cerebellar atrophy.    The orbits are unremarkable.  The paranasal sinuses are clear.  The nasal cavity appears intact.  The nasopharynx is symmetric.  The central skull base, petrous temporal bones and calvarium remain intact.      IMPRESSION:   moderate periventricular and deep white matter ischemia. Tiny old lacunar infarction is seen in the LEFT caudate nucleus. Mild cerebellar atrophy.    < end of copied text >      < from: EEG Awake or Drowsy (10.01.20 @ 13:18) >  PROCEDURE DATE:  10/01/2020        INTERPRETATION:  The background activity contains in the  alpha range 7-8 Hz bilaterally symmetrical over the posterior region ranging from 20/30 microvolt activity noticed most of the recording the theta and alpha activity noticed during the recording there are some suspicious sharp and spike waves noticed. During photic station abnormalities noticed hyperventilation was not performed    Impression abnormal record because ofthe suspicious sharp and spike waves noticed clinical correlation is recommended for seizures.      < end of copied text >    < from: CT Abdomen and Pelvis w/ Oral Cont (10.05.20 @ 15:20) >  INTERPRETATION:  CLINICAL INFORMATION: Constipation    COMPARISON: 2020    PROCEDURE:  CT of the Abdomen and Pelvis was performed without intravenous contrast.  Intravenous contrast: None.  Oral contrast: positive contrast was administered.  Sagittal and coronal reformats were performed.    FINDINGS:  LOWER CHEST: Status post sternotomy. Basilar atelectasis. Partially imaged cardiac leads. Coronary artery calcifications.    Please note that evaluation of the abdominal organs and vascular structures is limited by lack of intravenous contrast.    LIVER: Hepatic hypodensities, possibly cysts.  BILE DUCTS: Normal caliber.  GALLBLADDER: Within normal limits.  SPLEEN: Within normal limits.  PANCREAS: Within normal limits.  ADRENALS: Within normal limits.  KIDNEYS/URETERS: Nonobstructive left intrarenal calculi measuring up to 6 mm. No hydronephrosis. Left ureteral stent.    BLADDER: Small amount of intravesical gas, possibly iatrogenic. Right posterolateral diverticulum.  REPRODUCTIVE ORGANS: No adnexal mass.    BOWEL: No bowel obstruction. Appendix is normal. Colonic diverticulosis. Rectum distended by stool. Rectal wall thickening.  PERITONEUM: No ascites.  VESSELS: Abdominal aortic aneurysm (native sac diameter 4.1 cm, stable) status post endovascular repair. Right internal iliac artery embolization coils.  RETROPERITONEUM/LYMPH NODES: No lymphadenopathy.  ABDOMINAL WALL: Bilateral groin surgical clips.  BONES: Degenerative changes.    IMPRESSION:  Rectum distended by stool with wall thickening, compatible with stercoral colitis.  Otherwise, no bowel obstruction.    < end of copied text >          Imaging Personally Reviewed:  [ ] YES  [ ] NO    Consultant(s) Notes Reviewed:  [x] YES  [ ] NO    Care Discussed with Consultants/Other Providers [x] YES  [ ] NO    Care discussed in detail with patient.  All questions and concerns addressed.

## 2021-09-27 NOTE — BRIEF OPERATIVE NOTE - PRIMARY SURGEON
Hansel Carcamo MD PAST MEDICAL HISTORY:  COVID-19 vaccine series completed moderna 3/2021    Hiatal hernia with GERD     HLD (hyperlipidemia)     Hypothyroid     OBDULIO (obstructive sleep apnea) CPAP at night

## 2021-12-26 NOTE — H&P ADULT - PROBLEM SELECTOR PROBLEM 1
3 y.o. male presents to ER RWR 04/RWR 04   Chief Complaint   Patient presents with    General Illness     Cough, congestion and fever began this morning   . No acute distress noted.  
Proctocolitis

## 2022-04-05 NOTE — PROGRESS NOTE ADULT - SUBJECTIVE AND OBJECTIVE BOX
Patient Education     Discharge Instructions for Cellulitis   You have been diagnosed with cellulitis. This is an infection in the deepest layer of the skin and tissue beneath the skin. In some cases, the infection also affects the muscle. Cellulitis is caused by bacteria. The bacteria can enter the body through broken skin. This can happen with a cut, scratch, animal bite, or an insect bite that has been scratched. You may have been treated in the hospital with antibiotics and fluids. You will likely be given a prescription for antibiotics to take at home. This sheet will help you take care of yourself at home.  Home care  When you are home:  · Take the prescribed antibiotic medicine you are given as directed until it is gone. Take it even if you feel better. It treats the infection and stops it from returning. Not taking all the medicine can make future infections hard to treat.  · Keep the infected area clean.  · When possible, raise the infected area above the level of your heart. This helps keep swelling down.  · Talk with your healthcare provider if you are in pain. Ask what kind of over-the-counter medicine you can take for pain.  · Apply clean bandages as advised.  · Take your temperature once a day for a week.  · Wash your hands often to prevent spreading the infection.  In the future, wash your hands before and after you touch cuts, scratches, or bandages. This will help prevent infection.   When to call your healthcare provider  Call your healthcare provider right away if you have any of the following:  · Trouble or pain when moving the joints above or below the infected area  · Discharge or pus draining from the area  · Fever of 100.4°F (38°C) or higher, or as directed by your healthcare provider  · Pain that gets worse in or around the infected   · Redness that gets worse in or around the infected area, particularly if the area of redness expands to a wider area  · Shaking chills  · Swelling of the  Dr. Headley  Office [phone]  Cell [phone]  [facility]  Cell [phone]      Patient is a 87y old  Female who presents with a chief complaint of increased weakkness (28 Aug 2020 18:56)      Patient seen and examined at bedside. No chest pain/sob    VITALS:  T(F): 98.4 (08-29-20 @ 11:10), Max: 98.9 (08-29-20 @ 00:05)  HR: 65 (08-29-20 @ 11:10)  BP: 139/100 (08-29-20 @ 11:10)  RR: 18 (08-29-20 @ 11:10)  SpO2: 91% (08-29-20 @ 11:10)  Wt(kg): --    08-28 @ 07:01  -  08-29 @ 07:00  --------------------------------------------------------  IN: 472 mL / OUT: 1250 mL / NET: -778 mL    08-29 @ 07:01  -  08-29 @ 11:28  --------------------------------------------------------  IN: 240 mL / OUT: 0 mL / NET: 240 mL          PHYSICAL EXAM:  Constitutional: NAD  Neck: No JVD  Respiratory: CTAB, no wheezes, rales or rhonchi  Cardiovascular: S1, S2, RRR  Gastrointestinal: BS+, soft, NT/ND  Extremities: No peripheral edema    Hospital Medications:   MEDICATIONS  (STANDING):  aspirin enteric coated 81 milliGRAM(s) Oral daily  cefTRIAXone   IVPB      cefTRIAXone   IVPB 1000 milliGRAM(s) IV Intermittent every 24 hours  enoxaparin Injectable 30 milliGRAM(s) SubCutaneous daily  mirtazapine 7.5 milliGRAM(s) Oral at bedtime  pantoprazole    Tablet 40 milliGRAM(s) Oral before breakfast  potassium chloride    Tablet ER 20 milliEquivalent(s) Oral once  simvastatin 20 milliGRAM(s) Oral at bedtime      LABS:  08-29    140  |  110<H>  |  26<H>  ----------------------------<  75  3.4<L>   |  24  |  1.15    Ca    8.2<L>      29 Aug 2020 07:28  Phos  2.8     08-29  Mg     2.2     08-29    TPro  7.2  /  Alb  2.6<L>  /  TBili  0.7  /  DBili      /  AST  31  /  ALT  17  /  AlkPhos  54  08-28    Creatinine Trend: 1.15 <--, 1.54 <--, 1.47 <--, 1.08 <--, 1.22 <--    Phosphorus Level, Serum: 2.8 mg/dL (08-29 @ 07:28)                              11.5   9.60  )-----------( 202      ( 29 Aug 2020 07:28 )             36.0     Urine Studies:  Urinalysis - [08-25-20 @ 12:40]      Color Yellow / Appearance very cloudy / SG 1.025 / pH 5.0      Gluc Negative / Ketone Moderate  / Bili Negative / Urobili 1       Blood Large / Protein 500 / Leuk Est Moderate / Nitrite Positive      RBC 6-10 / WBC >50 / Hyaline  / Gran  / Sq Epi  / Non Sq Epi Few / Bacteria Many            RADIOLOGY & ADDITIONAL STUDIES: infected area  · Vomiting  Leslye last reviewed this educational content on 11/1/2019  © 0043-1781 [employer]. [address], [place] [address]. All rights reserved. This information is not intended as a substitute for professional medical care. Always follow your healthcare professional's instructions.

## 2022-04-18 NOTE — H&P ADULT - NSHPPOAPRESSUREULCER_GEN_ALL_CORE
Edema- I suggested avoidance of added salt,avoidance of NSAID's, unless advised or ordered  and suggested Limb elevation and abbey hose use   no

## 2022-08-09 NOTE — ED PROVIDER NOTE - CROS ED MARK PERT SYS NEG
Follow up 8/11/22 in [facility] w/Dr Nolan Childress
Pt called in to schedule a hospital f/u. Pt was in the hospital for   Terminal ileitis of small intestine, other complication ([facility] [address]    Crohn's colitis, other complication.  Writer offered pt an appt tomorrow 08/ 09/22 at the [place] location but pt is unable to get transportation there and requesting the [facility] office next available appt 09/30/22 pt is wanting to know if she can be seen any sooner at the [facility] location
willa all pertinent systems negative

## 2022-09-19 NOTE — PHYSICAL THERAPY INITIAL EVALUATION ADULT - PERSONAL SAFETY AND JUDGMENT, REHAB EVAL
What Is The Reason For Today's Visit?: Annual Full Body Skin Examination with No Concerns
What Is The Reason For Today's Visit? (Being Monitored For X): the re-examination of lesions previously examined
intact

## 2022-12-13 NOTE — PHYSICAL EXAM
0 = swallows foods/liquids without difficulty [No Acute Distress] : no acute distress [No Respiratory Distress] : no respiratory distress [Clear to Auscultation] : lungs were clear to auscultation bilaterally [Normal Rate] : heart rate was normal  [Regular Rhythm] : with a regular rhythm [Normal Bowel Sounds] : normal bowel sounds [Non Tender] : non-tender [No Joint Swelling] : no joint swelling seen [de-identified] : speaks minimally but answers questions appropriately [de-identified] : Left canal occluded by cerumen. Unable to tolerate disimpaction [de-identified] : no murmur, no edema [de-identified] : moves all extrmeities [de-identified] : oriented x2

## 2022-12-22 NOTE — ED ADULT TRIAGE NOTE - AS O2 DELIVERY
Patient asleep in bed. Respirations even and unlabored at rest. Oxygen at 1 liter. Patient febrile at 100.7. Bed alarm in place. room air

## 2022-12-28 NOTE — ED PROVIDER NOTE - ENMT NEGATIVE STATEMENT, MLM
warm Ears: no ear pain and no hearing problems.Nose: no nasal congestion and no nasal drainage.Mouth/Throat: no dysphagia, no hoarseness and no throat pain.Neck: no lumps, no pain, no stiffness and no swollen glands.

## 2023-06-18 NOTE — H&P ADULT - BIRTH SEX
Goal Outcome Evaluation:  Pt has intermittent confusion. Alert and oriented x4 at first, when asked what day it was the second time, she became tearful because she could not remember the month or day. Pt is on 4 L of oxygen. Pt denies pain. IV Zosyn administered. Voiding with purwick. Mag & K protocols.   Female

## 2023-06-19 NOTE — ED PROVIDER NOTE - CHIEF COMPLAINT
The patient is a 87y Female complaining of vomiting. Skin Substitute Text: The defect edges were debeveled with a #15 scalpel blade.  Given the location of the defect, shape of the defect and the proximity to free margins a skin substitute graft was deemed most appropriate.  The graft material was trimmed to fit the size of the defect. The graft was then placed in the primary defect and oriented appropriately.

## 2023-08-29 NOTE — DISCHARGE NOTE PROVIDER - NPI NUMBER (FOR SYSADMIN USE ONLY) :
[UNKNOWN] [[id number]] Minoxidil Counseling: Minoxidil is a topical medication which can increase blood flow where it is applied. It is uncertain how this medication increases hair growth. Side effects are uncommon and include stinging and allergic reactions.

## 2023-08-29 NOTE — CONSULT NOTE ADULT - SUBJECTIVE AND OBJECTIVE BOX
[facility]  Division of Infectious Diseases  [phone]    MITCHEL MULLINS  87y, Female  [id number]    HPI--  87F with multiple medical issues presents with 1-2 days of fevers, chills, rigors, nausea, vomiting, and back pain. Patient also felt general weakness. She noted blood in her urine for a day or 2 prior to the onset of her other symptoms. No dysuria or other urinary symptoms.     Here noted to have leukocytosis and abnormal urinalysis. CT A/P with obstructive uropathy due to L ureteral calculus, fecal impaction, and changes related to her prior vascular surgery. Patient underwent cysto/stent emergently on .     Patient now feeling much better.     PMH/PSH--  CAD (coronary artery disease)  AAA (abdominal aortic aneurysm)  Myocardial infarction  S/P CABG x 4  HLD (hyperlipidemia)  Benign essential HTN  S/P CABG x 4      Allergies--  No Known Allergies      Medications--  Antibiotics: piperacillin/tazobactam IVPB.. 3.375 Gram(s) IV Intermittent every 8 hours    Immunologic:   Other: aspirin enteric coated  enoxaparin Injectable  mirtazapine  pantoprazole    Tablet  simvastatin  sodium chloride 0.9%.    Antimicrobials last 90 days per EMR: MEDICATIONS  (STANDING):    cefTRIAXone   IVPB   100 mL/Hr IV Intermittent (20 @ 13:22)    piperacillin/tazobactam IVPB.   200 mL/Hr IV Intermittent (20 @ 10:40)    piperacillin/tazobactam IVPB..   25 mL/Hr IV Intermittent (20 @ 13:15)   25 mL/Hr IV Intermittent (20 @ 06:02)   25 mL/Hr IV Intermittent (20 @ 22:35)   25 mL/Hr IV Intermittent (20 @ 18:09)        Social History--  EtOH: denies   Tobacco: denies active, distant prior.   Drug Use: denies     Family/Marital History--  Parents  of old age  Not relevant to present concern.      Review of Systems:  A >=10-point review of systems was obtained.   Review of systems otherwise negative except as previously noted.    Physical Exam--  Vital Signs: T(F): 97.8 (20 @ 16:41), Max: 98 (20 @ 11:45)  HR: 63 (20 @ 16:41)  BP: 157/91 (20 @ 16:41)  RR: 18 (20 @ 16:41)  SpO2: 93% (20 @ 16:41)  Wt(kg): --  General: Nontoxic-appearing Female in no acute distress.  HEENT: AT/NC. PERRL. EOMI. Anicteric. Conjunctiva pink and moist. Oropharynx clear.  Neck: Not rigid. No sense of mass.  Nodes: None palpable.  Lungs: Clear bilaterally without rales, wheezing or rhonchi  Heart: Regular rate and rhythm. No Murmur. No rub. No gallop. No palpable thrill.  Abdomen: Bowel sounds present and normoactive. Soft. Nondistended. Nontender. No sense of mass. No organomegaly.  Back: No spinal tenderness. No costovertebral angle tenderness.   Extremities: No cyanosis or clubbing. No edema.   Skin: Warm. Dry. Good turgor. No rash. No vasculitic stigmata.  Psychiatric: Appropriate affect and mood for situation.         Laboratory & Imaging Data--  CBC                        10.9   12.94 )-----------( 163      ( 27 Aug 2020 07:51 )             32.2     WBC Count: 12.94 K/uL (20 @ 07:51)  WBC Count: 10.63 K/uL (20 @ 01:19)  WBC Count: 15.01 K/uL (20 @ 12:05)        Chemistries      141  |  109<H>  |  32<H>  ----------------------------<  118<H>  4.8   |  24  |  1.47<H>    Creatinine, Serum: 1.08 mg/dL (20 @ 01:19)  Creatinine, Serum: 1.22 mg/dL (20 @ 12:05)  Creatinine, Serum: 0.80 mg/dL (10.09.18 @ 15:57)    Ca    8.6      27 Aug 2020 07:51  Mg     2.1         TPro  6.7  /  Alb  2.5<L>  /  TBili  0.8  /  DBili  x   /  AST  38<H>  /  ALT  19  /  AlkPhos  62      Urinalysis (20 @ 12:40)    Blood, Urine: Large    Glucose Qualitative, Urine: Negative mg/dL    pH Urine: 5.0    Color: Yellow    Urine Appearance: very cloudy    Bilirubin: Negative    Ketone - Urine: Moderate    Specific Gravity: 1.025    Protein, Urine: 500 mg/dL    Urobilinogen: 1 mg/dL    Nitrite: Positive    Leukocyte Esterase Concentration: Moderate  Urine Microscopic-Add On (NC) (20 @ 12:40)    Comment - Urine: WBC clumps    Epithelial Cells: Few    Bacteria: Many    Red Blood Cell - Urine: 6-10 /HPF    White Blood Cell - Urine: >50        Culture Data    Culture - Blood (collected 26 Aug 2020 11:14)  Source: .Blood Blood-Peripheral  Preliminary Report (27 Aug 2020 12:01):    No growth to date.    Culture - Urine (collected 26 Aug 2020 09:25)  Source: .Urine urine from left kidney c/s  Preliminary Report (27 Aug 2020 11:42):    Numerous Gram Negative Rods    Culture - Urine (collected 26 Aug 2020 09:25)  Source: .Urine bladder urine free  Preliminary Report (27 Aug 2020 14:15):    Numerous Klebsiella pneumoniae    Numerous Escherichia coli    Culture - Urine (collected 25 Aug 2020 17:44)  Source: .Urine Catheterized  Preliminary Report (27 Aug 2020 11:14):    >100,000 CFU/ml Escherichia coli    Culture - Blood (collected 25 Aug 2020 16:31)  Source: .Blood Blood-Peripheral  Gram Stain (prelim) (26 Aug 2020 11:21):    Growth in aerobic bottle: Gram Negative Rods  Preliminary Report (27 Aug 2020 11:11):    Growth in aerobic bottle: Klebsiella pneumoniae    See previous culture [id number]    Culture - Blood (collected 25 Aug 2020 16:31)  Source: .Blood Blood-Peripheral  Gram Stain (prelim) (26 Aug 2020 04:42):    Growth in aerobic bottle: Gram Negative Rods  Preliminary Report (27 Aug 2020 09:00):    Growth in aerobic bottle: Klebsiella pneumoniae    "Due to technical problems, Proteus sp. will Not be reported as part of    the BCID panel until further notice"    ***Blood Panel PCR results on this specimen are available    approximately 3 hours after the Gram stain result.***    Gram stain, PCR, and/or culture results may not always    correspond due to difference in methodologies.    ************************************************************    This PCR assay was performed using [employer].    The following targets are tested for: Enterococcus,    vancomycin resistant enterococci, Listeria monocytogenes,    coagulase negative staphylococci, S. aureus,    methicillin resistant S. aureus, Streptococcus agalactiae    (Group B), S. pneumoniae, S. pyogenes (Group A),    Acinetobacter baumannii, Enterobacter cloacae, E. coli,    Klebsiella oxytoca, K. pneumoniae, Proteus sp.,    Serratia marcescens, Haemophilus influenzae,    Neisseria meningitidis, Pseudomonas aeruginosa, Candida    albicans, C. glabrata, C krusei, C parapsilosis,    C. tropicalis and the KPC resistance gene.  Organism: Blood Culture PCR (26 Aug 2020 06:39)  Organism: Blood Culture PCR (26 Aug 2020 06:39)    < from: CT Abdomen and Pelvis No Cont (20 @ 13:49) >  EXAM:  CT ABDOMEN AND PELVIS                        PROCEDURE DATE:  2020    INTERPRETATION:  CLINICAL INDICATION: 87 years  Female with abdominal pain.  COMPARISON: Contrast-enhanced CT abdomen and pelvis 10/6/2018  PROCEDURE:  CT of the Abdomen and Pelvis was performed without intravenous contrast.  Intravenous contrast: None.  Oral contrast: None.  Sagittal and coronal reformats were performed.  LIMITATIONS: Evaluation of the solid organs, vascular structures and GI tract is limited without oral and IV contrast.. Motion artifact.    FINDINGS:  LOWER CHEST: Mild bibasilar dependent changes. Trace bilateral pleural effusions. Mild cardiomegaly. Pacemaker leads in the heart. Coronary atherosclerosis.  LIVER: Multiplehepatic cysts. Some of the largest cysts are smaller than on the prior study.  BILE DUCTS: Normal caliber.  GALLBLADDER: Within normal limits.  SPLEEN: Within normal limits.  PANCREAS: Within normal limits.  ADRENALS: Within normal limits.  KIDNEYS/URETERS: New mild left hydroureteronephrosis down to the level of a 8.8 mm mid ureteral calculus at the level of the L2-3 disc. Mild left perinephric edema. No right hydroureteronephrosis or calculus.  BLADDER: Decompressed  REPRODUCTIVE ORGANS: Atrophic uterus deviated to the left by the distended rectum.    BOWEL: No bowel obstruction. Appendix is not visualized. No evidence of inflammation in the pericecal region.. Rectal fecal impaction with the rectum measuring up to 7.6 cm in diameter. Redundant sigmoid colon. The proximal sigmoid colon is distended measuring up to 6.7 cm.  PERITONEUM: No ascites.  VESSELS: Embolization coils reidentified in the right internal iliac artery. Aortobifemoral stent graft reidentified. Native aortic aneurysmmeasures up to 4.5 cm in diameter.  RETROPERITONEUM/LYMPH NODES: No lymphadenopathy.  ABDOMINAL WALL: Bilateral inguinal surgical clips.  BONES: Moderate degenerative changes.    IMPRESSION:  Mild left hydroureteronephrosis down to level of an 8.8 mmmid ureteral calculus. Left perinephric stranding related to obstruction. Correlate clinically for pyelonephritis.  Rectal fecal impaction. Redundant sigmoid colon is mildly distended  Aortobifemoral stent graft reidentified. Right internal iliacartery embolization coils reidentified.  Cardiomegaly. Pacemaker. Trace bilateral pleural effusions.  MICHELLE LIU M.D., ATTENDING RADIOLOGIST  This document has been electronically signed. Aug 25 2020  2:38PM    < end of copied text > Bactrim Counseling:  I discussed with the patient the risks of sulfa antibiotics including but not limited to GI upset, allergic reaction, drug rash, diarrhea, dizziness, photosensitivity, and yeast infections.  Rarely, more serious reactions can occur including but not limited to aplastic anemia, agranulocytosis, methemoglobinemia, blood dyscrasias, liver or kidney failure, lung infiltrates or desquamative/blistering drug rashes.

## 2023-08-31 NOTE — ED PROVIDER NOTE - NS ED MD DISPO ISOLATION TYPES
None Cyclophosphamide Pregnancy And Lactation Text: This medication is Pregnancy Category D and it isn't considered safe during pregnancy. This medication is excreted in breast milk.

## 2024-04-11 NOTE — PROVIDER CONTACT NOTE (CRITICAL VALUE NOTIFICATION) - PERSON GIVING RESULT:
Addended by: MOLLY BENOIT on: 4/11/2024 09:21 AM     Modules accepted: Orders    
Dianne Goldberg/ Lab
Blanche Merrill
Loan

## 2024-05-30 NOTE — ED PROCEDURE NOTE - NS ED PROC PERFORMED BY1 FT
Action May 30, 2024 Jtv 9:13 AM    Action Taken CSS sent an urgent request to [facility] for images and [facility] for records.      Action May 31, 2024 Jtv 1:18 PM    Action Taken CSS received and resolved images from [facility].    MEDICAL RECORDS REQUEST   [facility]  [facility]  [address] [place] [address]  PHONE: [phone]  Fax: [phone]        FUTURE VISIT INFORMATION                                                   Juliana Jackson, : 1958 scheduled for future visit at [facility]    APPOINTMENT INFORMATION:  Date: 2024  Provider:  Cristobal Porras MD  Reason for Visit/Diagnosis: RENAL CYST    REFERRAL INFORMATION:  Referring provider:  Cindy Quiroga MD @ [facility]       RECORDS REQUESTED FOR VISIT                                                     NOTES  STATUS/DETAILS   OFFICE NOTE from referring provider  in process   OFFICE NOTE from other specialist  yes   OPERATIVE REPORT  yes   MEDICATION LIST  yes   LABS     URINALYSIS ([facility])  yes   URINE CYTOLOGY  yes, 2024   IMAGES  yes, [facility]  2024 -- CT ABD PELVIS UROGRAM  2024 -- CT ABD PELVIS STONE     PRE-VISIT CHECKLIST      Joint diagnostic appointment coordinated correctly          (ensure right order & amount of time) Yes   RECORD COLLECTION COMPLETE yes      Dr. Archibald

## 2024-11-01 NOTE — ED ADULT NURSE REASSESSMENT NOTE - NS ED NURSE REASSESS COMMENT FT1
Goal Outcome Evaluation:  Plan of Care Reviewed With: patient           Outcome Evaluation: Pt complained of pain once during the shift, which was managed with oral PRN medication. VSS. PT did not ambulate. Pt is awaiting rehab placement.                              Patient noted to have bleeding from nose due to attempts of NG tube insertion. MD Berry aware. Patient given blankets due to complaints of coldness.

## 2024-11-19 NOTE — PROGRESS NOTE ADULT - NUTRITIONAL ASSESSMENT
This patient has been assessed with a concern for Malnutrition and has been determined to have a diagnosis/diagnoses of Severe protein-calorie malnutrition.    This patient is being managed with:   Diet Mechanical Soft-  Supplement Feeding Modality:  Oral  Health Shake Cans or Servings Per Day:  1       Frequency:  Two Times a day  Entered: May 21 2021  1:54PM    Diet Mechanical Soft-  Entered: May 20 2021  2:38PM    The following pending diet order is being considered for treatment of Severe protein-calorie malnutrition:null
This patient has been assessed with a concern for Malnutrition and has been determined to have a diagnosis/diagnoses of Severe protein-calorie malnutrition.    This patient is being managed with:   Diet Mechanical Soft-  Supplement Feeding Modality:  Oral  Health Shake Cans or Servings Per Day:  1       Frequency:  Two Times a day  Entered: May 21 2021  1:54PM    
This patient has been assessed with a concern for Malnutrition and has been determined to have a diagnosis/diagnoses of Severe protein-calorie malnutrition.    This patient is being managed with:   Diet Mechanical Soft-  Supplement Feeding Modality:  Oral  Health Shake Cans or Servings Per Day:  1       Frequency:  Two Times a day  Entered: May 21 2021  1:54PM    
Alert-The patient is alert, awake and responds to voice. The patient is oriented to time, place, and person. The triage nurse is able to obtain subjective information.
